# Patient Record
Sex: MALE | Race: WHITE | NOT HISPANIC OR LATINO | Employment: UNEMPLOYED | URBAN - METROPOLITAN AREA
[De-identification: names, ages, dates, MRNs, and addresses within clinical notes are randomized per-mention and may not be internally consistent; named-entity substitution may affect disease eponyms.]

---

## 2017-03-20 ENCOUNTER — LAB CONVERSION - ENCOUNTER (OUTPATIENT)
Dept: OTHER | Facility: OTHER | Age: 62
End: 2017-03-20

## 2017-03-20 LAB
A/G RATIO (HISTORICAL): 1.5 (CALC) (ref 1–2.5)
ALBUMIN SERPL BCP-MCNC: 4.3 G/DL (ref 3.6–5.1)
ALP SERPL-CCNC: 87 U/L (ref 40–115)
ALT SERPL W P-5'-P-CCNC: 11 U/L (ref 9–46)
AST SERPL W P-5'-P-CCNC: 11 U/L (ref 10–35)
BASOPHILS # BLD AUTO: 0.4 %
BASOPHILS # BLD AUTO: 32 CELLS/UL (ref 0–200)
BILIRUB SERPL-MCNC: 0.8 MG/DL (ref 0.2–1.2)
BUN SERPL-MCNC: 10 MG/DL (ref 7–25)
BUN/CREA RATIO (HISTORICAL): ABNORMAL (CALC) (ref 6–22)
CALCIUM SERPL-MCNC: 9.4 MG/DL (ref 8.6–10.3)
CHLORIDE SERPL-SCNC: 102 MMOL/L (ref 98–110)
CHOLEST SERPL-MCNC: 218 MG/DL (ref 125–200)
CHOLEST/HDLC SERPL: 5.2 (CALC)
CO2 SERPL-SCNC: 23 MMOL/L (ref 20–31)
CREAT SERPL-MCNC: 0.88 MG/DL (ref 0.7–1.33)
CREATININE, RANDOM URINE (HISTORICAL): 66 MG/DL (ref 20–370)
DEPRECATED RDW RBC AUTO: 14.6 % (ref 11–15)
EGFR AFRICAN AMERICAN (HISTORICAL): 109 ML/MIN/1.73M2
EGFR-AMERICAN CALC (HISTORICAL): 94 ML/MIN/1.73M2
EOSINOPHIL # BLD AUTO: 277 CELLS/UL (ref 15–500)
EOSINOPHIL # BLD AUTO: 3.5 %
GAMMA GLOBULIN (HISTORICAL): 2.8 G/DL (CALC) (ref 1.9–3.7)
GLUCOSE (HISTORICAL): 150 MG/DL (ref 65–99)
HBA1C MFR BLD HPLC: 7 % OF TOTAL HGB
HCT VFR BLD AUTO: 47.1 % (ref 38.5–50)
HDLC SERPL-MCNC: 42 MG/DL
HGB BLD-MCNC: 15.4 G/DL (ref 13.2–17.1)
LDL CHOLESTEROL (HISTORICAL): 152 MG/DL (CALC)
LYMPHOCYTES # BLD AUTO: 1414 CELLS/UL (ref 850–3900)
LYMPHOCYTES # BLD AUTO: 17.9 %
MAGNESIUM, UR (HISTORICAL): 3.2 MG/DL
MCH RBC QN AUTO: 29.7 PG (ref 27–33)
MCHC RBC AUTO-ENTMCNC: 32.8 G/DL (ref 32–36)
MCV RBC AUTO: 90.6 FL (ref 80–100)
MICROALBUMIN/CREATININE RATIO (HISTORICAL): 48 MCG/MG CREAT
MONOCYTES # BLD AUTO: 474 CELLS/UL (ref 200–950)
MONOCYTES (HISTORICAL): 6 %
NEUTROPHILS # BLD AUTO: 5704 CELLS/UL (ref 1500–7800)
NEUTROPHILS # BLD AUTO: 72.2 %
NON-HDL-CHOL (CHOL-HDL) (HISTORICAL): 176 MG/DL (CALC)
PLATELET # BLD AUTO: 222 THOUSAND/UL (ref 140–400)
PMV BLD AUTO: 8.9 FL (ref 7.5–12.5)
POTASSIUM SERPL-SCNC: 4 MMOL/L (ref 3.5–5.3)
RBC # BLD AUTO: 5.2 MILLION/UL (ref 4.2–5.8)
SODIUM SERPL-SCNC: 136 MMOL/L (ref 135–146)
TOTAL PROTEIN (HISTORICAL): 7.1 G/DL (ref 6.1–8.1)
TRIGL SERPL-MCNC: 118 MG/DL
TSH SERPL DL<=0.05 MIU/L-ACNC: 0.48 MIU/L (ref 0.4–4.5)
WBC # BLD AUTO: 7.9 THOUSAND/UL (ref 3.8–10.8)

## 2017-03-22 ENCOUNTER — ALLSCRIPTS OFFICE VISIT (OUTPATIENT)
Dept: OTHER | Facility: OTHER | Age: 62
End: 2017-03-22

## 2017-06-16 DIAGNOSIS — I10 ESSENTIAL (PRIMARY) HYPERTENSION: ICD-10-CM

## 2017-06-16 DIAGNOSIS — E78.5 HYPERLIPIDEMIA: ICD-10-CM

## 2017-06-16 DIAGNOSIS — Z12.5 ENCOUNTER FOR SCREENING FOR MALIGNANT NEOPLASM OF PROSTATE: ICD-10-CM

## 2017-06-16 DIAGNOSIS — E11.9 TYPE 2 DIABETES MELLITUS WITHOUT COMPLICATIONS (HCC): ICD-10-CM

## 2018-01-12 VITALS
BODY MASS INDEX: 49.38 KG/M2 | TEMPERATURE: 97.9 F | DIASTOLIC BLOOD PRESSURE: 74 MMHG | HEIGHT: 66 IN | HEART RATE: 84 BPM | SYSTOLIC BLOOD PRESSURE: 132 MMHG | WEIGHT: 307.25 LBS

## 2018-01-13 NOTE — PROGRESS NOTES
Assessment    1  Morbid or severe obesity due to excess calories (278 01) (E66 01)   2  Type 2 diabetes mellitus (250 00) (E11 9)   3  Essential hypertension (401 9) (I10)   4  Hyperlipidemia (272 4) (E78 5)    Plan  Essential hypertension, Hyperlipidemia, Type 2 diabetes mellitus    · (1) COMPREHENSIVE METABOLIC PANEL; Status:Active; Requested for:24May2016;    · (1) HEMOGLOBIN A1C; Status:Active; Requested for:24May2016;    · (1) LIPID PANEL, FASTING; Status:Active; Requested for:24May2016;   Type 2 diabetes mellitus    · MetFORMIN HCl  MG Oral Tablet Extended Release 24 Hour; TAKE  3   TABLETS BY MOUTH ONCE DAILY   · *VB - Eye Exam; Status:Active; Requested for:15Jan2016;    · *VB-Foot Exam; Status:Complete;   Done: [id number]    Discussion/Summary    Patient presents for followup of chronic medical conditions  He is motivated to start healthy diet, exercise, lose weight  His blood pressure is well controlled with present medication regimen  His LDL is elevated, he is motivated to adjust his diet and lose weight  We will hold off new medications today, he will start fatty 3 [place] acid supplements/flaxseed oil  We will increase dose of metformin to 1500 mg daily  Patient will start vitamin D3 4000 units once a day for a month and then will use 2000 units once a day from then on  He is unable to proceed with colonoscopy due to high insurance co-pays, pt denies abdominal pain, dysuria, melena or bright red blood per rectum  His PSA is normal  No recurrences of kidney stones  Chronic low back pain/stable  I strongly advised him to schedule annual diabetic eye exam   We will repeat blood work and followup in 4-5 month  Possible side effects of new medications were reviewed with the patient/guardian today  The treatment plan was reviewed with the patient/guardian   The patient/guardian understands and agrees with the treatment plan   The patient was counseled regarding diagnostic results, instructions for management, risk factor reductions, impressions  Chief Complaint  Pt is here for a 6 month f/u appt  Pt offers no complaints  Recent BW done  All meds/allergies reviewed with pt  Patient presents for followup of hypertension, obesity, impaired glucose tolerance  He has been under a lot of stress and was not following healthy diet  He gained 20 pounds  He is not monitoring blood sugars at home  He is using over the counter vitamin D3 but is not consistent with medication  Results of recent blood work discussed with patient  Hemoglobin A1c 6 4  LDL is elevated 140  Patient is compliant with Norvasc and Cozaar for hypertension  Patient is here today for follow up of chronic conditions described in HPI  Review of Systems    Constitutional: No fever or chills, feels well, no tiredness, no recent weight gain or weight loss  Eyes: No complaints of eye pain, no red eyes, no discharge from eyes, no itchy eyes  ENT: no complaints of earache, no hearing loss, no nosebleeds, no nasal discharge, no sore throat, no hoarseness  Cardiovascular: No complaints of slow heart rate, no fast heart rate, no chest pain, no palpitations, no leg claudication, no lower extremity  Respiratory: No complaints of shortness of breath, no wheezing, no cough, no SOB on exertion, no orthopnea or PND  Gastrointestinal: No complaints of abdominal pain, no constipation, no nausea or vomiting, no diarrhea or bloody stools  Genitourinary: No complaints of dysuria, no incontinence, no hesitancy, no nocturia, no genital lesion, no testicular pain  Musculoskeletal: No complaints of arthralgia, no myalgias, no joint swelling or stiffness, no limb pain or swelling  Integumentary: No complaints of skin rash or skin lesions, no itching, no skin wound, no dry skin  Neurological: No compliants of headache, no confusion, no convulsions, no numbness or tingling, no dizziness or fainting, no limb weakness, no difficulty walking  Psychiatric: Is not suicidal, no sleep disturbances, no anxiety or depression, no change in personality, no emotional problems  Endocrine: No complaints of proptosis, no hot flashes, no muscle weakness, no erectile dysfunction, no deepening of the voice, no feelings of weakness  Hematologic/Lymphatic: No complaints of swollen glands, no swollen glands in the neck, does not bleed easily, no easy bruising  Active Problems    1  Acute maxillary sinusitis, recurrence not specified (461 0) (J01 00)   2  Dysfunction of both Eustachian tubes (381 81) (H69 83)   3  Eczema (692 9) (L30 9)   4  Essential hypertension (401 9) (I10)   5  Impacted cerumen of both ears (380 4) (H61 23)   6  Impaired fasting glucose (790 21) (R73 01)   7  Morbid or severe obesity due to excess calories (278 01) (E66 01)   8  Nephrolithiasis (592 0) (N20 0)   9  Sebaceous cyst (706 2) (L72 3)   10  Type 2 diabetes mellitus (250 00) (E11 9)   11  Vitamin D deficiency (268 9) (E55 9)    Past Medical History    1  History of Measles ([place]) (055 9)   2  History of Mumps (072 9)   3  History of Reported Prostate Antigen Blood Test   4  History of Snoring Loudly    The active problems and past medical history were reviewed and updated today  Family History    1  Family history of Diabetes Mellitus Under Control   2  Family history of Ovarian Cancer (V16 41)   3  Family history of Reported Family History Of Heart Disease   4  Family history of Type 2 Diabetes Mellitus    5  Family history of Colon Cancer (V16 0)   6  Family history of Heart Disease (V17 49)   7  Family history of Nephrolithiasis    The family history was reviewed and updated today  Social History    · Alcohol Use (History)   · Never A Smoker  The social history was reviewed and updated today  Current Meds   1  AmLODIPine Besylate 10 MG Oral Tablet; take one tablet by mouth daily; Therapy: 11Rap2344 to (Evaluate:18Jun2016)  Requested for: 43TIG2371;  Last Rx: 15XIN1039 Ordered   2  Clobetasol Propionate 0 05 % External Cream; APPLY SPARINGLY TO AFFECTED   AREA(S) 3 TIMES A DAY; Therapy: 81RJJ5695 to (Evaluate:28Ivk2411)  Requested for: 38LIR5314; Last   ZD:40YNU4042 Ordered   3  Ecotrin Low Strength 81 MG Oral Tablet Delayed Release; TAKE 1 TABLET DAILY AS   DIRECTED; Therapy: 83IHJ3643 to (Evaluate:48Mod0778); Last Rx:24Whc0409 Ordered   4  Losartan Potassium 100 MG Oral Tablet; Take 1 tablet daily; Therapy: 19HQV5175 to (Evaluate:18Jun2016)  Requested for: 93WUW6396; Last   ZQ:16RFB2119 Ordered   5  MetFORMIN HCl  MG Oral Tablet Extended Release 24 Hour; TAKE2 TABLETS BY   MOUTH ONCE DAILY; Therapy: 82MHX8055 to (Evaluate:18Jun2016)  Requested for: 21UCP6903; Last   Rx:53Vbz8479 Ordered    The medication list was reviewed and updated today  Allergies    1  No Known Drug Allergies    Vitals  Vital Signs [Data Includes: Current Encounter]    Recorded: 05YDA4019 07:33AM   Temperature 97 6 F   Heart Rate 80   Systolic 414   Diastolic 90   Height 5 ft 6 in   Weight 310 lb 2 08 oz   BMI Calculated 50 06   BSA Calculated 2 42     Physical Exam    Constitutional   General appearance: No acute distress, well appearing and well nourished  132/84 LC  Eyes   Conjunctiva and lids: No swelling, erythema, or discharge  Pulmonary   Respiratory effort: No increased work of breathing or signs of respiratory distress  Auscultation of lungs: Clear to auscultation, equal breath sounds bilaterally, no wheezes, no rales, no rhonci  Cardiovascular   Auscultation of heart: Normal rate and rhythm, normal S1 and S2, without murmurs  Examination of extremities for edema and/or varicosities: Normal     Carotid pulses: Normal     Abdomen   Abdomen: Non-tender, no masses  No abdominal bruit  Musculoskeletal   Gait and station: Normal     Neurologic   Cranial nerves: Cranial nerves 2-12 intact      Psychiatric   Orientation to person, place and time: Normal  Mood and affect: Normal     Diabetic Foot Screen: Normal        Results/Data  Results   (1) CBC/PLT/DIFF 60YHR3932 09:14AM [facility]     Test Name Result Flag Reference   WHITE BLOOD CELL COUNT 5 4 Thousand/uL  3 8-10 8   RED BLOOD CELL COUNT 5 08 Million/uL  4 20-5 80   HEMOGLOBIN 14 9 g/dL  13 2-17 1   HEMATOCRIT 45 1 %  38 5-50 0   MCV 88 9 fL  80 0-100 0   MCH 29 3 pg  27 0-33 0   MCHC 33 0 g/dL  32 0-36 0   RDW 15 3 % H 11 0-15 0   PLATELET COUNT 541 Thousand/uL  140-400   MPV 9 2 fL  7 5-11 5   ABSOLUTE NEUTROPHILS 3667 cells/uL  0729-6708   ABSOLUTE LYMPHOCYTES 1183 cells/uL  850-3900   ABSOLUTE MONOCYTES 383 cells/uL  200-950   ABSOLUTE EOSINOPHILS 157 cells/uL     ABSOLUTE BASOPHILS 11 cells/uL  0-200   NEUTROPHILS 67 9 %     LYMPHOCYTES 21 9 %     MONOCYTES 7 1 %     EOSINOPHILS 2 9 %     BASOPHILS 0 2 %       (1) COMPREHENSIVE METABOLIC PANEL [id number] 23:31MJ [facility]     Test Name Result Flag Reference   GLUCOSE 125 mg/dL H 65-99   Fasting reference interval   UREA NITROGEN (BUN) 16 mg/dL  7-25   CREATININE 0 87 mg/dL  0 70-1 33   For patients >52years of age, the reference limit  for Creatinine is approximately 13% higher for people  identified as -American  eGFR NON-AFR   AMERICAN 95 mL/min/1 73m2  > OR = 60   eGFR AFRICAN AMERICAN 110 mL/min/1 73m2  > OR = 60   BUN/CREATININE RATIO   5-68   NOT APPLICABLE (calc)   SODIUM 137 mmol/L  135-146   POTASSIUM 4 3 mmol/L  3 5-5 3   CHLORIDE 102 mmol/L     CARBON DIOXIDE 23 mmol/L  19-30   CALCIUM 9 4 mg/dL  8 6-10 3   PROTEIN, TOTAL 6 7 g/dL  6 1-8 1   ALBUMIN 4 1 g/dL  3 6-5 1   GLOBULIN 2 6 g/dL (calc)  1 9-3 7   ALBUMIN/GLOBULIN RATIO 1 6 (calc)  1 0-2 5   BILIRUBIN, TOTAL 0 6 mg/dL  0 2-1 2   ALKALINE PHOSPHATASE 66 U/L     AST 15 U/L  10-35   ALT 17 U/L  9-46     (1) LIPID PANEL, FASTING 30LOQ4724 09:14AM Bonnie Nim     Test Name Result Flag Reference   CHOLESTEROL, TOTAL 204 mg/dL H 125-200 HDL CHOLESTEROL 44 mg/dL  > OR = 40   TRIGLICERIDES 97 mg/dL  <618   LDL-CHOLESTEROL 141 mg/dL (calc) H <130   Desirable range <100 mg/dL for patients with CHD or  diabetes and <70 mg/dL for diabetic patients with  known heart disease  CHOL/HDLC RATIO 4 6 (calc)  < OR = 5 0   NON HDL CHOLESTEROL 160 mg/dL (calc) H    Target for non-HDL cholesterol is 30 mg/dL higher than   LDL cholesterol target  (Q) MICROALBUMIN, RANDOM URINE (W/CREATININE) 16JYY6717 09:14AM Trent Holt     Test Name Result Flag Reference   CREATININE, RANDOM URINE 73 mg/dL     MICROALBUMIN 0 3 mg/dL     Reference Range  Not established   MICROALBUMIN/CREATININE$RATIO, RANDOM URINE 4 mcg/mg creat  <30   The ADA defines abnormalities in albumin  excretion as follows:     Category         Result (mcg/mg creatinine)     Normal                    <30  Microalbuminuria            Clinical albuminuria   > OR = 300     The ADA recommends that at least two of three  specimens collected within a 3-6 month period be  abnormal before considering a patient to be  within a diagnostic category  (Q) TSH, 3RD GENERATION 24KVX6165 09:14AM Trent Jonathon     Test Name Result Flag Reference   TSH 0 51 mIU/L  0 40-4 50     (1) PSA, DIAGNOSTIC (FOLLOW-UP) 96HVS5107 09:14AM Trent Jonathon     Test Name Result Flag Reference   PSA, TOTAL 0 6 ng/mL  < OR = 4 0   This test was performed using the Siemens  chemiluminescent method  Values obtained from  different assay methods cannot be used  interchangeably  PSA levels, regardless of  value, should not be interpreted as absolute  evidence of the presence or absence of disease       *(Q) VITAMIN D, 25-HYDROXY, LC/MS/MS 64XCN6461 09:14AM Trent Jonathon     Test Name Result Flag Reference   VITAMIN D, 25-OH, TOTAL 25 ng/mL L    Vitamin D Status         25-OH Vitamin D:     Deficiency:                    <20 ng/mL  Insufficiency:             20 - 29 ng/mL  Optimal: > or = 30 ng/mL     For 25-OH Vitamin D testing on patients on   D2-supplementation and patients for whom quantitation   of D2 and D3 fractions is required, the QuestAssureD(TM)  25-OH VIT D, (D2,D3), LC/MS/MS is recommended: order   code 32779 (patients >2yrs)  For more information on this test, go to:  http://Tappit/faq/KKS752  (This link is being provided for   informational/educational purposes only )     (Q) HEMOGLOBIN A1c 12Jan2016 09:14AM Aung Bullock   REPORT COMMENT:  FASTING:YES     Test Name Result Flag Reference   HEMOGLOBIN A1c 6 4 % of total Hgb H <5 7   According to ADA guidelines, hemoglobin A1c <7 0%  represents optimal control in non-pregnant diabetic  patients  Different metrics may apply to specific  patient populations  Standards of Medical Care in    Diabetes Care  2013;36:s11-s66     For the purpose of screening for the presence of  diabetes  <5 7%       Consistent with the absence of diabetes  5 7-6 4%    Consistent with increased risk for diabetes              (prediabetes)  >or=6 5%    Consistent with diabetes     This assay result is consistent with a higher risk  of diabetes  Currently, no consensus exists for use of hemoglobin  A1c for diagnosis of diabetes for children  Future Appointments    Date/Time Provider Specialty Site   07/13/2016 07:30 AM PAOLA Rodriguez  Family Medicine [address] [place] [address]     Signatures   Electronically signed by :  PAOLA Calabrese ; David 15 2016  6:20AM EST                       (Author)

## 2019-03-18 ENCOUNTER — TELEPHONE (OUTPATIENT)
Dept: FAMILY MEDICINE CLINIC | Facility: CLINIC | Age: 64
End: 2019-03-18

## 2019-03-19 RX ORDER — METFORMIN HYDROCHLORIDE 500 MG/1
TABLET, EXTENDED RELEASE ORAL
COMMUNITY
Start: 2013-10-01 | End: 2019-03-20 | Stop reason: SDUPTHER

## 2019-03-19 RX ORDER — ATORVASTATIN CALCIUM 10 MG/1
1 TABLET, FILM COATED ORAL DAILY
COMMUNITY
Start: 2017-03-22 | End: 2019-03-20 | Stop reason: ALTCHOICE

## 2019-03-19 RX ORDER — ACETAMINOPHEN 500 MG
500 TABLET ORAL EVERY 6 HOURS PRN
COMMUNITY
End: 2020-09-03 | Stop reason: ALTCHOICE

## 2019-03-19 RX ORDER — AMLODIPINE BESYLATE 10 MG/1
1 TABLET ORAL DAILY
COMMUNITY
Start: 2013-09-17 | End: 2019-03-20 | Stop reason: ALTCHOICE

## 2019-03-19 RX ORDER — ASPIRIN 81 MG/1
1 TABLET ORAL DAILY
COMMUNITY
Start: 2014-06-25 | End: 2019-08-08 | Stop reason: SDUPTHER

## 2019-03-19 NOTE — TELEPHONE ENCOUNTER
Please contact patient  I received a letter from Orthopedic surgery that he is scheduled for total knee replacement on April 11th  I have not seen him in the office in 2 years  I did notice that his blood pressure was quite high during recent office with Orthopedic surgery  He is currently scheduled to see me on March 25th for left total knee replacement clearance  We need to reschedule his preop at earlier date, please check with me regarding timing, I will try to accommodate patient

## 2019-03-20 ENCOUNTER — OFFICE VISIT (OUTPATIENT)
Dept: FAMILY MEDICINE CLINIC | Facility: CLINIC | Age: 64
End: 2019-03-20
Payer: COMMERCIAL

## 2019-03-20 VITALS
HEIGHT: 65 IN | DIASTOLIC BLOOD PRESSURE: 88 MMHG | HEART RATE: 96 BPM | BODY MASS INDEX: 39.29 KG/M2 | SYSTOLIC BLOOD PRESSURE: 140 MMHG | WEIGHT: 235.8 LBS | TEMPERATURE: 98.1 F | RESPIRATION RATE: 16 BRPM

## 2019-03-20 DIAGNOSIS — E11.69 DIABETES MELLITUS TYPE 2 IN OBESE (HCC): Primary | ICD-10-CM

## 2019-03-20 DIAGNOSIS — E66.01 SEVERE OBESITY (HCC): ICD-10-CM

## 2019-03-20 DIAGNOSIS — Z12.5 ENCOUNTER FOR PROSTATE CANCER SCREENING: ICD-10-CM

## 2019-03-20 DIAGNOSIS — M17.12 OSTEOARTHRITIS OF LEFT KNEE, UNSPECIFIED OSTEOARTHRITIS TYPE: ICD-10-CM

## 2019-03-20 DIAGNOSIS — B35.3 TINEA PEDIS OF BOTH FEET: ICD-10-CM

## 2019-03-20 DIAGNOSIS — I10 ESSENTIAL HYPERTENSION: ICD-10-CM

## 2019-03-20 DIAGNOSIS — Z01.818 PRE-OP EXAM: ICD-10-CM

## 2019-03-20 DIAGNOSIS — R63.4 WEIGHT LOSS, NON-INTENTIONAL: ICD-10-CM

## 2019-03-20 DIAGNOSIS — Z11.59 NEED FOR HEPATITIS C SCREENING TEST: ICD-10-CM

## 2019-03-20 DIAGNOSIS — E78.49 OTHER HYPERLIPIDEMIA: ICD-10-CM

## 2019-03-20 DIAGNOSIS — E66.9 DIABETES MELLITUS TYPE 2 IN OBESE (HCC): Primary | ICD-10-CM

## 2019-03-20 DIAGNOSIS — Z28.21 REFUSED INFLUENZA VACCINE: ICD-10-CM

## 2019-03-20 LAB
SL AMB  POCT GLUCOSE, UA: 2000
SL AMB LEUKOCYTE ESTERASE,UA: NORMAL
SL AMB POCT BILIRUBIN,UA: NORMAL
SL AMB POCT BLOOD,UA: NORMAL
SL AMB POCT CLARITY,UA: CLEAR
SL AMB POCT COLOR,UA: YELLOW
SL AMB POCT KETONES,UA: NORMAL
SL AMB POCT NITRITE,UA: NORMAL
SL AMB POCT PH,UA: 5
SL AMB POCT SPECIFIC GRAVITY,UA: 1
SL AMB POCT URINE PROTEIN: NORMAL
SL AMB POCT UROBILINOGEN: 0.2

## 2019-03-20 PROCEDURE — 4010F ACE/ARB THERAPY RXD/TAKEN: CPT | Performed by: FAMILY MEDICINE

## 2019-03-20 PROCEDURE — 99215 OFFICE O/P EST HI 40 MIN: CPT | Performed by: FAMILY MEDICINE

## 2019-03-20 PROCEDURE — 81003 URINALYSIS AUTO W/O SCOPE: CPT | Performed by: FAMILY MEDICINE

## 2019-03-20 PROCEDURE — 3725F SCREEN DEPRESSION PERFORMED: CPT | Performed by: FAMILY MEDICINE

## 2019-03-20 RX ORDER — KETOCONAZOLE 20 MG/G
CREAM TOPICAL
Qty: 60 G | Refills: 1 | Status: SHIPPED | OUTPATIENT
Start: 2019-03-20 | End: 2020-09-03 | Stop reason: ALTCHOICE

## 2019-03-20 RX ORDER — METFORMIN HYDROCHLORIDE 500 MG/1
TABLET, EXTENDED RELEASE ORAL
Qty: 270 TABLET | Refills: 1 | Status: SHIPPED | OUTPATIENT
Start: 2019-03-20 | End: 2019-04-04 | Stop reason: SDUPTHER

## 2019-03-20 RX ORDER — LISINOPRIL 10 MG/1
10 TABLET ORAL DAILY
Qty: 90 TABLET | Refills: 3 | Status: SHIPPED | OUTPATIENT
Start: 2019-03-20 | End: 2019-05-09 | Stop reason: SDUPTHER

## 2019-03-21 ENCOUNTER — TELEPHONE (OUTPATIENT)
Dept: FAMILY MEDICINE CLINIC | Facility: CLINIC | Age: 64
End: 2019-03-21

## 2019-03-21 DIAGNOSIS — E66.9 DIABETES MELLITUS TYPE 2 IN OBESE (HCC): Primary | ICD-10-CM

## 2019-03-21 DIAGNOSIS — E11.69 DIABETES MELLITUS TYPE 2 IN OBESE (HCC): Primary | ICD-10-CM

## 2019-03-22 NOTE — TELEPHONE ENCOUNTER
Patient is aware of MD instructions, orders placed and faxed  Follow-up appointment scheduled  Orders placed and faxed

## 2019-03-22 NOTE — TELEPHONE ENCOUNTER
Please contact patient  I reviewed his blood work results and EKG performed at [facility]  Please advise him to continue metformin and lisinopril that I have prescribed  Please fax prescription to his pharmacy for Accu strips, use t i d  Number 90 and 5 refills  Please advise patient to proceed with additional blood work next week  Please advise him to start monitoring blood sugars at home a 3 times a day  Please make follow-up with me in 2 weeks    Thank you

## 2019-03-23 PROBLEM — E66.01 SEVERE OBESITY (HCC): Status: ACTIVE | Noted: 2019-03-23

## 2019-03-23 PROBLEM — M17.12 OSTEOARTHRITIS OF LEFT KNEE: Status: ACTIVE | Noted: 2019-03-23

## 2019-03-29 ENCOUNTER — TELEPHONE (OUTPATIENT)
Dept: FAMILY MEDICINE CLINIC | Facility: CLINIC | Age: 64
End: 2019-03-29

## 2019-03-29 ENCOUNTER — APPOINTMENT (OUTPATIENT)
Dept: LAB | Facility: CLINIC | Age: 64
End: 2019-03-29
Payer: COMMERCIAL

## 2019-03-29 DIAGNOSIS — Z11.59 NEED FOR HEPATITIS C SCREENING TEST: ICD-10-CM

## 2019-03-29 DIAGNOSIS — E11.69 DIABETES MELLITUS TYPE 2 IN OBESE (HCC): ICD-10-CM

## 2019-03-29 DIAGNOSIS — I10 ESSENTIAL HYPERTENSION: ICD-10-CM

## 2019-03-29 DIAGNOSIS — E66.9 DIABETES MELLITUS TYPE 2 IN OBESE (HCC): ICD-10-CM

## 2019-03-29 DIAGNOSIS — Z12.5 ENCOUNTER FOR PROSTATE CANCER SCREENING: ICD-10-CM

## 2019-03-29 LAB
ALBUMIN SERPL BCP-MCNC: 3.1 G/DL (ref 3.5–5)
ALP SERPL-CCNC: 78 U/L (ref 46–116)
ALT SERPL W P-5'-P-CCNC: 10 U/L (ref 12–78)
ANION GAP SERPL CALCULATED.3IONS-SCNC: 9 MMOL/L (ref 4–13)
AST SERPL W P-5'-P-CCNC: 8 U/L (ref 5–45)
BILIRUB SERPL-MCNC: 0.54 MG/DL (ref 0.2–1)
BUN SERPL-MCNC: 15 MG/DL (ref 5–25)
CALCIUM SERPL-MCNC: 9.3 MG/DL (ref 8.3–10.1)
CHLORIDE SERPL-SCNC: 101 MMOL/L (ref 100–108)
CHOLEST SERPL-MCNC: 174 MG/DL (ref 50–200)
CO2 SERPL-SCNC: 24 MMOL/L (ref 21–32)
CREAT SERPL-MCNC: 0.96 MG/DL (ref 0.6–1.3)
GFR SERPL CREATININE-BSD FRML MDRD: 85 ML/MIN/1.73SQ M
GLUCOSE P FAST SERPL-MCNC: 327 MG/DL (ref 65–99)
HCV AB SER QL: NORMAL
HDLC SERPL-MCNC: 31 MG/DL (ref 40–60)
LDLC SERPL CALC-MCNC: 112 MG/DL (ref 0–100)
NONHDLC SERPL-MCNC: 143 MG/DL
POTASSIUM SERPL-SCNC: 3.7 MMOL/L (ref 3.5–5.3)
PROT SERPL-MCNC: 7.8 G/DL (ref 6.4–8.2)
PSA SERPL-MCNC: 0.3 NG/ML (ref 0–4)
SODIUM SERPL-SCNC: 134 MMOL/L (ref 136–145)
TRIGL SERPL-MCNC: 153 MG/DL
TSH SERPL DL<=0.05 MIU/L-ACNC: 0.71 UIU/ML (ref 0.36–3.74)

## 2019-03-29 PROCEDURE — 86803 HEPATITIS C AB TEST: CPT

## 2019-03-29 PROCEDURE — 36415 COLL VENOUS BLD VENIPUNCTURE: CPT

## 2019-03-29 PROCEDURE — G0103 PSA SCREENING: HCPCS

## 2019-03-29 PROCEDURE — 84443 ASSAY THYROID STIM HORMONE: CPT

## 2019-03-29 PROCEDURE — 80061 LIPID PANEL: CPT

## 2019-03-29 PROCEDURE — 80053 COMPREHEN METABOLIC PANEL: CPT

## 2019-03-29 NOTE — TELEPHONE ENCOUNTER
----- Message from Jamir Cortez MD sent at 3/29/2019  5:08 PM EDT -----  Please contact patient  I received his blood work  Fasting blood sugar is still quite high at 327  Please advise him to increase dose of metformin from 3 tablets once a day to 4 tablets once a day  Rest of the blood work looks normal/stable  Will discuss further at his follow-up appointment next week    Thank you

## 2019-04-02 RX ORDER — BLOOD SUGAR DIAGNOSTIC
STRIP MISCELLANEOUS
COMMUNITY
Start: 2019-03-22

## 2019-04-02 RX ORDER — LANCETS
EACH MISCELLANEOUS
COMMUNITY
Start: 2019-03-22

## 2019-04-04 ENCOUNTER — OFFICE VISIT (OUTPATIENT)
Dept: FAMILY MEDICINE CLINIC | Facility: CLINIC | Age: 64
End: 2019-04-04
Payer: COMMERCIAL

## 2019-04-04 VITALS
RESPIRATION RATE: 16 BRPM | TEMPERATURE: 97.8 F | SYSTOLIC BLOOD PRESSURE: 112 MMHG | DIASTOLIC BLOOD PRESSURE: 82 MMHG | HEIGHT: 65 IN | BODY MASS INDEX: 38.59 KG/M2 | HEART RATE: 84 BPM | WEIGHT: 231.6 LBS

## 2019-04-04 DIAGNOSIS — Z12.11 SCREENING FOR COLON CANCER: ICD-10-CM

## 2019-04-04 DIAGNOSIS — E11.69 DIABETES MELLITUS TYPE 2 IN OBESE (HCC): ICD-10-CM

## 2019-04-04 DIAGNOSIS — E66.9 DIABETES MELLITUS TYPE 2 IN OBESE (HCC): ICD-10-CM

## 2019-04-04 DIAGNOSIS — M17.12 OSTEOARTHRITIS OF LEFT KNEE, UNSPECIFIED OSTEOARTHRITIS TYPE: ICD-10-CM

## 2019-04-04 DIAGNOSIS — I10 ESSENTIAL HYPERTENSION: ICD-10-CM

## 2019-04-04 DIAGNOSIS — R63.4 WEIGHT LOSS, NON-INTENTIONAL: Primary | ICD-10-CM

## 2019-04-04 PROCEDURE — 3074F SYST BP LT 130 MM HG: CPT | Performed by: FAMILY MEDICINE

## 2019-04-04 PROCEDURE — 3079F DIAST BP 80-89 MM HG: CPT | Performed by: FAMILY MEDICINE

## 2019-04-04 PROCEDURE — 3008F BODY MASS INDEX DOCD: CPT | Performed by: FAMILY MEDICINE

## 2019-04-04 PROCEDURE — 99214 OFFICE O/P EST MOD 30 MIN: CPT | Performed by: FAMILY MEDICINE

## 2019-04-04 RX ORDER — METFORMIN HYDROCHLORIDE 500 MG/1
2000 TABLET, EXTENDED RELEASE ORAL DAILY
Qty: 360 TABLET | Refills: 1 | Status: SHIPPED | OUTPATIENT
Start: 2019-04-04 | End: 2019-08-08 | Stop reason: SDUPTHER

## 2019-04-04 RX ORDER — GLIPIZIDE 5 MG/1
5 TABLET, FILM COATED, EXTENDED RELEASE ORAL DAILY
Qty: 90 TABLET | Refills: 0 | Status: SHIPPED | OUTPATIENT
Start: 2019-04-04 | End: 2019-08-01 | Stop reason: SDUPTHER

## 2019-04-04 RX ORDER — MELOXICAM 15 MG/1
TABLET ORAL
Qty: 90 TABLET | Refills: 0 | Status: SHIPPED | OUTPATIENT
Start: 2019-04-04 | End: 2019-08-08

## 2019-04-10 ENCOUNTER — TELEPHONE (OUTPATIENT)
Dept: FAMILY MEDICINE CLINIC | Facility: CLINIC | Age: 64
End: 2019-04-10

## 2019-04-11 DIAGNOSIS — R63.4 WEIGHT LOSS, NON-INTENTIONAL: Primary | ICD-10-CM

## 2019-04-12 ENCOUNTER — TRANSCRIBE ORDERS (OUTPATIENT)
Dept: ADMINISTRATIVE | Facility: HOSPITAL | Age: 64
End: 2019-04-12

## 2019-04-12 DIAGNOSIS — R63.4 WEIGHT LOSS: ICD-10-CM

## 2019-04-12 DIAGNOSIS — I10 ESSENTIAL HYPERTENSION: Primary | ICD-10-CM

## 2019-04-12 DIAGNOSIS — E66.9 DIABETES MELLITUS TYPE 2 IN OBESE (HCC): ICD-10-CM

## 2019-04-12 DIAGNOSIS — E11.69 DIABETES MELLITUS TYPE 2 IN OBESE (HCC): ICD-10-CM

## 2019-04-15 ENCOUNTER — HOSPITAL ENCOUNTER (OUTPATIENT)
Dept: CT IMAGING | Facility: HOSPITAL | Age: 64
Discharge: HOME | End: 2019-04-15
Payer: COMMERCIAL

## 2019-04-15 DIAGNOSIS — R63.4 WEIGHT LOSS, NON-INTENTIONAL: ICD-10-CM

## 2019-04-15 PROCEDURE — 74177 CT ABD & PELVIS W/CONTRAST: CPT

## 2019-04-15 RX ADMIN — IOHEXOL 100 ML: 350 INJECTION, SOLUTION INTRAVENOUS at 17:41

## 2019-04-24 ENCOUNTER — TELEPHONE (OUTPATIENT)
Dept: FAMILY MEDICINE CLINIC | Facility: CLINIC | Age: 64
End: 2019-04-24

## 2019-04-24 DIAGNOSIS — R93.5 ABNORMAL CT SCAN, PELVIS: ICD-10-CM

## 2019-04-24 DIAGNOSIS — N32.89 BLADDER WALL THICKENING: Primary | ICD-10-CM

## 2019-04-27 ENCOUNTER — HOSPITAL ENCOUNTER (OUTPATIENT)
Dept: ULTRASOUND IMAGING | Facility: HOSPITAL | Age: 64
Discharge: HOME | End: 2019-04-27
Payer: COMMERCIAL

## 2019-04-27 DIAGNOSIS — N32.89 BLADDER WALL THICKENING: ICD-10-CM

## 2019-04-27 DIAGNOSIS — R93.5 ABNORMAL CT SCAN, PELVIS: ICD-10-CM

## 2019-04-27 PROCEDURE — 76770 US EXAM ABDO BACK WALL COMP: CPT

## 2019-05-02 DIAGNOSIS — N32.89 BLADDER WALL THICKENING: Primary | ICD-10-CM

## 2019-05-07 ENCOUNTER — CONSULT (OUTPATIENT)
Dept: UROLOGY | Facility: MEDICAL CENTER | Age: 64
End: 2019-05-07
Payer: COMMERCIAL

## 2019-05-07 VITALS
SYSTOLIC BLOOD PRESSURE: 120 MMHG | DIASTOLIC BLOOD PRESSURE: 80 MMHG | WEIGHT: 231 LBS | HEIGHT: 66 IN | HEART RATE: 97 BPM | BODY MASS INDEX: 37.12 KG/M2

## 2019-05-07 DIAGNOSIS — N32.89 BLADDER WALL THICKENING: Primary | ICD-10-CM

## 2019-05-07 DIAGNOSIS — R82.81 PYURIA: ICD-10-CM

## 2019-05-07 LAB
SL AMB  POCT GLUCOSE, UA: ABNORMAL
SL AMB LEUKOCYTE ESTERASE,UA: ABNORMAL
SL AMB POCT BILIRUBIN,UA: ABNORMAL
SL AMB POCT BLOOD,UA: ABNORMAL
SL AMB POCT CLARITY,UA: CLEAR
SL AMB POCT COLOR,UA: YELLOW
SL AMB POCT KETONES,UA: ABNORMAL
SL AMB POCT NITRITE,UA: POSITIVE
SL AMB POCT PH,UA: 5.5
SL AMB POCT SPECIFIC GRAVITY,UA: 1.02
SL AMB POCT URINE PROTEIN: ABNORMAL
SL AMB POCT UROBILINOGEN: 0.2

## 2019-05-07 PROCEDURE — 81003 URINALYSIS AUTO W/O SCOPE: CPT | Performed by: UROLOGY

## 2019-05-07 PROCEDURE — 99244 OFF/OP CNSLTJ NEW/EST MOD 40: CPT | Performed by: UROLOGY

## 2019-05-08 ENCOUNTER — APPOINTMENT (OUTPATIENT)
Dept: LAB | Facility: CLINIC | Age: 64
End: 2019-05-08
Payer: COMMERCIAL

## 2019-05-08 DIAGNOSIS — E66.9 DIABETES MELLITUS TYPE 2 IN OBESE (HCC): ICD-10-CM

## 2019-05-08 DIAGNOSIS — E11.69 DIABETES MELLITUS TYPE 2 IN OBESE (HCC): ICD-10-CM

## 2019-05-08 LAB
ALBUMIN SERPL BCP-MCNC: 3.5 G/DL (ref 3.5–5)
ALP SERPL-CCNC: 75 U/L (ref 46–116)
ALT SERPL W P-5'-P-CCNC: 11 U/L (ref 12–78)
ANION GAP SERPL CALCULATED.3IONS-SCNC: 9 MMOL/L (ref 4–13)
AST SERPL W P-5'-P-CCNC: 5 U/L (ref 5–45)
BILIRUB SERPL-MCNC: 0.54 MG/DL (ref 0.2–1)
BUN SERPL-MCNC: 15 MG/DL (ref 5–25)
CALCIUM SERPL-MCNC: 8.9 MG/DL (ref 8.3–10.1)
CHLORIDE SERPL-SCNC: 106 MMOL/L (ref 100–108)
CO2 SERPL-SCNC: 24 MMOL/L (ref 21–32)
CREAT SERPL-MCNC: 1.01 MG/DL (ref 0.6–1.3)
EST. AVERAGE GLUCOSE BLD GHB EST-MCNC: 200 MG/DL
GFR SERPL CREATININE-BSD FRML MDRD: 80 ML/MIN/1.73SQ M
GLUCOSE P FAST SERPL-MCNC: 135 MG/DL (ref 65–99)
HBA1C MFR BLD: 8.6 % (ref 4.2–6.3)
POTASSIUM SERPL-SCNC: 4 MMOL/L (ref 3.5–5.3)
PROT SERPL-MCNC: 7.7 G/DL (ref 6.4–8.2)
SODIUM SERPL-SCNC: 139 MMOL/L (ref 136–145)

## 2019-05-08 PROCEDURE — 80053 COMPREHEN METABOLIC PANEL: CPT

## 2019-05-08 PROCEDURE — 83036 HEMOGLOBIN GLYCOSYLATED A1C: CPT

## 2019-05-08 PROCEDURE — 87077 CULTURE AEROBIC IDENTIFY: CPT | Performed by: UROLOGY

## 2019-05-08 PROCEDURE — 87147 CULTURE TYPE IMMUNOLOGIC: CPT | Performed by: UROLOGY

## 2019-05-08 PROCEDURE — 87186 SC STD MICRODIL/AGAR DIL: CPT | Performed by: UROLOGY

## 2019-05-08 PROCEDURE — 36415 COLL VENOUS BLD VENIPUNCTURE: CPT

## 2019-05-08 PROCEDURE — 87086 URINE CULTURE/COLONY COUNT: CPT | Performed by: UROLOGY

## 2019-05-09 ENCOUNTER — OFFICE VISIT (OUTPATIENT)
Dept: FAMILY MEDICINE CLINIC | Facility: CLINIC | Age: 64
End: 2019-05-09
Payer: COMMERCIAL

## 2019-05-09 VITALS
WEIGHT: 240.4 LBS | HEART RATE: 77 BPM | DIASTOLIC BLOOD PRESSURE: 92 MMHG | HEIGHT: 66 IN | RESPIRATION RATE: 16 BRPM | SYSTOLIC BLOOD PRESSURE: 142 MMHG | OXYGEN SATURATION: 98 % | BODY MASS INDEX: 38.63 KG/M2 | TEMPERATURE: 97 F

## 2019-05-09 DIAGNOSIS — E66.01 SEVERE OBESITY (HCC): ICD-10-CM

## 2019-05-09 DIAGNOSIS — M17.12 OSTEOARTHRITIS OF LEFT KNEE, UNSPECIFIED OSTEOARTHRITIS TYPE: ICD-10-CM

## 2019-05-09 DIAGNOSIS — E11.69 DIABETES MELLITUS TYPE 2 IN OBESE (HCC): ICD-10-CM

## 2019-05-09 DIAGNOSIS — I10 ESSENTIAL HYPERTENSION: Primary | ICD-10-CM

## 2019-05-09 DIAGNOSIS — E66.9 DIABETES MELLITUS TYPE 2 IN OBESE (HCC): ICD-10-CM

## 2019-05-09 DIAGNOSIS — N32.89 BLADDER WALL THICKENING: ICD-10-CM

## 2019-05-09 PROCEDURE — 4010F ACE/ARB THERAPY RXD/TAKEN: CPT | Performed by: FAMILY MEDICINE

## 2019-05-09 PROCEDURE — 99214 OFFICE O/P EST MOD 30 MIN: CPT | Performed by: FAMILY MEDICINE

## 2019-05-09 PROCEDURE — 93000 ELECTROCARDIOGRAM COMPLETE: CPT | Performed by: FAMILY MEDICINE

## 2019-05-09 RX ORDER — LISINOPRIL 20 MG/1
20 TABLET ORAL DAILY
Qty: 30 TABLET | Refills: 3 | Status: SHIPPED | OUTPATIENT
Start: 2019-05-09 | End: 2019-08-08 | Stop reason: SDUPTHER

## 2019-05-10 ENCOUNTER — TELEPHONE (OUTPATIENT)
Dept: UROLOGY | Facility: MEDICAL CENTER | Age: 64
End: 2019-05-10

## 2019-05-10 DIAGNOSIS — B95.2 UTI (URINARY TRACT INFECTION) DUE TO ENTEROCOCCUS: Primary | ICD-10-CM

## 2019-05-10 DIAGNOSIS — N39.0 UTI (URINARY TRACT INFECTION) DUE TO ENTEROCOCCUS: Primary | ICD-10-CM

## 2019-05-10 LAB
BACTERIA UR CULT: ABNORMAL
BACTERIA UR CULT: ABNORMAL

## 2019-05-10 RX ORDER — CEPHALEXIN 500 MG/1
500 CAPSULE ORAL EVERY 6 HOURS SCHEDULED
Qty: 28 CAPSULE | Refills: 0 | Status: SHIPPED | OUTPATIENT
Start: 2019-05-10 | End: 2019-05-13 | Stop reason: SDUPTHER

## 2019-05-13 ENCOUNTER — TELEPHONE (OUTPATIENT)
Dept: FAMILY MEDICINE CLINIC | Facility: CLINIC | Age: 64
End: 2019-05-13

## 2019-05-13 ENCOUNTER — PROCEDURE VISIT (OUTPATIENT)
Dept: UROLOGY | Facility: MEDICAL CENTER | Age: 64
End: 2019-05-13
Payer: COMMERCIAL

## 2019-05-13 VITALS
WEIGHT: 231 LBS | HEIGHT: 66 IN | BODY MASS INDEX: 37.12 KG/M2 | SYSTOLIC BLOOD PRESSURE: 154 MMHG | DIASTOLIC BLOOD PRESSURE: 98 MMHG

## 2019-05-13 DIAGNOSIS — N39.0 UTI (URINARY TRACT INFECTION) DUE TO ENTEROCOCCUS: ICD-10-CM

## 2019-05-13 DIAGNOSIS — B95.2 UTI (URINARY TRACT INFECTION) DUE TO ENTEROCOCCUS: ICD-10-CM

## 2019-05-13 DIAGNOSIS — M17.12 OSTEOARTHRITIS OF LEFT KNEE, UNSPECIFIED OSTEOARTHRITIS TYPE: Primary | ICD-10-CM

## 2019-05-13 DIAGNOSIS — N30.00 ACUTE CYSTITIS WITHOUT HEMATURIA: ICD-10-CM

## 2019-05-13 DIAGNOSIS — N32.89 BLADDER WALL THICKENING: Primary | ICD-10-CM

## 2019-05-13 LAB
SL AMB  POCT GLUCOSE, UA: ABNORMAL
SL AMB LEUKOCYTE ESTERASE,UA: ABNORMAL
SL AMB POCT BILIRUBIN,UA: ABNORMAL
SL AMB POCT BLOOD,UA: ABNORMAL
SL AMB POCT CLARITY,UA: CLEAR
SL AMB POCT COLOR,UA: YELLOW
SL AMB POCT KETONES,UA: ABNORMAL
SL AMB POCT NITRITE,UA: ABNORMAL
SL AMB POCT PH,UA: 5.5
SL AMB POCT SPECIFIC GRAVITY,UA: 1.01
SL AMB POCT URINE PROTEIN: ABNORMAL
SL AMB POCT UROBILINOGEN: 0.2

## 2019-05-13 PROCEDURE — 81003 URINALYSIS AUTO W/O SCOPE: CPT | Performed by: UROLOGY

## 2019-05-13 PROCEDURE — 52000 CYSTOURETHROSCOPY: CPT | Performed by: UROLOGY

## 2019-05-13 PROCEDURE — 99214 OFFICE O/P EST MOD 30 MIN: CPT | Performed by: UROLOGY

## 2019-05-13 RX ORDER — CEPHALEXIN 500 MG/1
500 CAPSULE ORAL EVERY 12 HOURS SCHEDULED
Qty: 14 CAPSULE | Refills: 0 | Status: SHIPPED | OUTPATIENT
Start: 2019-05-13 | End: 2019-05-20

## 2019-05-21 ENCOUNTER — TELEPHONE (OUTPATIENT)
Dept: FAMILY MEDICINE CLINIC | Facility: CLINIC | Age: 64
End: 2019-05-21

## 2019-05-22 ENCOUNTER — CONSULT (OUTPATIENT)
Dept: OBGYN CLINIC | Facility: OTHER | Age: 64
End: 2019-05-22
Payer: COMMERCIAL

## 2019-05-22 ENCOUNTER — TELEPHONE (OUTPATIENT)
Dept: FAMILY MEDICINE CLINIC | Facility: CLINIC | Age: 64
End: 2019-05-22

## 2019-05-22 ENCOUNTER — APPOINTMENT (OUTPATIENT)
Dept: RADIOLOGY | Facility: OTHER | Age: 64
End: 2019-05-22
Payer: COMMERCIAL

## 2019-05-22 VITALS
SYSTOLIC BLOOD PRESSURE: 160 MMHG | BODY MASS INDEX: 39.31 KG/M2 | HEIGHT: 66 IN | WEIGHT: 244.6 LBS | DIASTOLIC BLOOD PRESSURE: 118 MMHG | HEART RATE: 106 BPM

## 2019-05-22 DIAGNOSIS — I10 ESSENTIAL HYPERTENSION: Primary | ICD-10-CM

## 2019-05-22 DIAGNOSIS — M17.12 OSTEOARTHRITIS OF LEFT KNEE, UNSPECIFIED OSTEOARTHRITIS TYPE: Primary | ICD-10-CM

## 2019-05-22 DIAGNOSIS — M25.562 LEFT KNEE PAIN, UNSPECIFIED CHRONICITY: ICD-10-CM

## 2019-05-22 DIAGNOSIS — Z01.89 ENCOUNTER FOR LOWER EXTREMITY COMPARISON IMAGING STUDY: ICD-10-CM

## 2019-05-22 PROCEDURE — 73564 X-RAY EXAM KNEE 4 OR MORE: CPT

## 2019-05-22 PROCEDURE — 99204 OFFICE O/P NEW MOD 45 MIN: CPT | Performed by: ORTHOPAEDIC SURGERY

## 2019-05-22 PROCEDURE — 73562 X-RAY EXAM OF KNEE 3: CPT

## 2019-05-22 RX ORDER — CEFAZOLIN SODIUM 2 G/50ML
2000 SOLUTION INTRAVENOUS ONCE
Status: CANCELLED | OUTPATIENT
Start: 2019-06-10 | End: 2019-05-22

## 2019-05-22 RX ORDER — AMLODIPINE BESYLATE 5 MG/1
5 TABLET ORAL DAILY
Qty: 30 TABLET | Refills: 2 | Status: SHIPPED | OUTPATIENT
Start: 2019-05-22 | End: 2019-08-08 | Stop reason: SDUPTHER

## 2019-05-22 RX ORDER — CHLORHEXIDINE GLUCONATE 0.12 MG/ML
15 RINSE ORAL ONCE
Status: CANCELLED | OUTPATIENT
Start: 2019-05-22 | End: 2019-05-22

## 2019-05-27 ENCOUNTER — ANESTHESIA EVENT (OUTPATIENT)
Dept: PERIOP | Facility: HOSPITAL | Age: 64
DRG: 302 | End: 2019-05-27
Payer: COMMERCIAL

## 2019-05-27 RX ORDER — SODIUM CHLORIDE 9 MG/ML
125 INJECTION, SOLUTION INTRAVENOUS CONTINUOUS
Status: CANCELLED | OUTPATIENT
Start: 2019-06-10

## 2019-05-28 ENCOUNTER — APPOINTMENT (OUTPATIENT)
Dept: PREADMISSION TESTING | Facility: HOSPITAL | Age: 64
End: 2019-05-28
Payer: COMMERCIAL

## 2019-05-28 ENCOUNTER — APPOINTMENT (OUTPATIENT)
Dept: LAB | Facility: HOSPITAL | Age: 64
End: 2019-05-28
Attending: ORTHOPAEDIC SURGERY
Payer: COMMERCIAL

## 2019-05-28 DIAGNOSIS — M17.12 PRIMARY OSTEOARTHRITIS OF LEFT KNEE: ICD-10-CM

## 2019-05-28 LAB
ABO GROUP BLD: NORMAL
ANION GAP SERPL CALCULATED.3IONS-SCNC: 9 MMOL/L (ref 4–13)
APTT PPP: 28 SECONDS (ref 26–38)
BASOPHILS # BLD AUTO: 0.01 THOUSANDS/ΜL (ref 0–0.1)
BASOPHILS NFR BLD AUTO: 0 % (ref 0–1)
BLD GP AB SCN SERPL QL: NEGATIVE
BUN SERPL-MCNC: 19 MG/DL (ref 5–25)
CALCIUM SERPL-MCNC: 9.9 MG/DL (ref 8.3–10.1)
CHLORIDE SERPL-SCNC: 102 MMOL/L (ref 100–108)
CO2 SERPL-SCNC: 27 MMOL/L (ref 21–32)
CREAT SERPL-MCNC: 0.87 MG/DL (ref 0.6–1.3)
CRP SERPL QL: 3.5 MG/L
EOSINOPHIL # BLD AUTO: 0.17 THOUSAND/ΜL (ref 0–0.61)
EOSINOPHIL NFR BLD AUTO: 2 % (ref 0–6)
ERYTHROCYTE [DISTWIDTH] IN BLOOD BY AUTOMATED COUNT: 14.8 % (ref 11.6–15.1)
FERRITIN SERPL-MCNC: 251 NG/ML (ref 8–388)
GFR SERPL CREATININE-BSD FRML MDRD: 92 ML/MIN/1.73SQ M
GLUCOSE P FAST SERPL-MCNC: 116 MG/DL (ref 65–99)
HCT VFR BLD AUTO: 45 % (ref 36.5–49.3)
HGB BLD-MCNC: 14.9 G/DL (ref 12–17)
IMM GRANULOCYTES # BLD AUTO: 0.04 THOUSAND/UL (ref 0–0.2)
IMM GRANULOCYTES NFR BLD AUTO: 1 % (ref 0–2)
INR PPP: 0.99 (ref 0.86–1.17)
IRON SATN MFR SERPL: 24 %
IRON SERPL-MCNC: 83 UG/DL (ref 65–175)
LYMPHOCYTES # BLD AUTO: 1.48 THOUSANDS/ΜL (ref 0.6–4.47)
LYMPHOCYTES NFR BLD AUTO: 18 % (ref 14–44)
MCH RBC QN AUTO: 30 PG (ref 26.8–34.3)
MCHC RBC AUTO-ENTMCNC: 33.1 G/DL (ref 31.4–37.4)
MCV RBC AUTO: 91 FL (ref 82–98)
MONOCYTES # BLD AUTO: 0.53 THOUSAND/ΜL (ref 0.17–1.22)
MONOCYTES NFR BLD AUTO: 7 % (ref 4–12)
NEUTROPHILS # BLD AUTO: 5.87 THOUSANDS/ΜL (ref 1.85–7.62)
NEUTS SEG NFR BLD AUTO: 72 % (ref 43–75)
NRBC BLD AUTO-RTO: 0 /100 WBCS
PLATELET # BLD AUTO: 279 THOUSANDS/UL (ref 149–390)
PMV BLD AUTO: 9.6 FL (ref 8.9–12.7)
POTASSIUM SERPL-SCNC: 4 MMOL/L (ref 3.5–5.3)
PROTHROMBIN TIME: 13.2 SECONDS (ref 11.8–14.2)
RBC # BLD AUTO: 4.96 MILLION/UL (ref 3.88–5.62)
RH BLD: POSITIVE
SODIUM SERPL-SCNC: 138 MMOL/L (ref 136–145)
SPECIMEN EXPIRATION DATE: NORMAL
TIBC SERPL-MCNC: 343 UG/DL (ref 250–450)
WBC # BLD AUTO: 8.1 THOUSAND/UL (ref 4.31–10.16)

## 2019-05-28 PROCEDURE — 80048 BASIC METABOLIC PNL TOTAL CA: CPT

## 2019-05-28 PROCEDURE — 83540 ASSAY OF IRON: CPT

## 2019-05-28 PROCEDURE — 82728 ASSAY OF FERRITIN: CPT

## 2019-05-28 PROCEDURE — 36415 COLL VENOUS BLD VENIPUNCTURE: CPT

## 2019-05-28 PROCEDURE — 85025 COMPLETE CBC W/AUTO DIFF WBC: CPT

## 2019-05-28 PROCEDURE — 86850 RBC ANTIBODY SCREEN: CPT

## 2019-05-28 PROCEDURE — 86900 BLOOD TYPING SEROLOGIC ABO: CPT

## 2019-05-28 PROCEDURE — 86901 BLOOD TYPING SEROLOGIC RH(D): CPT

## 2019-05-28 PROCEDURE — 83550 IRON BINDING TEST: CPT

## 2019-05-28 PROCEDURE — 85610 PROTHROMBIN TIME: CPT

## 2019-05-28 PROCEDURE — 86140 C-REACTIVE PROTEIN: CPT

## 2019-05-28 PROCEDURE — 85730 THROMBOPLASTIN TIME PARTIAL: CPT

## 2019-05-29 DIAGNOSIS — E11.69 DIABETES MELLITUS TYPE 2 IN OBESE (HCC): Primary | ICD-10-CM

## 2019-05-29 DIAGNOSIS — E66.9 DIABETES MELLITUS TYPE 2 IN OBESE (HCC): Primary | ICD-10-CM

## 2019-05-30 ENCOUNTER — TRANSCRIBE ORDERS (OUTPATIENT)
Dept: LAB | Facility: CLINIC | Age: 64
End: 2019-05-30

## 2019-05-30 ENCOUNTER — APPOINTMENT (OUTPATIENT)
Dept: LAB | Facility: CLINIC | Age: 64
End: 2019-05-30
Payer: COMMERCIAL

## 2019-05-30 ENCOUNTER — TELEPHONE (OUTPATIENT)
Dept: OBGYN CLINIC | Facility: HOSPITAL | Age: 64
End: 2019-05-30

## 2019-05-30 DIAGNOSIS — I10 ESSENTIAL HYPERTENSION: ICD-10-CM

## 2019-05-30 DIAGNOSIS — E66.9 DIABETES MELLITUS TYPE 2 IN OBESE (HCC): ICD-10-CM

## 2019-05-30 DIAGNOSIS — E11.69 DIABETES MELLITUS TYPE 2 IN OBESE (HCC): ICD-10-CM

## 2019-05-30 LAB
ALBUMIN SERPL BCP-MCNC: 3.8 G/DL (ref 3.5–5)
ALP SERPL-CCNC: 66 U/L (ref 46–116)
ALT SERPL W P-5'-P-CCNC: 11 U/L (ref 12–78)
ANION GAP SERPL CALCULATED.3IONS-SCNC: 8 MMOL/L (ref 4–13)
AST SERPL W P-5'-P-CCNC: 9 U/L (ref 5–45)
BILIRUB SERPL-MCNC: 0.84 MG/DL (ref 0.2–1)
BUN SERPL-MCNC: 21 MG/DL (ref 5–25)
CALCIUM SERPL-MCNC: 8.9 MG/DL (ref 8.3–10.1)
CHLORIDE SERPL-SCNC: 106 MMOL/L (ref 100–108)
CHOLEST SERPL-MCNC: 189 MG/DL (ref 50–200)
CO2 SERPL-SCNC: 25 MMOL/L (ref 21–32)
CREAT SERPL-MCNC: 0.86 MG/DL (ref 0.6–1.3)
EST. AVERAGE GLUCOSE BLD GHB EST-MCNC: 163 MG/DL
GFR SERPL CREATININE-BSD FRML MDRD: 93 ML/MIN/1.73SQ M
GLUCOSE P FAST SERPL-MCNC: 122 MG/DL (ref 65–99)
HBA1C MFR BLD: 7.3 % (ref 4.2–6.3)
HDLC SERPL-MCNC: 43 MG/DL (ref 40–60)
LDLC SERPL CALC-MCNC: 127 MG/DL (ref 0–100)
NONHDLC SERPL-MCNC: 146 MG/DL
POTASSIUM SERPL-SCNC: 4 MMOL/L (ref 3.5–5.3)
PROT SERPL-MCNC: 7.4 G/DL (ref 6.4–8.2)
SODIUM SERPL-SCNC: 139 MMOL/L (ref 136–145)
TRIGL SERPL-MCNC: 96 MG/DL

## 2019-05-30 PROCEDURE — 36415 COLL VENOUS BLD VENIPUNCTURE: CPT

## 2019-05-30 PROCEDURE — 80053 COMPREHEN METABOLIC PANEL: CPT

## 2019-05-30 PROCEDURE — 83036 HEMOGLOBIN GLYCOSYLATED A1C: CPT

## 2019-05-30 PROCEDURE — 80061 LIPID PANEL: CPT

## 2019-06-04 ENCOUNTER — OFFICE VISIT (OUTPATIENT)
Dept: FAMILY MEDICINE CLINIC | Facility: CLINIC | Age: 64
End: 2019-06-04
Payer: COMMERCIAL

## 2019-06-04 VITALS
HEIGHT: 66 IN | SYSTOLIC BLOOD PRESSURE: 122 MMHG | HEART RATE: 88 BPM | BODY MASS INDEX: 39.47 KG/M2 | TEMPERATURE: 97.4 F | RESPIRATION RATE: 16 BRPM | OXYGEN SATURATION: 98 % | WEIGHT: 245.6 LBS | DIASTOLIC BLOOD PRESSURE: 78 MMHG

## 2019-06-04 DIAGNOSIS — Z01.818 PRE-OP EXAM: Primary | ICD-10-CM

## 2019-06-04 DIAGNOSIS — M17.12 OSTEOARTHRITIS OF LEFT KNEE, UNSPECIFIED OSTEOARTHRITIS TYPE: ICD-10-CM

## 2019-06-04 DIAGNOSIS — I10 ESSENTIAL HYPERTENSION: ICD-10-CM

## 2019-06-04 DIAGNOSIS — E66.9 DIABETES MELLITUS TYPE 2 IN OBESE (HCC): ICD-10-CM

## 2019-06-04 DIAGNOSIS — E11.69 DIABETES MELLITUS TYPE 2 IN OBESE (HCC): ICD-10-CM

## 2019-06-04 PROCEDURE — 99243 OFF/OP CNSLTJ NEW/EST LOW 30: CPT | Performed by: FAMILY MEDICINE

## 2019-06-10 ENCOUNTER — ANESTHESIA (OUTPATIENT)
Dept: PERIOP | Facility: HOSPITAL | Age: 64
DRG: 302 | End: 2019-06-10
Payer: COMMERCIAL

## 2019-06-10 ENCOUNTER — HOSPITAL ENCOUNTER (INPATIENT)
Facility: HOSPITAL | Age: 64
LOS: 1 days | Discharge: HOME HEALTH | DRG: 302 | End: 2019-06-11
Attending: ORTHOPAEDIC SURGERY | Admitting: ORTHOPAEDIC SURGERY
Payer: COMMERCIAL

## 2019-06-10 DIAGNOSIS — M17.12 PRIMARY OSTEOARTHRITIS OF LEFT KNEE: Primary | ICD-10-CM

## 2019-06-10 LAB
GLUCOSE SERPL-MCNC: 115 MG/DL (ref 65–140)
GLUCOSE SERPL-MCNC: 143 MG/DL (ref 65–140)
GLUCOSE SERPL-MCNC: 147 MG/DL (ref 65–140)
GLUCOSE SERPL-MCNC: 95 MG/DL (ref 65–140)

## 2019-06-10 PROCEDURE — C1776 JOINT DEVICE (IMPLANTABLE): HCPCS | Performed by: ORTHOPAEDIC SURGERY

## 2019-06-10 PROCEDURE — 97163 PT EVAL HIGH COMPLEX 45 MIN: CPT

## 2019-06-10 PROCEDURE — G8979 MOBILITY GOAL STATUS: HCPCS

## 2019-06-10 PROCEDURE — 27447 TOTAL KNEE ARTHROPLASTY: CPT | Performed by: ORTHOPAEDIC SURGERY

## 2019-06-10 PROCEDURE — C1713 ANCHOR/SCREW BN/BN,TIS/BN: HCPCS | Performed by: ORTHOPAEDIC SURGERY

## 2019-06-10 PROCEDURE — 82948 REAGENT STRIP/BLOOD GLUCOSE: CPT

## 2019-06-10 PROCEDURE — 0SRD0J9 REPLACEMENT OF LEFT KNEE JOINT WITH SYNTHETIC SUBSTITUTE, CEMENTED, OPEN APPROACH: ICD-10-PCS | Performed by: ORTHOPAEDIC SURGERY

## 2019-06-10 PROCEDURE — 99254 IP/OBS CNSLTJ NEW/EST MOD 60: CPT | Performed by: PHYSICIAN ASSISTANT

## 2019-06-10 PROCEDURE — 27447 TOTAL KNEE ARTHROPLASTY: CPT | Performed by: PHYSICIAN ASSISTANT

## 2019-06-10 PROCEDURE — G8978 MOBILITY CURRENT STATUS: HCPCS

## 2019-06-10 DEVICE — ATTUNE KNEE SYSTEM TIBIAL INSERT FIXED BEARING POSTERIOR STABILIZED 5 5MM AOX
Type: IMPLANTABLE DEVICE | Site: KNEE | Status: FUNCTIONAL
Brand: ATTUNE

## 2019-06-10 DEVICE — ATTUNE PATELLA MEDIALIZED DOME 38MM CEMENTED AOX
Type: IMPLANTABLE DEVICE | Site: KNEE | Status: FUNCTIONAL
Brand: ATTUNE

## 2019-06-10 DEVICE — SMARTSET GMV HIGH PERFORMANCE GENTAMICIN MEDIUM VISCOSITY BONE CEMENT 40G
Type: IMPLANTABLE DEVICE | Site: KNEE | Status: FUNCTIONAL
Brand: SMARTSET

## 2019-06-10 DEVICE — ATTUNE KNEE SYSTEM FEMORAL POSTERIOR STABILIZED SIZE 5 LEFT CEMENTED
Type: IMPLANTABLE DEVICE | Site: KNEE | Status: FUNCTIONAL
Brand: ATTUNE

## 2019-06-10 DEVICE — ATTUNE KNEE SYSTEM TIBIAL BASE FIXED BEARING SIZE 5 CEMENTED
Type: IMPLANTABLE DEVICE | Site: KNEE | Status: FUNCTIONAL
Brand: ATTUNE

## 2019-06-10 RX ORDER — SODIUM CHLORIDE 9 MG/ML
125 INJECTION, SOLUTION INTRAVENOUS CONTINUOUS
Status: DISCONTINUED | OUTPATIENT
Start: 2019-06-10 | End: 2019-06-11 | Stop reason: HOSPADM

## 2019-06-10 RX ORDER — FENTANYL CITRATE 50 UG/ML
INJECTION, SOLUTION INTRAMUSCULAR; INTRAVENOUS
Status: DISCONTINUED | OUTPATIENT
Start: 2019-06-10 | End: 2019-06-10

## 2019-06-10 RX ORDER — CEFAZOLIN SODIUM 2 G/50ML
2000 SOLUTION INTRAVENOUS EVERY 8 HOURS
Status: COMPLETED | OUTPATIENT
Start: 2019-06-10 | End: 2019-06-11

## 2019-06-10 RX ORDER — SIMETHICONE 80 MG
80 TABLET,CHEWABLE ORAL 4 TIMES DAILY PRN
Status: DISCONTINUED | OUTPATIENT
Start: 2019-06-10 | End: 2019-06-11 | Stop reason: HOSPADM

## 2019-06-10 RX ORDER — OXYCODONE HYDROCHLORIDE 5 MG/1
5 TABLET ORAL EVERY 4 HOURS PRN
Status: DISCONTINUED | OUTPATIENT
Start: 2019-06-10 | End: 2019-06-11 | Stop reason: HOSPADM

## 2019-06-10 RX ORDER — CELECOXIB 200 MG/1
200 CAPSULE ORAL DAILY
Status: DISCONTINUED | OUTPATIENT
Start: 2019-06-12 | End: 2019-06-11 | Stop reason: HOSPADM

## 2019-06-10 RX ORDER — KETOROLAC TROMETHAMINE 30 MG/ML
30 INJECTION, SOLUTION INTRAMUSCULAR; INTRAVENOUS EVERY 6 HOURS PRN
Status: DISCONTINUED | OUTPATIENT
Start: 2019-06-10 | End: 2019-06-11 | Stop reason: HOSPADM

## 2019-06-10 RX ORDER — ROPIVACAINE HYDROCHLORIDE 5 MG/ML
INJECTION, SOLUTION EPIDURAL; INFILTRATION; PERINEURAL
Status: COMPLETED | OUTPATIENT
Start: 2019-06-10 | End: 2019-06-10

## 2019-06-10 RX ORDER — PANTOPRAZOLE SODIUM 40 MG/1
40 TABLET, DELAYED RELEASE ORAL
Status: DISCONTINUED | OUTPATIENT
Start: 2019-06-10 | End: 2019-06-11 | Stop reason: HOSPADM

## 2019-06-10 RX ORDER — OXYCODONE HYDROCHLORIDE 10 MG/1
10 TABLET ORAL EVERY 4 HOURS PRN
Status: DISCONTINUED | OUTPATIENT
Start: 2019-06-10 | End: 2019-06-11 | Stop reason: HOSPADM

## 2019-06-10 RX ORDER — HYDRALAZINE HYDROCHLORIDE 20 MG/ML
5 INJECTION INTRAMUSCULAR; INTRAVENOUS EVERY 4 HOURS PRN
Status: DISCONTINUED | OUTPATIENT
Start: 2019-06-10 | End: 2019-06-11 | Stop reason: HOSPADM

## 2019-06-10 RX ORDER — TRAMADOL HYDROCHLORIDE 50 MG/1
50 TABLET ORAL EVERY 6 HOURS SCHEDULED
Status: DISCONTINUED | OUTPATIENT
Start: 2019-06-10 | End: 2019-06-11 | Stop reason: HOSPADM

## 2019-06-10 RX ORDER — GABAPENTIN 300 MG/1
300 CAPSULE ORAL DAILY
Status: DISCONTINUED | OUTPATIENT
Start: 2019-06-10 | End: 2019-06-11 | Stop reason: HOSPADM

## 2019-06-10 RX ORDER — ACETAMINOPHEN 325 MG/1
650 TABLET ORAL EVERY 4 HOURS PRN
Status: DISCONTINUED | OUTPATIENT
Start: 2019-06-10 | End: 2019-06-11 | Stop reason: HOSPADM

## 2019-06-10 RX ORDER — TRANEXAMIC ACID 100 MG/ML
INJECTION, SOLUTION INTRAVENOUS AS NEEDED
Status: DISCONTINUED | OUTPATIENT
Start: 2019-06-10 | End: 2019-06-10 | Stop reason: SURG

## 2019-06-10 RX ORDER — PROPOFOL 10 MG/ML
INJECTION, EMULSION INTRAVENOUS CONTINUOUS PRN
Status: DISCONTINUED | OUTPATIENT
Start: 2019-06-10 | End: 2019-06-10 | Stop reason: SURG

## 2019-06-10 RX ORDER — IBUPROFEN 600 MG/1
TABLET ORAL EVERY 6 HOURS PRN
COMMUNITY
End: 2019-06-11 | Stop reason: HOSPADM

## 2019-06-10 RX ORDER — MAGNESIUM HYDROXIDE 1200 MG/15ML
LIQUID ORAL AS NEEDED
Status: DISCONTINUED | OUTPATIENT
Start: 2019-06-10 | End: 2019-06-10 | Stop reason: HOSPADM

## 2019-06-10 RX ORDER — MIDAZOLAM HYDROCHLORIDE 1 MG/ML
INJECTION INTRAMUSCULAR; INTRAVENOUS
Status: COMPLETED | OUTPATIENT
Start: 2019-06-10 | End: 2019-06-10

## 2019-06-10 RX ORDER — CHLORHEXIDINE GLUCONATE 0.12 MG/ML
15 RINSE ORAL ONCE
Status: COMPLETED | OUTPATIENT
Start: 2019-06-10 | End: 2019-06-10

## 2019-06-10 RX ORDER — FENTANYL CITRATE/PF 50 MCG/ML
25 SYRINGE (ML) INJECTION
Status: DISCONTINUED | OUTPATIENT
Start: 2019-06-10 | End: 2019-06-10 | Stop reason: HOSPADM

## 2019-06-10 RX ORDER — CALCIUM CARBONATE 200(500)MG
1000 TABLET,CHEWABLE ORAL DAILY PRN
Status: DISCONTINUED | OUTPATIENT
Start: 2019-06-10 | End: 2019-06-11 | Stop reason: HOSPADM

## 2019-06-10 RX ORDER — ONDANSETRON 2 MG/ML
4 INJECTION INTRAMUSCULAR; INTRAVENOUS ONCE AS NEEDED
Status: DISCONTINUED | OUTPATIENT
Start: 2019-06-10 | End: 2019-06-10 | Stop reason: HOSPADM

## 2019-06-10 RX ORDER — SENNOSIDES 8.6 MG
1 TABLET ORAL DAILY
Status: DISCONTINUED | OUTPATIENT
Start: 2019-06-10 | End: 2019-06-11 | Stop reason: HOSPADM

## 2019-06-10 RX ORDER — ROPIVACAINE HYDROCHLORIDE 5 MG/ML
INJECTION, SOLUTION EPIDURAL; INFILTRATION; PERINEURAL
Status: DISCONTINUED | OUTPATIENT
Start: 2019-06-10 | End: 2019-06-10

## 2019-06-10 RX ORDER — ACETAMINOPHEN 325 MG/1
650 TABLET ORAL EVERY 6 HOURS SCHEDULED
Status: DISCONTINUED | OUTPATIENT
Start: 2019-06-10 | End: 2019-06-11 | Stop reason: HOSPADM

## 2019-06-10 RX ORDER — SODIUM CHLORIDE, SODIUM LACTATE, POTASSIUM CHLORIDE, CALCIUM CHLORIDE 600; 310; 30; 20 MG/100ML; MG/100ML; MG/100ML; MG/100ML
75 INJECTION, SOLUTION INTRAVENOUS CONTINUOUS
Status: DISCONTINUED | OUTPATIENT
Start: 2019-06-10 | End: 2019-06-11 | Stop reason: HOSPADM

## 2019-06-10 RX ORDER — FENTANYL CITRATE 50 UG/ML
INJECTION, SOLUTION INTRAMUSCULAR; INTRAVENOUS
Status: COMPLETED | OUTPATIENT
Start: 2019-06-10 | End: 2019-06-10

## 2019-06-10 RX ORDER — CEFAZOLIN SODIUM 2 G/50ML
2000 SOLUTION INTRAVENOUS ONCE
Status: COMPLETED | OUTPATIENT
Start: 2019-06-10 | End: 2019-06-10

## 2019-06-10 RX ORDER — AMLODIPINE BESYLATE 5 MG/1
5 TABLET ORAL DAILY
Status: DISCONTINUED | OUTPATIENT
Start: 2019-06-11 | End: 2019-06-11 | Stop reason: HOSPADM

## 2019-06-10 RX ORDER — MIDAZOLAM HYDROCHLORIDE 1 MG/ML
INJECTION INTRAMUSCULAR; INTRAVENOUS
Status: DISCONTINUED | OUTPATIENT
Start: 2019-06-10 | End: 2019-06-10

## 2019-06-10 RX ORDER — DOCUSATE SODIUM 100 MG/1
100 CAPSULE, LIQUID FILLED ORAL 2 TIMES DAILY
Status: DISCONTINUED | OUTPATIENT
Start: 2019-06-10 | End: 2019-06-11 | Stop reason: HOSPADM

## 2019-06-10 RX ADMIN — MIDAZOLAM 2 MG: 1 INJECTION INTRAMUSCULAR; INTRAVENOUS at 10:56

## 2019-06-10 RX ADMIN — ACETAMINOPHEN 650 MG: 325 TABLET ORAL at 17:59

## 2019-06-10 RX ADMIN — PANTOPRAZOLE SODIUM 40 MG: 40 TABLET, DELAYED RELEASE ORAL at 15:46

## 2019-06-10 RX ADMIN — LIDOCAINE HYDROCHLORIDE 40 MG: 20 INJECTION, SOLUTION INTRAVENOUS at 10:56

## 2019-06-10 RX ADMIN — CHLORHEXIDINE GLUCONATE 15 ML: 1.2 RINSE ORAL at 08:16

## 2019-06-10 RX ADMIN — MIDAZOLAM 2 MG: 1 INJECTION INTRAMUSCULAR; INTRAVENOUS at 09:54

## 2019-06-10 RX ADMIN — PROPOFOL 80 MCG/KG/MIN: 10 INJECTION, EMULSION INTRAVENOUS at 10:56

## 2019-06-10 RX ADMIN — CEFAZOLIN SODIUM 2000 MG: 2 SOLUTION INTRAVENOUS at 17:58

## 2019-06-10 RX ADMIN — GABAPENTIN 300 MG: 300 CAPSULE ORAL at 15:46

## 2019-06-10 RX ADMIN — SODIUM CHLORIDE, SODIUM LACTATE, POTASSIUM CHLORIDE, AND CALCIUM CHLORIDE 75 ML/HR: .6; .31; .03; .02 INJECTION, SOLUTION INTRAVENOUS at 14:28

## 2019-06-10 RX ADMIN — SENNOSIDES 8.6 MG: 8.6 TABLET, FILM COATED ORAL at 15:46

## 2019-06-10 RX ADMIN — SODIUM CHLORIDE: 0.9 INJECTION, SOLUTION INTRAVENOUS at 11:08

## 2019-06-10 RX ADMIN — DOCUSATE SODIUM 100 MG: 100 CAPSULE, LIQUID FILLED ORAL at 17:58

## 2019-06-10 RX ADMIN — SODIUM CHLORIDE 125 ML/HR: 0.9 INJECTION, SOLUTION INTRAVENOUS at 08:14

## 2019-06-10 RX ADMIN — FENTANYL CITRATE 100 MCG: 50 INJECTION, SOLUTION INTRAMUSCULAR; INTRAVENOUS at 09:54

## 2019-06-10 RX ADMIN — CEFAZOLIN SODIUM 2000 MG: 2 SOLUTION INTRAVENOUS at 10:56

## 2019-06-10 RX ADMIN — TRANEXAMIC ACID 1000 MG: 1 INJECTION, SOLUTION INTRAVENOUS at 11:20

## 2019-06-10 RX ADMIN — TRAMADOL HYDROCHLORIDE 50 MG: 50 TABLET, COATED ORAL at 23:52

## 2019-06-10 RX ADMIN — ROPIVACAINE HYDROCHLORIDE 30 ML: 5 INJECTION, SOLUTION EPIDURAL; INFILTRATION; PERINEURAL at 09:54

## 2019-06-10 RX ADMIN — ACETAMINOPHEN 650 MG: 325 TABLET ORAL at 23:53

## 2019-06-11 VITALS
TEMPERATURE: 97 F | DIASTOLIC BLOOD PRESSURE: 89 MMHG | OXYGEN SATURATION: 97 % | BODY MASS INDEX: 39.18 KG/M2 | HEIGHT: 66 IN | WEIGHT: 243.8 LBS | RESPIRATION RATE: 18 BRPM | SYSTOLIC BLOOD PRESSURE: 161 MMHG | HEART RATE: 78 BPM

## 2019-06-11 PROBLEM — E66.812 CLASS 2 OBESITY IN ADULT: Status: ACTIVE | Noted: 2019-03-23

## 2019-06-11 PROBLEM — E66.9 CLASS 2 OBESITY IN ADULT: Status: ACTIVE | Noted: 2019-03-23

## 2019-06-11 LAB
ANION GAP SERPL CALCULATED.3IONS-SCNC: 10 MMOL/L (ref 4–13)
BUN SERPL-MCNC: 17 MG/DL (ref 5–25)
CALCIUM SERPL-MCNC: 8.8 MG/DL (ref 8.3–10.1)
CHLORIDE SERPL-SCNC: 106 MMOL/L (ref 100–108)
CO2 SERPL-SCNC: 24 MMOL/L (ref 21–32)
CREAT SERPL-MCNC: 0.93 MG/DL (ref 0.6–1.3)
GFR SERPL CREATININE-BSD FRML MDRD: 88 ML/MIN/1.73SQ M
GLUCOSE SERPL-MCNC: 108 MG/DL (ref 65–140)
GLUCOSE SERPL-MCNC: 134 MG/DL (ref 65–140)
GLUCOSE SERPL-MCNC: 150 MG/DL (ref 65–140)
HCT VFR BLD AUTO: 38 % (ref 36.5–49.3)
HGB BLD-MCNC: 12.4 G/DL (ref 12–17)
POTASSIUM SERPL-SCNC: 4.2 MMOL/L (ref 3.5–5.3)
SODIUM SERPL-SCNC: 140 MMOL/L (ref 136–145)

## 2019-06-11 PROCEDURE — 99232 SBSQ HOSP IP/OBS MODERATE 35: CPT | Performed by: PHYSICIAN ASSISTANT

## 2019-06-11 PROCEDURE — 82948 REAGENT STRIP/BLOOD GLUCOSE: CPT

## 2019-06-11 PROCEDURE — NC001 PR NO CHARGE: Performed by: PHYSICIAN ASSISTANT

## 2019-06-11 PROCEDURE — 99024 POSTOP FOLLOW-UP VISIT: CPT | Performed by: PHYSICIAN ASSISTANT

## 2019-06-11 PROCEDURE — 97110 THERAPEUTIC EXERCISES: CPT

## 2019-06-11 PROCEDURE — 97116 GAIT TRAINING THERAPY: CPT

## 2019-06-11 PROCEDURE — 97530 THERAPEUTIC ACTIVITIES: CPT

## 2019-06-11 PROCEDURE — 80048 BASIC METABOLIC PNL TOTAL CA: CPT | Performed by: PHYSICIAN ASSISTANT

## 2019-06-11 PROCEDURE — 85018 HEMOGLOBIN: CPT | Performed by: PHYSICIAN ASSISTANT

## 2019-06-11 PROCEDURE — 85014 HEMATOCRIT: CPT | Performed by: PHYSICIAN ASSISTANT

## 2019-06-11 RX ORDER — OXYCODONE HYDROCHLORIDE 5 MG/1
5 TABLET ORAL EVERY 4 HOURS PRN
Qty: 60 TABLET | Refills: 0 | Status: SHIPPED | OUTPATIENT
Start: 2019-06-11 | End: 2019-06-21

## 2019-06-11 RX ADMIN — TRAMADOL HYDROCHLORIDE 50 MG: 50 TABLET, COATED ORAL at 14:01

## 2019-06-11 RX ADMIN — CEFAZOLIN SODIUM 2000 MG: 2 SOLUTION INTRAVENOUS at 03:00

## 2019-06-11 RX ADMIN — OXYCODONE HYDROCHLORIDE 10 MG: 10 TABLET ORAL at 06:31

## 2019-06-11 RX ADMIN — ACETAMINOPHEN 650 MG: 325 TABLET ORAL at 13:24

## 2019-06-11 RX ADMIN — ENOXAPARIN SODIUM 40 MG: 40 INJECTION SUBCUTANEOUS at 08:17

## 2019-06-11 RX ADMIN — SENNOSIDES 8.6 MG: 8.6 TABLET, FILM COATED ORAL at 08:17

## 2019-06-11 RX ADMIN — ACETAMINOPHEN 650 MG: 325 TABLET ORAL at 06:31

## 2019-06-11 RX ADMIN — CEFAZOLIN SODIUM 2000 MG: 2 SOLUTION INTRAVENOUS at 09:38

## 2019-06-11 RX ADMIN — AMLODIPINE BESYLATE 5 MG: 5 TABLET ORAL at 08:17

## 2019-06-11 RX ADMIN — SODIUM CHLORIDE, SODIUM LACTATE, POTASSIUM CHLORIDE, AND CALCIUM CHLORIDE 75 ML/HR: .6; .31; .03; .02 INJECTION, SOLUTION INTRAVENOUS at 02:57

## 2019-06-11 RX ADMIN — OXYCODONE HYDROCHLORIDE 5 MG: 5 TABLET ORAL at 13:25

## 2019-06-11 RX ADMIN — TRAMADOL HYDROCHLORIDE 50 MG: 50 TABLET, COATED ORAL at 07:40

## 2019-06-11 RX ADMIN — DOCUSATE SODIUM 100 MG: 100 CAPSULE, LIQUID FILLED ORAL at 08:17

## 2019-06-11 RX ADMIN — GABAPENTIN 300 MG: 300 CAPSULE ORAL at 08:17

## 2019-06-11 RX ADMIN — PANTOPRAZOLE SODIUM 40 MG: 40 TABLET, DELAYED RELEASE ORAL at 06:31

## 2019-06-12 ENCOUNTER — TRANSITIONAL CARE MANAGEMENT (OUTPATIENT)
Dept: FAMILY MEDICINE CLINIC | Facility: CLINIC | Age: 64
End: 2019-06-12

## 2019-06-12 ENCOUNTER — TELEPHONE (OUTPATIENT)
Dept: OBGYN CLINIC | Facility: HOSPITAL | Age: 64
End: 2019-06-12

## 2019-06-12 DIAGNOSIS — M17.12 OSTEOARTHRITIS OF LEFT KNEE, UNSPECIFIED OSTEOARTHRITIS TYPE: Primary | ICD-10-CM

## 2019-06-12 RX ORDER — ONDANSETRON 4 MG/1
4 TABLET, ORALLY DISINTEGRATING ORAL EVERY 6 HOURS PRN
Qty: 30 TABLET | Refills: 0 | Status: SHIPPED | OUTPATIENT
Start: 2019-06-12 | End: 2019-08-05

## 2019-06-14 ENCOUNTER — TELEPHONE (OUTPATIENT)
Dept: FAMILY MEDICINE CLINIC | Facility: CLINIC | Age: 64
End: 2019-06-14

## 2019-06-25 ENCOUNTER — APPOINTMENT (OUTPATIENT)
Dept: RADIOLOGY | Facility: CLINIC | Age: 64
End: 2019-06-25
Payer: COMMERCIAL

## 2019-06-25 ENCOUNTER — OFFICE VISIT (OUTPATIENT)
Dept: OBGYN CLINIC | Facility: MEDICAL CENTER | Age: 64
End: 2019-06-25

## 2019-06-25 VITALS
DIASTOLIC BLOOD PRESSURE: 86 MMHG | SYSTOLIC BLOOD PRESSURE: 131 MMHG | WEIGHT: 243.2 LBS | HEIGHT: 66 IN | BODY MASS INDEX: 39.08 KG/M2 | HEART RATE: 103 BPM

## 2019-06-25 DIAGNOSIS — M17.12 OSTEOARTHRITIS OF LEFT KNEE, UNSPECIFIED OSTEOARTHRITIS TYPE: ICD-10-CM

## 2019-06-25 DIAGNOSIS — M17.12 PRIMARY OSTEOARTHRITIS OF LEFT KNEE: Primary | ICD-10-CM

## 2019-06-25 PROCEDURE — 73560 X-RAY EXAM OF KNEE 1 OR 2: CPT

## 2019-06-25 PROCEDURE — 1111F DSCHRG MED/CURRENT MED MERGE: CPT | Performed by: ORTHOPAEDIC SURGERY

## 2019-06-25 PROCEDURE — 99024 POSTOP FOLLOW-UP VISIT: CPT | Performed by: ORTHOPAEDIC SURGERY

## 2019-07-02 ENCOUNTER — TELEPHONE (OUTPATIENT)
Dept: OBGYN CLINIC | Facility: HOSPITAL | Age: 64
End: 2019-07-02

## 2019-07-02 NOTE — TELEPHONE ENCOUNTER
Slick Bryson is calling to let us know that the patient is finishing home pt today and will transition to outpatient pt on Friday

## 2019-07-05 ENCOUNTER — EVALUATION (OUTPATIENT)
Dept: PHYSICAL THERAPY | Facility: MEDICAL CENTER | Age: 64
End: 2019-07-05
Payer: COMMERCIAL

## 2019-07-05 DIAGNOSIS — M17.12 PRIMARY OSTEOARTHRITIS OF LEFT KNEE: Primary | ICD-10-CM

## 2019-07-05 PROCEDURE — 97161 PT EVAL LOW COMPLEX 20 MIN: CPT | Performed by: PHYSICAL THERAPIST

## 2019-07-05 NOTE — PROGRESS NOTES
PT Evaluation     Today's date: 2019  Patient name: Lele Meyer  : 1955  MRN: [id number]  Referring provider: Zofia Blake MD  Dx:   Encounter Diagnosis     ICD-10-CM    1  Primary osteoarthritis of left knee M17 12 Ambulatory referral to Physical Therapy                  Assessment  Assessment details: Lele Meyer is a 60 y o  male who came to outpatient PT on 19  Patient presents with L knee decreased ROM, instability, and weakness post L TKA done on 6/10/19  The patient's greatest concerns are his feeling of unsteadiness and weakness  No further referral appears necessary at this time based upon examination results  Lele Meyer has the impairments listed below resulting in functional impairment and are having a negative impact on his quality of life  Patient is appropriate and would benefit from skilled PT intervention to have an optimal return to function and achieve patient specific goals  All of the patient's questions were answered to their satisfaction and the patient agreed to the plan of care  Patient's greatest impairments are:  1 ) decreased L knee ROM - to be addressed with joint mobilizations, self and therapist stretching and soft tissue mobilizations  2 ) impaired balance - to be addressed with neuromuscular reeducation, balance training  3 ) LE weakness - to be addressed with neuromuscular reeducation, progressive resistance exercise    Primary movement impairment diagnosis of L knee hypomobility and weakness resulting in pathoanatomical symptoms of imbalance  The encounter diagnosis was Primary osteoarthritis of left knee  and limiting his ability to exercise or recreation and walking for extended periods of time  Etiologic factors include poor lower extremity strength and poor balance      Impairments: abnormal coordination, abnormal gait, abnormal muscle firing, abnormal or restricted ROM, activity intolerance, impaired balance, impaired physical strength and lacks appropriate home exercise program    Symptom irritability: lowUnderstanding of Dx/Px/POC: good   Prognosis: good    Goals  Patient will successfully transition to HEP  Patient will be able to manage symptoms independently  Patient will regain full ROM of L knee  Patient will regain full strength of L knee  Patient will report no limitation in ADLs, walking, and patient specific goals  Plan  Plan details: Patient will receive skilled PT 2x a week for 12 weeks  Patient would benefit from: skilled physical therapy  Planned modality interventions: cryotherapy  Planned therapy interventions: activity modification, balance, balance/weight bearing training, coordination, flexibility, functional ROM exercises, graded exercise, home exercise program, therapeutic exercise, therapeutic activities, stretching, strengthening, neuromuscular re-education, motor coordination training, manual therapy and joint mobilization        Subjective Evaluation    History of Present Illness  Mechanism of injury: Emiliano Kenny is a 60 y o  male who came to outpatient PT on 7/5/19  Patient presents with L knee decreased ROM, instability, and weakness post L TKA done on 6/10/19  The patient reports he is happy with his progress that he made with home PT  The patients greatest concerns are the instability and weakness he is feeling in his L knee  Denied any Red Flags  Patient finds aggravating factors are:  1 ) walking for longer periods of time     Patients finds most relieving factors are:  1 ) rest    No further referral appears necessary at this time based upon examination results  Patient's goals are to feel steady and balanced enough in order to get on his boat and to gain motion back in his knee  Objective     Observations   Left Knee   Positive for effusion and incision  Negative for drainage       Active Range of Motion   Left Knee   Flexion: 115 degrees   Extension: -10 degrees Right Knee   Normal active range of motion  Flexion: WFL  Prone flexion: WFL  Extension: WFL    Passive Range of Motion   Left Hip   Flexion: [facility] [place]  External rotation (prone): [facility]  Internal rotation (prone): WFL    Right Hip   Flexion: [facility]  External rotation (prone): [facility]  Internal rotation (prone): WFL  Left Knee   Flexion: 120 degrees   Extension: -8 degrees     Mobility   Patellar Mobility:   Left Knee   WFL: medial, lateral, superior and inferior       Right Knee   WFL: medial, lateral, superior and inferior    Strength/Myotome Testing     Left Hip   Planes of Motion   Flexion: 3+  Abduction: 3    Right Hip   Planes of Motion   Flexion: 3+    Left Knee   Flexion: 3+  Extension: 4    Right Knee   Flexion: 4  Extension: 4      Flowsheet Rows      Most Recent Value   PT/OT G-Codes   Current Score  60   Projected Score  69             Precautions: none      Manual  7/5            STM ant distal thigh AF            Post knee mob AF                                                       Exercise Diary                                                                                                                                                                                                                                                                                      Modalities

## 2019-07-08 ENCOUNTER — OFFICE VISIT (OUTPATIENT)
Dept: PHYSICAL THERAPY | Facility: MEDICAL CENTER | Age: 64
End: 2019-07-08
Payer: COMMERCIAL

## 2019-07-08 DIAGNOSIS — M17.12 PRIMARY OSTEOARTHRITIS OF LEFT KNEE: Primary | ICD-10-CM

## 2019-07-08 PROCEDURE — 97110 THERAPEUTIC EXERCISES: CPT | Performed by: PHYSICAL THERAPIST

## 2019-07-08 PROCEDURE — 97140 MANUAL THERAPY 1/> REGIONS: CPT | Performed by: PHYSICAL THERAPIST

## 2019-07-08 NOTE — PROGRESS NOTES
Daily Note     Today's date: 2019  Patient name: Rachel Chopra  : 1955  MRN: [id number]  Referring provider: Colleen Irby MD  Dx:   Encounter Diagnosis     ICD-10-CM    1  Primary osteoarthritis of left knee M17 12                   Subjective: Karlos Wilson reports that he felt good after 1st session and felt like his knee was moving better       Objective: See treatment diary below    Assessment: Tolerated treatment well  Patient exhibited good technique with therapeutic exercises and improved knee extension post session  Began unstable stepping to facilitate return to stepping on boat with good tolerance  Progress as able      Plan: Continue per plan of care        Precautions: none      Manual             STM ant distal thigh AF AF           Post knee mob to promote extension AF AF                                                      Exercise Diary              Bike  10'           Hamstring stretch  30 sec  X3           Prone quad stretch  30 sec  X3           Prone quad set  5 sec  X20           Prone extension hang  5 min           Stepping on foam/dynadisc   30           Sit to stand  10#  3X10                                                                                                                                                                                        Modalities

## 2019-07-11 ENCOUNTER — OFFICE VISIT (OUTPATIENT)
Dept: PHYSICAL THERAPY | Facility: MEDICAL CENTER | Age: 64
End: 2019-07-11
Payer: COMMERCIAL

## 2019-07-11 DIAGNOSIS — M17.12 PRIMARY OSTEOARTHRITIS OF LEFT KNEE: Primary | ICD-10-CM

## 2019-07-11 PROCEDURE — 97140 MANUAL THERAPY 1/> REGIONS: CPT | Performed by: PHYSICAL THERAPIST

## 2019-07-11 PROCEDURE — 97110 THERAPEUTIC EXERCISES: CPT | Performed by: PHYSICAL THERAPIST

## 2019-07-11 NOTE — PROGRESS NOTES
Daily Note     Today's date: 2019  Patient name: Von Pedersen  : 1955  MRN: [id number]  Referring provider: Shirin Rivera MD  Dx:   Encounter Diagnosis     ICD-10-CM    1  Primary osteoarthritis of left knee M17 12                   Subjective: Patient reports he is doing well since his last visit  He was able to kneel on his knees while painting and the only discomfort he had with that was his incision  Objective: See treatment diary below      Assessment: Patient is continuing to be adherent to his HEP and working on his knee ext ROM  He is doing well with progressing his dynamic balance, but is unsteady at times  Will continue to progress challenge as able  Tolerated treatment well  Patient would benefit from continued PT for optimal return to function  Plan: Continue per plan of care        Precautions: none      Manual            STM ant distal thigh AF AF KD          Post knee mob to promote extension AF AF KD                                                     Exercise Diary              Bike  10' 10'          Hamstring stretch  30 sec  X3 30 sec x3          Prone quad stretch  30 sec  X3 30sec x3          Prone quad set  5 sec  X20           Prone extension hang  5 min           Stepping on foam/dynadisc   30           Sit to stand  10#  3X10           Mini squats w/ TB at knees   3x12 GR          Step up onto dynadisc to step and down to dynadisc    2 risers 30x                                                                                                                                                             Modalities

## 2019-07-15 ENCOUNTER — OFFICE VISIT (OUTPATIENT)
Dept: PHYSICAL THERAPY | Facility: MEDICAL CENTER | Age: 64
End: 2019-07-15
Payer: COMMERCIAL

## 2019-07-15 DIAGNOSIS — M17.12 PRIMARY OSTEOARTHRITIS OF LEFT KNEE: Primary | ICD-10-CM

## 2019-07-15 PROCEDURE — 97140 MANUAL THERAPY 1/> REGIONS: CPT | Performed by: PHYSICAL THERAPIST

## 2019-07-15 PROCEDURE — 97110 THERAPEUTIC EXERCISES: CPT | Performed by: PHYSICAL THERAPIST

## 2019-07-17 ENCOUNTER — OFFICE VISIT (OUTPATIENT)
Dept: PHYSICAL THERAPY | Facility: MEDICAL CENTER | Age: 64
End: 2019-07-17
Payer: COMMERCIAL

## 2019-07-17 DIAGNOSIS — Z12.11 SCREENING FOR COLON CANCER: Primary | ICD-10-CM

## 2019-07-17 DIAGNOSIS — M17.12 PRIMARY OSTEOARTHRITIS OF LEFT KNEE: Primary | ICD-10-CM

## 2019-07-17 PROCEDURE — 97110 THERAPEUTIC EXERCISES: CPT | Performed by: PHYSICAL MEDICINE & REHABILITATION

## 2019-07-17 PROCEDURE — 97140 MANUAL THERAPY 1/> REGIONS: CPT | Performed by: PHYSICAL MEDICINE & REHABILITATION

## 2019-07-17 PROCEDURE — 97112 NEUROMUSCULAR REEDUCATION: CPT | Performed by: PHYSICAL MEDICINE & REHABILITATION

## 2019-07-17 NOTE — PROGRESS NOTES
Daily Note     Today's date: 2019  Patient name: Johanna Gant  : 1955  MRN: [id number]  Referring provider: Yared Bills MD  Dx:   Encounter Diagnosis     ICD-10-CM    1  Primary osteoarthritis of left knee M17 12                   Subjective: Adolfo reported "I am feeling okay today "      Objective: See treatment diary below      Assessment: Tolerated treatment well  Patient would benefit from continued PT  Chayo Ng was able to add leg press to his therapy program which shows progress towards his goals  He denied an increase in pain during the session  Plan: Continue per plan of care        Precautions: none      Manual  7/5 7/8 7/11 7/15 2019        STM ant distal thigh AF AF KD  JR        Post knee mob to promote extension AF AF KD  JR                                                   Exercise Diary    7/11 7/15 2019        Bike  10' 10' 10'         Hamstring stretch  30 sec  X3 30 sec x3 30 sec x3 30 sec x3        Prone quad stretch  30 sec  X3 30sec x3 30 sec x3 30 sec x3        Prone quad set  5 sec  X20   NP        Prone extension hang  5 min           Stepping on foam/dynadisc   30           Sit to stand  10#  3X10           Mini squats w/ TB at knees   3x12 GR 3x12 GR LLE back 3x12 GR LLE back        Step up onto dynadisc to step and down to dynadisc    2 risers 30x 2 dynadisc 10x 2 discs 10x        Lateral Step Down             Standing TKE     18# 30x         Lunges onto Dynadisc    20x 20x        Leg Press     90# 2x10                                                                                                       Modalities

## 2019-07-22 ENCOUNTER — OFFICE VISIT (OUTPATIENT)
Dept: PHYSICAL THERAPY | Facility: MEDICAL CENTER | Age: 64
End: 2019-07-22
Payer: COMMERCIAL

## 2019-07-22 DIAGNOSIS — M17.12 PRIMARY OSTEOARTHRITIS OF LEFT KNEE: Primary | ICD-10-CM

## 2019-07-22 PROCEDURE — 97110 THERAPEUTIC EXERCISES: CPT | Performed by: PHYSICAL THERAPIST

## 2019-07-22 NOTE — PROGRESS NOTES
Daily Note     Today's date: 2019  Patient name: Sonia Castaneda  : 1955  MRN: [id number]  Referring provider: Naveen Caban MD  Dx:   Encounter Diagnosis     ICD-10-CM    1  Primary osteoarthritis of left knee M17 12                   Subjective: Patient reports his knee is doing well  He did painting this weekend and now it feels a little sore  Objective: See treatment diary below      Assessment: Patient continues to be able to tolerate progressive strengthening with no pain  Continuing to gain knee flexion and knee extension ROM  Tolerated treatment well  Patient would benefit from continued PT      Plan: Continue per plan of care        Precautions: none      Manual  7/5 7/8 7/11 7/15 2019 7/22       STM ant distal thigh AF AF KD  JR KD       Post knee mob to promote extension AF AF KD  JR KD                                                  Exercise Diary    7/11 7/15 2019 7/22       Bike  10' 10' 10'  10'       Hamstring stretch  30 sec  X3 30 sec x3 30 sec x3 30 sec x3 30 sec x3       Prone quad stretch  30 sec  X3 30sec x3 30 sec x3 30 sec x3 30 sec x3       Prone quad set  5 sec  X20   NP        Prone extension hang  5 min           Stepping on foam/dynadisc   30           Sit to stand  10#  3X10           Mini squats w/ TB at knees   3x12 GR 3x12 GR LLE back 3x12 GR LLE back 3x12 GR LLE back       Step up onto dynadisc to step and down to dynadisc    2 risers 30x 2 dynadisc 10x 2 discs 10x        Lateral Step Down             Standing TKE     18# 30x         Lunges onto Dynadisc    20x 20x 20x       Leg Press     90# 2x10 95# 3x                                                                                                      Modalities

## 2019-07-23 ENCOUNTER — TELEPHONE (OUTPATIENT)
Dept: FAMILY MEDICINE CLINIC | Facility: CLINIC | Age: 64
End: 2019-07-23

## 2019-07-23 NOTE — TELEPHONE ENCOUNTER
Cassandra heredia/ [facility] on [address]  Called asking if patient should be on a statin therapy  Can you please send a RX or call them to let them know that he shouldn't be taking anything at [phone]

## 2019-07-25 ENCOUNTER — OFFICE VISIT (OUTPATIENT)
Dept: PHYSICAL THERAPY | Facility: MEDICAL CENTER | Age: 64
End: 2019-07-25
Payer: COMMERCIAL

## 2019-07-25 DIAGNOSIS — M17.12 PRIMARY OSTEOARTHRITIS OF LEFT KNEE: Primary | ICD-10-CM

## 2019-07-25 PROCEDURE — 97110 THERAPEUTIC EXERCISES: CPT | Performed by: PHYSICAL MEDICINE & REHABILITATION

## 2019-07-25 PROCEDURE — 97112 NEUROMUSCULAR REEDUCATION: CPT | Performed by: PHYSICAL MEDICINE & REHABILITATION

## 2019-07-25 NOTE — PROGRESS NOTES
Daily Note     Today's date: 2019  Patient name: Alonzo Jensen  : 1955  MRN: [id number]  Referring provider: Apurva Ayala MD  Dx:   Encounter Diagnosis     ICD-10-CM    1  Primary osteoarthritis of left knee M17 12                   Subjective: Adolfo reported "I walked 1 5 miles yesterday, it felt pretty good but it made it tired today "    Objective: See treatment diary below      Assessment: Tolerated treatment well  Patient would benefit from continued PT  [address] was able to increase his resistance for leg press and showed an increase in independence with his exercises which shows progress towards his goals  He denied an increase in pain throughout the session  Plan: Continue per plan of care        Precautions: none      Manual  7/5 7/8 7/11 7/15 2019 7/22 2019      STM ant distal thigh AF AF YAMEL MONTESINOS JR      Post knee mob to promote extension AF AF YAMEL MONTESINOS JR                                                 Exercise Diary    7/11 7/15 2019 7/22 2019      Bike  10' 10' 10'  10'       Hamstring stretch  30 sec  X3 30 sec x3 30 sec x3 30 sec x3 30 sec x3 3x30"      Prone quad stretch  30 sec  X3 30sec x3 30 sec x3 30 sec x3 30 sec x3 3x30"      Prone quad set  5 sec  X20   NP        Prone extension hang  5 min           Stepping on foam/dynadisc   30           Sit to stand  10#  3X10           Mini squats w/ TB at knees   3x12 GR 3x12 GR LLE back 3x12 GR LLE back 3x12 GR LLE back 3x20 GR LLE back      Step up onto dynadisc to step and down to dynadisc    2 risers 30x 2 dynadisc 10x 2 discs 10x        Lateral Step Down             Standing TKE     18# 30x         Lunges onto Dynadisc    20x 20x 20x 20x      Leg Press     90# 2x10 95# 3x 100# 3x15                                                                                                     Modalities

## 2019-07-29 ENCOUNTER — TELEPHONE (OUTPATIENT)
Dept: OTHER | Facility: OTHER | Age: 64
End: 2019-07-29

## 2019-07-30 NOTE — TELEPHONE ENCOUNTER
Pharmacy suggesting the [facility] prescription  Please call the pharmacy with any question

## 2019-08-01 DIAGNOSIS — E11.69 DIABETES MELLITUS TYPE 2 IN OBESE (HCC): ICD-10-CM

## 2019-08-01 DIAGNOSIS — E66.9 DIABETES MELLITUS TYPE 2 IN OBESE (HCC): ICD-10-CM

## 2019-08-01 RX ORDER — GLIPIZIDE 5 MG/1
TABLET, FILM COATED, EXTENDED RELEASE ORAL
Qty: 90 TABLET | Refills: 0 | Status: SHIPPED | OUTPATIENT
Start: 2019-08-01 | End: 2019-08-08 | Stop reason: SDUPTHER

## 2019-08-06 ENCOUNTER — OFFICE VISIT (OUTPATIENT)
Dept: OBGYN CLINIC | Facility: MEDICAL CENTER | Age: 64
End: 2019-08-06

## 2019-08-06 VITALS
BODY MASS INDEX: 39.73 KG/M2 | SYSTOLIC BLOOD PRESSURE: 131 MMHG | WEIGHT: 247.2 LBS | HEART RATE: 94 BPM | HEIGHT: 66 IN | DIASTOLIC BLOOD PRESSURE: 88 MMHG

## 2019-08-06 DIAGNOSIS — Z96.652 STATUS POST LEFT KNEE REPLACEMENT: Primary | ICD-10-CM

## 2019-08-06 PROCEDURE — 99024 POSTOP FOLLOW-UP VISIT: CPT | Performed by: ORTHOPAEDIC SURGERY

## 2019-08-06 NOTE — PROGRESS NOTES
Orthopaedic Surgery - Office Note  Sonia Castaneda (86 y o  male)   : 1955   MRN: [id number]  Encounter Date: 2019    Chief Complaint   Patient presents with    Left Knee - Follow-up       Assessment / Plan  2 months s/p Left TKA, DOS: 6/10/19    · Continue to work on knee extension  · Ice and analgesics as needed   Return in about 4 months (around 2019) for Post-op with new xray  History of Present Illness  Sonia Castaneda is a 60 y o  male who presents for 2 months follow-up of Left TKA  He stated that he is doing great over all  He completed PT last week  He stated that the incision is a little tender but other than that he isn't experiencing any pain  He mowed the grass the other day without pain  He has been ambulating with no assistance and using stairs without difficulty and pain  He denies numbness and tingling  Review of Systems  Const: negative for chills and fevers  Eyes: negative for irritation and redness  Resp: negative for stridor and wheezing  CV: negative for chest pain and chest pressure/discomfort  MSK:negative for arthralgias and myalgias    Physical Exam  /88   Pulse 94   Ht 5' 6" (1 676 m)   Wt 112 kg (247 lb 3 2 oz)   BMI 39 90 kg/m²   Cons: Appears well  No apparent distress  Psych: Alert  Oriented x3  Mood and affect normal   Eyes: PERRLA, EOMI  Resp: Normal effort  No audible wheezing or stridor  CV: Palpable pulse  No discernable arrhythmia  No LE edema  Lymph:  No palpable cervical, axillary, or inguinal lymphadenopathy  Skin: Warm  No palpable masses  No visible lesions  Neuro: Normal muscle tone  Normal and symmetric DTR's  Left Knee Exam  Alignment:  Normal knee alignment  Inspection:  Well healed anterior incision  Palpation:  minimal tenderness at insicion site  ROM:  Knee Extension 10  Knee Flexion 120  Strength:  5/5 quadriceps and hamstrings  Stability:  No objective knee instability   Stable Varus / Valgus stress, Lachman, and Posterior drawer  Tests:  No pertinent positive or negative tests  Patella:  Patella tracks centrally without crepitus  Neurovascular:  Sensation intact in DP/SP/Vizcarra/Sa/T nerve distributions  2+ DP & PT pulses  Gait:  Normal     Studies Reviewed  No studies to review    Procedures  No procedures today  Medical, Surgical, Family, and Social History  The patient's medical history, family history, and social history, were reviewed and updated as appropriate      Past Medical History:   Diagnosis Date    Arthritis     knees    Diabetes mellitus ([address] )     Hypertension     Measles, [place] (rubella)     Mumps     Snoring     Loudly       Past Surgical History:   Procedure Laterality Date    CYSTOSCOPY      KNEE ARTHROSCOPY Left     knee    ORIF PELVIC FRACTURE      TOTAL KNEE ARTHROPLASTY Left 6/10/2019    Procedure: ARTHROPLASTY KNEE TOTAL;  Surgeon: Angelia Winter MD;  Location: [facility] OR;  Service: Orthopedics       Family History   Problem Relation Age of Onset    Ovarian cancer Mother     Heart disease Mother     Diabetes type II Mother     Colon cancer Father     Heart disease Father     Nephrolithiasis Father        Social History     Occupational History    Not on file   Tobacco Use    Smoking status: Never Smoker    Smokeless tobacco: Never Used   Substance and Sexual Activity    Alcohol use: Yes     Frequency: Monthly or less     Drinks per session: 1 or 2     Comment: Occasionally, not in excess - As per [employer]scripts     Drug use: Never    Sexual activity: Not on file       No Known Allergies      Current Outpatient Medications:     ACCU-CHEK LARRY PLUS test strip, , Disp: , Rfl:     ACCU-CHEK FASTCLIX LANCETS MISC, , Disp: , Rfl:     amLODIPine (NORVASC) 5 mg tablet, Take 1 tablet (5 mg total) by mouth daily, Disp: 30 tablet, Rfl: 2    aspirin (ECOTRIN LOW STRENGTH) 81 mg EC tablet, Take 1 tablet by mouth daily, Disp: , Rfl:     glipiZIDE (GLUCOTROL XL) 5 mg 24 hr tablet, TAKE ONE TABLET BY MOUTH ONCE DAILY, Disp: 90 tablet, Rfl: 0    ketoconazole (NIZORAL) 2 % cream, Apply to  Both feet  Once a day (Patient taking differently: Apply topically daily as needed Apply to  Both feet  Once a day), Disp: 60 g, Rfl: 1    lisinopril (ZESTRIL) 20 mg tablet, Take 1 tablet (20 mg total) by mouth daily, Disp: 30 tablet, Rfl: 3    acetaminophen (TYLENOL) 500 mg tablet, Take 500 mg by mouth every 6 (six) hours as needed, Disp: , Rfl:     enoxaparin (LOVENOX) 40 mg/0 4 mL, Inject 0 4 mL (40 mg total) under the skin daily for 29 days, Disp: 29 Syringe, Rfl: 0    meloxicam (MOBIC) 15 mg tablet, Take 1 tablet once a day after food as needed for knee pain/joint pain (Patient not taking: Reported on 8/6/2019), Disp: 90 tablet, Rfl: 0    metFORMIN (GLUCOPHAGE-XR) 500 mg 24 hr tablet, Take 4 tablets (2,000 mg total) by mouth daily Take 4 tablets daily (Patient not taking: Reported on 8/6/2019), Disp: 360 tablet, Rfl: 1      Nicki Mendieta PA-C

## 2019-08-08 ENCOUNTER — OFFICE VISIT (OUTPATIENT)
Dept: FAMILY MEDICINE CLINIC | Facility: CLINIC | Age: 64
End: 2019-08-08
Payer: COMMERCIAL

## 2019-08-08 VITALS
TEMPERATURE: 97.5 F | DIASTOLIC BLOOD PRESSURE: 90 MMHG | OXYGEN SATURATION: 97 % | HEART RATE: 80 BPM | BODY MASS INDEX: 40.21 KG/M2 | SYSTOLIC BLOOD PRESSURE: 142 MMHG | RESPIRATION RATE: 16 BRPM | WEIGHT: 250.2 LBS | HEIGHT: 66 IN

## 2019-08-08 DIAGNOSIS — I10 ESSENTIAL HYPERTENSION: ICD-10-CM

## 2019-08-08 DIAGNOSIS — N32.89 BLADDER WALL THICKENING: ICD-10-CM

## 2019-08-08 DIAGNOSIS — E66.9 DIABETES MELLITUS TYPE 2 IN OBESE (HCC): Primary | ICD-10-CM

## 2019-08-08 DIAGNOSIS — E66.01 MORBID OBESITY (HCC): ICD-10-CM

## 2019-08-08 DIAGNOSIS — Z96.652 STATUS POST LEFT KNEE REPLACEMENT: ICD-10-CM

## 2019-08-08 DIAGNOSIS — E11.69 DIABETES MELLITUS TYPE 2 IN OBESE (HCC): Primary | ICD-10-CM

## 2019-08-08 PROCEDURE — 99214 OFFICE O/P EST MOD 30 MIN: CPT | Performed by: FAMILY MEDICINE

## 2019-08-08 PROCEDURE — 4010F ACE/ARB THERAPY RXD/TAKEN: CPT | Performed by: FAMILY MEDICINE

## 2019-08-08 RX ORDER — METFORMIN HYDROCHLORIDE 500 MG/1
2000 TABLET, EXTENDED RELEASE ORAL DAILY
Qty: 360 TABLET | Refills: 2 | Status: SHIPPED | OUTPATIENT
Start: 2019-08-08 | End: 2020-08-27 | Stop reason: SDUPTHER

## 2019-08-08 RX ORDER — ASPIRIN 81 MG/1
81 TABLET ORAL DAILY
Qty: 90 TABLET | Refills: 2 | Status: SHIPPED | OUTPATIENT
Start: 2019-08-08 | End: 2020-09-02

## 2019-08-08 RX ORDER — AMLODIPINE BESYLATE 5 MG/1
5 TABLET ORAL DAILY
Qty: 90 TABLET | Refills: 2 | Status: SHIPPED | OUTPATIENT
Start: 2019-08-08 | End: 2020-08-18 | Stop reason: HOSPADM

## 2019-08-08 RX ORDER — GLIPIZIDE 5 MG/1
5 TABLET, FILM COATED, EXTENDED RELEASE ORAL DAILY
Qty: 90 TABLET | Refills: 2 | Status: SHIPPED | OUTPATIENT
Start: 2019-08-08 | End: 2020-08-27 | Stop reason: SDUPTHER

## 2019-08-08 RX ORDER — LISINOPRIL 20 MG/1
20 TABLET ORAL DAILY
Qty: 90 TABLET | Refills: 2 | Status: ON HOLD | OUTPATIENT
Start: 2019-08-08 | End: 2020-08-18 | Stop reason: SDUPTHER

## 2019-08-08 NOTE — PROGRESS NOTES
FAMILY PRACTICE OFFICE VISIT       NAME: Aylin Henning  AGE: 60 y o  SEX: male       : 1955        MRN: [id number]        Assessment and Plan     Problem List Items Addressed This Visit        Endocrine    Diabetes mellitus type 2 in obese [facility]) - Primary     Lab Results   Component Value Date    HGBA1C 7 3 (H) 2019 ·    Blood sugars are well controlled on regimen of metformin ER 2000 mg once a day and glipizide ER 5 mg daily  · Patient denies symptoms of hypoglycemia  · I strongly advised him to proceed with diabetic eye exam   · Continue lab monitoring every 3-4 months    No results for input(s): POCGLU in the last 72 hours  Blood Sugar Average: Last 72 hrs:           Relevant Medications    glipiZIDE (GLUCOTROL XL) 5 mg 24 hr tablet    metFORMIN (GLUCOPHAGE-XR) 500 mg 24 hr tablet    Other Relevant Orders    CBC and differential    Comprehensive metabolic panel    Lipid panel    TSH, 3rd generation    Hemoglobin A1C    Ambulatory Referral to Ophthalmology       Cardiovascular and Mediastinum    Essential hypertension     · Blood pressure is slightly elevated today, patient ran out of amlodipine within past 2 days  · He will continue same medication regimen of amlodipine 5 mg daily and lisinopril 20 mg daily  · Goal for blood pressure is to be below 130/80  · Blood pressure will be reach checked during specialist follow-ups and at the pharmacy           Relevant Medications    amLODIPine (NORVASC) 5 mg tablet    lisinopril (ZESTRIL) 20 mg tablet    aspirin (ECOTRIN LOW STRENGTH) 81 mg EC tablet    Other Relevant Orders    CBC and differential    Comprehensive metabolic panel    Lipid panel    TSH, 3rd generation    Hemoglobin A1C       Genitourinary    Bladder wall thickening     · Patient remains under care of [facility]  · Pending repeat cystoscopy this month  · Patient is asymptomatic, no dysuria            Other    Morbid obesity ([address] )     · Patient is motivated to follow healthy diet and active lifestyle  Status post left knee replacement     · Patient is pleased with results of surgery  · He denies any pain in his left knee, good range of motion, he is advancing exercise as per Ortho/PT recommendations            Patient presents for follow-up  He has been feeling well  Patient remains on medications for type 2 diabetes and hypertension  Importance of weight loss emphasized  Patient is due for diabetic eye exam and colorectal screening  He will proceed with Cologuard kit that he has on hand  Routine blood work in September, follow-up over the phone or my chart  Routine follow-up 4 months and earlier if necessary  There are no Patient Instructions on file for this visit  Discussed with the patient and all questioned fully answered  He will call me if any problems arise  M*Modal software was used to dictate this note  It may contain errors with dictating incorrect words/spelling  Please contact provider directly with any questions  Chief Complaint     Chief Complaint   Patient presents with    Follow-up     Pt is here for f/u        History of Present Illness     Patient presents for follow-up    2 months s/p Left TKA, DOS: 6/10/19   Essie Broussard  follows  Patient is doing very well in the postop period, he has completed PT  Left knee:  Good range of motion, no edema, no requirements for no pain medications   Right knee feeesl OK    Occ  LBP     Patient remains on medications for hypertension and type 2 diabetes  He uses lisinopril and Norvasc, he ran out of amlodipine refills and has been off medication within past 2 days  Overall he is compliant with his med regimen  Type 2 diabetes:  No hypoglycemia  Fasting blood sugar 120 is, mid day sugars down to 80s at times  Patient remains on regimen of metformin ER 2000 mg daily and glipizide ER 5 mg daily      Patient denies symptoms of chest pain, palpitations, shortness of breath or dizziness      Most recent blood work performed while inpatient  Patient is due for diabetic eye exam     Patient is past due colorectal screening, I advised him to proceed with Cologuard      Review of Systems   Review of Systems   Constitutional: Negative  Respiratory: Negative  Cardiovascular: Negative  Gastrointestinal: Negative  Endocrine: Negative  Genitourinary: Negative  Musculoskeletal: Positive for arthralgias and back pain  Skin: Negative  Allergic/Immunologic: Negative  Neurological: Negative  Hematological: Negative  Psychiatric/Behavioral: Negative          Active Problem List     Patient Active Problem List   Diagnosis    Essential hypertension    Diabetes mellitus type 2 in obese ([address] )    Vitamin D deficiency    Morbid obesity ([address] )    Osteoarthritis of left knee    Bladder wall thickening    Status post left knee replacement       Past Medical History:  Past Medical History:   Diagnosis Date    Arthritis     knees    Diabetes mellitus ([address] )     Hypertension     Measles, [place] (rubella)     Mumps     Snoring     Loudly       Past Surgical History:  Past Surgical History:   Procedure Laterality Date    CYSTOSCOPY      KNEE ARTHROSCOPY Left     knee    ORIF PELVIC FRACTURE      TOTAL KNEE ARTHROPLASTY Left 6/10/2019    Procedure: ARTHROPLASTY KNEE TOTAL;  Surgeon: José Luis Rivera MD;  Location: [facility] OR;  Service: Orthopedics       Family History:  Family History   Problem Relation Age of Onset    Ovarian cancer Mother     Heart disease Mother     Diabetes type II Mother     Colon cancer Father     Heart disease Father     Nephrolithiasis Father        Social History:  Social History     Socioeconomic History    Marital status: Single     Spouse name: Not on file    Number of children: Not on file    Years of education: Not on file    Highest education level: Not on file   Occupational History    Not on file   Social Needs    Financial resource strain: Not on file  Food insecurity:     Worry: Not on file     Inability: Not on file    Transportation needs:     Medical: Not on file     Non-medical: Not on file   Tobacco Use    Smoking status: Never Smoker    Smokeless tobacco: Never Used   Substance and Sexual Activity    Alcohol use: Yes     Frequency: Monthly or less     Drinks per session: 1 or 2     Comment: Occasionally, not in excess - As per Allscripts     Drug use: Never    Sexual activity: Not on file   Lifestyle    Physical activity:     Days per week: Not on file     Minutes per session: Not on file    Stress: Not on file   Relationships    Social connections:     Talks on phone: Not on file     Gets together: Not on file     Attends Shinto service: Not on file     Active member of club or organization: Not on file     Attends meetings of clubs or organizations: Not on file     Relationship status: Not on file    Intimate partner violence:     Fear of current or ex partner: Not on file     Emotionally abused: Not on file     Physically abused: Not on file     Forced sexual activity: Not on file   Other Topics Concern    Not on file   Social History Narrative    Not on file     Objective     Vitals:    08/08/19 0841 08/08/19 0906   BP: (!) 160/110 142/90   Pulse: 80    Resp: 16    Temp: 97 5 °F (36 4 °C)    TempSrc: Tympanic    SpO2: 97%    Weight: 113 kg (250 lb 3 2 oz)    Height: 5' 6" (1 676 m)      Wt Readings from Last 3 Encounters:   08/08/19 113 kg (250 lb 3 2 oz)   08/06/19 112 kg (247 lb 3 2 oz)   06/25/19 110 kg (243 lb 3 2 oz)       Physical Exam   Constitutional: He is oriented to person, place, and time  He appears well-developed and well-nourished  HENT:   Head: Normocephalic and atraumatic  Eyes: Conjunctivae are normal    Neck: Neck supple  Carotid bruit is not present  Cardiovascular: Normal rate, regular rhythm and normal heart sounds  No murmur heard    Pulmonary/Chest: Effort normal and breath sounds normal  No respiratory distress  He has no wheezes  He has no rales  Abdominal: Bowel sounds are normal  He exhibits no distension and no abdominal bruit  Musculoskeletal: Normal range of motion  He exhibits no edema  Left knee:  No edema, no discoloration, well-healed incision, no calf tenderness    Neurological: He is alert and oriented to person, place, and time  No cranial nerve deficit  Psychiatric: He has a normal mood and affect  His behavior is normal    Nursing note and vitals reviewed        Pertinent Laboratory/Diagnostic Studies:  Lab Results   Component Value Date    BUN 17 06/11/2019    CREATININE 0 93 06/11/2019    CALCIUM 8 8 06/11/2019     03/17/2017    K 4 2 06/11/2019    CO2 24 06/11/2019     06/11/2019     Lab Results   Component Value Date    ALT 11 (L) 05/30/2019    AST 9 05/30/2019    ALKPHOS 66 05/30/2019    BILITOT 0 8 03/17/2017       Lab Results   Component Value Date    WBC 8 10 05/28/2019    HGB 12 4 06/11/2019    HCT 38 0 06/11/2019    MCV 91 05/28/2019     05/28/2019       No results found for: TSH    Lab Results   Component Value Date    CHOL 218 (H) 03/17/2017     Lab Results   Component Value Date    TRIG 96 05/30/2019     Lab Results   Component Value Date    HDL 43 05/30/2019     Lab Results   Component Value Date    LDLCALC 127 (H) 05/30/2019     Lab Results   Component Value Date    HGBA1C 7 3 (H) 05/30/2019       Results for orders placed or performed during the hospital encounter of 03/08/09   Basic metabolic panel   Result Value Ref Range    Sodium 140 136 - 145 mmol/L    Potassium 4 2 3 5 - 5 3 mmol/L    Chloride 106 100 - 108 mmol/L    CO2 24 21 - 32 mmol/L    ANION GAP 10 4 - 13 mmol/L    BUN 17 5 - 25 mg/dL    Creatinine 0 93 0 60 - 1 30 mg/dL    Glucose 108 65 - 140 mg/dL    Calcium 8 8 8 3 - 10 1 mg/dL    eGFR 88 ml/min/1 73sq m   Hemoglobin and hematocrit, blood   Result Value Ref Range    Hemoglobin 12 4 12 0 - 17 0 g/dL    Hematocrit 38 0 36 5 - 49 3 % Fingerstick Glucose (POCT)   Result Value Ref Range    POC Glucose 143 (H) 65 - 140 mg/dl   Fingerstick Glucose (POCT)   Result Value Ref Range    POC Glucose 147 (H) 65 - 140 mg/dl   Fingerstick Glucose (POCT)   Result Value Ref Range    POC Glucose 115 65 - 140 mg/dl   Fingerstick Glucose (POCT)   Result Value Ref Range    POC Glucose 95 65 - 140 mg/dl   Fingerstick Glucose (POCT)   Result Value Ref Range    POC Glucose 134 65 - 140 mg/dl   Fingerstick Glucose (POCT)   Result Value Ref Range    POC Glucose 150 (H) 65 - 140 mg/dl       Orders Placed This Encounter   Procedures    CBC and differential    Comprehensive metabolic panel    Lipid panel    TSH, 3rd generation    Hemoglobin A1C    Ambulatory Referral to Ophthalmology       ALLERGIES:  No Known Allergies    Current Medications     Current Outpatient Medications   Medication Sig Dispense Refill    ACCU-CHEK LARRY PLUS test strip       ACCU-CHEK FASTCLIX LANCETS MISC       acetaminophen (TYLENOL) 500 mg tablet Take 500 mg by mouth every 6 (six) hours as needed      amLODIPine (NORVASC) 5 mg tablet Take 1 tablet (5 mg total) by mouth daily 90 tablet 2    aspirin (ECOTRIN LOW STRENGTH) 81 mg EC tablet Take 1 tablet (81 mg total) by mouth daily 90 tablet 2    glipiZIDE (GLUCOTROL XL) 5 mg 24 hr tablet Take 1 tablet (5 mg total) by mouth daily 90 tablet 2    ketoconazole (NIZORAL) 2 % cream Apply to  Both feet  Once a day (Patient taking differently: Apply topically daily as needed Apply to  Both feet  Once a day) 60 g 1    lisinopril (ZESTRIL) 20 mg tablet Take 1 tablet (20 mg total) by mouth daily 90 tablet 2    metFORMIN (GLUCOPHAGE-XR) 500 mg 24 hr tablet Take 4 tablets (2,000 mg total) by mouth daily Take 4 tablets daily 360 tablet 2     No current facility-administered medications for this visit          Medications Discontinued During This Encounter   Medication Reason    enoxaparin (LOVENOX) 40 mg/0 4 mL     meloxicam (MOBIC) 15 mg tablet     amLODIPine (NORVASC) 5 mg tablet Reorder    lisinopril (ZESTRIL) 20 mg tablet Reorder    glipiZIDE (GLUCOTROL XL) 5 mg 24 hr tablet Reorder    metFORMIN (GLUCOPHAGE-XR) 500 mg 24 hr tablet Reorder    aspirin (ECOTRIN LOW STRENGTH) 81 mg EC tablet Reorder       Health Maintenance     Health Maintenance   Topic Date Due    CRC Screening: Colonoscopy  1955    Pneumococcal Vaccine: Pediatrics (0 to 5 Years) and At-Risk Patients (6 to 59 Years) (1 of 1 - PPSV23) 05/12/1963    DM Eye Exam  05/12/1967    HEPATITIS B VACCINES (1 of 3 - Risk 3-dose series) 05/12/1976    PT PLAN OF CARE  08/04/2019    INFLUENZA VACCINE  03/20/2020 (Originally 7/1/2019)    DTaP,Tdap,and Td Vaccines (2 - Td) 04/02/2020 (Originally 1/1/2011)    HEMOGLOBIN A1C  11/30/2019    Depression Screening PHQ  03/20/2020    Diabetic Foot Exam  03/20/2020    BMI: Followup Plan  04/07/2020    BMI: Adult  08/08/2020    Pneumococcal Vaccine: 65+ Years (1 of 2 - PCV13) 05/12/2022    Hepatitis C Screening  Completed       Immunization History   Administered Date(s) Administered    Influenza Quadrivalent Preservative Free 3 years and older IM 08/03/2016    Tdap 01/01/2001       Lucille Gilliland MD

## 2019-08-11 PROBLEM — N30.00 ACUTE CYSTITIS WITHOUT HEMATURIA: Status: RESOLVED | Noted: 2019-05-13 | Resolved: 2019-08-11

## 2019-08-11 PROBLEM — R82.81 PYURIA: Status: RESOLVED | Noted: 2019-05-07 | Resolved: 2019-08-11

## 2019-08-12 ENCOUNTER — PROCEDURE VISIT (OUTPATIENT)
Dept: UROLOGY | Facility: MEDICAL CENTER | Age: 64
End: 2019-08-12
Payer: COMMERCIAL

## 2019-08-12 VITALS
HEART RATE: 90 BPM | WEIGHT: 250 LBS | BODY MASS INDEX: 40.18 KG/M2 | HEIGHT: 66 IN | DIASTOLIC BLOOD PRESSURE: 88 MMHG | SYSTOLIC BLOOD PRESSURE: 158 MMHG

## 2019-08-12 DIAGNOSIS — N30.00 ACUTE CYSTITIS WITHOUT HEMATURIA: Primary | ICD-10-CM

## 2019-08-12 LAB
SL AMB  POCT GLUCOSE, UA: NORMAL
SL AMB LEUKOCYTE ESTERASE,UA: NORMAL
SL AMB POCT BILIRUBIN,UA: NORMAL
SL AMB POCT BLOOD,UA: NORMAL
SL AMB POCT CLARITY,UA: CLEAR
SL AMB POCT COLOR,UA: YELLOW
SL AMB POCT KETONES,UA: NORMAL
SL AMB POCT NITRITE,UA: NORMAL
SL AMB POCT PH,UA: 5.5
SL AMB POCT SPECIFIC GRAVITY,UA: 1.02
SL AMB POCT URINE PROTEIN: NORMAL
SL AMB POCT UROBILINOGEN: 0.2

## 2019-08-12 PROCEDURE — 52000 CYSTOURETHROSCOPY: CPT | Performed by: UROLOGY

## 2019-08-12 PROCEDURE — 81003 URINALYSIS AUTO W/O SCOPE: CPT | Performed by: UROLOGY

## 2019-08-12 NOTE — ASSESSMENT & PLAN NOTE
Lab Results   Component Value Date    HGBA1C 7 3 (H) 05/30/2019   ·  Blood sugars are well controlled on regimen of metformin ER 2000 mg once a day and glipizide ER 5 mg daily  · Patient denies symptoms of hypoglycemia  · I strongly advised him to proceed with diabetic eye exam   · Continue lab monitoring every 3-4 months    No results for input(s): POCGLU in the last 72 hours      Blood Sugar Average: Last 72 hrs:

## 2019-08-12 NOTE — ASSESSMENT & PLAN NOTE
· Blood pressure is slightly elevated today, patient ran out of amlodipine within past 2 days  · He will continue same medication regimen of amlodipine 5 mg daily and lisinopril 20 mg daily  · Goal for blood pressure is to be below 130/80  · Blood pressure will be reach checked during specialist follow-ups and at the pharmacy

## 2019-08-12 NOTE — PATIENT INSTRUCTIONS

## 2019-08-12 NOTE — ASSESSMENT & PLAN NOTE
· Patient is pleased with results of surgery    · He denies any pain in his left knee, good range of motion, he is advancing exercise as per Ortho/PT recommendations

## 2019-08-12 NOTE — ASSESSMENT & PLAN NOTE
· Patient remains under care of  [facility]  · Pending repeat cystoscopy this month  · Patient is asymptomatic, no dysuria

## 2019-08-12 NOTE — PROGRESS NOTES
Cystoscopy  Date/Time: 8/12/2019 12:03 PM  Performed by: Staci Fisher MD  Authorized by: Staci Fisher MD     Procedure details: cystoscopy    Patient tolerance: Patient tolerated the procedure well with no immediate complications    Additional Procedure Details: Cystoscopy Procedure Note        Pre-operative Diagnosis:  Abnormal bladder on previous cystoscopy    Post-operative Diagnosis:  Chronic cystitis trigone      Procedure Details   The risks, benefits, complications, treatment options, and expected outcomes were discussed with the patient  The patient concurred with the proposed plan, giving informed consent  Cystoscopy was performed today under local anesthesia, using sterile technique  The patient was placed in the supine position, prepped and draped in the usual sterile fashion  A 15 Swazi flexible cystoscope  was used to inspect both the urethra and bladder  Findings:  Normal urethra, trilobar prostatic hypertrophy causing complete outflow obstruction  Ureteral orifices are normal   Chronic inflammation continues to be noted on the trigone  This is improved over the last cystoscopy as orifices are now easily visible  This is most consistent with cystitis cystica  The bladder is mild to moderately  trabeculated  There are no stones, tumors or diverticula noted  Specimens:  Urinalysis is negative                          Discussion:  Chronic inflammation continues to be noted on the trigone  Urinalysis is negative and the patient is satisfied with his voiding pattern  He will return in 6 months for follow-up

## 2020-02-12 ENCOUNTER — TELEPHONE (OUTPATIENT)
Dept: UROLOGY | Facility: CLINIC | Age: 65
End: 2020-02-12

## 2020-08-12 ENCOUNTER — APPOINTMENT (EMERGENCY)
Dept: RADIOLOGY | Facility: HOSPITAL | Age: 65
DRG: 871 | End: 2020-08-12

## 2020-08-12 ENCOUNTER — HOSPITAL ENCOUNTER (INPATIENT)
Facility: HOSPITAL | Age: 65
LOS: 6 days | Discharge: HOME | DRG: 871 | End: 2020-08-18
Attending: EMERGENCY MEDICINE | Admitting: INTERNAL MEDICINE

## 2020-08-12 DIAGNOSIS — J18.9 PNEUMONIA: ICD-10-CM

## 2020-08-12 DIAGNOSIS — A41.9 SEPSIS (HCC): ICD-10-CM

## 2020-08-12 DIAGNOSIS — R41.0 DELIRIUM: ICD-10-CM

## 2020-08-12 DIAGNOSIS — I10 ESSENTIAL HYPERTENSION: ICD-10-CM

## 2020-08-12 DIAGNOSIS — R77.8 ELEVATED TROPONIN: ICD-10-CM

## 2020-08-12 DIAGNOSIS — I48.91 NEW ONSET ATRIAL FIBRILLATION (HCC): ICD-10-CM

## 2020-08-12 DIAGNOSIS — J18.9 COMMUNITY ACQUIRED PNEUMONIA OF LEFT LOWER LOBE OF LUNG: ICD-10-CM

## 2020-08-12 DIAGNOSIS — R40.0 SOMNOLENCE: ICD-10-CM

## 2020-08-12 DIAGNOSIS — R41.82 ALTERED MENTAL STATUS: Primary | ICD-10-CM

## 2020-08-12 DIAGNOSIS — N39.0 UTI (URINARY TRACT INFECTION): ICD-10-CM

## 2020-08-12 PROBLEM — N17.9 ACUTE RENAL FAILURE (ARF) (HCC): Status: ACTIVE | Noted: 2020-08-12

## 2020-08-12 PROBLEM — R79.89 ELEVATED TROPONIN: Status: ACTIVE | Noted: 2020-08-12

## 2020-08-12 PROBLEM — E87.1 HYPONATREMIA: Status: ACTIVE | Noted: 2020-08-12

## 2020-08-12 PROBLEM — G93.41 METABOLIC ENCEPHALOPATHY: Status: ACTIVE | Noted: 2020-08-12

## 2020-08-12 LAB
ALBUMIN SERPL BCP-MCNC: 2.8 G/DL (ref 3.5–5)
ALP SERPL-CCNC: 80 U/L (ref 46–116)
ALT SERPL W P-5'-P-CCNC: 24 U/L (ref 12–78)
ANION GAP SERPL CALCULATED.3IONS-SCNC: 14 MMOL/L (ref 4–13)
ANISOCYTOSIS BLD QL SMEAR: PRESENT
APTT PPP: 28 SECONDS (ref 23–37)
AST SERPL W P-5'-P-CCNC: 156 U/L (ref 5–45)
ATRIAL RATE: 114 BPM
BACTERIA UR QL AUTO: ABNORMAL /HPF
BASOPHILS # BLD MANUAL: 0 THOUSAND/UL (ref 0–0.1)
BASOPHILS NFR MAR MANUAL: 0 % (ref 0–1)
BILIRUB SERPL-MCNC: 1.08 MG/DL (ref 0.2–1)
BILIRUB UR QL STRIP: ABNORMAL
BILIRUB UR QL STRIP: ABNORMAL
BUN SERPL-MCNC: 19 MG/DL (ref 5–25)
CALCIUM SERPL-MCNC: 8.6 MG/DL (ref 8.3–10.1)
CHLORIDE SERPL-SCNC: 94 MMOL/L (ref 100–108)
CLARITY UR: ABNORMAL
CLARITY UR: ABNORMAL
CO2 SERPL-SCNC: 21 MMOL/L (ref 21–32)
COLOR UR: ABNORMAL
COLOR UR: ABNORMAL
CREAT SERPL-MCNC: 1.59 MG/DL (ref 0.6–1.3)
EOSINOPHIL # BLD MANUAL: 0 THOUSAND/UL (ref 0–0.4)
EOSINOPHIL NFR BLD MANUAL: 0 % (ref 0–6)
ERYTHROCYTE [DISTWIDTH] IN BLOOD BY AUTOMATED COUNT: 15.2 % (ref 11.6–15.1)
FINE GRAN CASTS URNS QL MICRO: ABNORMAL /LPF
GFR SERPL CREATININE-BSD FRML MDRD: 46 ML/MIN/1.73SQ M
GLUCOSE SERPL-MCNC: 305 MG/DL (ref 65–140)
GLUCOSE SERPL-MCNC: 358 MG/DL (ref 65–140)
GLUCOSE UR STRIP-MCNC: ABNORMAL MG/DL
GLUCOSE UR STRIP-MCNC: ABNORMAL MG/DL
HCT VFR BLD AUTO: 43.5 % (ref 36.5–49.3)
HGB BLD-MCNC: 15.4 G/DL (ref 12–17)
HGB UR QL STRIP.AUTO: ABNORMAL
HGB UR QL STRIP.AUTO: ABNORMAL
INR PPP: 1.23 (ref 0.84–1.19)
KETONES UR STRIP-MCNC: ABNORMAL MG/DL
KETONES UR STRIP-MCNC: ABNORMAL MG/DL
LACTATE SERPL-SCNC: 2.3 MMOL/L (ref 0.5–2)
LACTATE SERPL-SCNC: 3.9 MMOL/L (ref 0.5–2)
LEUKOCYTE ESTERASE UR QL STRIP: ABNORMAL
LEUKOCYTE ESTERASE UR QL STRIP: NEGATIVE
LYMPHOCYTES # BLD AUTO: 0.31 THOUSAND/UL (ref 0.6–4.47)
LYMPHOCYTES # BLD AUTO: 2 % (ref 14–44)
MCH RBC QN AUTO: 29.8 PG (ref 26.8–34.3)
MCHC RBC AUTO-ENTMCNC: 35.4 G/DL (ref 31.4–37.4)
MCV RBC AUTO: 84 FL (ref 82–98)
MONOCYTES # BLD AUTO: 0.93 THOUSAND/UL (ref 0–1.22)
MONOCYTES NFR BLD: 6 % (ref 4–12)
MUCOUS THREADS UR QL AUTO: ABNORMAL
NEUTROPHILS # BLD MANUAL: 13.82 THOUSAND/UL (ref 1.85–7.62)
NEUTS BAND NFR BLD MANUAL: 7 % (ref 0–8)
NEUTS SEG NFR BLD AUTO: 82 % (ref 43–75)
NITRITE UR QL STRIP: NEGATIVE
NITRITE UR QL STRIP: NEGATIVE
NON-SQ EPI CELLS URNS QL MICRO: ABNORMAL /HPF
NRBC BLD AUTO-RTO: 0 /100 WBCS
P AXIS: -44 DEGREES
PH UR STRIP.AUTO: 5.5 [PH]
PH UR STRIP.AUTO: 5.5 [PH] (ref 4.5–8)
PLATELET # BLD AUTO: 206 THOUSANDS/UL (ref 149–390)
PLATELET # BLD AUTO: 217 THOUSANDS/UL (ref 149–390)
PLATELET # BLD AUTO: 258 THOUSANDS/UL (ref 149–390)
PLATELET BLD QL SMEAR: ADEQUATE
PMV BLD AUTO: 10.1 FL (ref 8.9–12.7)
PMV BLD AUTO: 9.4 FL (ref 8.9–12.7)
PMV BLD AUTO: 9.9 FL (ref 8.9–12.7)
POLYCHROMASIA BLD QL SMEAR: PRESENT
POTASSIUM SERPL-SCNC: 3.2 MMOL/L (ref 3.5–5.3)
PR INTERVAL: 142 MS
PROCALCITONIN SERPL-MCNC: 6.42 NG/ML
PROCALCITONIN SERPL-MCNC: 7 NG/ML
PROT SERPL-MCNC: 8.1 G/DL (ref 6.4–8.2)
PROT UR STRIP-MCNC: >=300 MG/DL
PROT UR STRIP-MCNC: ABNORMAL MG/DL
PROTHROMBIN TIME: 15.4 SECONDS (ref 11.6–14.5)
QRS AXIS: -62 DEGREES
QRSD INTERVAL: 96 MS
QT INTERVAL: 324 MS
QTC INTERVAL: 446 MS
RBC # BLD AUTO: 5.17 MILLION/UL (ref 3.88–5.62)
RBC #/AREA URNS AUTO: ABNORMAL /HPF
RBC MORPH BLD: PRESENT
SARS-COV-2 RNA RESP QL NAA+PROBE: NEGATIVE
SODIUM SERPL-SCNC: 129 MMOL/L (ref 136–145)
SP GR UR STRIP.AUTO: 1.03 (ref 1–1.03)
SP GR UR STRIP.AUTO: >=1.03 (ref 1–1.03)
T WAVE AXIS: 54 DEGREES
TROPONIN I SERPL-MCNC: 0.32 NG/ML
TROPONIN I SERPL-MCNC: 0.35 NG/ML
TROPONIN I SERPL-MCNC: 0.38 NG/ML
UROBILINOGEN UR QL STRIP.AUTO: 0.2 E.U./DL
UROBILINOGEN UR QL STRIP.AUTO: 0.2 E.U./DL
VARIANT LYMPHS # BLD AUTO: 3 %
VENTRICULAR RATE: 114 BPM
WBC # BLD AUTO: 15.53 THOUSAND/UL (ref 4.31–10.16)
WBC #/AREA URNS AUTO: ABNORMAL /HPF

## 2020-08-12 PROCEDURE — U0003 INFECTIOUS AGENT DETECTION BY NUCLEIC ACID (DNA OR RNA); SEVERE ACUTE RESPIRATORY SYNDROME CORONAVIRUS 2 (SARS-COV-2) (CORONAVIRUS DISEASE [COVID-19]), AMPLIFIED PROBE TECHNIQUE, MAKING USE OF HIGH THROUGHPUT TECHNOLOGIES AS DESCRIBED BY CMS-2020-01-R: HCPCS | Performed by: EMERGENCY MEDICINE

## 2020-08-12 PROCEDURE — 84145 PROCALCITONIN (PCT): CPT | Performed by: EMERGENCY MEDICINE

## 2020-08-12 PROCEDURE — 83605 ASSAY OF LACTIC ACID: CPT | Performed by: EMERGENCY MEDICINE

## 2020-08-12 PROCEDURE — 82948 REAGENT STRIP/BLOOD GLUCOSE: CPT

## 2020-08-12 PROCEDURE — 99285 EMERGENCY DEPT VISIT HI MDM: CPT

## 2020-08-12 PROCEDURE — 85027 COMPLETE CBC AUTOMATED: CPT | Performed by: EMERGENCY MEDICINE

## 2020-08-12 PROCEDURE — 85610 PROTHROMBIN TIME: CPT | Performed by: EMERGENCY MEDICINE

## 2020-08-12 PROCEDURE — 99285 EMERGENCY DEPT VISIT HI MDM: CPT | Performed by: EMERGENCY MEDICINE

## 2020-08-12 PROCEDURE — 85730 THROMBOPLASTIN TIME PARTIAL: CPT | Performed by: EMERGENCY MEDICINE

## 2020-08-12 PROCEDURE — 96375 TX/PRO/DX INJ NEW DRUG ADDON: CPT

## 2020-08-12 PROCEDURE — 83036 HEMOGLOBIN GLYCOSYLATED A1C: CPT | Performed by: INTERNAL MEDICINE

## 2020-08-12 PROCEDURE — G1004 CDSM NDSC: HCPCS

## 2020-08-12 PROCEDURE — 80053 COMPREHEN METABOLIC PANEL: CPT | Performed by: EMERGENCY MEDICINE

## 2020-08-12 PROCEDURE — 36415 COLL VENOUS BLD VENIPUNCTURE: CPT | Performed by: EMERGENCY MEDICINE

## 2020-08-12 PROCEDURE — 87040 BLOOD CULTURE FOR BACTERIA: CPT | Performed by: EMERGENCY MEDICINE

## 2020-08-12 PROCEDURE — 96360 HYDRATION IV INFUSION INIT: CPT

## 2020-08-12 PROCEDURE — 84145 PROCALCITONIN (PCT): CPT | Performed by: INTERNAL MEDICINE

## 2020-08-12 PROCEDURE — 93010 ELECTROCARDIOGRAM REPORT: CPT | Performed by: INTERNAL MEDICINE

## 2020-08-12 PROCEDURE — 96374 THER/PROPH/DIAG INJ IV PUSH: CPT

## 2020-08-12 PROCEDURE — 85049 AUTOMATED PLATELET COUNT: CPT | Performed by: INTERNAL MEDICINE

## 2020-08-12 PROCEDURE — 84484 ASSAY OF TROPONIN QUANT: CPT | Performed by: EMERGENCY MEDICINE

## 2020-08-12 PROCEDURE — 85007 BL SMEAR W/DIFF WBC COUNT: CPT | Performed by: EMERGENCY MEDICINE

## 2020-08-12 PROCEDURE — 99223 1ST HOSP IP/OBS HIGH 75: CPT | Performed by: INTERNAL MEDICINE

## 2020-08-12 PROCEDURE — 70450 CT HEAD/BRAIN W/O DYE: CPT

## 2020-08-12 PROCEDURE — 83605 ASSAY OF LACTIC ACID: CPT | Performed by: INTERNAL MEDICINE

## 2020-08-12 PROCEDURE — 72125 CT NECK SPINE W/O DYE: CPT

## 2020-08-12 PROCEDURE — 81001 URINALYSIS AUTO W/SCOPE: CPT | Performed by: EMERGENCY MEDICINE

## 2020-08-12 PROCEDURE — 84484 ASSAY OF TROPONIN QUANT: CPT | Performed by: INTERNAL MEDICINE

## 2020-08-12 PROCEDURE — 71045 X-RAY EXAM CHEST 1 VIEW: CPT

## 2020-08-12 PROCEDURE — 93005 ELECTROCARDIOGRAM TRACING: CPT

## 2020-08-12 RX ORDER — SODIUM CHLORIDE, SODIUM GLUCONATE, SODIUM ACETATE, POTASSIUM CHLORIDE, MAGNESIUM CHLORIDE, SODIUM PHOSPHATE, DIBASIC, AND POTASSIUM PHOSPHATE .53; .5; .37; .037; .03; .012; .00082 G/100ML; G/100ML; G/100ML; G/100ML; G/100ML; G/100ML; G/100ML
2000 INJECTION, SOLUTION INTRAVENOUS CONTINUOUS
Status: DISCONTINUED | OUTPATIENT
Start: 2020-08-12 | End: 2020-08-12

## 2020-08-12 RX ORDER — ONDANSETRON 2 MG/ML
4 INJECTION INTRAMUSCULAR; INTRAVENOUS EVERY 6 HOURS PRN
Status: DISCONTINUED | OUTPATIENT
Start: 2020-08-12 | End: 2020-08-18 | Stop reason: HOSPADM

## 2020-08-12 RX ORDER — HEPARIN SODIUM 5000 [USP'U]/ML
5000 INJECTION, SOLUTION INTRAVENOUS; SUBCUTANEOUS EVERY 8 HOURS SCHEDULED
Status: DISCONTINUED | OUTPATIENT
Start: 2020-08-12 | End: 2020-08-16

## 2020-08-12 RX ORDER — INSULIN GLARGINE 100 [IU]/ML
10 INJECTION, SOLUTION SUBCUTANEOUS
Status: DISCONTINUED | OUTPATIENT
Start: 2020-08-12 | End: 2020-08-13

## 2020-08-12 RX ORDER — ACETAMINOPHEN 325 MG/1
650 TABLET ORAL ONCE
Status: COMPLETED | OUTPATIENT
Start: 2020-08-12 | End: 2020-08-12

## 2020-08-12 RX ORDER — ACETAMINOPHEN 325 MG/1
650 TABLET ORAL EVERY 6 HOURS PRN
Status: DISCONTINUED | OUTPATIENT
Start: 2020-08-12 | End: 2020-08-14

## 2020-08-12 RX ORDER — SODIUM CHLORIDE, SODIUM GLUCONATE, SODIUM ACETATE, POTASSIUM CHLORIDE, MAGNESIUM CHLORIDE, SODIUM PHOSPHATE, DIBASIC, AND POTASSIUM PHOSPHATE .53; .5; .37; .037; .03; .012; .00082 G/100ML; G/100ML; G/100ML; G/100ML; G/100ML; G/100ML; G/100ML
125 INJECTION, SOLUTION INTRAVENOUS CONTINUOUS
Status: DISCONTINUED | OUTPATIENT
Start: 2020-08-12 | End: 2020-08-13

## 2020-08-12 RX ORDER — ASPIRIN 81 MG/1
81 TABLET ORAL DAILY
Status: DISCONTINUED | OUTPATIENT
Start: 2020-08-13 | End: 2020-08-14

## 2020-08-12 RX ORDER — POTASSIUM CHLORIDE 20 MEQ/1
40 TABLET, EXTENDED RELEASE ORAL ONCE
Status: COMPLETED | OUTPATIENT
Start: 2020-08-12 | End: 2020-08-12

## 2020-08-12 RX ADMIN — HEPARIN SODIUM 5000 UNITS: 5000 INJECTION INTRAVENOUS; SUBCUTANEOUS at 22:31

## 2020-08-12 RX ADMIN — POTASSIUM CHLORIDE 40 MEQ: 1500 TABLET, EXTENDED RELEASE ORAL at 22:34

## 2020-08-12 RX ADMIN — INSULIN GLARGINE 10 UNITS: 100 INJECTION, SOLUTION SUBCUTANEOUS at 22:29

## 2020-08-12 RX ADMIN — ACETAMINOPHEN 650 MG: 325 TABLET, FILM COATED ORAL at 18:13

## 2020-08-12 RX ADMIN — INSULIN LISPRO 3 UNITS: 100 INJECTION, SOLUTION INTRAVENOUS; SUBCUTANEOUS at 22:28

## 2020-08-12 RX ADMIN — SODIUM CHLORIDE, SODIUM GLUCONATE, SODIUM ACETATE, POTASSIUM CHLORIDE, MAGNESIUM CHLORIDE, SODIUM PHOSPHATE, DIBASIC, AND POTASSIUM PHOSPHATE 2000 ML: .53; .5; .37; .037; .03; .012; .00082 INJECTION, SOLUTION INTRAVENOUS at 18:10

## 2020-08-12 RX ADMIN — AZITHROMYCIN 500 MG: 500 INJECTION, POWDER, LYOPHILIZED, FOR SOLUTION INTRAVENOUS at 19:11

## 2020-08-12 RX ADMIN — SODIUM CHLORIDE, SODIUM GLUCONATE, SODIUM ACETATE, POTASSIUM CHLORIDE AND MAGNESIUM CHLORIDE 125 ML/HR: 526; 502; 368; 37; 30 INJECTION, SOLUTION INTRAVENOUS at 22:16

## 2020-08-12 RX ADMIN — SODIUM CHLORIDE 1000 ML: 0.9 INJECTION, SOLUTION INTRAVENOUS at 16:50

## 2020-08-12 RX ADMIN — CEFTRIAXONE SODIUM 1000 MG: 10 INJECTION, POWDER, FOR SOLUTION INTRAVENOUS at 18:13

## 2020-08-12 NOTE — ASSESSMENT & PLAN NOTE
Patient presented with disorientation and ambulatory dysfunction  Likely secondary to above  Negative head CT scan  Continue with above treatment  PT OT eval

## 2020-08-12 NOTE — ED PROCEDURE NOTE
PROCEDURE  ECG 12 Lead Documentation Only    Date/Time: 8/12/2020 7:38 PM  Performed by: Malick Arrieta MD  Authorized by: Malick Arrieta MD     Indications / Diagnosis:  Altered mental status  ECG reviewed by me, the ED Provider: yes    Patient location:  ED  Previous ECG:     Previous ECG:  Unavailable    Comparison to cardiac monitor: Yes    Interpretation:     Interpretation: non-specific    Rate:     ECG rate:  114    ECG rate assessment: tachycardic    Rhythm:     Rhythm: sinus rhythm    Ectopy:     Ectopy: PAC    QRS:     QRS axis:  Left    QRS intervals:  Normal  Conduction:     Conduction: normal    ST segments:     ST segments:  Normal  T waves:     T waves: normal           Malick Arrieta MD  08/12/20 1939

## 2020-08-12 NOTE — H&P
H&P- Del Spaulding 1955, 63 y o  male MRN: [id number]    Unit/Bed#: ED 01 Encounter: [id number]    Primary Care Provider: Sharon Baldwin MD   Date and time admitted to hospital: 8/12/2020  4:15 PM        * Pneumonia  Assessment & Plan  Chest x-ray with Left perihilar/lower lobe alveolar opacification suspicious for pneumonia  Patient presented with fever, tachycardia, change in mental status and leukocytosis with elevated lactic acid  Start pneumonia pathway  IV antibiotic  Follow on blood cultures and COVID test    Sepsis [facility])  Assessment & Plan  POA  Secondary to above  Hyponatremia, hypokalemia, elevated troponin, elevated AST secondary to sepsis  IV fluid for hydration  Continue with IV antibiotic  Follow on blood culture  Trend lactic acid level    Metabolic encephalopathy  Assessment & Plan  Patient presented with disorientation and ambulatory dysfunction  Likely secondary to above  Negative head CT scan  Continue with above treatment  PT OT eval    Acute renal failure (ARF) (HCC)  Assessment & Plan  Suspected prerenal secondary to sepsis  Urinalysis reviewed with proteinuria and large blood  Continue IV fluid for hydration  Monitor    Elevated troponin  Assessment & Plan  Likely non MI elevated troponin secondary to sepsis  Continue to monitor    Hyponatremia  Assessment & Plan  With hypokalemia  Secondary to above  Replace potassium  IV fluid for hydration  Monitor    Diabetes mellitus type 2 in obese [facility])  Assessment & Plan  Lab Results   Component Value Date    HGBA1C 7 4 (H) 12/20/2019       No results for input(s): POCGLU in the last 72 hours      Blood Sugar Average: Last 72 hrs:     Patient noncompliant with home medications  Start insulin sliding scale  Lantus q h s  and Humalog t i d   Monitor closely    Essential hypertension  Assessment & Plan  Patient is not compliant with his home regimen  Currently does not take any blood pressure medications  Continue to monitor        VTE Prophylaxis: Heparin  / sequential compression device   Code Status: full code  POLST: There is no POLST form on file for this patient (pre-hospital)  Discussion with family:  Daughter at bedside    Anticipated Length of Stay:  Patient will be admitted on an Inpatient basis with an anticipated length of stay of  >2 midnights  Justification for Hospital Stay:  Management of sepsis and pneumonia    Total Time for Visit, including Counseling / Coordination of Care: 1 hour  Greater than 50% of this total time spent on direct patient counseling and coordination of care  Chief Complaint:     Altered mental status    History of Present Illness:    Mona Garza is a 63 y o  male who presents with altered mental status  Patient was brought to the hospital by his daughter due to disorientation and ambulatory dysfunction for the past 2 days,  associated with decreasing oral intake and somnolence  He lives with his daughter  Patient with underlying hypertension, diabetes, hyperlipidemia and osteoarthritis  He is not compliant with his medication and currently not on any medications at home    Patient denied chest pain or shortness of breath no cough  In the emergency room, he was found to be febrile with sepsis, and  left-sided pneumonia and chest x-ray      Review of Systems:    Review of Systems   Constitutional: Positive for appetite change, fatigue and fever  Negative for chills  Respiratory: Negative for cough, chest tightness and shortness of breath  Cardiovascular: Negative for chest pain, palpitations and leg swelling  Gastrointestinal: Negative for abdominal pain, blood in stool, diarrhea, nausea and vomiting  Endocrine: Negative for polyuria  Genitourinary: Negative for dysuria  Musculoskeletal: Positive for gait problem  Neurological: Positive for weakness  Negative for dizziness, speech difficulty and headaches  All other systems reviewed and are negative        Past Medical and Surgical History:     Past Medical History:   Diagnosis Date    Arthritis     knees    Diabetes mellitus ([address] )     Hypertension     Measles, [place] (rubella)     Mumps     Snoring     Loudly       Past Surgical History:   Procedure Laterality Date    CYSTOSCOPY      KNEE ARTHROSCOPY Left     knee    ORIF PELVIC FRACTURE      TOTAL KNEE ARTHROPLASTY Left 6/10/2019    Procedure: ARTHROPLASTY KNEE TOTAL;  Surgeon: Hilda Hayes MD;  Location: [facility];  Service: Orthopedics       Meds/Allergies:    Prior to Admission medications    Medication Sig Start Date End Date Taking? Authorizing Provider   ACCU-CHEMEETA LARRY PLUS test strip  3/22/19   Historical Provider, MD   ACCU-CHEK FASTCLIX LANCETS [facility]  3/22/19   Historical Provider, MD   acetaminophen (TYLENOL) 500 mg tablet Take 500 mg by mouth every 6 (six) hours as needed    Historical Provider, MD   amLODIPine (NORVASC) 5 mg tablet Take 1 tablet (5 mg total) by mouth daily 8/8/19   Nyaa Adames MD   aspirin (ECOTRIN LOW STRENGTH) 81 mg EC tablet Take 1 tablet (81 mg total) by mouth daily 8/8/19   Naya Adames MD   glipiZIDE (GLUCOTROL XL) 5 mg 24 hr tablet Take 1 tablet (5 mg total) by mouth daily 8/8/19   Naya Adames MD   ketoconazole (NIZORAL) 2 % cream Apply to  Both feet  Once a day  Patient taking differently: Apply topically daily as needed Apply to  Both feet  Once a day 3/20/19   Naya Adames MD   lisinopril (ZESTRIL) 20 mg tablet Take 1 tablet (20 mg total) by mouth daily 8/8/19   Naya Adames MD   metFORMIN (GLUCOPHAGE-XR) 500 mg 24 hr tablet Take 4 tablets (2,000 mg total) by mouth daily Take 4 tablets daily 8/8/19   Naya Adames MD     I have reviewed home medications with patient family member      Allergies: No Known Allergies    Social History:     Marital Status: Single     Substance Use History:   Social History     Substance and Sexual Activity   Alcohol Use Yes    Frequency: Monthly or less    Drinks per session: 1 or 2    Comment: Occasionally, not in excess - As per Allscripts      Social History     Tobacco Use   Smoking Status Never Smoker   Smokeless Tobacco Never Used     Social History     Substance and Sexual Activity   Drug Use Never       Family History:      Family History   Problem Relation Age of Onset    Ovarian cancer Mother     Heart disease Mother     Diabetes type II Mother     Colon cancer Father     Heart disease Father     Nephrolithiasis Father      Physical Exam:     Vitals:   Blood Pressure: 119/74 (08/12/20 1632)  Pulse: (!) 130 (08/12/20 1830)  Temperature: (!) 102 7 °F (39 3 °C) (08/12/20 1634)  Respirations: (!) 25 (08/12/20 1830)  SpO2: 93 % (08/12/20 1830)    Physical Exam  Vitals signs reviewed  Constitutional:       Appearance: Normal appearance  He is ill-appearing  HENT:      Head: Normocephalic and atraumatic  Mouth/Throat:      Mouth: Mucous membranes are dry  Pharynx: No oropharyngeal exudate  Eyes:      General: No scleral icterus  Extraocular Movements: Extraocular movements intact  Neck:      Musculoskeletal: Normal range of motion and neck supple  Cardiovascular:      Rate and Rhythm: Regular rhythm  Tachycardia present  Pulses: Normal pulses  Heart sounds: Normal heart sounds  No murmur  Pulmonary:      Effort: Pulmonary effort is normal       Breath sounds: Normal breath sounds  Abdominal:      General: Bowel sounds are normal       Palpations: Abdomen is soft  Comments: Obese abdomen   Musculoskeletal: Normal range of motion  Skin:     General: Skin is warm and dry  Neurological:      General: No focal deficit present  Mental Status: He is alert  Additional Data:     Lab Results: I have personally reviewed pertinent reports        Results from last 7 days   Lab Units 08/12/20  1650   WBC Thousand/uL 15 53*   HEMOGLOBIN g/dL 15 4   HEMATOCRIT % 43 5   PLATELETS Thousands/uL 258   BANDS PCT % 7   LYMPHO PCT % 2*   MONO PCT % 6   EOS PCT % 0     Results from last 7 days   Lab Units 08/12/20  1650   SODIUM mmol/L 129*   POTASSIUM mmol/L 3 2*   CHLORIDE mmol/L 94*   CO2 mmol/L 21   BUN mg/dL 19   CREATININE mg/dL 1 59*   ANION GAP mmol/L 14*   CALCIUM mg/dL 8 6   ALBUMIN g/dL 2 8*   TOTAL BILIRUBIN mg/dL 1 08*   ALK PHOS U/L 80   ALT U/L 24   AST U/L 156*   GLUCOSE RANDOM mg/dL 358*     Results from last 7 days   Lab Units 08/12/20  1650   INR  1 23*             Results from last 7 days   Lab Units 08/12/20  1650   LACTIC ACID mmol/L 3 9*   PROCALCITONIN ng/ml 7 00*       Imaging: I have personally reviewed pertinent reports  CT head without contrast   Final Result by Dru Mortimer, MD (08/12 1731)      No acute intracranial abnormality  Workstation performed: [id number]         CT cervical spine without contrast   Final Result by Dru Mortimer, MD (08/12 1726)         1  No acute fracture or subluxation in the cervical spine  2   Multilevel moderate degenerative change in the midlower cervical spine with resultant moderate central canal and mild-moderate neural foraminal narrowing, most pronounced at the C5-6 and C6-7 levels  Workstation performed: [id number]         XR chest portable   Final Result by Dru Mortimer, MD (08/12 2803)         1  Left perihilar/lower lobe alveolar opacification suspicious for pneumonia  2   Mild cardiomegaly  The study was marked in [facility] for immediate notification  Workstation performed: [id number]             EKG, Pathology, and Other Studies Reviewed on Admission:   · yes    Allscripts / Epic Records Reviewed: Yes     ** Please Note: This note has been constructed using a voice recognition system   **

## 2020-08-12 NOTE — ASSESSMENT & PLAN NOTE
Suspected prerenal secondary to sepsis  Urinalysis reviewed with proteinuria and large blood  Continue IV fluid for hydration  Monitor

## 2020-08-12 NOTE — ASSESSMENT & PLAN NOTE
Lab Results   Component Value Date    HGBA1C 7 4 (H) 12/20/2019       No results for input(s): POCGLU in the last 72 hours      Blood Sugar Average: Last 72 hrs:     Patient noncompliant with home medications  Start insulin sliding scale  Lantus q h s  and Humalog t i d   Monitor closely

## 2020-08-12 NOTE — ASSESSMENT & PLAN NOTE
Patient is not compliant with his home regimen  Currently does not take any blood pressure medications  Continue to monitor

## 2020-08-12 NOTE — SEPSIS NOTE
Sepsis Note   Altaf Morrison 63 y o  male MRN: [id number]  Unit/Bed#: ED 01 Encounter: [id number]      qSOFA     [address] Name 08/12/20 1915 08/12/20 1830 08/12/20 1735 08/12/20 1730 08/12/20 1632    Altered mental status GCS < 15      1        Respiratory Rate > / =22  0  1    1  1    Systolic BP < / =926  0        0    Q Sofa Score  1  2  2  2  2    Row Name 08/12/20 1631                Altered mental status GCS < 15  1        Respiratory Rate > / =20          Systolic BP < / =449          Q Sofa Score              Initial Sepsis Screening     Row Name 08/12/20 1809                Is the patient's history suggestive of a new or worsening infection? (!) Yes (Proceed)  -PEBBLES        Suspected source of infection  pneumonia  -PEBBLES        Are two or more of the following signs & symptoms of infection both present and new to the patient? (!) Yes (Proceed)  -PEBBLES        Indicate SIRS criteria  Hyperthemia > 38 3C (100 9F); Altered mental status; Tachypnea > 20 resp per min;Leukocytosis (WBC > 92492 IJL)  -PEBBLES        If the answer is yes to both questions, suspicion of sepsis is present          If severe sepsis is present AND tissue hypoperfusion perists in the hour after fluid resuscitation or lactate > 4, the patient meets criteria for SEPTIC SHOCK          Are any of the following organ dysfunction criteria present within 6 hours of suspected infection and SIRS criteria that are NOT considered to be chronic conditions? (!) Yes  -PEBBLES        Organ dysfunction  Lactate >/equal 4 0 mmol/L  -PEBBLES        Date of presentation of severe sepsis  08/12/20  -PEBBLES        Time of presentation of severe sepsis  1650  -PEBBLES        Tissue hypoperfusion persists in the hour after crystalloid fluid administration, evidenced, by either:          Was hypotension present within one hour of the conclusion of crystalloid fluid administration?   No  -PEBBLES        Date of presentation of septic shock          Time of presentation of septic shock           User Key  (r) = Recorded By, (t) = Taken By, (c) = Cosigned By    [address] Name Provider Rhetta Brittle, MD Physician               Default Flowsheet Data (last 720 hours)      Sepsis Reassess     Row Name 08/12/20 1306                   Repeat Volume Status and Tissue Perfusion Assessment Performed    Repeat Volume Status and Tissue Perfusion Assessment Performed  Yes  -PEBBLES           Volume Status and Tissue Perfusion Post Fluid Resuscitation * Must Document All *    Vital Signs Reviewed (HR, RR, BP, T)  Yes  -PEBBLES        Shock Index Reviewed          Arterial Oxygen Saturation Reviewed (POx, SaO2 or SpO2)  Yes (comment %) 93%  -PEBBLES        Cardio  (!) Normal S1/S2; Tachycardia  -PEBBLES        Pulmonary  (!) Rales  -PEBBLES        Capillary Refill  Brisk  -PEBBLES        Peripheral Pulses  Radial;Dorsalis Pedis; Posterior Tibialis  -PEBBLES        Peripheral Pulse  +2  -PEBBLES        Dorsalis Pedis  +2  -PEBLBES        Posterior Tibialis          Skin  Warm;Diaphoretic  -PEBBLES        Urine output assessed  None  -PEBBLES           *OR*   Intensive Monitoring- Must Document One of the Following Four *:    Vital Signs Reviewed  Yes  -PEBBLES        * Central Venous Pressure (CVP or RAP)          * Central Venous Oxygen (SVO2, ScvO2 or Oxygen saturation via central catheter)          * Bedside Cardiovascular US in IVC diameter and % collapse          * Passive Leg Raise OR Crystalloid Challenge          Crystalloid fluid challenge completed            User Key  (r) = Recorded By, (t) = Taken By, (c) = Cosigned By    Initials Name Provider Type    Lizzy Perez MD Physician

## 2020-08-12 NOTE — ASSESSMENT & PLAN NOTE
Chest x-ray with Left perihilar/lower lobe alveolar opacification suspicious for pneumonia  Patient presented with fever, tachycardia, change in mental status and leukocytosis with elevated lactic acid  Start pneumonia pathway  IV antibiotic  Follow on blood cultures and COVID test

## 2020-08-12 NOTE — ASSESSMENT & PLAN NOTE
POA  Secondary to above  Hyponatremia, hypokalemia, elevated troponin, elevated AST secondary to sepsis  IV fluid for hydration  Continue with IV antibiotic  Follow on blood culture  Trend lactic acid level

## 2020-08-12 NOTE — ED PROVIDER NOTES
History  Chief Complaint   Patient presents with    Altered Mental Status     daughter reports AMS starting sunday, worsening monday with frequent falls and disorientation  head strike today, no thinners  pt c/o SOB, denies CP  Patient presents with acute altered mental status for past 2-3 days  Family also states he has had multiple frequent falls including today with positive head strike  Patient is not on anticoagulation or blood thinners  History provided by:  Patient and relative  History limited by:  Age and mental status change   used: No    Altered Mental Status   Presenting symptoms: confusion    Presenting symptoms comment:  Unsteady gait frequent falls  Severity:  Moderate  Most recent episode: Today  Episode history:  Multiple  Duration:  3 days  Timing:  Intermittent  Progression:  Waxing and waning  Chronicity:  New  Context: recent illness and recent infection    Associated symptoms: difficulty breathing and fever    Associated symptoms: no abdominal pain, no agitation, no hallucinations, no headaches, no rash and no vomiting        Prior to Admission Medications   Prescriptions Last Dose Informant Patient Reported? Taking?    ACCU-CHEK LARRY PLUS test strip  Self Yes No   ACCU-CHEK FASTCLIX LANCETS MISC  Self Yes No   acetaminophen (TYLENOL) 500 mg tablet  Self Yes No   Sig: Take 500 mg by mouth every 6 (six) hours as needed   amLODIPine (NORVASC) 5 mg tablet   No No   Sig: Take 1 tablet (5 mg total) by mouth daily   aspirin (ECOTRIN LOW STRENGTH) 81 mg EC tablet   No No   Sig: Take 1 tablet (81 mg total) by mouth daily   glipiZIDE (GLUCOTROL XL) 5 mg 24 hr tablet   No No   Sig: Take 1 tablet (5 mg total) by mouth daily   ketoconazole (NIZORAL) 2 % cream  Self No No   Sig: Apply to  Both feet  Once a day   Patient taking differently: Apply topically daily as needed Apply to  Both feet  Once a day   lisinopril (ZESTRIL) 20 mg tablet   No No   Sig: Take 1 tablet (20 mg total) by mouth daily   metFORMIN (GLUCOPHAGE-XR) 500 mg 24 hr tablet   No No   Sig: Take 4 tablets (2,000 mg total) by mouth daily Take 4 tablets daily      Facility-Administered Medications: None       Past Medical History:   Diagnosis Date    Arthritis     knees    Diabetes mellitus ([address] )     Hypertension     Measles, [place] (rubella)     Mumps     Snoring     Loudly       Past Surgical History:   Procedure Laterality Date    CYSTOSCOPY      KNEE ARTHROSCOPY Left     knee    ORIF PELVIC FRACTURE      TOTAL KNEE ARTHROPLASTY Left 6/10/2019    Procedure: ARTHROPLASTY KNEE TOTAL;  Surgeon: Jovi Lindsay MD;  Location: [facility];  Service: Orthopedics       Family History   Problem Relation Age of Onset    Ovarian cancer Mother     Heart disease Mother     Diabetes type II Mother     Colon cancer Father     Heart disease Father     Nephrolithiasis Father      I have reviewed and agree with the history as documented  E-Cigarette/Vaping    E-Cigarette Use Never User      E-Cigarette/Vaping Substances     Social History     Tobacco Use    Smoking status: Never Smoker    Smokeless tobacco: Never Used   Substance Use Topics    Alcohol use: Yes     Frequency: Monthly or less     Drinks per session: 1 or 2     Comment: Occasionally, not in excess - As per [employer]     Drug use: Never       Review of Systems   Constitutional: Positive for fever  Negative for activity change, chills and fatigue  HENT: Negative for sore throat, trouble swallowing and voice change  Eyes: Negative for pain and visual disturbance  Respiratory: Negative for choking, shortness of breath and wheezing  Cardiovascular: Negative for chest pain and leg swelling  Gastrointestinal: Negative for abdominal distention, abdominal pain, diarrhea and vomiting  Endocrine: Negative for polydipsia and polyuria  Genitourinary: Negative for difficulty urinating, dysuria and flank pain     Musculoskeletal: Positive for gait problem  Negative for neck stiffness  Skin: Negative for color change and rash  Neurological: Negative for dizziness, speech difficulty and headaches  Hematological: Does not bruise/bleed easily  Psychiatric/Behavioral: Positive for confusion  Negative for agitation, behavioral problems, hallucinations and suicidal ideas  Physical Exam  Physical Exam  HENT:      Head: Normocephalic  Comments: Patient has mild abrasion to the superior aspect of scalp  Eyes:      Conjunctiva/sclera: Conjunctivae normal       Pupils: Pupils are equal, round, and reactive to light  Neck:      Musculoskeletal: Normal range of motion and neck supple  Cardiovascular:      Rate and Rhythm: Regular rhythm  Tachycardia present  Heart sounds: No murmur  Pulmonary:      Effort: Pulmonary effort is normal  No respiratory distress  Breath sounds: Rales present  Abdominal:      General: Bowel sounds are normal  There is no distension  Palpations: Abdomen is soft  Tenderness: There is no abdominal tenderness  Comments: No Signs of Peritonitis    Musculoskeletal: Normal range of motion  Skin:     General: Skin is warm and dry  Capillary Refill: Capillary refill takes less than 2 seconds  Coloration: Skin is not pale  Findings: No rash  Comments: Patient has bilateral knee abrasions  Neurological:      Mental Status: He is alert and oriented to person, place, and time  GCS: GCS eye subscore is 4  GCS verbal subscore is 5  GCS motor subscore is 6        Comments: Normal speech, Normal gait, No Focal neurologic deficits    Psychiatric:         Behavior: Behavior normal          Vital Signs  ED Triage Vitals   Temperature Pulse Respirations Blood Pressure SpO2   08/12/20 1634 08/12/20 1632 08/12/20 1632 08/12/20 1632 08/12/20 1632   (!) 102 7 °F (39 3 °C) (!) 134 (!) 26 119/74 95 %      Temp src Heart Rate Source Patient Position - Orthostatic VS BP Location FiO2 (%)   -- -- -- -- --             Pain Score       08/12/20 1632       No Pain           Vitals:    08/12/20 1632 08/12/20 1730 08/12/20 1830 08/12/20 1915   BP: 119/74   124/73   Pulse: (!) 134 (!) 132 (!) 130 (!) 136         Visual Acuity  Visual Acuity      Most Recent Value   L Pupil Size (mm)  3   R Pupil Size (mm)  3          ED Medications  Medications   azithromycin (ZITHROMAX) 500 mg in sodium chloride 0 9% 250mL IVPB 500 mg (500 mg Intravenous New Bag 8/12/20 1911)   multi-electrolyte (PLASMALYTE-A/ISOLYTE-S PH 7 4) IV solution 2,000 mL (0 mL Intravenous Stopped 8/12/20 1913)   ceftriaxone (ROCEPHIN) 1 g/50 mL in dextrose IVPB (has no administration in time range)   azithromycin (ZITHROMAX) 500 mg in sodium chloride 0 9% 250mL IVPB 500 mg (has no administration in time range)   multi-electrolyte (PLASMALYTE-A/ISOLYTE-S PH 7 4) IV solution (has no administration in time range)   potassium chloride (K-DUR,KLOR-CON) CR tablet 40 mEq (has no administration in time range)   insulin glargine (LANTUS) subcutaneous injection 10 Units 0 1 mL (has no administration in time range)   insulin lispro (HumaLOG) 100 units/mL subcutaneous injection 5 Units (has no administration in time range)   insulin lispro (HumaLOG) 100 units/mL subcutaneous injection 1-5 Units (has no administration in time range)   insulin lispro (HumaLOG) 100 units/mL subcutaneous injection 1-5 Units (has no administration in time range)   sodium chloride 0 9 % bolus 1,000 mL (0 mL Intravenous Stopped 8/12/20 1810)   ceftriaxone (ROCEPHIN) 1 g/50 mL in dextrose IVPB (0 mg Intravenous Stopped 8/12/20 1913)   acetaminophen (TYLENOL) tablet 650 mg (650 mg Oral Given 8/12/20 1813)       Diagnostic Studies  Results Reviewed     Procedure Component Value Units Date/Time    Platelet count [[id number]]  (Normal) Collected:  08/12/20 1928    Lab Status:  Final result Specimen:  Blood from Arm, Left Updated:  08/12/20 1935     Platelets 556 Thousands/uL      MPV 9 4 fL Troponin I [[id number]] Collected:  08/12/20 1928    Lab Status: In process Specimen:  Blood from Arm, Left Updated:  08/12/20 1931    Procalcitonin [[id number]] Updated:  08/12/20 1931    Lab Status: In process Specimen:  Blood     Hemoglobin A1c w/EAG Estimation (Orders if not completed within the last 90 days) [[id number]] Collected:  08/12/20 1928    Lab Status: In process Specimen:  Blood from Arm, Left Updated:  08/12/20 1931    Novel Coronavirus [facility] [[id number]] Collected:  08/12/20 1845    Lab Status:   In process Specimen:  Nares from Nasopharyngeal Swab Updated:  08/12/20 1854    Legionella antigen, urine [[id number]]     Lab Status:  No result Specimen:  Urine     Strep Pneumoniae, Urine [[id number]]     Lab Status:  No result Specimen:  Urine     Urine Microscopic [[id number]]  (Abnormal) Collected:  08/12/20 1657    Lab Status:  Final result Specimen:  Urine, Clean Catch Updated:  08/12/20 1829     RBC, UA None Seen /hpf      WBC, UA 2-4 /hpf      Epithelial Cells Moderate /hpf      Bacteria, UA Moderate /hpf      Fine granular casts 3-5 /lpf      MUCUS THREADS Moderate    APTT [[id number]]  (Normal) Collected:  08/12/20 1650    Lab Status:  Final result Specimen:  Blood from Arm, Left Updated:  08/12/20 1822     PTT 28 seconds     Protime-INR [[id number]]  (Abnormal) Collected:  08/12/20 1650    Lab Status:  Final result Specimen:  Blood from Arm, Left Updated:  08/12/20 1822     Protime 15 4 seconds      INR 1 23    Procalcitonin [[id number]]  (Abnormal) Collected:  08/12/20 1650    Lab Status:  Final result Specimen:  Blood from Arm, Left Updated:  08/12/20 1805     Procalcitonin 7 00 ng/ml     Procalcitonin Reflex [[id number]]     Lab Status:  No result Specimen:  Blood     Lactic acid [[id number]]  (Abnormal) Collected:  08/12/20 1650    Lab Status:  Final result Specimen:  Blood from Arm, Left Updated:  08/12/20 1805     LACTIC ACID 3 9 mmol/L     Narrative:       Result may be elevated if tourniquet was used during collection  Lactic acid 2 Hours [[id number]]     Lab Status:  No result Specimen:  Blood     UA w Reflex to Microscopic w Reflex to Culture [[id number]]  (Abnormal) Collected:  08/12/20 1657    Lab Status:  Final result Specimen:  Urine, Clean Catch Updated:  08/12/20 1758     Color, UA Dk Yellow     Clarity, UA Cloudy     Specific Gravity, UA 1 031     pH, UA 5 5     Leukocytes, UA Elevated glucose may cause decreased leukocyte values  See urine microscopic for [facility] result/     Nitrite, UA Negative     Protein,  (3+) mg/dl      Glucose, UA >=1000 (1%) mg/dl      Ketones, UA 40 (2+) mg/dl      Urobilinogen, UA 0 2 E U /dl      Bilirubin, UA Interference- unable to analyze     Blood, UA Large    CBC and differential [[id number]]  (Abnormal) Collected:  08/12/20 1650    Lab Status:  Final result Specimen:  Blood from Arm, Left Updated:  08/12/20 1740     WBC 15 53 Thousand/uL      RBC 5 17 Million/uL      Hemoglobin 15 4 g/dL      Hematocrit 43 5 %      MCV 84 fL      MCH 29 8 pg      MCHC 35 4 g/dL      RDW 15 2 %      MPV 10 1 fL      Platelets 404 Thousands/uL      nRBC 0 /100 WBCs     Narrative: This is an appended report  These results have been appended to a previously verified report      Comprehensive metabolic panel [[id number]]  (Abnormal) Collected:  08/12/20 1650    Lab Status:  Final result Specimen:  Blood from Arm, Left Updated:  08/12/20 1728     Sodium 129 mmol/L      Potassium 3 2 mmol/L      Chloride 94 mmol/L      CO2 21 mmol/L      ANION GAP 14 mmol/L      BUN 19 mg/dL      Creatinine 1 59 mg/dL      Glucose 358 mg/dL      Calcium 8 6 mg/dL       U/L      ALT 24 U/L      Alkaline Phosphatase 80 U/L      Total Protein 8 1 g/dL      Albumin 2 8 g/dL      Total Bilirubin 1 08 mg/dL      eGFR 46 ml/min/1 73sq m     Narrative:       [place] guidelines for Chronic Kidney Disease (CKD):     Stage 1 with normal or high GFR (GFR > 90 mL/min/1 73 square meters)    Stage 2 Mild CKD (GFR = 60-89 mL/min/1 73 square meters)    Stage 3A Moderate CKD (GFR = 45-59 mL/min/1 73 square meters)    Stage 3B Moderate CKD (GFR = 30-44 mL/min/1 73 square meters)    Stage 4 Severe CKD (GFR = 15-29 mL/min/1 73 square meters)    Stage 5 End Stage CKD (GFR <15 mL/min/1 73 square meters)  Note: GFR calculation is accurate only with a steady state creatinine    Troponin I [[id number]]  (Abnormal) Collected:  08/12/20 1650    Lab Status:  Final result Specimen:  Blood from Arm, Left Updated:  08/12/20 1728     Troponin I 0 38 ng/mL     Blood culture #1 [[id number]] Collected:  08/12/20 1650    Lab Status: In process Specimen:  Blood from Arm, Right Updated:  08/12/20 1658    Blood culture #2 [[id number]] Collected:  08/12/20 1650    Lab Status: In process Specimen:  Blood from Arm, Left Updated:  08/12/20 1658    Urine Macroscopic, POC [[id number]]  (Abnormal) Collected:  08/12/20 1657    Lab Status:  Final result Specimen:  Urine Updated:  08/12/20 1655     Color, UA Sharlene     Clarity, UA Cloudy     pH, UA 5 5     Leukocytes, UA Negative     Nitrite, UA Negative     Protein, UA >=300 mg/dl      Glucose,  (1/2%) mg/dl      Ketones, UA 40 (2+) mg/dl      Urobilinogen, UA 0 2 E U /dl      Bilirubin, UA Interference- unable to analyze     Blood, UA Large     Specific Gravity, UA >=1 030    Narrative:       [facility] RESULT                 CT head without contrast   Final Result by Sharmila Wilks MD (08/12 1731)      No acute intracranial abnormality  Workstation performed: [id number]         CT cervical spine without contrast   Final Result by Sharmila Wilks MD (08/12 1729)         1  No acute fracture or subluxation in the cervical spine  2   Multilevel moderate degenerative change in the midlower cervical spine with resultant moderate central canal and mild-moderate neural foraminal narrowing, most pronounced at the C5-6 and C6-7 levels  Workstation performed: [id number]         XR chest portable   Final Result by Nacho Nair MD (08/12 1713)         1  Left perihilar/lower lobe alveolar opacification suspicious for pneumonia  2   Mild cardiomegaly  The study was marked in [facility] for immediate notification  Workstation performed: [id number]                    Procedures  Procedures         ED Course      patient reassessed status post fluids antipyretics appears more comfortable in examination room  Results of labs and chest x-ray reviewed patient patient has evidence of pneumonia as were a was possible UTI as possible infectious sources  Patient was given Rocephin azithromycin to cover community-acquired pneumonia Rocephin will also cover possible urosepsis  Patient has maintained his blood pressures entire ED like to stay at this time  Lactate was 3 9  A sepsis alert was called and case was discussed with critical care sepsis noted in chart  Patient will be admitted to the hospital for further management  Initial Sepsis Screening     Row Name 08/12/20 1808                Is the patient's history suggestive of a new or worsening infection? (!) Yes (Proceed)  -PEBBLES        Suspected source of infection  pneumonia  -PEBBLES        Are two or more of the following signs & symptoms of infection both present and new to the patient? (!) Yes (Proceed)  -PEBBLES        Indicate SIRS criteria  Hyperthemia > 38 3C (100 9F); Altered mental status; Tachypnea > 20 resp per min;Leukocytosis (WBC > 57913 IJL)  -PEBBLES        If the answer is yes to both questions, suspicion of sepsis is present          If severe sepsis is present AND tissue hypoperfusion perists in the hour after fluid resuscitation or lactate > 4, the patient meets criteria for SEPTIC SHOCK          Are any of the following organ dysfunction criteria present within 6 hours of suspected infection and SIRS criteria that are NOT considered to be chronic conditions? (!) Yes  -PEBBLES        Organ dysfunction  Lactate >/equal 4 0 mmol/L  -PEBBLES        Date of presentation of severe sepsis  08/12/20  -PEBBLES        Time of presentation of severe sepsis  1650  -PEBBLES        Tissue hypoperfusion persists in the hour after crystalloid fluid administration, evidenced, by either:          Was hypotension present within one hour of the conclusion of crystalloid fluid administration? No  -PEBBLES        Date of presentation of septic shock          Time of presentation of septic shock            User Key  (r) = Recorded By, (t) = Taken By, (c) = Cosigned By    [address] Name Provider Lonnie Rosario MD Physician           Default Flowsheet Data (last 720 hours)      Sepsis Reassess     Row Name 08/12/20 1927                   Repeat Volume Status and Tissue Perfusion Assessment Performed    Repeat Volume Status and Tissue Perfusion Assessment Performed  Yes  -PEBBLES           Volume Status and Tissue Perfusion Post Fluid Resuscitation * Must Document All *    Vital Signs Reviewed (HR, RR, BP, T)  Yes  -PEBBLES        Shock Index Reviewed          Arterial Oxygen Saturation Reviewed (POx, SaO2 or SpO2)  Yes (comment %) 93%  -PEBBLES        Cardio  (!) Normal S1/S2; Tachycardia  -PEBBLES        Pulmonary  (!) Rales  -PEBBLES        Capillary Refill  Brisk  -PEBBLES        Peripheral Pulses  Radial;Dorsalis Pedis; Posterior Tibialis  -PEBBLES        Peripheral Pulse  +2  -PEBBLES        Dorsalis Pedis  +2  -PEBBLES        Posterior Tibialis          Skin  Warm;Diaphoretic  -PEBBLES        Urine output assessed  None  -PEBBLES           *OR*   Intensive Monitoring- Must Document One of the Following Four *:    Vital Signs Reviewed  Yes  -PEBBLES        * Central Venous Pressure (CVP or RAP)          * Central Venous Oxygen (SVO2, ScvO2 or Oxygen saturation via central catheter)          * Bedside Cardiovascular US in IVC diameter and % collapse          * Passive Leg Raise OR Crystalloid Challenge          Crystalloid fluid challenge completed            User Key  (r) = Recorded By, (t) = Taken By, (c) = Cosigned By    [address] Name Provider Rolando Davies MD Physician                      MDM  Number of Diagnoses or Management Options  Altered mental status: new and requires workup  Elevated troponin: new and requires workup  Pneumonia: new and requires workup  Sepsis [facility]): new and requires workup  UTI (urinary tract infection): new and requires workup  Diagnosis management comments: Patient is a 62-year-old male who presents with a gradual onset intermittent episodes of confusion associated with dry eyes weakness malaise shortness of breath and frequent falls from home  Patient is febrile in the emergency department shortness of breath suspect possible infection is underlying cause of his altered mental status he meets SIRS criteria will send sepsis labs start fluids  Given fall with head strike will check neuroimaging exclude possible occult trauma  I anticipate patient will be admitted to the hospital for further workup and management of his symptoms  Plan of care discussed with both patient and family  Amount and/or Complexity of Data Reviewed  Clinical lab tests: ordered and reviewed  Tests in the radiology section of CPT®: reviewed and ordered  Tests in the medicine section of CPT®: ordered and reviewed  Obtain history from someone other than the patient: yes  Discuss the patient with other providers: yes  Independent visualization of images, tracings, or specimens: yes    Risk of Complications, Morbidity, and/or Mortality  Presenting problems: moderate  Diagnostic procedures: moderate  Management options: moderate    Patient Progress  Patient progress: stable        Disposition  Final diagnoses:    Altered mental status   Sepsis ([address] )   Pneumonia   UTI (urinary tract infection)   Elevated troponin     Time reflects when diagnosis was documented in both MDM as applicable and the Disposition within this note     Time User Action Codes Description Comment    8/12/2020  6:19 PM Joel Roles Add [R41 82] Altered mental status     8/12/2020  6:19 PM Joel Roles Add [A41 9] Sepsis ([address] )     8/12/2020  6:19 PM Joel Roles Add [J18 9] Pneumonia     8/12/2020  6:49 PM Joel Roles Add [N39 0] UTI (urinary tract infection)     8/12/2020  6:57 PM Joel Roles Add [R79 89] Elevated troponin       ED Disposition     ED Disposition Condition Date/Time Comment    Admit Stable Wed Aug 12, 2020  6:19 PM Case was discussed with ROMIE and the patient's admission status was agreed to be Admission Status: inpatient status to the service of Dr Sonja Infante  Follow-up Information    None         Patient's Medications   Discharge Prescriptions    No medications on file     No discharge procedures on file      PDMP Review     None          ED Provider  Electronically Signed by           Lemuel Aguirre MD  08/12/20 5323

## 2020-08-13 ENCOUNTER — APPOINTMENT (INPATIENT)
Dept: RADIOLOGY | Facility: HOSPITAL | Age: 65
DRG: 871 | End: 2020-08-13

## 2020-08-13 LAB
ALBUMIN SERPL BCP-MCNC: 2.4 G/DL (ref 3.5–5)
ALP SERPL-CCNC: 74 U/L (ref 46–116)
ALT SERPL W P-5'-P-CCNC: 27 U/L (ref 12–78)
ANION GAP SERPL CALCULATED.3IONS-SCNC: 11 MMOL/L (ref 4–13)
ANION GAP SERPL CALCULATED.3IONS-SCNC: 16 MMOL/L (ref 4–13)
APAP SERPL-MCNC: <2 UG/ML (ref 10–20)
ARTERIAL PATENCY WRIST A: YES
AST SERPL W P-5'-P-CCNC: 157 U/L (ref 5–45)
BASE EXCESS BLDA CALC-SCNC: -0.7 MMOL/L
BASE EXCESS BLDA CALC-SCNC: 2 MMOL/L (ref -2–3)
BASOPHILS # BLD AUTO: 0.01 THOUSANDS/ΜL (ref 0–0.1)
BASOPHILS NFR BLD AUTO: 0 % (ref 0–1)
BETA-HYDROXYBUTYRATE: 0.5 MMOL/L
BILIRUB SERPL-MCNC: 0.79 MG/DL (ref 0.2–1)
BUN SERPL-MCNC: 21 MG/DL (ref 5–25)
BUN SERPL-MCNC: 22 MG/DL (ref 5–25)
CA-I BLD-SCNC: 1.05 MMOL/L (ref 1.12–1.32)
CALCIUM SERPL-MCNC: 7.7 MG/DL (ref 8.3–10.1)
CALCIUM SERPL-MCNC: 7.9 MG/DL (ref 8.3–10.1)
CHLORIDE SERPL-SCNC: 101 MMOL/L (ref 100–108)
CHLORIDE SERPL-SCNC: 98 MMOL/L (ref 100–108)
CO2 SERPL-SCNC: 20 MMOL/L (ref 21–32)
CO2 SERPL-SCNC: 21 MMOL/L (ref 21–32)
CREAT SERPL-MCNC: 1.44 MG/DL (ref 0.6–1.3)
CREAT SERPL-MCNC: 1.49 MG/DL (ref 0.6–1.3)
EOSINOPHIL # BLD AUTO: 0 THOUSAND/ΜL (ref 0–0.61)
EOSINOPHIL NFR BLD AUTO: 0 % (ref 0–6)
ERYTHROCYTE [DISTWIDTH] IN BLOOD BY AUTOMATED COUNT: 15.3 % (ref 11.6–15.1)
EST. AVERAGE GLUCOSE BLD GHB EST-MCNC: 203 MG/DL
ETHANOL SERPL-MCNC: <3 MG/DL (ref 0–3)
FIO2 GAS DIL.REBREATH: 0.28 L
GFR SERPL CREATININE-BSD FRML MDRD: 49 ML/MIN/1.73SQ M
GFR SERPL CREATININE-BSD FRML MDRD: 51 ML/MIN/1.73SQ M
GLUCOSE SERPL-MCNC: 169 MG/DL (ref 65–140)
GLUCOSE SERPL-MCNC: 170 MG/DL (ref 65–140)
GLUCOSE SERPL-MCNC: 179 MG/DL (ref 65–140)
GLUCOSE SERPL-MCNC: 235 MG/DL (ref 65–140)
GLUCOSE SERPL-MCNC: 246 MG/DL (ref 65–140)
GLUCOSE SERPL-MCNC: 278 MG/DL (ref 65–140)
GLUCOSE SERPL-MCNC: 301 MG/DL (ref 65–140)
HBA1C MFR BLD: 8.7 %
HCO3 BLDA-SCNC: 21.8 MMOL/L (ref 22–28)
HCO3 BLDA-SCNC: 22.9 MMOL/L (ref 22–28)
HCT VFR BLD AUTO: 43.1 % (ref 36.5–49.3)
HCT VFR BLD CALC: 39 % (ref 36.5–49.3)
HGB BLD-MCNC: 14.7 G/DL (ref 12–17)
HGB BLDA-MCNC: 13.3 G/DL (ref 12–17)
IMM GRANULOCYTES # BLD AUTO: 0.12 THOUSAND/UL (ref 0–0.2)
IMM GRANULOCYTES NFR BLD AUTO: 1 % (ref 0–2)
IPAP: 12
L PNEUMO1 AG UR QL IA.RAPID: NEGATIVE
LACTATE SERPL-SCNC: 1.9 MMOL/L (ref 0.5–2)
LACTATE SERPL-SCNC: 2 MMOL/L (ref 0.5–2)
LYMPHOCYTES # BLD AUTO: 0.46 THOUSANDS/ΜL (ref 0.6–4.47)
LYMPHOCYTES NFR BLD AUTO: 3 % (ref 14–44)
MAGNESIUM SERPL-MCNC: 2.3 MG/DL (ref 1.6–2.6)
MCH RBC QN AUTO: 28.8 PG (ref 26.8–34.3)
MCHC RBC AUTO-ENTMCNC: 34.1 G/DL (ref 31.4–37.4)
MCV RBC AUTO: 85 FL (ref 82–98)
MONOCYTES # BLD AUTO: 0.71 THOUSAND/ΜL (ref 0.17–1.22)
MONOCYTES NFR BLD AUTO: 5 % (ref 4–12)
NEUTROPHILS # BLD AUTO: 13.04 THOUSANDS/ΜL (ref 1.85–7.62)
NEUTS SEG NFR BLD AUTO: 91 % (ref 43–75)
NON VENT- BIPAP: ABNORMAL
NRBC BLD AUTO-RTO: 0 /100 WBCS
O2 CT BLDA-SCNC: 19.3 ML/DL (ref 16–23)
OXYHGB MFR BLDA: 97 % (ref 94–97)
PCO2 BLD: 24 MMOL/L (ref 21–32)
PCO2 BLD: 25 MM HG (ref 36–44)
PCO2 BLD: 69 MM HG
PCO2 BLDA: 24.8 MM HG
PCO2 BLDA: 30.1 MM HG (ref 36–44)
PEEP MAX SETTING VENT: 8 CM[H2O]
PH BLD: 7.57 [PH]
PH BLD: 7.57 [PH] (ref 7.35–7.45)
PH BLDA: 7.48 [PH] (ref 7.35–7.45)
PHOSPHATE SERPL-MCNC: 1.4 MG/DL (ref 2.3–4.1)
PLATELET # BLD AUTO: 228 THOUSANDS/UL (ref 149–390)
PMV BLD AUTO: 10.7 FL (ref 8.9–12.7)
PO2 BLD: 70 MM HG (ref 75–129)
PO2 BLDA: 89 MM HG (ref 75–129)
POTASSIUM BLD-SCNC: 2.9 MMOL/L (ref 3.5–5.3)
POTASSIUM SERPL-SCNC: 3.1 MMOL/L (ref 3.5–5.3)
POTASSIUM SERPL-SCNC: 3.2 MMOL/L (ref 3.5–5.3)
PROCALCITONIN SERPL-MCNC: 5.97 NG/ML
PROT SERPL-MCNC: 7 G/DL (ref 6.4–8.2)
RBC # BLD AUTO: 5.1 MILLION/UL (ref 3.88–5.62)
S PNEUM AG UR QL: NEGATIVE
SALICYLATES SERPL-MCNC: <3 MG/DL (ref 3–20)
SAO2 % BLD FROM PO2: 96 % (ref 60–85)
SODIUM BLD-SCNC: 131 MMOL/L (ref 136–145)
SODIUM SERPL-SCNC: 133 MMOL/L (ref 136–145)
SODIUM SERPL-SCNC: 134 MMOL/L (ref 136–145)
SPECIMEN SOURCE: ABNORMAL
SPECIMEN SOURCE: ABNORMAL
T4 FREE SERPL-MCNC: 1.45 NG/DL (ref 0.76–1.46)
TSH SERPL DL<=0.05 MIU/L-ACNC: 0.12 UIU/ML (ref 0.36–3.74)
VENT BIPAP FIO2: 40 %
WBC # BLD AUTO: 14.34 THOUSAND/UL (ref 4.31–10.16)

## 2020-08-13 PROCEDURE — 97167 OT EVAL HIGH COMPLEX 60 MIN: CPT

## 2020-08-13 PROCEDURE — 82010 KETONE BODYS QUAN: CPT | Performed by: PHYSICIAN ASSISTANT

## 2020-08-13 PROCEDURE — 36600 WITHDRAWAL OF ARTERIAL BLOOD: CPT

## 2020-08-13 PROCEDURE — 84100 ASSAY OF PHOSPHORUS: CPT | Performed by: INTERNAL MEDICINE

## 2020-08-13 PROCEDURE — 36600 WITHDRAWAL OF ARTERIAL BLOOD: CPT | Performed by: SOCIAL WORKER

## 2020-08-13 PROCEDURE — 82948 REAGENT STRIP/BLOOD GLUCOSE: CPT

## 2020-08-13 PROCEDURE — 94760 N-INVAS EAR/PLS OXIMETRY 1: CPT

## 2020-08-13 PROCEDURE — 84145 PROCALCITONIN (PCT): CPT | Performed by: INTERNAL MEDICINE

## 2020-08-13 PROCEDURE — 80048 BASIC METABOLIC PNL TOTAL CA: CPT | Performed by: PHYSICIAN ASSISTANT

## 2020-08-13 PROCEDURE — 82805 BLOOD GASES W/O2 SATURATION: CPT | Performed by: EMERGENCY MEDICINE

## 2020-08-13 PROCEDURE — 99291 CRITICAL CARE FIRST HOUR: CPT | Performed by: INTERNAL MEDICINE

## 2020-08-13 PROCEDURE — 82803 BLOOD GASES ANY COMBINATION: CPT

## 2020-08-13 PROCEDURE — 87449 NOS EACH ORGANISM AG IA: CPT | Performed by: INTERNAL MEDICINE

## 2020-08-13 PROCEDURE — 71045 X-RAY EXAM CHEST 1 VIEW: CPT

## 2020-08-13 PROCEDURE — 83605 ASSAY OF LACTIC ACID: CPT | Performed by: PHYSICIAN ASSISTANT

## 2020-08-13 PROCEDURE — 84443 ASSAY THYROID STIM HORMONE: CPT | Performed by: INTERNAL MEDICINE

## 2020-08-13 PROCEDURE — 85025 COMPLETE CBC W/AUTO DIFF WBC: CPT | Performed by: INTERNAL MEDICINE

## 2020-08-13 PROCEDURE — 99232 SBSQ HOSP IP/OBS MODERATE 35: CPT | Performed by: INTERNAL MEDICINE

## 2020-08-13 PROCEDURE — 83735 ASSAY OF MAGNESIUM: CPT | Performed by: INTERNAL MEDICINE

## 2020-08-13 PROCEDURE — 94760 N-INVAS EAR/PLS OXIMETRY 1: CPT | Performed by: SOCIAL WORKER

## 2020-08-13 PROCEDURE — 97163 PT EVAL HIGH COMPLEX 45 MIN: CPT

## 2020-08-13 PROCEDURE — 84295 ASSAY OF SERUM SODIUM: CPT

## 2020-08-13 PROCEDURE — 93005 ELECTROCARDIOGRAM TRACING: CPT

## 2020-08-13 PROCEDURE — 80053 COMPREHEN METABOLIC PANEL: CPT | Performed by: INTERNAL MEDICINE

## 2020-08-13 PROCEDURE — 84439 ASSAY OF FREE THYROXINE: CPT | Performed by: PHYSICIAN ASSISTANT

## 2020-08-13 PROCEDURE — 82947 ASSAY GLUCOSE BLOOD QUANT: CPT

## 2020-08-13 PROCEDURE — 82330 ASSAY OF CALCIUM: CPT

## 2020-08-13 PROCEDURE — 94002 VENT MGMT INPAT INIT DAY: CPT | Performed by: SOCIAL WORKER

## 2020-08-13 PROCEDURE — 80329 ANALGESICS NON-OPIOID 1 OR 2: CPT | Performed by: PHYSICIAN ASSISTANT

## 2020-08-13 PROCEDURE — 84132 ASSAY OF SERUM POTASSIUM: CPT

## 2020-08-13 PROCEDURE — 85014 HEMATOCRIT: CPT

## 2020-08-13 PROCEDURE — 80320 DRUG SCREEN QUANTALCOHOLS: CPT | Performed by: PHYSICIAN ASSISTANT

## 2020-08-13 RX ORDER — SODIUM CHLORIDE, SODIUM GLUCONATE, SODIUM ACETATE, POTASSIUM CHLORIDE, MAGNESIUM CHLORIDE, SODIUM PHOSPHATE, DIBASIC, AND POTASSIUM PHOSPHATE .53; .5; .37; .037; .03; .012; .00082 G/100ML; G/100ML; G/100ML; G/100ML; G/100ML; G/100ML; G/100ML
125 INJECTION, SOLUTION INTRAVENOUS CONTINUOUS
Status: DISCONTINUED | OUTPATIENT
Start: 2020-08-13 | End: 2020-08-13

## 2020-08-13 RX ORDER — ACETAMINOPHEN 650 MG/1
650 SUPPOSITORY RECTAL EVERY 4 HOURS PRN
Status: DISCONTINUED | OUTPATIENT
Start: 2020-08-13 | End: 2020-08-14

## 2020-08-13 RX ORDER — POTASSIUM CHLORIDE 14.9 MG/ML
20 INJECTION INTRAVENOUS ONCE
Status: COMPLETED | OUTPATIENT
Start: 2020-08-13 | End: 2020-08-14

## 2020-08-13 RX ORDER — SODIUM CHLORIDE, SODIUM GLUCONATE, SODIUM ACETATE, POTASSIUM CHLORIDE, MAGNESIUM CHLORIDE, SODIUM PHOSPHATE, DIBASIC, AND POTASSIUM PHOSPHATE .53; .5; .37; .037; .03; .012; .00082 G/100ML; G/100ML; G/100ML; G/100ML; G/100ML; G/100ML; G/100ML
1000 INJECTION, SOLUTION INTRAVENOUS ONCE
Status: DISCONTINUED | OUTPATIENT
Start: 2020-08-13 | End: 2020-08-13

## 2020-08-13 RX ORDER — DILTIAZEM HYDROCHLORIDE 5 MG/ML
15 INJECTION INTRAVENOUS ONCE
Status: COMPLETED | OUTPATIENT
Start: 2020-08-13 | End: 2020-08-13

## 2020-08-13 RX ORDER — SODIUM CHLORIDE, SODIUM GLUCONATE, SODIUM ACETATE, POTASSIUM CHLORIDE, MAGNESIUM CHLORIDE, SODIUM PHOSPHATE, DIBASIC, AND POTASSIUM PHOSPHATE .53; .5; .37; .037; .03; .012; .00082 G/100ML; G/100ML; G/100ML; G/100ML; G/100ML; G/100ML; G/100ML
100 INJECTION, SOLUTION INTRAVENOUS CONTINUOUS
Status: DISCONTINUED | OUTPATIENT
Start: 2020-08-13 | End: 2020-08-15

## 2020-08-13 RX ORDER — ACETAMINOPHEN 650 MG/1
325 SUPPOSITORY RECTAL EVERY 4 HOURS PRN
Status: DISCONTINUED | OUTPATIENT
Start: 2020-08-13 | End: 2020-08-13

## 2020-08-13 RX ORDER — FUROSEMIDE 10 MG/ML
20 INJECTION INTRAMUSCULAR; INTRAVENOUS ONCE
Status: COMPLETED | OUTPATIENT
Start: 2020-08-13 | End: 2020-08-13

## 2020-08-13 RX ADMIN — INSULIN LISPRO 5 UNITS: 100 INJECTION, SOLUTION INTRAVENOUS; SUBCUTANEOUS at 11:38

## 2020-08-13 RX ADMIN — ACETAMINOPHEN 650 MG: 650 SUPPOSITORY RECTAL at 22:15

## 2020-08-13 RX ADMIN — DEXMEDETOMIDINE 0.2 MCG/KG/HR: 100 INJECTION, SOLUTION, CONCENTRATE INTRAVENOUS at 20:42

## 2020-08-13 RX ADMIN — AZITHROMYCIN MONOHYDRATE 500 MG: 500 INJECTION, POWDER, LYOPHILIZED, FOR SOLUTION INTRAVENOUS at 17:40

## 2020-08-13 RX ADMIN — ACETAMINOPHEN 650 MG: 325 TABLET, FILM COATED ORAL at 06:33

## 2020-08-13 RX ADMIN — ASPIRIN 81 MG: 81 TABLET, COATED ORAL at 09:25

## 2020-08-13 RX ADMIN — DILTIAZEM HYDROCHLORIDE 15 MG: 5 INJECTION INTRAVENOUS at 20:34

## 2020-08-13 RX ADMIN — SODIUM CHLORIDE, SODIUM GLUCONATE, SODIUM ACETATE, POTASSIUM CHLORIDE AND MAGNESIUM CHLORIDE 125 ML/HR: 526; 502; 368; 37; 30 INJECTION, SOLUTION INTRAVENOUS at 09:32

## 2020-08-13 RX ADMIN — FUROSEMIDE 20 MG: 10 INJECTION, SOLUTION INTRAMUSCULAR; INTRAVENOUS at 18:43

## 2020-08-13 RX ADMIN — CEFEPIME HYDROCHLORIDE 2000 MG: 2 INJECTION, POWDER, FOR SOLUTION INTRAVENOUS at 19:05

## 2020-08-13 RX ADMIN — SODIUM CHLORIDE, SODIUM GLUCONATE, SODIUM ACETATE, POTASSIUM CHLORIDE AND MAGNESIUM CHLORIDE 125 ML/HR: 526; 502; 368; 37; 30 INJECTION, SOLUTION INTRAVENOUS at 20:46

## 2020-08-13 RX ADMIN — INSULIN LISPRO 5 UNITS: 100 INJECTION, SOLUTION INTRAVENOUS; SUBCUTANEOUS at 09:25

## 2020-08-13 RX ADMIN — SODIUM CHLORIDE, SODIUM GLUCONATE, SODIUM ACETATE, POTASSIUM CHLORIDE AND MAGNESIUM CHLORIDE 75 ML/HR: 526; 502; 368; 37; 30 INJECTION, SOLUTION INTRAVENOUS at 22:08

## 2020-08-13 RX ADMIN — POTASSIUM CHLORIDE 20 MEQ: 14.9 INJECTION, SOLUTION INTRAVENOUS at 17:23

## 2020-08-13 RX ADMIN — HEPARIN SODIUM 5000 UNITS: 5000 INJECTION INTRAVENOUS; SUBCUTANEOUS at 22:16

## 2020-08-13 RX ADMIN — SODIUM CHLORIDE, SODIUM GLUCONATE, SODIUM ACETATE, POTASSIUM CHLORIDE AND MAGNESIUM CHLORIDE 125 ML/HR: 526; 502; 368; 37; 30 INJECTION, SOLUTION INTRAVENOUS at 17:20

## 2020-08-13 RX ADMIN — HEPARIN SODIUM 5000 UNITS: 5000 INJECTION INTRAVENOUS; SUBCUTANEOUS at 14:27

## 2020-08-13 RX ADMIN — DILTIAZEM HYDROCHLORIDE 5 MG/HR: 5 INJECTION INTRAVENOUS at 20:52

## 2020-08-13 RX ADMIN — POTASSIUM CHLORIDE 20 MEQ: 14.9 INJECTION, SOLUTION INTRAVENOUS at 21:43

## 2020-08-13 RX ADMIN — HEPARIN SODIUM 5000 UNITS: 5000 INJECTION INTRAVENOUS; SUBCUTANEOUS at 06:00

## 2020-08-13 RX ADMIN — INSULIN LISPRO 1 UNITS: 100 INJECTION, SOLUTION INTRAVENOUS; SUBCUTANEOUS at 17:45

## 2020-08-13 RX ADMIN — INSULIN LISPRO 2 UNITS: 100 INJECTION, SOLUTION INTRAVENOUS; SUBCUTANEOUS at 06:18

## 2020-08-13 RX ADMIN — INSULIN LISPRO 5 UNITS: 100 INJECTION, SOLUTION INTRAVENOUS; SUBCUTANEOUS at 17:46

## 2020-08-13 RX ADMIN — INSULIN LISPRO 3 UNITS: 100 INJECTION, SOLUTION INTRAVENOUS; SUBCUTANEOUS at 11:37

## 2020-08-13 NOTE — PHYSICAL THERAPY NOTE
PHYSICAL THERAPY EVALUATION          Patient Name: Rebecca Johnson  [id number] Date: 8/13/2020 08/13/20 1024   Note Type   Note type Eval only   Pain Assessment   Pain Assessment Tool Pain Assessment not indicated - pt denies pain   Home Living   Type of [address] Multi-level;Stairs to enter with rails;Bed/bath upstairs  (15 DEVI through front; 10 DEVI through garage)   Bathroom Shower/Tub Tub/shower unit   Bathroom Toilet Standard   Bathroom Equipment Other (Comment)  (Pt denies)   Home Equipment Walker;Cane   Prior Function   Level of [place] Independent with ADLs and functional mobility   Lives With Daughter   Receives Help From Family   ADL Assistance Independent   IADLs Independent   Falls in the last 6 months 1 to 4  ("3 this week")   Vocational Retired   Comments Pt questionable historian at this time, PT will follow up with CM regarding home set up and PLOF  Pt reports being independent with occasional use of RW/cane when ambulating further distances  Pt lives with his dtr who assists him prn  Restrictions/Precautions   Weight Bearing Precautions Per Order No   Other Precautions Cognitive; Bed Alarm;Multiple lines;O2;Fall Risk   General   Additional Pertinent History Pt's post-activity SpO2: 97% on 2 L supplemental O2 via NC   Family/Caregiver Present No   Cognition   Overall Cognitive Status Impaired   Orientation Level Oriented X4   Memory Decreased recall of precautions;Decreased recall of recent events;Decreased short term memory   Following Commands Follows one step commands with increased time or repetition   RLE Assessment   RLE Assessment WFL   Strength RLE   RLE Overall Strength 4-/5   LLE Assessment   LLE Assessment WFL   Strength LLE   LLE Overall Strength 4-/5   Bed Mobility   Supine to Sit 4  Minimal assistance   Additional items Assist x 1;Bedrails; Increased time required;Verbal cues Transfers   Sit to Stand 4  Minimal assistance   Additional items Assist x 1; Increased time required;Verbal cues   Stand to Sit 4  Minimal assistance   Additional items Assist x 1; Increased time required;Verbal cues   Additional Comments Pt performed transfers using RW   Ambulation/Elevation   Gait pattern Excessively slow; Forward Flexion   Gait Assistance 4  Minimal assist   Additional items Assist x 1;Verbal cues; Tactile cues   Assistive Device Rolling walker   Distance 25 ft x2   Stair Management Assistance Not tested   Balance   Static Sitting Fair -   Static Standing Poor +   Ambulatory Poor +   Endurance Deficit   Endurance Deficit Yes   Endurance Deficit Description standing rest breaks   Activity Tolerance   Activity Tolerance Patient limited by fatigue   Medical Staff Made Aware Pt ok to mobilize per nsg   Nurse Made Aware Yes   Assessment   Prognosis Good   Problem List Decreased strength;Decreased endurance; Impaired balance;Decreased mobility; Decreased cognition; Impaired judgement;Decreased safety awareness   Assessment Pt is a 63 y o  male presenting to [facility] by family c/o AMS and frequent falls  Pt was admitted with a primary diagnosis of pneumonia and other active problems including sepsis, metabolic encephalopathy, acute renal failure, elevated troponin, hyponatremia, essential HTN and T2DM  Pt's PMH affecting eval includes arthritis, measles, mumps and personal factors including steps to negotiate at home and frequent falls  Pt seen for high complexity PT eval due to ongoing medical management of admitting diagnosis, increased reliance on supplemental O2, decreased functional ability and activity tolerance compared to baseline, aspiration precautions and fall risk  Upon eval, pt was resting in bed  Pt required Min Ax1 with bed mobility, transfers and ambulation using RW  During ambulation, pt required standing rest breaks and demonstrated increased reliance on RW for support   Pt denies any dizziness or lightheadedness in standing and post-activity SpO2 was 97% on 2 L supplemental O2 via NC  Based on PT eval, pt is currently limited by decreased BLE strength, decreased endurance, impaired balance, decreased mobility, impaired cognition and fall risk  PT will continue to follow pt for remainder of hospital stay to address these impairments  At conclusion of session, pt was assisted back into bed with all needs within reach  PT currently recommending rehab at D/C  Barriers to Discharge Decreased caregiver support; Inaccessible home environment   Goals   Patient Goals To go home   STG Expiration Date 08/23/20   Short Term Goal #1 In 10 days, pt will  Jamie Merquan Estrella 1) perform all aspects of bed mobility independently in order to reduce caregiver burden  2) perform transfers with supervision in order to increase functional independence  3) ambulate 150 ft with supervision and least restrictive AD in order to increase endurance  4) negotiate full flight of steps with supervision in order to access different levels of home  5) increase BLE strength by 1/2 grade in order to perform transfers with greater ease  6) improve ambulatory balance by 1 grade in order to reduce fall risk  Plan   Treatment/Interventions Functional transfer training;LE strengthening/ROM; Elevations; Therapeutic exercise; Endurance training;Patient/family training;Equipment eval/education; Bed mobility;Gait training;Spoke to nursing;OT   PT Frequency Other (Comment)  (3-5x/wk)   Recommendation   PT Discharge Recommendation Post-Acute Rehabilitation Services   Equipment Recommended Walker  (RW)   PT - OK to Discharge Yes  (Rehab when medically cleared)   Modified Navarro Scale   Modified Navarro Scale 4   Barthel Index   Feeding 10   Bathing 0   Grooming Score 0   Dressing Score 5   Bladder Score 10   Bowels Score 0   Toilet Use Score 5   Transfers (Bed/Chair) Score 10   Mobility (Level Surface) Score 0   Stairs Score 0   Barthel Index Score [address], [place]

## 2020-08-13 NOTE — PLAN OF CARE
Problem: PHYSICAL THERAPY ADULT  Goal: Performs mobility at highest level of function for planned discharge setting  See evaluation for individualized goals  Description: Treatment/Interventions: Functional transfer training, LE strengthening/ROM, Elevations, Therapeutic exercise, Endurance training, Patient/family training, Equipment eval/education, Bed mobility, Gait training, Spoke to nursing, OT  Equipment Recommended: Walker(RW)       See flowsheet documentation for full assessment, interventions and recommendations  Note: Prognosis: Good  Problem List: Decreased strength, Decreased endurance, Impaired balance, Decreased mobility, Decreased cognition, Impaired judgement, Decreased safety awareness  Assessment: Pt is a 63 y o  male presenting to [facility] by family c/o AMS and frequent falls  Pt was admitted with a primary diagnosis of pneumonia and other active problems including sepsis, metabolic encephalopathy, acute renal failure, elevated troponin, hyponatremia, essential HTN and T2DM  Pt's PMH affecting eval includes arthritis, measles, mumps and personal factors including steps to negotiate at home and frequent falls  Pt seen for high complexity PT eval due to ongoing medical management of admitting diagnosis, increased reliance on supplemental O2, decreased functional ability and activity tolerance compared to baseline, aspiration precautions and fall risk  Upon eval, pt was resting in bed  Pt required Min Ax1 with bed mobility, transfers and ambulation using RW  During ambulation, pt required standing rest breaks and demonstrated increased reliance on RW for support  Pt denies any dizziness or lightheadedness in standing and post-activity SpO2 was 97% on 2 L supplemental O2 via NC  Based on PT eval, pt is currently limited by decreased BLE strength, decreased endurance, impaired balance, decreased mobility, impaired cognition and fall risk   PT will continue to follow pt for remainder of hospital stay to address these impairments  At conclusion of session, pt was assisted back into bed with all needs within reach  PT currently recommending rehab at D/C  Barriers to Discharge: Decreased caregiver support, Inaccessible home environment     PT Discharge Recommendation: [id number]     PT - OK to Discharge: Yes(Rehab when medically cleared)    See flowsheet documentation for full assessment

## 2020-08-13 NOTE — QUICK NOTE
QUICK NOTE - Deterioration Index  Luigi Villagomez 63 y o  male MRN: [id number]  Unit/Bed#: -01 Encounter: [id number]    Date Paged: 20  Time Paged: 1618  Room #:   Arrival Time: 1619  Deterioration index score at time of page: 63 5  %  Spoke with Dr Weaver President from primary team  Need to escalate level of care: yes     PROBLEMS resulting in high DI score:   20% Respiratory rate is 26   17% Supplemental oxygen is Nasal cannula   17% Age is 63   14% Neurological exam is Lethargic   10% [place] coma scale is 14   6% Cardiac rhythm is Other (Comment)   6% WBC count is 14 34 Thousand/uL   3% Systolic is 647   3% Sodium is 134 mmol/L   2% Pulse is 95   2% Potassium is 3 2 mmol/L   <1% Hematocrit is 43 1 %   <1% Pulse oximetry is 96 %   <1% Temperature is 98 6 °F (37 °C)     PLAN:     Patient confused, knows his name but does not know location or place  He follows basic commands, but does not understand why he is in the hospital     Metabolic acidosis on AM labs with elevated respiratory rate  Recheck STAT BMP, lactic, and a beta hydroxybutyrate  Check bedside iSTAT    Concern for DKA in setting of sepsis vs worsening sepsis    Likely will need upgrade       Vitals:   Vitals:    20 0627 20 0700 20 1300 20 1547   BP: 158/87 (!) 180/109  145/87   BP Location: Left arm Left arm  Left arm   Pulse: 67  95    Resp: (!) 24  (!) 26    Temp: (!) 103 1 °F (39 5 °C) 99 6 °F (37 6 °C) 98 6 °F (37 °C)    TempSrc: Oral Oral Oral    SpO2: 96% 93% 94%    Weight:           Respiratory:  SpO2: SpO2: 94 %, SpO2 Activity: SpO2 Activity: At Rest, SpO2 Device: O2 Device: Nasal cannula  Nasal Cannula O2 Flow Rate (L/min): 2 L/min    Temperature: Temp (24hrs), Av 8 °F (38 2 °C), Min:98 6 °F (37 °C), Max:103 1 °F (39 5 °C)  Current: Temperature: 98 6 °F (37 °C)    Labs:   Results from last 7 days   Lab Units 08/13/20  0506 20  2156 20  1928 20  1650   WBC Thousand/uL 14 34*  --   -- 15 53*   HEMOGLOBIN g/dL 14 7  --   --  15 4   HEMATOCRIT % 43 1  --   --  43 5   PLATELETS Thousands/uL 228 217 206 258   NEUTROS PCT % 91*  --   --   --    MONOS PCT % 5  --   --   --    MONO PCT %  --   --   --  6     Results from last 7 days   Lab Units 08/13/20  0506 08/12/20  1650   SODIUM mmol/L 134* 129*   POTASSIUM mmol/L 3 2* 3 2*   CHLORIDE mmol/L 98* 94*   CO2 mmol/L 20* 21   BUN mg/dL 21 19   CREATININE mg/dL 1 44* 1 59*   CALCIUM mg/dL 7 7* 8 6   ALK PHOS U/L 74 80   ALT U/L 27 24   AST U/L 157* 156*     Results from last 7 days   Lab Units 08/13/20  0506   MAGNESIUM mg/dL 2 3     Results from last 7 days   Lab Units 08/13/20  0119 08/12/20  2156 08/12/20  1650   LACTIC ACID mmol/L 1 9 2 3* 3 9*     Results from last 7 days   Lab Units 08/12/20  2156 08/12/20  1928 08/12/20  1650   TROPONIN I ng/mL 0 32* 0 35* 0 38*     Results from last 7 days   Lab Units 08/13/20  0610 08/12/20  2129 08/12/20  1650   PROCALCITONIN ng/ml 5 97* 6 42* 7 00*       Code Status: Level 1 - Full Code

## 2020-08-13 NOTE — OCCUPATIONAL THERAPY NOTE
Occupational Therapy Evaluation     Patient Name: Shaheen Haddad  XSXNS'[id number] Date: 8/13/2020  Problem List  Principal Problem:    Pneumonia  Active Problems:    Essential hypertension    Diabetes mellitus type 2 in obese ([address] )    Sepsis ([address] )    Hyponatremia    Acute renal failure (ARF) (HCC)    Metabolic encephalopathy    Elevated troponin    Past Medical History  Past Medical History:   Diagnosis Date    Arthritis     knees    Diabetes mellitus ([address] )     Hypertension     Measles, [place] (rubella)     Mumps     Snoring     Loudly     Past Surgical History  Past Surgical History:   Procedure Laterality Date    CYSTOSCOPY      KNEE ARTHROSCOPY Left     knee    ORIF PELVIC FRACTURE      TOTAL KNEE ARTHROPLASTY Left 6/10/2019    Procedure: ARTHROPLASTY KNEE TOTAL;  Surgeon: Ethan Natarajan MD;  Location: [facility];  Service: Orthopedics         08/13/20 1025   Note Type   Note type Eval only   Restrictions/Precautions   Weight Bearing Precautions Per Order No   Other Precautions Cognitive;Multiple lines; Fall Risk   Pain Assessment   Pain Assessment Tool 0-10   Pain Score No Pain   Home Living   Type of [address] [place] [address] Multi-level;Stairs to enter with rails;Bed/bath upstairs   Bathroom Shower/Tub Tub/shower unit   Bathroom Toilet Standard   Bathroom Equipment   (denies)   Home Equipment Walker;Cane   Additional Comments Pt is a questionable historian, but reports living in a 3 story home with 10-15 DEVI with bed/bath on third floor      Prior Function   Level of [place] Independent with ADLs and functional mobility   Lives With Daughter   ADL Assistance Independent   IADLs Independent   Falls in the last 6 months 1 to 4  (Pt reports 3 within the past few days  )   Vocational Retired   Lifestyle   Autonomy Pt is a questionable historian but reports being I with ADLS, IADLS and mobility without device PTA  (+)    Reciprocal Relationships Pt reports that he lives with his daughter, unclear how much support she is able to provide  Service to Others Retired   Intrinsic Gratification Reports that he "can not afford any hobbies" now that he is retired    ADL   Where Assessed Edge of bed   [address] 7  Independent   Grooming Assistance 5  [address] [facility] 4  Minimal Assistance   LB [address] 3  Moderate Assistance   [address] 4  [address] 3  Moderate Assistance   [address]  2  Maximal Assistance   Bed Mobility   Supine to Sit 4  Minimal assistance   Additional items Assist x 1; Increased time required;Verbal cues;LE management   Sit to Supine 4  Minimal assistance   Additional items Assist x 1; Increased time required   Additional Comments After OT session pt in bed with all needs within reach  Transfers   Sit to Stand 4  Minimal assistance   Additional items Assist x 1; Increased time required;Verbal cues   Stand to Sit 4  Minimal assistance   Additional items Assist x 1; Increased time required;Verbal cues   Functional Mobility   Functional Mobility 4  Minimal assistance   Additional Comments Pt demonstrated short household mobility with RW and VC for correct technique  Additional items Rolling walker   Balance   Static Sitting Fair -   Dynamic Sitting Fair -   Static Standing Poor +   Dynamic Standing Poor +   Ambulatory Poor +   Activity Tolerance   Activity Tolerance Patient limited by fatigue   Medical Staff Made Aware PT Joselin Nunez   Nurse Made Aware RN confirmed okay to see pt   RUE Assessment   RUE Assessment WFL   LUE Assessment   LUE Assessment WFL   Cognition   Overall Cognitive Status Impaired   Arousal/Participation Alert; Cooperative   Attention Attends with cues to redirect   Orientation Level Oriented X4   Memory Decreased recall of precautions   Following Commands Follows one step commands with increased time or repetition   Comments Pt is a questionable historian but is overall pleasant and cooperative  Assessment   Limitation Decreased ADL status; Decreased endurance;Decreased cognition;Decreased self-care trans;Decreased high-level ADLs   Prognosis Good   Assessment Pt is a 63 y o  male admitted [facility] on 2020 w/ pneumonia  Comorbidities include a h/o  has a past medical history of Arthritis, Diabetes mellitus ([address] ), Hypertension, Measles, [place] (rubella), Mumps, and Snoring  Pt with active OT orders and up and OOB as tolerated orders  Pt is a questionable historian but reports living in a 1 story home with 10-15 DEVI  Pt reports living with his daughter, unclear what level of support she is able to provide  Pt was I w/  ADLS and IADLS, (+) drove, & required no use of DME PTA  Currently pt is min A for bed mobility, functional mobility and functional transfers, min A for UB ADLS and mod A for LB ADLS  Pt is limited at this time 2*: endurance, activity tolerance, functional mobility, forward functional reach, functional standing tolerance, decreased I w/ ADLS/IADLS and decreased insight into deficits  The following Occupational Performance Areas to address include: grooming, bathing/shower, toilet hygiene, dressing, functional mobility and clothing management  Based on the aforementioned OT evaluation, functional performance deficits, and assessments, pt has been identified as a high complexity evaluation  From OT standpoint, anticipate d/c STR  Pt to continue to benefit from acute immediate OT services to address the following goals 3-5x/week to  w/in 7-10 days: Ivonne Cassette Goals   Patient Goals To return home    LTG Time Frame 7-10   Long Term Goal #1 See goals below   Plan   Treatment Interventions ADL retraining;Functional transfer training; Endurance training;UE strengthening/ROM; Cognitive reorientation;Patient/family training;Equipment evaluation/education; Compensatory technique education;Continued evaluation; Energy conservation; Activityengagement   Goal Expiration Date 20   OT Frequency 3-5x/wk   Recommendation   OT Discharge Recommendation Post-Acute Rehabilitation Services   OT - OK to Discharge Yes  (When medically appropriate)   Modified Bartow Scale   Modified Bartow Scale 4     GOALS    1) Pt will increase activity tolerance to G for 30 min txment sessions    2) Pt will complete UB/LB dressing/self care w/ mod I using adaptive device and DME as needed    3) Pt will complete bathing w/ Mod I w/ use of AE and DME as needed    4) Pt will complete toileting w/ mod I w/ G hygiene/thoroughness using DME as needed    5) Pt will improve functional transfers to Mod I on/off all surfaces using DME as needed w/ G balance/safety     6) Pt will improve functional mobility during ADL/IADL/leisure tasks to Mod I using DME as needed w/ G balance/safety     7) Pt will participate in simulated IADL management task with DME as needed to increase independence to  w/ G safety and endurance    8) Pt will demonstrate G carryover of pt/caregiver education and training as appropriate  9) Pt will demonstrate 100% carryover of energy conservation techniques t/o functional I/ADL/leisure tasks w/o cues s/p skilled education    10) Pt will independently identify and utilize 2-3 coping strategies to increase positive affect and promote overall well-being      11) Pt will engage in ongoing cognitive assessment w/ G participation to assist w/ safe d/c planning/recommendations (as needed)     BENITO Epstein, OTR/L

## 2020-08-13 NOTE — PROGRESS NOTES
Progress Note - Paris Maddox 1955, 63 y o  male MRN: [id number]    Unit/Bed#: -01 Encounter: [id number]    Primary Care Provider: Christiane Blanco MD   Date and time admitted to hospital: 8/12/2020  4:15 PM        * Pneumonia  Assessment & Plan  Chest x-ray with Left perihilar/lower lobe alveolar opacification suspicious for pneumonia  Patient presented with fever, tachycardia, change in mental status and leukocytosis with elevated lactic acid  Start pneumonia pathway  IV antibiotic  Follow on blood cultures and COVID test    Elevated troponin  Assessment & Plan  Likely non MI elevated troponin secondary to sepsis  Continue to monitor    Metabolic encephalopathy  Assessment & Plan  Patient presented with disorientation and ambulatory dysfunction  Likely secondary to above  Negative head CT scan  Continue with above treatment  PT OT eval    Acute renal failure (ARF) (HCC)  Assessment & Plan  Suspected prerenal secondary to sepsis  Urinalysis reviewed with proteinuria and large blood  Continue IV fluid for hydration  Monitor    Hyponatremia  Assessment & Plan  With hypokalemia  Secondary to above  Replace potassium  IV fluid for hydration  Monitor    Sepsis ([address] )  Assessment & Plan  POA  Secondary to above  Hyponatremia, hypokalemia, elevated troponin, elevated AST secondary to sepsis  IV fluid for hydration  Continue with IV antibiotic  Follow on blood culture  Deterioration index discussed with    Diabetes mellitus type 2 in obese [facility])  Assessment & Plan  Lab Results   Component Value Date    HGBA1C 8 7 (H) 08/12/2020       Recent Labs     08/12/20  1632 08/12/20  2147 08/13/20  0615 08/13/20  1058   POCGLU 301* 305* 246* 278*       Blood Sugar Average: Last 72 hrs:  (P) 282 5   Patient noncompliant with home medications  Start insulin sliding scale  Lantus q h s  and Humalog t i d   Monitor closely    Essential hypertension  Assessment & Plan  Patient is not compliant with his home regimen  Currently does not take any blood pressure medications  Continue to monitor    Disposition:  Discussed with critical care team   They continue to follow  Possible transfer to ICU versus level 1 step-down  Critical care team will follow through regarding transfer to ICU if needed based on work of breathing  VTE Pharmacologic Prophylaxis:   Pharmacologic: Heparin  Mechanical VTE Prophylaxis in Place: No    Patient Centered Rounds: I have performed bedside rounds with nursing staff today  Time Spent for Care: 15 minutes  More than 50% of total time spent on counseling and coordination of care as described above  Current Length of Stay: 1 day(s)    Current Patient Status: Inpatient       Code Status: Level 1 - Full Code      Subjective:   No acute distress    Objective:     Vitals:   Temp (24hrs), Av 9 °F (37 7 °C), Min:98 3 °F (36 8 °C), Max:103 1 °F (39 5 °C)    Temp:  [98 3 °F (36 8 °C)-103 1 °F (39 5 °C)] 98 3 °F (36 8 °C)  HR:  [] 104  Resp:  [20-26] 26  BP: (117-180)/() 145/87  SpO2:  [93 %-96 %] 95 %  Body mass index is 42 66 kg/m²  Input and Output Summary (last 24 hours): Intake/Output Summary (Last 24 hours) at 2020 1759  Last data filed at 2020 1548  Gross per 24 hour   Intake 2770 ml   Output 614 ml   Net 2156 ml       Physical Exam:     Physical Exam  Constitutional:       Appearance: Normal appearance  HENT:      Head: Normocephalic and atraumatic  Eyes:      Extraocular Movements: Extraocular movements intact  Pupils: Pupils are equal, round, and reactive to light  Neck:      Musculoskeletal: Normal range of motion and neck supple  Cardiovascular:      Rate and Rhythm: Normal rate and regular rhythm  Heart sounds: No murmur  Pulmonary:      Effort: Pulmonary effort is normal       Breath sounds: Normal breath sounds  Abdominal:      General: Abdomen is flat  Palpations: Abdomen is soft     Musculoskeletal:         General: No swelling  Skin:     General: Skin is warm and dry  Neurological:      General: No focal deficit present  Mental Status: He is alert and oriented to person, place, and time  Psychiatric:         Mood and Affect: Mood normal          Behavior: Behavior normal          Additional Data:     Labs:    Results from last 7 days   Lab Units 08/13/20  1657 08/13/20  0506   WBC Thousand/uL  --  14 34*   HEMOGLOBIN g/dL  --  14 7   I STAT HEMOGLOBIN g/dl 13 3  --    HEMATOCRIT %  --  43 1   HEMATOCRIT, ISTAT % 39  --    PLATELETS Thousands/uL  --  228   NEUTROS PCT %  --  91*   LYMPHS PCT %  --  3*   MONOS PCT %  --  5   EOS PCT %  --  0     Results from last 7 days   Lab Units 08/13/20  1715 08/13/20  1657 08/13/20  0506   POTASSIUM mmol/L 3 1*  --  3 2*   CHLORIDE mmol/L 101  --  98*   CO2 mmol/L 21  --  20*   CO2, I-STAT mmol/L  --  24  --    BUN mg/dL 22  --  21   CREATININE mg/dL 1 49*  --  1 44*   CALCIUM mg/dL 7 9*  --  7 7*   ALK PHOS U/L  --   --  74   ALT U/L  --   --  27   AST U/L  --   --  157*   GLUCOSE, ISTAT mg/dl  --  170*  --      Results from last 7 days   Lab Units 08/12/20  1650   INR  1 23*       * I Have Reviewed All Lab Data Listed Above  * Additional Pertinent Lab Tests Reviewed: All Labs Within Last 24 Hours Reviewed      Recent Cultures (last 7 days):     Results from last 7 days   Lab Units 08/13/20  0418 08/12/20  1650   BLOOD CULTURE   --  Received in Microbiology Lab  Culture in Progress  Received in Microbiology Lab  Culture in Progress     LEGIONELLA URINARY ANTIGEN  Negative  --        Last 24 Hours Medication List:   Current Facility-Administered Medications   Medication Dose Route Frequency Provider Last Rate    acetaminophen  650 mg Oral Q6H PRN [place] Silk, DO      aspirin  81 mg Oral Daily Henry Silk, DO      azithromycin  500 mg Intravenous Q24H Henry Silk,  mg (08/13/20 1740)    cefTRIAXone  1,000 mg Intravenous Q24H Henry Silk, DO      heparin (porcine) 5,000 Units Subcutaneous [facility] Heidy Thurman, DO      insulin glargine  10 Units Subcutaneous HS Heidy Thurman, DO      insulin lispro  1-5 Units Subcutaneous TID [facility] Heidy Thurman, DO      insulin lispro  1-5 Units Subcutaneous HS Heidy Thurman, DO      insulin lispro  5 Units Subcutaneous TID With Meals Heidy Thurman, DO      multi-electrolyte  125 mL/hr Intravenous Continuous Heidy Thurman,  mL/hr (08/13/20 1720)    ondansetron  4 mg Intravenous Q6H PRN Heidy Thurman, DO      potassium chloride  20 mEq Intravenous Once Sandra Victoria PA-C 20 mEq (08/13/20 1723)    Followed by   Kinsey Massey potassium chloride  20 mEq Intravenous Once Sandra Julien, PA-C      potassium phosphate  30 mmol Intravenous Once Sandra Julien PA-C          Today, Patient Was Seen By: Sheri Matute DO    ** Please Note: Dictation voice to text software may have been used in the creation of this document   **

## 2020-08-13 NOTE — SOCIAL WORK
CM received call from Josiane Ibarra with denials informing that pt does not have insurance  Pt is listed as MA Pending  CM called and spoke with , Vasu Lopez who informed that a referral to [facility] has already been initiated  MARIA ESTHER informed that pt is confused and his daughter, Carlos Davis is his contact  Sho informed that pt has had MA in the past but that it wasn't renewed  CM department to follow

## 2020-08-13 NOTE — PLAN OF CARE
Problem: OCCUPATIONAL THERAPY ADULT  Goal: Performs self-care activities at highest level of function for planned discharge setting  See evaluation for individualized goals  Description: Treatment Interventions: ADL retraining, Functional transfer training, Endurance training, UE strengthening/ROM, Cognitive reorientation, Patient/family training, Equipment evaluation/education, Compensatory technique education, Continued evaluation, Energy conservation, Activityengagement          See flowsheet documentation for full assessment, interventions and recommendations  Note: Limitation: Decreased ADL status, Decreased endurance, Decreased cognition, Decreased self-care trans, Decreased high-level ADLs  Prognosis: Good  Assessment: Pt is a 63 y o  male admitted to [facility] on 8/12/2020 w/ pneumonia  Comorbidities include a h/o  has a past medical history of Arthritis, Diabetes mellitus ([facility] [address] ), Hypertension, Measles, [place] (rubella), Mumps, and Snoring  Pt with active OT orders and up and OOB as tolerated orders  Pt is a questionable historian but reports living in a 1 story home with 10-15 DEVI  Pt reports living with his daughter, unclear what level of support she is able to provide  Pt was I w/  ADLS and IADLS, (+) drove, & required no use of DME PTA  Currently pt is min A for bed mobility, functional mobility and functional transfers, min A for UB ADLS and mod A for LB ADLS  Pt is limited at this time 2*: endurance, activity tolerance, functional mobility, forward functional reach, functional standing tolerance, decreased I w/ ADLS/IADLS and decreased insight into deficits  The following Occupational Performance Areas to address include: grooming, bathing/shower, toilet hygiene, dressing, functional mobility and clothing management  Based on the aforementioned OT evaluation, functional performance deficits, and assessments, pt has been identified as a high complexity evaluation  From OT standpoint, anticipate d/c STR  Pt to continue to benefit from acute immediate OT services to address the following goals 3-5x/week to  w/in 7-10 days:        OT Discharge Recommendation: Post-Acute Rehabilitation Services  OT - OK to Discharge: Yes(When medically appropriate)

## 2020-08-13 NOTE — UTILIZATION REVIEW
Initial Clinical Review    Admission: Date/Time/Statement:   Admission Orders (From admission, onward)     Ordered        08/12/20 1824  Inpatient Admission (expected length of stay for this patient Order details is greater than two midnights)  Once                   Orders Placed This Encounter   Procedures    Inpatient Admission (expected length of stay for this patient Order details is greater than two midnights)     Standing Status:   Standing     Number of Occurrences:   1     Order Specific Question:   Admitting Physician     Answer:   Marizol Bussing [[id number]]     Order Specific Question:   Level of Care     Answer:   Level 2 Stepdown / HOT [14]     Order Specific Question:   Estimated length of stay     Answer:   More than 2 Midnights     Order Specific Question:   Certification     Answer:   I certify that inpatient services are medically necessary for this patient for a duration of greater than two midnights  See H&P and MD Progress Notes for additional information about the patient's course of treatment  ED Arrival Information     Expected Arrival Acuity Means of Arrival Escorted By Service Admission Type    - 8/12/2020 15:57 Emergent Walk-In Family Member Hospitalist Emergency    Arrival Complaint    weakness,fall        Chief Complaint   Patient presents with    Altered Mental Status     daughter reports AMS starting sunday, worsening monday with frequent falls and disorientation  head strike today, no thinners  pt c/o SOB, denies CP  Assessment/Plan: 63year old male, presented to the ED @ [facility] from home via walk in  Admitted as Inpatient due to Pneumonia  Patient was brought to the hospital by his daughter due to disorientation and ambulatory dysfunction for the past 2 days,  associated with decreasing oral intake and somnolence  + for appetite change fatigue and fever, weakness and gait problem    In the ED, he was found to be febrile with sepsis, and  left-sided pneumonia and chest x-ray   IV Abx F/U on blood and COVID test   IV flds  Hyponatremia, hypokalemia, elevated troponin, elevated AST secondary to sepsis  Consult PT/OT        VTE Prophylaxis: Heparin  / sequential compression device     ED Triage Vitals   Temperature Pulse Respirations Blood Pressure SpO2   08/12/20 1634 08/12/20 1632 08/12/20 1632 08/12/20 1632 08/12/20 1632   (!) 102 7 °F (39 3 °C) (!) 134 (!) 26 119/74 95 %      Temp Source Heart Rate Source Patient Position - Orthostatic VS BP Location FiO2 (%)   08/12/20 2130 08/13/20 0627 08/12/20 2130 08/12/20 2130 --   Oral Monitor Lying Right arm       Pain Score       08/12/20 1632       No Pain          Wt Readings from Last 1 Encounters:   08/12/20 120 kg (264 lb 4 8 oz)     Additional Vital Signs:   Date/Time   Temp   Pulse   Resp   BP   MAP (mmHg)   SpO2   Calculated FIO2 (%) - Nasal Cannula   Nasal Cannula O2 Flow Rate (L/min)   O2 Device   Patient Position - Orthostatic VS    08/13/20 1548                     28   2 L/min   Nasal cannula       08/13/20 1547            145/87   116               Lying    08/13/20 1300   98 6 °F (37 °C)   95   26Abnormal           94 %         None (Room air)       08/13/20 0900                     28   2 L/min   Nasal cannula       08/13/20 0700   99 6 °F (37 6 °C)         180/109Abnormal        93 %            Lying    08/13/20 0627   103 1 °F (39 5 °C)Abnormal     67   24Abnormal     158/87   119   96 %            Lying    08/12/20 2130   100 °F (37 8 °C)   112Abnormal        153/86   109   94 %         None (Room air)   Lying    08/12/20 2000      136Abnormal        117/76   105                   08/12/20 1915      136Abnormal     20   124/73   103   93 %                08/12/20 1830      130Abnormal     25Abnormal           93 %                08/12/20 1730      132Abnormal     30Abnormal           95 %                  Date and Time  Eye Opening  Best Verbal Response Best Motor Response  Forrest Coma Scale Score    20 1300  4  4  6  14    20 0900  4  5  6  15    20 0300  4  4  6  14    20 2130  4  4  6  14    20 1735  4  4  6  14    20 1631  4  4  6  14      2020 @ 1729  CT head:  No acute intracranial abnormality  2020 @ 1724  CT C spine:  1   No acute fracture or subluxation in the cervical spine  2   Multilevel moderate degenerative change in the midlower cervical spine with resultant moderate central canal and mild-moderate neural foraminal narrowing, most pronounced at the C5-6 and C6-7 levels  2020 @ 1712   Chest X:  1   Left perihilar/lower lobe alveolar opacification suspicious for pneumonia  2   Mild cardiomegaly       2020 @ 1629  EC, Sinus tachycardia, Premature atrial complexes    Pertinent Labs/Diagnostic Test Results:   Results from last 7 days   Lab Units 20  1845   SARS-COV-2  Negative     Results from last 7 days   Lab Units 20  0506 20  2156 20  1928 20  1650   WBC Thousand/uL 14 34*  --   --  15 53*   HEMOGLOBIN g/dL 14 7  --   --  15 4   HEMATOCRIT % 43 1  --   --  43 5   PLATELETS Thousands/uL 228 217 206 258   NEUTROS ABS Thousands/µL 13 04*  --   --   --    BANDS PCT %  --   --   --  7     Results from last 7 days   Lab Units 20  0506 20  1650   SODIUM mmol/L 134* 129*   POTASSIUM mmol/L 3 2* 3 2*   CHLORIDE mmol/L 98* 94*   CO2 mmol/L 20* 21   ANION GAP mmol/L 16* 14*   BUN mg/dL 21 19   CREATININE mg/dL 1 44* 1 59*   EGFR ml/min/1 73sq m 51 46   CALCIUM mg/dL 7 7* 8 6   MAGNESIUM mg/dL 2 3  --    PHOSPHORUS mg/dL 1 4*  --      Results from last 7 days   Lab Units 20  0506 20  1650   AST U/L 157* 156*   ALT U/L 27 24   ALK PHOS U/L 74 80   TOTAL PROTEIN g/dL 7 0 8 1   ALBUMIN g/dL 2 4* 2 8*   TOTAL BILIRUBIN mg/dL 0 79 1 08*     Results from last 7 days   Lab Units 20  1058 20  0615 20  2147 20  1632 POC GLUCOSE mg/dl 278* 246* 305* 301*     Results from last 7 days   Lab Units 08/13/20  0506 08/12/20  1650   GLUCOSE RANDOM mg/dL 235* 358*     Results from last 7 days   Lab Units 08/12/20  1928   HEMOGLOBIN A1C % 8 7*   EAG mg/dl 203     Results from last 7 days   Lab Units 08/12/20  2156 08/12/20  1928 08/12/20  1650   TROPONIN I ng/mL 0 32* 0 35* 0 38*     Results from last 7 days   Lab Units 08/12/20  1650   PROTIME seconds 15 4*   INR  1 23*   PTT seconds 28     Results from last 7 days   Lab Units 08/13/20  0506   TSH 3RD GENERATON uIU/mL 0 118*     Results from last 7 days   Lab Units 08/13/20  0610 08/12/20  2129 08/12/20  1650   PROCALCITONIN ng/ml 5 97* 6 42* 7 00*     Results from last 7 days   Lab Units 08/13/20  0119 08/12/20  2156 08/12/20  1650   LACTIC ACID mmol/L 1 9 2 3* 3 9*     Results from last 7 days   Lab Units 08/12/20  1657   CLARITY UA  Cloudy  Cloudy   COLOR UA  Dk Yellow  Sharlene   SPEC GRAV UA  1 031*  >=1 030   PH UA  5 5  5 5   GLUCOSE UA mg/dl >=1000 (1%)*  500 (1/2%)*   KETONES UA mg/dl 40 (2+)*  40 (2+)*   BLOOD UA  Large*  Large*   PROTEIN UA mg/dl 300 (3+)*  >=300*   NITRITE UA  Negative  Negative   BILIRUBIN UA  Interference- unable to analyze*  Interference- unable to analyze*   UROBILINOGEN UA E U /dl 0 2  0 2   LEUKOCYTES UA  Elevated glucose may cause decreased leukocyte values  See urine microscopic for [facility] result/*  Negative   WBC UA /hpf 2-4*   RBC UA /hpf None Seen   BACTERIA UA /hpf Moderate*   EPITHELIAL CELLS WET PREP /hpf Moderate*   MUCUS THREADS  Moderate*     Results from last 7 days   Lab Units 08/13/20  0418   STREP PNEUMONIAE ANTIGEN, URINE  Negative   LEGIONELLA URINARY ANTIGEN  Negative     Results from last 7 days   Lab Units 08/12/20  1650   BLOOD CULTURE  Received in Microbiology Lab  Culture in Progress  Received in Microbiology Lab  Culture in Progress       ED Treatment:   Medication Administration from 08/12/2020 1557 to 08/12/2020 2139 Date/Time Order Dose Route Action     08/12/2020 1650 sodium chloride 0 9 % bolus 1,000 mL 1,000 mL Intravenous New Bag     08/12/2020 1813 ceftriaxone (ROCEPHIN) 1 g/50 mL in dextrose IVPB 1,000 mg Intravenous New Bag     08/12/2020 1911 azithromycin (ZITHROMAX) 500 mg in sodium chloride 0 9% 250mL IVPB 500 mg 500 mg Intravenous New Bag     08/12/2020 1813 acetaminophen (TYLENOL) tablet 650 mg 650 mg Oral Given     08/12/2020 1810 multi-electrolyte (PLASMALYTE-A/ISOLYTE-S PH 7 4) IV solution 2,000 mL 2,000 mL Intravenous New Bag        Past Medical History:   Diagnosis Date    Arthritis     knees    Diabetes mellitus ([facility] [address] )     Hypertension     Measles, [place] (rubella)     Mumps     Snoring     Loudly     Present on Admission:   Essential hypertension   Diabetes mellitus type 2 in [facility])    Admitting Diagnosis: UTI (urinary tract infection) [N39 0]  Altered mental status [R41 82]  Pneumonia [J18 9]  Elevated troponin [R79 89]  Sepsis ([facility] [address] ) [A41 9]  Age/Sex: 63 y o  male  Admission Orders:  Scheduled Medications:  aspirin, 81 mg, Oral, Daily  azithromycin, 500 mg, Intravenous, Q24H  cefTRIAXone, 1,000 mg, Intravenous, Q24H  heparin (porcine), 5,000 Units, Subcutaneous, Q8H HECTOR  insulin glargine, 10 Units, Subcutaneous, HS  insulin lispro, 1-5 Units, Subcutaneous, TID AC  insulin lispro, 1-5 Units, Subcutaneous, HS  insulin lispro, 5 Units, Subcutaneous, TID With Meals    Continuous IV Infusions:  multi-electrolyte, 125 mL/hr, Intravenous, Continuous    PRN Meds:  acetaminophen, 650 mg, Oral, Q6H PRN  ondansetron, 4 mg, Intravenous, Q6H PRN    Augie SCDs      Network Utilization Review Department  [email or web address]  ATTENTION: Please call with any questions or concerns to [phone] and carefully listen to the prompts so that you are directed to the right person   All voicemails are confidential   Trista Winter all requests for admission clinical reviews, approved or denied determinations and any other requests to dedicated fax number below belonging to the campus where the patient is receiving treatment   List of dedicated fax numbers for the Facilities:  [address] [place] [address] DENIALS (Administrative/Medical Necessity) [phone]   [address]  (Maternity/NICU/Pediatrics) [phone]   Trinidad Flatten [phone]   Sebastian Harris [phone]   [facility] [phone]   Paolo Mtz [phone]   [address] [place] [address] [phone]   [facility]  [phone]   [address] [facility] [address] [facility]  [address] [facility] [address] [facility] [phone]

## 2020-08-13 NOTE — PROGRESS NOTES
ICU Accept Note - [facility] 63 y o  male MRN: [id number]  Unit/Bed#: -01 Encounter: [id number]      -------------------------------------------------------------------------------------------------------------  Chief Complaint: SOB    History of Present Illness   HX and PE limited by: mentation  Zara Abel is a 63 y o  male who presents with acute onset AMS and shortness of breath  Per H&P patient noted to have ambulatory dysfunction with confusion x 2 days per daughter with associated decreased oral intake  Upon arrival there was concern for LLL PNA and bacteruria he was given ABX and admitted to the hospital   A DI alert was called and critical care evaluated the patient  He was found to have a AGMA on his AM labs with associated tachypnea, RR in the 30's, he was confused and unable to answer most basic questions mostly saying "pshh, I don't know"       PMHx: HTN, HLD, DM2, noncompliant    History obtained from chart review and unobtainable from patient due to mental status   -------------------------------------------------------------------------------------------------------------  Assessment and Plan:    Neuro:    Diagnosis: Acute encephalopathy   o Plan:   - Unclear etiology likely toxic metabolic in setting of AGMA  - 8/12 CT head- neg acute changes    Diagnosis: PAD   o Plan:   - Delirium precautions  - PRN tylenol      CV:    Diagnosis: Hx of HTN  o Plan:   - Continue to hold home Norvasc in favor of PRN medications for SBP >180  - Hold lisinopril in setting of LAURENCE    Pulm:   Diagnosis: Acute hypoxic respiratory failure  o Plan:   - Place patient on BiPAP for WOB  - STAT CXR to evaluate, possible pulmonary edema vs worsening pneumonia   Diagnosis: Acute LLL PNA  o Plan:   - Broaded ABX to cefepime  - Wean FiO2 as able for SpO2 > 92%      GI:   o NPO      :    Diagnosis: Acute renal failure  o Plan:   - Unclear baseline likely secondary to prerenal in setting of sepsis  - Avoid hypotension and nephrotoxic medications   Diagnosis: AGMA  o Plan:   - Unclear etiology, check lactic acid, beta hydroxybutyrate, recheck BMP, COMA panel     Diagnosis: Hyponatremia  o Plan:   - Improving since admission with IVF, possible due to hypovolemic hyponatremia       F/E/N:    Plan: NPO, replete lytes as needed      Heme/Onc:   SQH/scds for ppx    Endo:    Diagnosis: DM2  o Plan:   - Continue Lantus and SSI at this moment, but if AGMA is due to DM will need to start insulin gtt    Diagnosis: Low TSH  o Plan:   - Obtain free T4      ID:    Diagnosis: Sepsis unclear etiology  o Plan:   - Probable LLL pna vs urosepsis   - Broaded to cefepime   - Follow up BCx and sputum cx   - Legionella and strep pneumo neg      MSK/Skin:   o B/l lower knee wounds, consult wound care       Disposition: Transfer to Stepdown Level 1   Code Status: Level 1 - Full Code  --------------------------------------------------------------------------------------------------------------  Review of Systems    Review of systems was unable to be performed secondary to encephalopathy    Physical Exam  Constitutional:       General: He is in acute distress  Appearance: He is ill-appearing  HENT:      Head: Normocephalic and atraumatic  Cardiovascular:      Rate and Rhythm: Regular rhythm  Tachycardia present  Heart sounds: No murmur  No friction rub  No gallop  Pulmonary:      Effort: Respiratory distress present  Comments: Diminished b/l breath dsounds  Tachypnec   Chest:      Chest wall: No tenderness  Abdominal:      Tenderness: There is no abdominal tenderness  Comments: obese   Musculoskeletal:      Comments: Trace b/l lower extremity edema    Skin:     General: Skin is warm and dry     Neurological:      Comments: Alert, oriented to self  Follows basic commands, but easily gets distracted and tries to get out of bed  nonfocal --------------------------------------------------------------------------------------------------------------  Vitals:   Vitals:    08/13/20 0700 08/13/20 1300 08/13/20 1547 08/13/20 1548   BP: (!) 180/109  145/87    BP Location: Left arm  Left arm    Pulse:  95  104   Resp:  (!) 26     Temp: 99 6 °F (37 6 °C) 98 6 °F (37 °C)  98 3 °F (36 8 °C)   TempSrc: Oral Oral  Oral   SpO2: 93% 94% 95% 95%   Weight:         Temp  Min: 98 3 °F (36 8 °C)  Max: 103 1 °F (39 5 °C)  IBW: -88 kg     Body mass index is 42 66 kg/m²    N/A    Laboratory and Diagnostics:  Results from last 7 days   Lab Units 08/13/20  1657 08/13/20  0506 08/12/20  2156 08/12/20  1928 08/12/20  1650   WBC Thousand/uL  --  14 34*  --   --  15 53*   HEMOGLOBIN g/dL  --  14 7  --   --  15 4   I STAT HEMOGLOBIN g/dl 13 3  --   --   --   --    HEMATOCRIT %  --  43 1  --   --  43 5   HEMATOCRIT, ISTAT % 39  --   --   --   --    PLATELETS Thousands/uL  --  228 217 206 258   NEUTROS PCT %  --  91*  --   --   --    BANDS PCT %  --   --   --   --  7   MONOS PCT %  --  5  --   --   --    MONO PCT %  --   --   --   --  6     Results from last 7 days   Lab Units 08/13/20  1715 08/13/20  1657 08/13/20  0506 08/12/20  1650   SODIUM mmol/L 133*  --  134* 129*   POTASSIUM mmol/L 3 1*  --  3 2* 3 2*   CHLORIDE mmol/L 101  --  98* 94*   CO2 mmol/L 21  --  20* 21   CO2, I-STAT mmol/L  --  24  --   --    ANION GAP mmol/L 11  --  16* 14*   BUN mg/dL 22  --  21 19   CREATININE mg/dL 1 49*  --  1 44* 1 59*   CALCIUM mg/dL 7 9*  --  7 7* 8 6   GLUCOSE RANDOM mg/dL 169*  --  235* 358*   ALT U/L  --   --  27 24   AST U/L  --   --  157* 156*   ALK PHOS U/L  --   --  74 80   ALBUMIN g/dL  --   --  2 4* 2 8*   TOTAL BILIRUBIN mg/dL  --   --  0 79 1 08*     Results from last 7 days   Lab Units 08/13/20  0506   MAGNESIUM mg/dL 2 3   PHOSPHORUS mg/dL 1 4*      Results from last 7 days   Lab Units 08/12/20  1650   INR  1 23*   PTT seconds 28      Results from last 7 days   Lab Units 08/12/20  2156 08/12/20  1928 08/12/20  1650   TROPONIN I ng/mL 0 32* 0 35* 0 38*     Results from last 7 days   Lab Units 08/13/20  1656 08/13/20  0119 08/12/20  2156 08/12/20  1650   LACTIC ACID mmol/L 2 0 1 9 2 3* 3 9*     ABG:    VBG:    Results from last 7 days   Lab Units 08/13/20  0610 08/12/20  2129 08/12/20  1650   PROCALCITONIN ng/ml 5 97* 6 42* 7 00*       Micro:  Results from last 7 days   Lab Units 08/13/20  0418 08/12/20  1650   BLOOD CULTURE   --  Received in Microbiology Lab  Culture in Progress  Received in Microbiology Lab  Culture in Progress     LEGIONELLA URINARY ANTIGEN  Negative  --    STREP PNEUMONIAE ANTIGEN, URINE  Negative  --        EKG: no new  Imaging: pending new    Historical Information   Past Medical History:   Diagnosis Date    Arthritis     knees    Diabetes mellitus ([address] )     Hypertension     Measles, [place] (rubella)     Mumps     Snoring     Loudly     Past Surgical History:   Procedure Laterality Date    CYSTOSCOPY      KNEE ARTHROSCOPY Left     knee    ORIF PELVIC FRACTURE      TOTAL KNEE ARTHROPLASTY Left 6/10/2019    Procedure: ARTHROPLASTY KNEE TOTAL;  Surgeon: Misha Simental MD;  Location: [facility] OR;  Service: Orthopedics     Social History   Social History     Substance and Sexual Activity   Alcohol Use Yes    Frequency: Monthly or less    Drinks per session: 1 or 2    Comment: Occasionally, not in excess - As per [employer]      Social History     Substance and Sexual Activity   Drug Use Never     Social History     Tobacco Use   Smoking Status Never Smoker   Smokeless Tobacco Never Used     Exercise History: n/a  Family History:   Family History   Problem Relation Age of Onset    Ovarian cancer Mother     Heart disease Mother     Diabetes type II Mother     Colon cancer Father     Heart disease Father     Nephrolithiasis Father      I have reviewed this patient's family history and commented on sigificant items within the HPI      Medications:  Current Facility-Administered Medications   Medication Dose Route Frequency    acetaminophen (TYLENOL) tablet 650 mg  650 mg Oral Q6H PRN    aspirin (ECOTRIN LOW STRENGTH) EC tablet 81 mg  81 mg Oral Daily    azithromycin (ZITHROMAX) 500 mg in sodium chloride 0 9% 250mL IVPB 500 mg  500 mg Intravenous Q24H    ceftriaxone (ROCEPHIN) 1 g/50 mL in dextrose IVPB  1,000 mg Intravenous Q24H    heparin (porcine) subcutaneous injection 5,000 Units  5,000 Units Subcutaneous Q8H [address]    insulin glargine (LANTUS) subcutaneous injection 10 Units 0 1 mL  10 Units Subcutaneous HS    insulin lispro (HumaLOG) 100 units/mL subcutaneous injection 1-5 Units  1-5 Units Subcutaneous TID AC    insulin lispro (HumaLOG) 100 units/mL subcutaneous injection 1-5 Units  1-5 Units Subcutaneous HS    insulin lispro (HumaLOG) 100 units/mL subcutaneous injection 5 Units  5 Units Subcutaneous TID With Meals    multi-electrolyte (PLASMALYTE-A/ISOLYTE-S PH 7 4) IV solution  125 mL/hr Intravenous Continuous    ondansetron (ZOFRAN) injection 4 mg  4 mg Intravenous Q6H PRN    potassium chloride 20 mEq IVPB (premix)  20 mEq Intravenous Once    Followed by    potassium chloride 20 mEq IVPB (premix)  20 mEq Intravenous Once    potassium phosphates 30 mmol in sodium chloride 0 9 % 250 mL infusion  30 mmol Intravenous Once     Home medications:  Prior to Admission Medications   Prescriptions Last Dose Informant Patient Reported? Taking?    ACCU-CHEK LARRY PLUS test strip  Self Yes No   ACCU-CHEK FASTCLIX LANCETS MISC  Self Yes No   acetaminophen (TYLENOL) 500 mg tablet  Self Yes No   Sig: Take 500 mg by mouth every 6 (six) hours as needed   amLODIPine (NORVASC) 5 mg tablet   No No   Sig: Take 1 tablet (5 mg total) by mouth daily   aspirin (ECOTRIN LOW STRENGTH) 81 mg EC tablet   No No   Sig: Take 1 tablet (81 mg total) by mouth daily   glipiZIDE (GLUCOTROL XL) 5 mg 24 hr tablet   No No   Sig: Take 1 tablet (5 mg total) by mouth daily   ketoconazole (NIZORAL) 2 % cream  Self No No   Sig: Apply to  Both feet  Once a day   Patient taking differently: Apply topically daily as needed Apply to  Both feet  Once a day   lisinopril (ZESTRIL) 20 mg tablet   No No   Sig: Take 1 tablet (20 mg total) by mouth daily   metFORMIN (GLUCOPHAGE-XR) 500 mg 24 hr tablet   No No   Sig: Take 4 tablets (2,000 mg total) by mouth daily Take 4 tablets daily      Facility-Administered Medications: None     Allergies:  No Known Allergies  ------------------------------------------------------------------------------------------------------------  Advance Directive and Living Will:      Power of :    POLST:    ------------------------------------------------------------------------------------------------------------  Anticipated Length of Stay is > 2 midnights    Care Time Delivered:   No Critical Care time spent       Jessy Lau PA-C        Portions of the record may have been created with voice recognition software  Occasional wrong word or "sound a like" substitutions may have occurred due to the inherent limitations of voice recognition software    Read the chart carefully and recognize, using context, where substitutions have occurred

## 2020-08-13 NOTE — ASSESSMENT & PLAN NOTE
POA  Secondary to above  Hyponatremia, hypokalemia, elevated troponin, elevated AST secondary to sepsis  IV fluid for hydration  Continue with IV antibiotic  Follow on blood culture  Deterioration index discussed with

## 2020-08-13 NOTE — RESPIRATORY THERAPY NOTE
RT Protocol Note  Ronak Solares 63 y o  male MRN: [id number]  Unit/Bed#: -01 Encounter: [id number]    Assessment    Principal Problem:    Pneumonia  Active Problems:    Essential hypertension    Diabetes mellitus type 2 in obese ([facility] [address] )    [facility]    Hyponatremia    Acute renal failure (ARF) (HCC)    Metabolic encephalopathy    Elevated troponin      Home Pulmonary Medications:  none  Home Devices/Therapy: Other (Comment)    Past Medical History:   Diagnosis Date    Arthritis     knees    Diabetes mellitus ([facility] )     Hypertension     Measles, [place] (rubella)     Mumps     Snoring     Loudly     Social History     Socioeconomic History    Marital status: Single     Spouse name: None    Number of children: None    Years of education: None    Highest education level: None   Occupational History    None   Social Needs    Financial resource strain: None    Food insecurity     Worry: None     Inability: None    Transportation needs     Medical: None     Non-medical: None   Tobacco Use    Smoking status: Never Smoker    Smokeless tobacco: Never Used   Substance and Sexual Activity    Alcohol use: Yes     Frequency: Monthly or less     Drinks per session: 1 or 2     Comment: Occasionally, not in excess - As per [employer]scripts     Drug use: Never    Sexual activity: None   Lifestyle    Physical activity     Days per week: None     Minutes per session: None    Stress: None   Relationships    Social connections     Talks on phone: None     Gets together: None     Attends [employer] service: None     Active member of club or organization: None     Attends meetings of clubs or organizations: None     Relationship status: None    Intimate partner violence     Fear of current or ex partner: None     Emotionally abused: None     Physically abused: None     Forced sexual activity: None   Other Topics Concern    None   Social History Narrative    None       Subjective         Objective    Physical Exam: Assessment Type: Assess only  General Appearance: Lethargic, Awake  Respiratory Pattern: Dyspnea at rest  Chest Assessment: Chest expansion symmetrical  Bilateral Breath Sounds: Clear, Diminished    Vitals:  Blood pressure 145/87, pulse 104, temperature 98 3 °F (36 8 °C), temperature source Oral, resp  rate (!) 26, weight 120 kg (264 lb 4 8 oz), SpO2 95 %  Imaging and other studies: I have personally reviewed pertinent reports  Plan    Respiratory Plan: Vent/NIV/HFNC  Airway Clearance Plan: Incentive Spirometer     Resp Comments: (P) Pt placed on Cpap after IStat result for increased wob  Pt admitted with sepsis  CXR shown PNA  Pt has no pulm hx and takes no pulm meds at home  Will cont to follow per resp protocol

## 2020-08-13 NOTE — ASSESSMENT & PLAN NOTE
Lab Results   Component Value Date    HGBA1C 8 7 (H) 08/12/2020       Recent Labs     08/12/20  1632 08/12/20  2147 08/13/20  0615 08/13/20  1058   POCGLU 301* 305* 246* 278*       Blood Sugar Average: Last 72 hrs:  (P) 282 5   Patient noncompliant with home medications  Start insulin sliding scale  Lantus q h s  and Humalog t i d   Monitor closely

## 2020-08-14 ENCOUNTER — APPOINTMENT (INPATIENT)
Dept: RADIOLOGY | Facility: HOSPITAL | Age: 65
DRG: 871 | End: 2020-08-14

## 2020-08-14 ENCOUNTER — APPOINTMENT (INPATIENT)
Dept: NON INVASIVE DIAGNOSTICS | Facility: HOSPITAL | Age: 65
DRG: 871 | End: 2020-08-14

## 2020-08-14 PROBLEM — M62.82 RHABDOMYOLYSIS: Status: ACTIVE | Noted: 2020-08-14

## 2020-08-14 PROBLEM — J96.01 ACUTE RESPIRATORY FAILURE WITH HYPOXIA (HCC): Status: ACTIVE | Noted: 2020-08-14

## 2020-08-14 LAB
ANION GAP SERPL CALCULATED.3IONS-SCNC: 16 MMOL/L (ref 4–13)
ATRIAL RATE: 159 BPM
BASE EXCESS BLDA CALC-SCNC: -6.9 MMOL/L
BODY TEMPERATURE: 100.9 DEGREES FEHRENHEIT
BUN SERPL-MCNC: 26 MG/DL (ref 5–25)
CA-I BLD-SCNC: 1.03 MMOL/L (ref 1.12–1.32)
CALCIUM SERPL-MCNC: 7.6 MG/DL (ref 8.3–10.1)
CHLORIDE SERPL-SCNC: 101 MMOL/L (ref 100–108)
CK MB SERPL-MCNC: 3.2 NG/ML (ref 0–5)
CK MB SERPL-MCNC: 4.7 NG/ML (ref 0–5)
CK MB SERPL-MCNC: <1 % (ref 0–2.5)
CK MB SERPL-MCNC: <1 % (ref 0–2.5)
CK SERPL-CCNC: 3753 U/L (ref 39–308)
CK SERPL-CCNC: 5452 U/L (ref 39–308)
CO2 SERPL-SCNC: 18 MMOL/L (ref 21–32)
CREAT SERPL-MCNC: 1.54 MG/DL (ref 0.6–1.3)
ERYTHROCYTE [DISTWIDTH] IN BLOOD BY AUTOMATED COUNT: 15.7 % (ref 11.6–15.1)
GFR SERPL CREATININE-BSD FRML MDRD: 47 ML/MIN/1.73SQ M
GLUCOSE SERPL-MCNC: 142 MG/DL (ref 65–140)
GLUCOSE SERPL-MCNC: 148 MG/DL (ref 65–140)
GLUCOSE SERPL-MCNC: 168 MG/DL (ref 65–140)
GLUCOSE SERPL-MCNC: 204 MG/DL (ref 65–140)
GLUCOSE SERPL-MCNC: 224 MG/DL (ref 65–140)
HCO3 BLDA-SCNC: 16.6 MMOL/L (ref 22–28)
HCT VFR BLD AUTO: 37.9 % (ref 36.5–49.3)
HGB BLD-MCNC: 12.7 G/DL (ref 12–17)
HOROWITZ INDEX BLDA+IHG-RTO: 100 MM[HG]
LACTATE SERPL-SCNC: 1.5 MMOL/L (ref 0.5–2)
MAGNESIUM SERPL-MCNC: 2.3 MG/DL (ref 1.6–2.6)
MCH RBC QN AUTO: 28.9 PG (ref 26.8–34.3)
MCHC RBC AUTO-ENTMCNC: 33.5 G/DL (ref 31.4–37.4)
MCV RBC AUTO: 86 FL (ref 82–98)
O2 CT BLDA-SCNC: 19.4 ML/DL (ref 16–23)
OXYHGB MFR BLDA: 98.8 % (ref 94–97)
PCO2 BLDA: 28.3 MM HG (ref 36–44)
PCO2 TEMP ADJ BLDA: 30 MM HG (ref 36–44)
PEEP RESPIRATORY: 10 CM[H2O]
PH BLD: 7.37 [PH] (ref 7.35–7.45)
PH BLDA: 7.39 [PH] (ref 7.35–7.45)
PHOSPHATE SERPL-MCNC: 3.3 MG/DL (ref 2.3–4.1)
PLATELET # BLD AUTO: 245 THOUSANDS/UL (ref 149–390)
PMV BLD AUTO: 10.7 FL (ref 8.9–12.7)
PO2 BLD: 249.9 MM HG (ref 75–129)
PO2 BLDA: 243.6 MM HG (ref 75–129)
POTASSIUM SERPL-SCNC: 3.6 MMOL/L (ref 3.5–5.3)
PROCALCITONIN SERPL-MCNC: 6.95 NG/ML
QRS AXIS: -74 DEGREES
QRSD INTERVAL: 100 MS
QT INTERVAL: 271 MS
QTC INTERVAL: 441 MS
RBC # BLD AUTO: 4.4 MILLION/UL (ref 3.88–5.62)
SODIUM SERPL-SCNC: 135 MMOL/L (ref 136–145)
SPECIMEN SOURCE: ABNORMAL
T WAVE AXIS: 77 DEGREES
VENT AC: 26
VENT- AC: AC
VENTRICULAR RATE: 159 BPM
VT SETTING VENT: 400 ML
WBC # BLD AUTO: 18.04 THOUSAND/UL (ref 4.31–10.16)

## 2020-08-14 PROCEDURE — 99291 CRITICAL CARE FIRST HOUR: CPT | Performed by: INTERNAL MEDICINE

## 2020-08-14 PROCEDURE — 03HC33Z INSERTION OF INFUSION DEVICE INTO LEFT RADIAL ARTERY, PERCUTANEOUS APPROACH: ICD-10-PCS | Performed by: INTERNAL MEDICINE

## 2020-08-14 PROCEDURE — 5A1945Z RESPIRATORY VENTILATION, 24-96 CONSECUTIVE HOURS: ICD-10-PCS | Performed by: INTERNAL MEDICINE

## 2020-08-14 PROCEDURE — 94760 N-INVAS EAR/PLS OXIMETRY 1: CPT | Performed by: SOCIAL WORKER

## 2020-08-14 PROCEDURE — 93325 DOPPLER ECHO COLOR FLOW MAPG: CPT | Performed by: INTERNAL MEDICINE

## 2020-08-14 PROCEDURE — 83735 ASSAY OF MAGNESIUM: CPT | Performed by: EMERGENCY MEDICINE

## 2020-08-14 PROCEDURE — 82948 REAGENT STRIP/BLOOD GLUCOSE: CPT

## 2020-08-14 PROCEDURE — 87205 SMEAR GRAM STAIN: CPT | Performed by: INTERNAL MEDICINE

## 2020-08-14 PROCEDURE — 71045 X-RAY EXAM CHEST 1 VIEW: CPT

## 2020-08-14 PROCEDURE — 84145 PROCALCITONIN (PCT): CPT | Performed by: PHYSICIAN ASSISTANT

## 2020-08-14 PROCEDURE — 93010 ELECTROCARDIOGRAM REPORT: CPT | Performed by: INTERNAL MEDICINE

## 2020-08-14 PROCEDURE — 02HV33Z INSERTION OF INFUSION DEVICE INTO SUPERIOR VENA CAVA, PERCUTANEOUS APPROACH: ICD-10-PCS | Performed by: INTERNAL MEDICINE

## 2020-08-14 PROCEDURE — 82330 ASSAY OF CALCIUM: CPT | Performed by: EMERGENCY MEDICINE

## 2020-08-14 PROCEDURE — 93321 DOPPLER ECHO F-UP/LMTD STD: CPT | Performed by: INTERNAL MEDICINE

## 2020-08-14 PROCEDURE — 94760 N-INVAS EAR/PLS OXIMETRY 1: CPT

## 2020-08-14 PROCEDURE — 84100 ASSAY OF PHOSPHORUS: CPT | Performed by: EMERGENCY MEDICINE

## 2020-08-14 PROCEDURE — 82553 CREATINE MB FRACTION: CPT | Performed by: PHYSICIAN ASSISTANT

## 2020-08-14 PROCEDURE — 83605 ASSAY OF LACTIC ACID: CPT | Performed by: PHYSICIAN ASSISTANT

## 2020-08-14 PROCEDURE — 31500 INSERT EMERGENCY AIRWAY: CPT | Performed by: INTERNAL MEDICINE

## 2020-08-14 PROCEDURE — 94002 VENT MGMT INPAT INIT DAY: CPT

## 2020-08-14 PROCEDURE — 36556 INSERT NON-TUNNEL CV CATH: CPT | Performed by: INTERNAL MEDICINE

## 2020-08-14 PROCEDURE — 82805 BLOOD GASES W/O2 SATURATION: CPT | Performed by: EMERGENCY MEDICINE

## 2020-08-14 PROCEDURE — 93308 TTE F-UP OR LMTD: CPT | Performed by: INTERNAL MEDICINE

## 2020-08-14 PROCEDURE — 0BH18EZ INSERTION OF ENDOTRACHEAL AIRWAY INTO TRACHEA, VIA NATURAL OR ARTIFICIAL OPENING ENDOSCOPIC: ICD-10-PCS | Performed by: INTERNAL MEDICINE

## 2020-08-14 PROCEDURE — 36620 INSERTION CATHETER ARTERY: CPT | Performed by: INTERNAL MEDICINE

## 2020-08-14 PROCEDURE — 99292 CRITICAL CARE ADDL 30 MIN: CPT | Performed by: INTERNAL MEDICINE

## 2020-08-14 PROCEDURE — 87070 CULTURE OTHR SPECIMN AEROBIC: CPT | Performed by: INTERNAL MEDICINE

## 2020-08-14 PROCEDURE — 31500 INSERT EMERGENCY AIRWAY: CPT

## 2020-08-14 PROCEDURE — 85027 COMPLETE CBC AUTOMATED: CPT | Performed by: EMERGENCY MEDICINE

## 2020-08-14 PROCEDURE — 93306 TTE W/DOPPLER COMPLETE: CPT

## 2020-08-14 PROCEDURE — 80048 BASIC METABOLIC PNL TOTAL CA: CPT | Performed by: EMERGENCY MEDICINE

## 2020-08-14 PROCEDURE — 82550 ASSAY OF CK (CPK): CPT | Performed by: PHYSICIAN ASSISTANT

## 2020-08-14 RX ORDER — MIDAZOLAM HYDROCHLORIDE 2 MG/2ML
INJECTION, SOLUTION INTRAMUSCULAR; INTRAVENOUS
Status: COMPLETED
Start: 2020-08-14 | End: 2020-08-14

## 2020-08-14 RX ORDER — CALCIUM GLUCONATE 20 MG/ML
2 INJECTION, SOLUTION INTRAVENOUS ONCE
Status: COMPLETED | OUTPATIENT
Start: 2020-08-14 | End: 2020-08-14

## 2020-08-14 RX ORDER — ASPIRIN 81 MG/1
81 TABLET, CHEWABLE ORAL DAILY
Status: DISCONTINUED | OUTPATIENT
Start: 2020-08-14 | End: 2020-08-14

## 2020-08-14 RX ORDER — SODIUM CHLORIDE, SODIUM GLUCONATE, SODIUM ACETATE, POTASSIUM CHLORIDE, MAGNESIUM CHLORIDE, SODIUM PHOSPHATE, DIBASIC, AND POTASSIUM PHOSPHATE .53; .5; .37; .037; .03; .012; .00082 G/100ML; G/100ML; G/100ML; G/100ML; G/100ML; G/100ML; G/100ML
1000 INJECTION, SOLUTION INTRAVENOUS ONCE
Status: COMPLETED | OUTPATIENT
Start: 2020-08-14 | End: 2020-08-14

## 2020-08-14 RX ORDER — ASPIRIN 81 MG/1
81 TABLET, CHEWABLE ORAL DAILY
Status: DISCONTINUED | OUTPATIENT
Start: 2020-08-15 | End: 2020-08-18 | Stop reason: HOSPADM

## 2020-08-14 RX ORDER — NOREPINEPHRINE BITARTRATE 1 MG/ML
INJECTION, SOLUTION INTRAVENOUS
Status: DISPENSED
Start: 2020-08-14 | End: 2020-08-14

## 2020-08-14 RX ORDER — FENTANYL CITRATE 50 UG/ML
100 INJECTION, SOLUTION INTRAMUSCULAR; INTRAVENOUS ONCE
Status: COMPLETED | OUTPATIENT
Start: 2020-08-14 | End: 2020-08-14

## 2020-08-14 RX ORDER — ACETAMINOPHEN 160 MG/5ML
975 SUSPENSION, ORAL (FINAL DOSE FORM) ORAL EVERY 6 HOURS PRN
Status: DISCONTINUED | OUTPATIENT
Start: 2020-08-14 | End: 2020-08-15

## 2020-08-14 RX ORDER — POTASSIUM CHLORIDE 20MEQ/15ML
40 LIQUID (ML) ORAL ONCE
Status: COMPLETED | OUTPATIENT
Start: 2020-08-14 | End: 2020-08-14

## 2020-08-14 RX ORDER — LIDOCAINE HYDROCHLORIDE 10 MG/ML
5 INJECTION, SOLUTION EPIDURAL; INFILTRATION; INTRACAUDAL; PERINEURAL ONCE
Status: COMPLETED | OUTPATIENT
Start: 2020-08-14 | End: 2020-08-14

## 2020-08-14 RX ORDER — CHLORHEXIDINE GLUCONATE 0.12 MG/ML
15 RINSE ORAL EVERY 12 HOURS SCHEDULED
Status: DISCONTINUED | OUTPATIENT
Start: 2020-08-14 | End: 2020-08-16

## 2020-08-14 RX ORDER — PROPOFOL 10 MG/ML
INJECTION, EMULSION INTRAVENOUS
Status: COMPLETED
Start: 2020-08-14 | End: 2020-08-14

## 2020-08-14 RX ORDER — CALCIUM GLUCONATE 20 MG/ML
1 INJECTION, SOLUTION INTRAVENOUS ONCE
Status: COMPLETED | OUTPATIENT
Start: 2020-08-14 | End: 2020-08-14

## 2020-08-14 RX ORDER — FAMOTIDINE 40 MG/5ML
20 POWDER, FOR SUSPENSION ORAL 2 TIMES DAILY
Status: DISCONTINUED | OUTPATIENT
Start: 2020-08-14 | End: 2020-08-15

## 2020-08-14 RX ORDER — FENTANYL CITRATE 50 UG/ML
INJECTION, SOLUTION INTRAMUSCULAR; INTRAVENOUS
Status: COMPLETED
Start: 2020-08-14 | End: 2020-08-14

## 2020-08-14 RX ORDER — FENTANYL CITRATE 50 UG/ML
50 INJECTION, SOLUTION INTRAMUSCULAR; INTRAVENOUS ONCE
Status: COMPLETED | OUTPATIENT
Start: 2020-08-14 | End: 2020-08-14

## 2020-08-14 RX ORDER — SODIUM CHLORIDE, SODIUM GLUCONATE, SODIUM ACETATE, POTASSIUM CHLORIDE, MAGNESIUM CHLORIDE, SODIUM PHOSPHATE, DIBASIC, AND POTASSIUM PHOSPHATE .53; .5; .37; .037; .03; .012; .00082 G/100ML; G/100ML; G/100ML; G/100ML; G/100ML; G/100ML; G/100ML
500 INJECTION, SOLUTION INTRAVENOUS ONCE
Status: COMPLETED | OUTPATIENT
Start: 2020-08-14 | End: 2020-08-14

## 2020-08-14 RX ORDER — FENTANYL CITRATE-0.9 % NACL/PF 10 MCG/ML
50 PLASTIC BAG, INJECTION (ML) INTRAVENOUS CONTINUOUS
Status: DISCONTINUED | OUTPATIENT
Start: 2020-08-14 | End: 2020-08-15

## 2020-08-14 RX ORDER — PROPOFOL 10 MG/ML
5-50 INJECTION, EMULSION INTRAVENOUS
Status: DISCONTINUED | OUTPATIENT
Start: 2020-08-14 | End: 2020-08-15

## 2020-08-14 RX ADMIN — CEFEPIME HYDROCHLORIDE 2000 MG: 2 INJECTION, POWDER, FOR SOLUTION INTRAVENOUS at 06:55

## 2020-08-14 RX ADMIN — PROPOFOL 15 MCG/KG/MIN: 10 INJECTION, EMULSION INTRAVENOUS at 17:41

## 2020-08-14 RX ADMIN — FENTANYL CITRATE 50 MCG: 50 INJECTION INTRAMUSCULAR; INTRAVENOUS at 03:59

## 2020-08-14 RX ADMIN — SODIUM CHLORIDE, SODIUM GLUCONATE, SODIUM ACETATE, POTASSIUM CHLORIDE AND MAGNESIUM CHLORIDE 500 ML: 526; 502; 368; 37; 30 INJECTION, SOLUTION INTRAVENOUS at 09:30

## 2020-08-14 RX ADMIN — ACETAMINOPHEN 975 MG: 650 SUSPENSION ORAL at 20:56

## 2020-08-14 RX ADMIN — MIDAZOLAM 4 MG: 1 INJECTION INTRAMUSCULAR; INTRAVENOUS at 03:17

## 2020-08-14 RX ADMIN — CHLORHEXIDINE GLUCONATE 0.12% ORAL RINSE 15 ML: 1.2 LIQUID ORAL at 21:10

## 2020-08-14 RX ADMIN — SODIUM CHLORIDE, SODIUM GLUCONATE, SODIUM ACETATE, POTASSIUM CHLORIDE AND MAGNESIUM CHLORIDE 150 ML/HR: 526; 502; 368; 37; 30 INJECTION, SOLUTION INTRAVENOUS at 17:06

## 2020-08-14 RX ADMIN — PROPOFOL 20 MCG/KG/MIN: 10 INJECTION, EMULSION INTRAVENOUS at 10:14

## 2020-08-14 RX ADMIN — SODIUM CHLORIDE, SODIUM GLUCONATE, SODIUM ACETATE, POTASSIUM CHLORIDE AND MAGNESIUM CHLORIDE 150 ML/HR: 526; 502; 368; 37; 30 INJECTION, SOLUTION INTRAVENOUS at 10:13

## 2020-08-14 RX ADMIN — FENTANYL CITRATE 50 MCG: 50 INJECTION, SOLUTION INTRAMUSCULAR; INTRAVENOUS at 03:59

## 2020-08-14 RX ADMIN — FENTANYL CITRATE 100 MCG: 50 INJECTION, SOLUTION INTRAMUSCULAR; INTRAVENOUS at 03:30

## 2020-08-14 RX ADMIN — PROPOFOL 15 MCG/KG/MIN: 10 INJECTION, EMULSION INTRAVENOUS at 23:58

## 2020-08-14 RX ADMIN — PROPOFOL 20 MCG/KG/MIN: 10 INJECTION, EMULSION INTRAVENOUS at 05:38

## 2020-08-14 RX ADMIN — FAMOTIDINE 20 MG: 40 POWDER, FOR SUSPENSION ORAL at 18:03

## 2020-08-14 RX ADMIN — CEFEPIME HYDROCHLORIDE 2000 MG: 2 INJECTION, POWDER, FOR SOLUTION INTRAVENOUS at 17:50

## 2020-08-14 RX ADMIN — INSULIN LISPRO 10 UNITS: 100 INJECTION, SOLUTION INTRAVENOUS; SUBCUTANEOUS at 08:16

## 2020-08-14 RX ADMIN — NOREPINEPHRINE BITARTRATE 11 MCG/MIN: 1 INJECTION INTRAVENOUS at 06:38

## 2020-08-14 RX ADMIN — FAMOTIDINE 20 MG: 40 POWDER, FOR SUSPENSION ORAL at 12:38

## 2020-08-14 RX ADMIN — SODIUM CHLORIDE, SODIUM GLUCONATE, SODIUM ACETATE, POTASSIUM CHLORIDE AND MAGNESIUM CHLORIDE 500 ML: 526; 502; 368; 37; 30 INJECTION, SOLUTION INTRAVENOUS at 06:53

## 2020-08-14 RX ADMIN — POTASSIUM PHOSPHATE, MONOBASIC POTASSIUM PHOSPHATE, DIBASIC 30 MMOL: 224; 236 INJECTION, SOLUTION, CONCENTRATE INTRAVENOUS at 00:29

## 2020-08-14 RX ADMIN — LIDOCAINE HYDROCHLORIDE 5 ML: 10 INJECTION, SOLUTION EPIDURAL; INFILTRATION; INTRACAUDAL at 04:00

## 2020-08-14 RX ADMIN — ASPIRIN 81 MG: 81 TABLET, COATED ORAL at 09:26

## 2020-08-14 RX ADMIN — HEPARIN SODIUM 5000 UNITS: 5000 INJECTION INTRAVENOUS; SUBCUTANEOUS at 21:10

## 2020-08-14 RX ADMIN — CALCIUM GLUCONATE 2 G: 20 INJECTION, SOLUTION INTRAVENOUS at 07:44

## 2020-08-14 RX ADMIN — Medication 50 MCG/HR: at 15:33

## 2020-08-14 RX ADMIN — NOREPINEPHRINE BITARTRATE 8 MCG/MIN: 1 INJECTION INTRAVENOUS at 12:43

## 2020-08-14 RX ADMIN — CHLORHEXIDINE GLUCONATE 0.12% ORAL RINSE 15 ML: 1.2 LIQUID ORAL at 12:32

## 2020-08-14 RX ADMIN — Medication 50 MCG/HR: at 03:43

## 2020-08-14 RX ADMIN — HEPARIN SODIUM 5000 UNITS: 5000 INJECTION INTRAVENOUS; SUBCUTANEOUS at 15:05

## 2020-08-14 RX ADMIN — CALCIUM GLUCONATE 1 G: 20 INJECTION, SOLUTION INTRAVENOUS at 08:25

## 2020-08-14 RX ADMIN — ACETAMINOPHEN 650 MG: 650 SUPPOSITORY RECTAL at 02:13

## 2020-08-14 RX ADMIN — FENTANYL CITRATE 100 MCG: 50 INJECTION INTRAMUSCULAR; INTRAVENOUS at 03:30

## 2020-08-14 RX ADMIN — NOREPINEPHRINE BITARTRATE 5 MCG/MIN: 1 INJECTION INTRAVENOUS at 02:55

## 2020-08-14 RX ADMIN — DEXMEDETOMIDINE 0.6 MCG/KG/HR: 100 INJECTION, SOLUTION, CONCENTRATE INTRAVENOUS at 03:55

## 2020-08-14 RX ADMIN — POTASSIUM CHLORIDE 40 MEQ: 20 SOLUTION ORAL at 07:46

## 2020-08-14 RX ADMIN — ACETAMINOPHEN 650 MG: 325 TABLET, FILM COATED ORAL at 09:44

## 2020-08-14 RX ADMIN — INSULIN LISPRO 5 UNITS: 100 INJECTION, SOLUTION INTRAVENOUS; SUBCUTANEOUS at 12:32

## 2020-08-14 RX ADMIN — SODIUM CHLORIDE, SODIUM GLUCONATE, SODIUM ACETATE, POTASSIUM CHLORIDE AND MAGNESIUM CHLORIDE 1000 ML: 526; 502; 368; 37; 30 INJECTION, SOLUTION INTRAVENOUS at 03:00

## 2020-08-14 RX ADMIN — ACETAMINOPHEN 650 MG: 650 SUPPOSITORY RECTAL at 14:47

## 2020-08-14 RX ADMIN — HEPARIN SODIUM 5000 UNITS: 5000 INJECTION INTRAVENOUS; SUBCUTANEOUS at 06:05

## 2020-08-14 NOTE — WOUND OSTOMY CARE
Consult Note - Wound   Pramodst Found 63 y o  male MRN: [id number]  [facility]: [id number]      History and Present Illness: Patient is seen for wound care consult   The patient is a 63year old male that had a fall as per the RN and the family   The patient has had altered mental status   Patient with pneumonia , sepsis  , metabolic encephalopathy , acute renal failure , elevated troponin , hyponatremia, DM and HTN   Patient has his eyes closed during the assessment not responding   He was transferred to the MICU on CPAP with worsening respiratory distress and is intubated   Assessment Findings:   1  Bilateral heels dry and intact   2  Right knee - dry partial thickness abrasions   3  Left knee dry partial thickness abrasions   4  Confirmed with the ANH Figueroa sacral / buttocks is dry and intact     Discussed the plan of care with the ANH Figueroa         Skin care plans:  1-Hydraguard to bilateral sacrum, buttock and heels BID and PRN  2-Elevate heels to offload pressure  3-Ehob cushion in chair when out of bed  4-Moisturize skin daily with skin nourishing cream   5-Turn/reposition q2h or when medically stable for pressure re-distribution on skin  6  Right and left knee abrasions - apply 3 M no sting cavilon daily           Vitals: Blood pressure 140/85, pulse 66, temperature (!) 101 5 °F (38 6 °C), resp  rate (!) 25, weight 120 kg (264 lb 4 8 oz), SpO2 97 %  ,Body mass index is 42 66 kg/m²  Wound 08/14/20 Knee Anterior;Right (Active)   Wound Image   08/14/20 1122   Wound Description Dry; Intact; Brown 08/14/20 1330   Jaclyn-wound Assessment Clean;Dry; Intact 08/14/20 1330   Wound Length (cm) 7 cm 08/14/20 1122   Wound Width (cm) 8 cm 08/14/20 1122   Wound Surface Area (cm^2) 56 cm^2 08/14/20 1122   Drainage Amount None 08/14/20 1330   Non-staged Wound Description Partial thickness 08/14/20 1330   Treatments Other (Comment) 08/14/20 1330   Dressing Other (Comment) 08/14/20 1330   Patient Tolerance Tolerated well 08/14/20 1330       Wound 08/14/20 Knee Anterior; Left (Active)   Wound Image   08/14/20 1123   Wound Description Clean;Dry;Brown 08/14/20 1330   Jaclyn-wound Assessment Clean;Dry; Intact 08/14/20 1330   Wound Length (cm) 7 cm 08/14/20 1123   Wound Width (cm) 5 cm 08/14/20 1123   Wound Surface Area (cm^2) 35 cm^2 08/14/20 1123   Drainage Amount None 08/14/20 1330   Non-staged Wound Description Partial thickness 08/14/20 1330   Treatments Other (Comment) 08/14/20 1330   Dressing Other (Comment) 08/14/20 1330   Patient Tolerance Tolerated well 08/14/20 1330       Wound care will follow weekly call with questions or concerns     Melisa Mullen RN BSN [id number]

## 2020-08-14 NOTE — PROGRESS NOTES
Called urgently to see patient with worsening mental status as well as respiratory distress  I had evaluated patient multiple times tonight for respiratory failure, high grade fevers, encephalopathy, new onset AF   He was transferred to MICU on CPAP due to worsening respiratory distress with concern for LLL PNA as well as pulmonary vascular congestion  He received one dose lasix  On arrival in MICU- he is clearly delirious, febrile up to 104, tachypneic and increased work of breathing  We gave him one dose cardizem bolus and started on cardizem gtt and this seem to help his HR somewhat  We started him on low dose precedex gtt due to his worsening encephalopathy and he seemed to be somewhat calmer and will intermittently FC  He received multiple doses of antipyretics as well as surface coolants to bring his temperature down  I felt his primary driving force was a LLL PNA   We continued his antibiotics- cefepime/vancomycin and azithromycin  I updated his family- daughter and aunt multiple times and intimated them that if he got worse- he will need to be intubated  Around 3am - I was called to see him urgently- He was now obtunded and will only moan/localize to deep painful stimuli  He was also hypotensive needing institution of norepi gtt as well as a fluid bolus  Cardizem gtt was d/syeda  We proceeded with endotracheal intubation and mechanical ventilation  There were some purulent secretions noted in the oropharynx  There was transient hypoxemia noted during intubation  Afterwards we inserted a CVC and an arterial catheter for better hemodynamic monitoring - see separate procedure notes  POCUS with very poor echo views  He will need formal echo    We started fentanyl/profol for sedation/analgesia  I did update family again with events    Cc time- 55mins exclusive teaching/procedures

## 2020-08-14 NOTE — PROGRESS NOTES
Daily Progress Note - Critical Care   Celia Smith 63 y o  male MRN: [id number]  Unit/Bed#: MICU 04 Encounter: [id number]        ----------------------------------------------------------------------------------------  HPI/24hr events: Transferred to the ICU yesterday after DI  Overnight worsening respiratory status necessitating intubation  Post intubation he developed hypotension necessitating CVC placement for pressors  This AM he is on levo 11  Previously closed aMryam Ardon is now returned  ---------------------------------------------------------------------------------------  SUBJECTIVE  Intubated/sedated    Review of Systems  Review of systems was unable to be performed secondary to intubated/sedated  ---------------------------------------------------------------------------------------  Assessment and Plan:    Neuro:    Diagnosis: Acute encephlopathy  o Plan:   - Toxic metabolic in setting of AGMA  - Delirium precautions  § 8/12 CT head- neg acute changes   · Diagnosis: PAD             ? Plan:   § Continue propofol/fentanyl for goal RASS of 0  § PRN tylenol        CV:   · Diagnosis: Undifferentiated shock  ? Plan:   § Likely septic in setting of LLL PNA vs urosepsis  § Consider bedside ECHO  § Levo 11, give bolus 500cc IVF and wean as able for MAP >65     Pulm:  · Diagnosis: Acute hypoxic respiratory failure  ? Plan:   § AC/VC 20/400/60%/10  § Wean FiO2 as able for SpO2 >92%  · Diagnosis: Acute LLL PNA  ? Plan:   § Continue ABX as below  § Wean FiO2 as able for SpO2 > 92%        GI:   No active issues        :   · Diagnosis: Acute renal failure  ? Plan:   § Unclear baseline likely secondary to prerenal in setting of sepsis  § Avoid hypotension and nephrotoxic medications  § IVF  · Diagnosis: AGMA  ? Plan:   § Unclear etiology  § No DKA, unimpressive uremia, COMA panel neg for salicylates/ETOH   § Recheck lactic and check CK given renal failure   § IVF  · Diagnosis: Hyponatremia  ?  Plan:   § Improving since admission with IVF, possible due to hypovolemic hyponatremia         F/E/N:   · Plan: Start TF, replete lytes as needed        Heme/Onc:   SQH/scds for ppx     Endo:   · Diagnosis: DM2  ? Plan:   § Lantus on hold, increase SSI to Alg 6, may need insulin gtt   · Diagnosis: Low TSH  ? Plan:   § Normal free T4        ID:   · Diagnosis: Sepsis unclear etiology  ? Plan:   § Probable LLL pna vs urosepsis   § Continue cefepime   § Follow up BCx and sputum cx   § Legionella and strep pneumo neg  § Consider d/c azithromycin         MSK/Skin:   ? B/l lower knee wounds, consult wound care   Disposition: Continue Critical Care   Code Status: Level 1 - Full Code  ---------------------------------------------------------------------------------------  ICU CORE MEASURES    Prophylaxis   VTE Pharmacologic Prophylaxis: Heparin  VTE Mechanical Prophylaxis: sequential compression device  Stress Ulcer Prophylaxis: Prophylaxis Not Indicated     ABCDE Protocol (if indicated)  Plan to perform spontaneous awakening trial today? Yes  Plan to perform spontaneous breathing trial today? Yes  Obvious barriers to extubation? Yes  CAM-ICU: Positive    Invasive Devices Review  Invasive Devices     Central Venous Catheter Line            CVC Central Lines 08/14/20 Triple 16cm less than 1 day          Peripheral Intravenous Line            Peripheral IV 08/12/20 Left Antecubital 2 days    Peripheral IV 08/12/20 Right Forearm 1 day    Peripheral IV 08/14/20 Right Wrist less than 1 day          Arterial Line            Arterial Line 08/14/20 Radial less than 1 day          Drain            Urethral Catheter Temperature probe less than 1 day          Airway            ETT  Hi-Lo 8 mm less than 1 day              Can any invasive devices be discontinued today?  No  ---------------------------------------------------------------------------------------  OBJECTIVE    Vitals   Vitals:    08/14/20 0400 08/14/20 0405 08/14/20 0500 08/14/20 0600   BP: 96/64 (!) 84/64 101/58    BP Location:       Pulse: 82 80 70 70   Resp: 22 (!) 27 (!) 29 (!) 27   Temp: 100 4 °F (38 °C) (!) 100 8 °F (38 2 °C) (!) 101 5 °F (38 6 °C) 99 °F (37 2 °C)   TempSrc: Probe      SpO2: 98% 98% 99% 100%   Weight:         Temp (24hrs), Av 7 °F (38 7 °C), Min:98 3 °F (36 8 °C), Max:104 °F (40 °C)  Current: Temperature: 99 °F (37 2 °C)      Respiratory:  SpO2: SpO2: 100 %  Nasal Cannula O2 Flow Rate (L/min): 2 L/min    Invasive/non-invasive ventilation settings   Respiratory    Lab Data (Last 4 hours)       0520            pH, Arterial       7 387           pCO2, Arterial       28 3           pO2, Arterial       243 6           HCO3, Arterial       16 6           Base Excess, Arterial       -6 9                O2/Vent Data        0326   Most Recent         Vent Mode AC/VC  AC/VC      Resp Rate (BPM) (BPM) 26  26      Vt (mL) (mL) 400  400      FIO2 (%) (%) 100  100      PEEP (cmH2O) (cmH2O) 10  10      MV 13 5  13 5                  Physical Exam  Constitutional:       General: He is not in acute distress  Appearance: He is not ill-appearing  HENT:      Head: Normocephalic  Eyes:      Comments: Pupils 2mm b/l reactive to light    Cardiovascular:      Rate and Rhythm: Normal rate  Rhythm irregular  Heart sounds: No murmur  No friction rub  No gallop  Pulmonary:      Effort: No respiratory distress  Breath sounds: No wheezing or rales  Comments: Tachypnea   Abdominal:      Palpations: Abdomen is soft  Tenderness: There is no abdominal tenderness  Comments: obese   Musculoskeletal:         General: Swelling present  Skin:     General: Skin is warm and dry     Neurological:      Comments: Intubated/sedated, no sedation break at this time          Laboratory and Diagnostics:  Results from last 7 days   Lab Units 20  0519 20  1657 20  0506 20  2156 20  1928 20  1650   WBC Thousand/uL 18 04*  --  14 34*  -- --  15 53*   HEMOGLOBIN g/dL 12 7  --  14 7  --   --  15 4   I STAT HEMOGLOBIN g/dl  --  13 3  --   --   --   --    HEMATOCRIT % 37 9  --  43 1  --   --  43 5   HEMATOCRIT, ISTAT %  --  39  --   --   --   --    PLATELETS Thousands/uL 245  --  228 217 206 258   NEUTROS PCT %  --   --  91*  --   --   --    BANDS PCT %  --   --   --   --   --  7   MONOS PCT %  --   --  5  --   --   --    MONO PCT %  --   --   --   --   --  6     Results from last 7 days   Lab Units 08/14/20  0519 08/13/20  1715 08/13/20  1657 08/13/20  0506 08/12/20  1650   SODIUM mmol/L 135* 133*  --  134* 129*   POTASSIUM mmol/L 3 6 3 1*  --  3 2* 3 2*   CHLORIDE mmol/L 101 101  --  98* 94*   CO2 mmol/L 18* 21  --  20* 21   CO2, I-STAT mmol/L  --   --  24  --   --    ANION GAP mmol/L 16* 11  --  16* 14*   BUN mg/dL 26* 22  --  21 19   CREATININE mg/dL 1 54* 1 49*  --  1 44* 1 59*   CALCIUM mg/dL 7 6* 7 9*  --  7 7* 8 6   GLUCOSE RANDOM mg/dL 224* 169*  --  235* 358*   ALT U/L  --   --   --  27 24   AST U/L  --   --   --  157* 156*   ALK PHOS U/L  --   --   --  74 80   ALBUMIN g/dL  --   --   --  2 4* 2 8*   TOTAL BILIRUBIN mg/dL  --   --   --  0 79 1 08*     Results from last 7 days   Lab Units 08/14/20  0519 08/13/20  0506   MAGNESIUM mg/dL 2 3 2 3   PHOSPHORUS mg/dL 3 3 1 4*      Results from last 7 days   Lab Units 08/12/20  1650   INR  1 23*   PTT seconds 28      Results from last 7 days   Lab Units 08/12/20  2156 08/12/20  1928 08/12/20  1650   TROPONIN I ng/mL 0 32* 0 35* 0 38*     Results from last 7 days   Lab Units 08/13/20  1656 08/13/20  0119 08/12/20  2156 08/12/20  1650   LACTIC ACID mmol/L 2 0 1 9 2 3* 3 9*     ABG:  Results from last 7 days   Lab Units 08/14/20  0520   PH ART  7 387   PCO2 ART mm Hg 28 3*   PO2 ART mm Hg 243 6*   HCO3 ART mmol/L 16 6*   BASE EXC ART mmol/L -6 9   ABG SOURCE  Line, Arterial     VBG:  Results from last 7 days   Lab Units 08/14/20  0520   ABG SOURCE  Line, Arterial     Results from last 7 days   Lab Units 08/13/20  0610 08/12/20  2129 08/12/20  1650   PROCALCITONIN ng/ml 5 97* 6 42* 7 00*       Micro  Results from last 7 days   Lab Units 08/13/20  0418 08/12/20  1650   BLOOD CULTURE   --  No Growth at 24 hrs  No Growth at 24 hrs  LEGIONELLA URINARY ANTIGEN  Negative  --    STREP PNEUMONIAE ANTIGEN, URINE  Negative  --        EKG: No new  Imaging:Extremely rotated, ETT appears to be at chikis and CVC deep, recheck CR with patient repositioned I have personally reviewed pertinent films in PACS    Intake and Output  I/O       08/12 0701 - 08/13 0700 08/13 0701 - 08/14 0700    P  O  120 600    I V  (mL/kg) 1000 (8 3) 1657 2 (13 8)    IV Piggyback 1050 355    Total Intake(mL/kg) 2170 (18 1) 2612 1 (21 8)    Urine (mL/kg/hr) 300 1320 (0 5)    Stool 0 0    Total Output 300 1320    Net +1870 +1292 1          Unmeasured Urine Occurrence  2 x    Unmeasured Stool Occurrence 1 x 3 x        UOP: 0 5cc/kg/hr     Height and Weights      IBW: -88 kg  Body mass index is 42 66 kg/m²  Weight (last 2 days)     Date/Time   Weight    08/12/20 2130   120 (264 3)                Nutrition       Diet Orders   (From admission, onward)             Start     Ordered    08/13/20 2011  Diet NPO  Diet effective now     Question Answer Comment   Diet Type NPO    RD to adjust diet per protocol?  Yes        08/13/20 2010                  Active Medications  Scheduled Meds:  Current Facility-Administered Medications   Medication Dose Route Frequency Provider Last Rate    acetaminophen  650 mg Rectal Q4H PRN Devan Jolanta, DO      acetaminophen  650 mg Oral Q6H PRN Devan Jolanta, DO      aspirin  81 mg Oral Daily Diogenes A Paster, DO      [START ON 8/15/2020] azithromycin  500 mg Intravenous Q24H Whitney Brooks MD      calcium gluconate  2 g Intravenous Once Safia Velez PA-C      Followed by   [facility] calcium gluconate  1 g Intravenous Once Safia Velez PA-C      cefepime  2,000 mg Intravenous Q12H Diogenes A Paster, DO      fentaNYL  50 mcg/hr Intravenous Continuous Whitney Brooks MD 50 mcg/hr (08/14/20 0634)    heparin (porcine)  5,000 Units Subcutaneous Q8H [facility] Diogenes A Paster, DO      insulin lispro  1-6 Units Subcutaneous Q6H [facility] Diogenes A Paster, DO      multi-electrolyte  500 mL Intravenous Once Clifford Landaverde PA-C      multi-electrolyte  75 mL/hr Intravenous Continuous Diogenes A Paster, DO 75 mL/hr (08/13/20 2208)    norepinephrine           norepinephrine  1-30 mcg/min Intravenous Titrated Whitney Brooks MD 11 mcg/min (08/14/20 4663)    ondansetron  4 mg Intravenous Q6H PRN Diogenes A Paster, DO      potassium chloride  40 mEq Oral Once Clifford Landaverde PA-C      propofol  5-50 mcg/kg/min Intravenous Titrated Jack De Leon MD 15 mcg/kg/min (08/14/20 0500)     Continuous Infusions:  fentaNYL, 50 mcg/hr, Last Rate: 50 mcg/hr (08/14/20 0634)  multi-electrolyte, 75 mL/hr, Last Rate: 75 mL/hr (08/13/20 2208)  norepinephrine, 1-30 mcg/min, Last Rate: 11 mcg/min (08/14/20 6479)  propofol, 5-50 mcg/kg/min, Last Rate: 15 mcg/kg/min (08/14/20 0557)      PRN Meds:   acetaminophen, 650 mg, Q4H PRN  acetaminophen, 650 mg, Q6H PRN  ondansetron, 4 mg, Q6H PRN        Allergies   No Known Allergies  ---------------------------------------------------------------------------------------  Advance Directive and Living Will:      Power of :    POLST:    ---------------------------------------------------------------------------------------  Care Time Delivered:   Upon my evaluation, this patient had a high probability of imminent or life-threatening deterioration due to undifferentiated shock, AGMA, which required my direct attention, intervention, and personal management  I have personally provided 24 minutes (0600 to 0705) of critical care time, exclusive of procedures, teaching, family meetings, and any prior time recorded by providers other than myself       Clifford Landaverde PA-C      Portions of the record may have been created with voice recognition software  Occasional wrong word or "sound a like" substitutions may have occurred due to the inherent limitations of voice recognition software    Read the chart carefully and recognize, using context, where substitutions have occurred

## 2020-08-14 NOTE — RESPIRATORY THERAPY NOTE
RT Ventilator Management Note  Noam Form 63 y o  male MRN: [id number]  Unit/Bed#: [facility] 04 Encounter: [id number]      Daily Screen     No data found in the last 10 encounters  Physical Exam:   Assessment Type: Assess only  General Appearance: Sedated  Respiratory Pattern: Assisted  Chest Assessment: Chest expansion symmetrical  Bilateral Breath Sounds: Diminished, Coarse      Resp Comments: Pt became very obtunded  Pt was intubated by resident with the assistance of Attending  Moise to see with DL  Buren Clack was used  Sats were not stable through the procedure  Dropping to 40's  Bagged on 100% with and oral airway  Intubated on second attempt  Sats increased back to 95 after intubation  Placed on vent

## 2020-08-14 NOTE — DISCHARGE INSTR - OTHER ORDERS
Skin care plans:  1-Hydraguard to bilateral sacrum, buttock and heels BID and PRN  2-Elevate heels to offload pressure  3-Ehob cushion in chair when out of bed  4-Moisturize skin daily with skin nourishing cream   5-Turn/reposition q2h or when medically stable for pressure re-distribution on skin     6  Right and left knee abrasions - apply 3 M no sting cavilon daily

## 2020-08-14 NOTE — PROCEDURES
Central Line Insertion    Date/Time: 8/14/2020 4:58 AM  Performed by: Genesis Kohler DO  Authorized by: Genesis Kohler DO     Patient location:  ICU  Other Assisting Provider: Yes (comment)    Consent:     Consent obtained:  Verbal    Consent given by:  Spouse    Risks discussed:  Arterial puncture, bleeding, infection, incorrect placement, nerve damage and pneumothorax    Alternatives discussed:  No treatment, delayed treatment and alternative treatment  Universal protocol:     Procedure explained and questions answered to patient or proxy's satisfaction: yes      Relevant documents present and verified: yes      Test results available and properly labeled: yes      Patient identity confirmed:  Arm band and hospital-assigned identification number  Pre-procedure details:     Hand hygiene: Hand hygiene performed prior to insertion      Sterile barrier technique: All elements of maximal sterile technique followed      Skin preparation:  ChloraPrep    Skin preparation agent: Skin preparation agent completely dried prior to procedure    Indications:     Central line indications: medications requiring central line and hemodynamic monitoring    Sedation:     Sedation type: Anxiolysis  Anesthesia (see MAR for exact dosages):      Anesthesia method:  Local infiltration    Local anesthetic:  Lidocaine 1% w/o epi  Procedure details:     Location:  Right internal jugular    Vessel type: vein      Laterality:  Right    Approach: percutaneous technique used      Patient position:  Trendelenburg    Catheter type:  Triple lumen 16cm    Catheter size:  7 Fr    Landmarks identified: yes      Ultrasound guidance: yes      Sterile ultrasound techniques: Sterile gel and sterile probe covers were used      Number of attempts:  1    Successful placement: yes    Post-procedure details:     Post-procedure:  Dressing applied and line sutured    Assessment:  Blood return through all ports, free fluid flow, no pneumothorax on x-ray and placement verified by x-ray    Post-procedure complications: none      Patient tolerance of procedure: Tolerated well, no immediate complications    Observer: Yes      Observer name:  Dr Mary Santos and Dr Montoya Second  Arterial Line Insertion    Date/Time: 8/14/2020 5:00 AM  Performed by: Kevin Tatum DO  Authorized by: Kevin Tatum DO     Patient location:  ICU  Other Assisting Provider: Yes (comment)    Consent:     Consent obtained:  Verbal    Consent given by:  Spouse    Risks discussed:  Bleeding, ischemia, infection, pain and repeat procedure  Universal protocol:     Procedure explained and questions answered to patient or proxy's satisfaction: yes      Relevant documents present and verified: yes      Patient identity confirmed:  Arm band and hospital-assigned identification number  Indications:     Indications: hemodynamic monitoring, multiple ABGs and continuous blood pressure monitoring    Pre-procedure details:     Skin preparation:  Chlorhexidine    Preparation: Patient was prepped and draped in sterile fashion    Sedation:     Sedation type: Anxiolysis  Anesthesia (see MAR for exact dosages): Anesthesia method:  Local infiltration    Local anesthetic:  Lidocaine 1% w/o epi  Procedure details:     Location / Tip of Catheter:  Radial    Laterality:  Left    Dimas's test performed: yes      Needle gauge:  18 G    Placement technique:  Percutaneous    Number of attempts:  2    Successful placement: yes      Transducer: waveform confirmed    Post-procedure details:     Post-procedure:  Sterile dressing applied and sutured    Patient tolerance of procedure:   Tolerated well, no immediate complications  Intubation    Date/Time: 8/14/2020 5:01 AM  Performed by: Kevin Tatum DO  Authorized by: Kevin Tatum DO     Patient location:  Other (comment)  Other Assisting Provider: Yes (comment)    Consent:     Consent obtained:  Verbal    Consent given by:  Spouse    Risks discussed:  Brain injury, aspiration, bleeding, death, dental trauma, hypoxia, pneumothorax and laryngeal injury    Alternatives discussed:  No treatment, delayed treatment and alternative treatment  Universal protocol:     Procedure explained and questions answered to patient or proxy's satisfaction: yes      Relevant documents present and verified: yes      Test results available and properly labeled: yes      Radiology Images displayed and confirmed  If images not available, report reviewed: yes      Patient identity confirmed:  Arm band and hospital-assigned identification number  Pre-procedure details:     Patient status:  Altered mental status    Mallampati score:  3    Pretreatment medications:  Fentanyl    Paralytics:  Succinylcholine  Indications:     Indications for intubation: respiratory distress, respiratory failure and hypoxemia    Procedure details:     Preoxygenation:  Bag valve mask    CPR in progress: no      Intubation method:  Oral    Oral intubation technique:  Glidescope    Laryngoscope blade: Mac 3    Tube size (mm):  8 0    Tube type:  Cuffed    Number of attempts:  2    Ventilation between attempts: yes      Cricoid pressure: no      Tube visualized through cords: yes    Placement assessment:     ETT to lip:  24    Tube secured with:  ETT nunez    Breath sounds:  Equal    Placement verification: chest rise, condensation, CXR verification, direct visualization, equal breath sounds, ETCO2 detector and tube exhalation      CXR findings:  ETT in proper place  Post-procedure details:     Patient tolerance of procedure:   Tolerated well, no immediate complications

## 2020-08-14 NOTE — RESPIRATORY THERAPY NOTE
RT Ventilator Management Note  Ann-Marie Domínguez 63 y o  male MRN: [id number]  Unit/Bed#: [facility] 04 Encounter: [id number]      Daily Screen       8/14/2020  0824             Patient safety screen outcome[de-identified]  Failed    Not Ready for Weaning due to[de-identified]  Underline problem not resolved;PEEP > 8cmH2O            Physical Exam:   Assessment Type: (P) Assess only  General Appearance: (P) Sedated  Respiratory Pattern: (P) Assisted  Chest Assessment: (P) Chest expansion symmetrical  Bilateral Breath Sounds: (P) Clear, Diminished      Resp Comments: (P) Pt sedate  BS clear  02 decreased to 50 % withsat 99%  Rate changed on vent  Will place order for new settings  SBT not indicated due to increased peep

## 2020-08-15 LAB
ANION GAP SERPL CALCULATED.3IONS-SCNC: 9 MMOL/L (ref 4–13)
ATRIAL RATE: 71 BPM
BUN SERPL-MCNC: 24 MG/DL (ref 5–25)
CALCIUM SERPL-MCNC: 7.3 MG/DL (ref 8.3–10.1)
CHLORIDE SERPL-SCNC: 106 MMOL/L (ref 100–108)
CK MB SERPL-MCNC: 1.5 NG/ML (ref 0–5)
CK MB SERPL-MCNC: <1 % (ref 0–2.5)
CK SERPL-CCNC: 1692 U/L (ref 39–308)
CO2 SERPL-SCNC: 21 MMOL/L (ref 21–32)
CREAT SERPL-MCNC: 1.32 MG/DL (ref 0.6–1.3)
ERYTHROCYTE [DISTWIDTH] IN BLOOD BY AUTOMATED COUNT: 16.2 % (ref 11.6–15.1)
GFR SERPL CREATININE-BSD FRML MDRD: 57 ML/MIN/1.73SQ M
GLUCOSE SERPL-MCNC: 119 MG/DL (ref 65–140)
GLUCOSE SERPL-MCNC: 131 MG/DL (ref 65–140)
GLUCOSE SERPL-MCNC: 209 MG/DL (ref 65–140)
GLUCOSE SERPL-MCNC: 234 MG/DL (ref 65–140)
GLUCOSE SERPL-MCNC: 269 MG/DL (ref 65–140)
GLUCOSE SERPL-MCNC: 273 MG/DL (ref 65–140)
HCT VFR BLD AUTO: 34.7 % (ref 36.5–49.3)
HGB BLD-MCNC: 11.8 G/DL (ref 12–17)
MAGNESIUM SERPL-MCNC: 2.4 MG/DL (ref 1.6–2.6)
MCH RBC QN AUTO: 28.8 PG (ref 26.8–34.3)
MCHC RBC AUTO-ENTMCNC: 34 G/DL (ref 31.4–37.4)
MCV RBC AUTO: 85 FL (ref 82–98)
PHOSPHATE SERPL-MCNC: 1.6 MG/DL (ref 2.3–4.1)
PLATELET # BLD AUTO: 170 THOUSANDS/UL (ref 149–390)
PMV BLD AUTO: 10.1 FL (ref 8.9–12.7)
POTASSIUM SERPL-SCNC: 3.5 MMOL/L (ref 3.5–5.3)
PROCALCITONIN SERPL-MCNC: 5.74 NG/ML
QRS AXIS: -35 DEGREES
QRSD INTERVAL: 96 MS
QT INTERVAL: 388 MS
QTC INTERVAL: 422 MS
RBC # BLD AUTO: 4.1 MILLION/UL (ref 3.88–5.62)
SODIUM SERPL-SCNC: 136 MMOL/L (ref 136–145)
T WAVE AXIS: 23 DEGREES
VENTRICULAR RATE: 71 BPM
WBC # BLD AUTO: 10.75 THOUSAND/UL (ref 4.31–10.16)

## 2020-08-15 PROCEDURE — 94760 N-INVAS EAR/PLS OXIMETRY 1: CPT

## 2020-08-15 PROCEDURE — 80048 BASIC METABOLIC PNL TOTAL CA: CPT | Performed by: PHYSICIAN ASSISTANT

## 2020-08-15 PROCEDURE — 99291 CRITICAL CARE FIRST HOUR: CPT | Performed by: INTERNAL MEDICINE

## 2020-08-15 PROCEDURE — 85027 COMPLETE CBC AUTOMATED: CPT | Performed by: PHYSICIAN ASSISTANT

## 2020-08-15 PROCEDURE — 82553 CREATINE MB FRACTION: CPT | Performed by: NURSE PRACTITIONER

## 2020-08-15 PROCEDURE — 84100 ASSAY OF PHOSPHORUS: CPT | Performed by: NURSE PRACTITIONER

## 2020-08-15 PROCEDURE — 93005 ELECTROCARDIOGRAM TRACING: CPT

## 2020-08-15 PROCEDURE — 84145 PROCALCITONIN (PCT): CPT | Performed by: PHYSICIAN ASSISTANT

## 2020-08-15 PROCEDURE — 82948 REAGENT STRIP/BLOOD GLUCOSE: CPT

## 2020-08-15 PROCEDURE — 82550 ASSAY OF CK (CPK): CPT | Performed by: NURSE PRACTITIONER

## 2020-08-15 PROCEDURE — 83735 ASSAY OF MAGNESIUM: CPT | Performed by: NURSE PRACTITIONER

## 2020-08-15 PROCEDURE — 93010 ELECTROCARDIOGRAM REPORT: CPT | Performed by: INTERNAL MEDICINE

## 2020-08-15 PROCEDURE — 94003 VENT MGMT INPAT SUBQ DAY: CPT

## 2020-08-15 RX ORDER — ACETAMINOPHEN 160 MG/5ML
975 SUSPENSION, ORAL (FINAL DOSE FORM) ORAL EVERY 8 HOURS
Status: DISCONTINUED | OUTPATIENT
Start: 2020-08-15 | End: 2020-08-18 | Stop reason: HOSPADM

## 2020-08-15 RX ORDER — POTASSIUM CHLORIDE 20 MEQ/1
40 TABLET, EXTENDED RELEASE ORAL ONCE
Status: COMPLETED | OUTPATIENT
Start: 2020-08-15 | End: 2020-08-15

## 2020-08-15 RX ORDER — LABETALOL 20 MG/4 ML (5 MG/ML) INTRAVENOUS SYRINGE
10 EVERY 6 HOURS PRN
Status: DISCONTINUED | OUTPATIENT
Start: 2020-08-15 | End: 2020-08-16

## 2020-08-15 RX ORDER — LANOLIN ALCOHOL/MO/W.PET/CERES
6 CREAM (GRAM) TOPICAL
Status: DISCONTINUED | OUTPATIENT
Start: 2020-08-15 | End: 2020-08-18 | Stop reason: HOSPADM

## 2020-08-15 RX ORDER — NOREPINEPHRINE BITARTRATE 1 MG/ML
INJECTION, SOLUTION INTRAVENOUS
Status: DISPENSED
Start: 2020-08-15 | End: 2020-08-16

## 2020-08-15 RX ORDER — AMOXICILLIN 250 MG
1 CAPSULE ORAL 2 TIMES DAILY
Status: DISCONTINUED | OUTPATIENT
Start: 2020-08-15 | End: 2020-08-18 | Stop reason: HOSPADM

## 2020-08-15 RX ORDER — FUROSEMIDE 10 MG/ML
40 INJECTION INTRAMUSCULAR; INTRAVENOUS ONCE
Status: DISCONTINUED | OUTPATIENT
Start: 2020-08-15 | End: 2020-08-15

## 2020-08-15 RX ORDER — FENTANYL CITRATE 50 UG/ML
50 INJECTION, SOLUTION INTRAMUSCULAR; INTRAVENOUS
Status: DISCONTINUED | OUTPATIENT
Start: 2020-08-15 | End: 2020-08-15

## 2020-08-15 RX ORDER — FUROSEMIDE 10 MG/ML
40 INJECTION INTRAMUSCULAR; INTRAVENOUS ONCE
Status: COMPLETED | OUTPATIENT
Start: 2020-08-15 | End: 2020-08-15

## 2020-08-15 RX ORDER — METRONIDAZOLE 500 MG/1
500 TABLET ORAL EVERY 8 HOURS SCHEDULED
Status: DISCONTINUED | OUTPATIENT
Start: 2020-08-15 | End: 2020-08-18 | Stop reason: HOSPADM

## 2020-08-15 RX ORDER — POTASSIUM CHLORIDE 20MEQ/15ML
20 LIQUID (ML) ORAL ONCE
Status: COMPLETED | OUTPATIENT
Start: 2020-08-15 | End: 2020-08-15

## 2020-08-15 RX ADMIN — INSULIN LISPRO 10 UNITS: 100 INJECTION, SOLUTION INTRAVENOUS; SUBCUTANEOUS at 11:43

## 2020-08-15 RX ADMIN — HEPARIN SODIUM 5000 UNITS: 5000 INJECTION INTRAVENOUS; SUBCUTANEOUS at 05:23

## 2020-08-15 RX ADMIN — ACETAMINOPHEN 975 MG: 650 SUSPENSION ORAL at 20:23

## 2020-08-15 RX ADMIN — FUROSEMIDE 40 MG: 10 INJECTION, SOLUTION INTRAMUSCULAR; INTRAVENOUS at 16:06

## 2020-08-15 RX ADMIN — CHLORHEXIDINE GLUCONATE 0.12% ORAL RINSE 15 ML: 1.2 LIQUID ORAL at 08:05

## 2020-08-15 RX ADMIN — INSULIN LISPRO 15 UNITS: 100 INJECTION, SOLUTION INTRAVENOUS; SUBCUTANEOUS at 07:27

## 2020-08-15 RX ADMIN — DEXMEDETOMIDINE 0.2 MCG/KG/HR: 100 INJECTION, SOLUTION, CONCENTRATE INTRAVENOUS at 07:41

## 2020-08-15 RX ADMIN — FAMOTIDINE 20 MG: 40 POWDER, FOR SUSPENSION ORAL at 08:05

## 2020-08-15 RX ADMIN — HEPARIN SODIUM 5000 UNITS: 5000 INJECTION INTRAVENOUS; SUBCUTANEOUS at 21:39

## 2020-08-15 RX ADMIN — ACETAMINOPHEN 975 MG: 650 SUSPENSION ORAL at 11:43

## 2020-08-15 RX ADMIN — ASPIRIN 81 MG 81 MG: 81 TABLET ORAL at 08:05

## 2020-08-15 RX ADMIN — METRONIDAZOLE 500 MG: 500 TABLET ORAL at 21:39

## 2020-08-15 RX ADMIN — POTASSIUM CHLORIDE 40 MEQ: 1500 TABLET, EXTENDED RELEASE ORAL at 14:28

## 2020-08-15 RX ADMIN — POTASSIUM PHOSPHATE, MONOBASIC POTASSIUM PHOSPHATE, DIBASIC 30 MMOL: 224; 236 INJECTION, SOLUTION, CONCENTRATE INTRAVENOUS at 07:40

## 2020-08-15 RX ADMIN — HEPARIN SODIUM 5000 UNITS: 5000 INJECTION INTRAVENOUS; SUBCUTANEOUS at 14:28

## 2020-08-15 RX ADMIN — ACETAMINOPHEN 975 MG: 650 SUSPENSION ORAL at 04:08

## 2020-08-15 RX ADMIN — CEFTRIAXONE SODIUM 1000 MG: 10 INJECTION, POWDER, FOR SOLUTION INTRAVENOUS at 13:25

## 2020-08-15 RX ADMIN — INSULIN LISPRO 10 UNITS: 100 INJECTION, SOLUTION INTRAVENOUS; SUBCUTANEOUS at 01:06

## 2020-08-15 RX ADMIN — CEFEPIME HYDROCHLORIDE 2000 MG: 2 INJECTION, POWDER, FOR SOLUTION INTRAVENOUS at 05:32

## 2020-08-15 RX ADMIN — LABETALOL 20 MG/4 ML (5 MG/ML) INTRAVENOUS SYRINGE 10 MG: at 21:40

## 2020-08-15 RX ADMIN — CHLORHEXIDINE GLUCONATE 0.12% ORAL RINSE 15 ML: 1.2 LIQUID ORAL at 20:23

## 2020-08-15 RX ADMIN — POTASSIUM CHLORIDE 20 MEQ: 1.5 SOLUTION ORAL at 08:30

## 2020-08-15 RX ADMIN — DOCUSATE SODIUM AND SENNOSIDES 1 TABLET: 8.6; 5 TABLET ORAL at 08:05

## 2020-08-15 RX ADMIN — METRONIDAZOLE 500 MG: 500 TABLET ORAL at 14:28

## 2020-08-15 NOTE — PROGRESS NOTES
Daily Progress Note - Critical Care   Kaci Crowley 63 y o  male MRN: [id number]  Unit/Bed#: MICU 04 Encounter: [id number]        ----------------------------------------------------------------------------------------  HPI/24hr events:   · Levophed able to be weaned down and left stable overnight 3 mcg/min  · No acute issues overnight  · Patient stable on current ventilator setting    ---------------------------------------------------------------------------------------  SUBJECTIVE  Patient unable to offer verbal complaints  Patient able to deny pain, shortness of breath, nausea    Review of Systems   Unable to perform ROS: Intubated     Review of systems was unable to be performed secondary to Intubation/sedation  ---------------------------------------------------------------------------------------  Assessment and Plan    Neuro:   · Acute toxic/metabolic encephalopathy  ? Likely multifactorial from fever, sepsis, hypoxia, shock   § Appears improved this AM with increased alertness   ? Routine  neuro checks     ? Sleep/wake cycle regulation as able   § Melatonin 6mg qHS  ? CAM-ICU BID  · Sedation/analgesia   ? Continuous infusions:   § Current medications: , Fentanyl 50 mcg/hr and Propofol 15 mcg/kg/min  § Attempt to transition to Precedex    § Titrate to goal RASS 0 to -1  ?  PRN medications  § Add fentanyl 50 mcg q1h PRN  § Tylenol 975mg q6h PRN  § Change to Tylenol 975mg scheduled q8h given persistent fever       CV:    Septic shock  o Likely secondary to LLL pneumonia  o Continue levophed   - Will hopefully be able to come off vasopressors once off propofol   o Maintain arterial line and central lines today   o Maintain MAP >65  o Hold home antihypertensive medications:   - Amlodipine 5 mg daily  - Lisinopril 20 mg daily   None MI troponin elevation  o Secondary to sepsis and acute hypoxic respiratory failure  o Troponin peaked at 0 38  - No longer trending  o 8/14/20 Echo:  EF 60%, no RWMA, mild MR, mild AI  o Continue to monitor telemetry for EKG changes  o Aspirin 81 mg daily    Pulm:   Acute hypoxic respiratory failure  o Multifactorial secondary to pneumonia as well as component of volume overload from volume resuscitation  o Mechanical ventilation day # 2  o Perform SBT today to evaluate for readiness extubate  - Patient remains persistently tachypneic, may not tolerate extubation today  o Maintain SpO2 >90%  o Consider repeat chest x-ray today  o Suction as needed and closely monitor secretions  Maintain HOB >30 degrees  Q4h oral care with chlorhexidine BID  o Monitor peak and plateau airway pressures  Maintain Ppeak < 45cm H2O, Pplat < 30cm H2O      GI:    No acute issues  o Stress ulcer prophylaxis:  Famotidine 20mg BID  o Bowel regimen: Start senna/colace BID    :    LAURENCE   o Likely secondary to hypoperfusion from hypotension/prerenal ATN  o Baseline creatinine:  1 01-0 86  o Admission creatinine:  1 59  o Current creatinine:  1 32  o Maintain Elizondo catheter for today  o Strict q2h I/O monitoring  o Continue to follow renal function tests   Elevated CK  o Likely rhabdomyolysis secondary to high fevers  o CK trending down  - 5969 - 3513 - PENDING     F/E/N:    High anion gap metabolic acidosis   o Resolved   Maintenance fluids:  Isolyte 150 mL/hr  Decrease to 100 mL/hr   Diuresis plan:  None  Consider Lasix later today if blood pressure remains stable   Hypophosphatemia   o Replete  o Goal > 3 0   Nutrition:  Jevity 1 2 at 40 mL/hr    Follow-up with Nutrition regarding tube feeding recommendations    Heme/Onc:    No acute issues  o Baseline hemoglobin:  12 4-14 9  o Admission hemoglobin:  15 4  - Likely hemoconcentrated  o Current hemoglobin:  PENDING   o No sign of bleeding  o Transfuse for hemoglobin <7 0  o Baseline platelets:  151 - 266  o Admission platelets:  185  o Current platelets:  PENDING   o Transfuse for plts <15,000 or < 50,000 in the presence of bleeding    VTE prophylaxis: Sub-q heparin TID, SCD's to BLE    Endo/Rheum:    Insulin dependent type 2 diabetes   o Last hemoglobin A1c 8 7 % on 8/12/20  o Persistent episodes of blood glucose > 200 on sliding scale algorithm 6  o  Consider transitioning to insulin drip  o Adjust insulin regimen as needed to maintain goal -180      ID:    Sepsis secondary to left lower lobe pneumonia vs UTI  o Antibiotics de-escalated yesterday to cefepime  o Antibiotic day # 3  o 8/12/20 BCX x2:  No growth at 48 hours  o 8/12/20 COVID:  Negative  o 8/13/20 strep pneumo/Legionella antigen:  Negative  o 8/14/20 sputum culture:  1+ polys, no bacteria  - Continue to follow final results  o 8/12/20 urinalysis:  Negative nitrites, moderate bacteria, unable to evaluate leukocytes  - Unfortunately, no urine culture  o Procalcitonin: 7 00 - 6 42 - 5 97 - 6 95 - 5 74  o Patient persistently febrile  o Continue to monitor fever and WBC curve     MSK/Skin:    No acute issues  o PT/OT when appropriate  o Reposition q2h, eliminate pressure points while in bed  o Close skin surveillance     Disposition: Continue Critical Care   Code Status: Level 1 - Full Code  ---------------------------------------------------------------------------------------  Invasive Devices Review  Invasive Devices     Central Venous Catheter Line            CVC Central Lines 08/14/20 Triple 16cm 1 day          Peripheral Intravenous Line            Peripheral IV 08/14/20 Right Wrist 1 day          Arterial Line            Arterial Line 08/14/20 Radial 1 day          Drain            NG/OG/Enteral Tube Orogastric Left mouth 1 day    Urethral Catheter Temperature probe 1 day          Airway            ETT  Hi-Lo 8 mm 1 day              Can any invasive devices be discontinued today?  No  ---------------------------------------------------------------------------------------  OBJECTIVE    Vitals   Vitals:    08/15/20 0200 08/15/20 0300 08/15/20 0343 08/15/20 0400   BP:       BP Location: Pulse: 86 88  92   Resp:       Temp: (!) 101 8 °F (38 8 °C) (!) 102 2 °F (39 °C)  (!) 102 6 °F (39 2 °C)   TempSrc:       SpO2: 96% 95% 95% 96%   Weight:         Temp (24hrs), Av 9 °F (38 3 °C), Min:97 5 °F (36 4 °C), Max:104 °F (40 °C)  Current: Temperature: (!) 102 6 °F (39 2 °C)      Invasive/non-invasive ventilation settings   Respiratory    Lab Data (Last 4 hours)    None         O2/Vent Data (Last 4 hours)      08/15 0343           Vent Mode AC/VC+       Resp Rate (BPM) (BPM) 20       VT (mL) (mL) 400       Insp Time (S) (S) 0 6       FIO2 (%) (%) 40       PEEP (cmH2O) (cmH2O) 5       Rise Time (%) (%) 50       MV (Obs) 11 1                   Physical Exam  Constitutional:       General: He is not in acute distress  Appearance: He is obese  Interventions: He is sedated, intubated and restrained  HENT:      Head: Normocephalic and atraumatic  Eyes:      Conjunctiva/sclera: Conjunctivae normal       Pupils: Pupils are equal, round, and reactive to light  Neck:      Musculoskeletal: Neck supple  Cardiovascular:      Rate and Rhythm: Normal rate and regular rhythm  Pulses:           Radial pulses are 2+ on the right side and 2+ on the left side  Dorsalis pedis pulses are 1+ on the right side and 1+ on the left side  Heart sounds: Normal heart sounds  Pulmonary:      Effort: Tachypnea present  He is intubated  Breath sounds: Examination of the right-lower field reveals decreased breath sounds  Examination of the left-lower field reveals decreased breath sounds  Decreased breath sounds present  No wheezing or rhonchi  Comments: Strong cough, small amount tan secretions  Abdominal:      General: Abdomen is protuberant  Bowel sounds are decreased  Palpations: Abdomen is soft  Tenderness: There is no abdominal tenderness  Genitourinary:     Comments: Elizondo catheter:  Clear, yellow urine  Musculoskeletal:      Right lower le+ Edema present  Left lower le+ Edema present  Skin:     General: Skin is warm  Capillary Refill: Capillary refill takes less than 2 seconds  Neurological:      Mental Status: He is alert and easily aroused  GCS: GCS eye subscore is 4  GCS verbal subscore is 1  GCS motor subscore is 6  Motor: Motor function is intact             Laboratory and Diagnostics:  Results from last 7 days   Lab Units 20  0519 20  1657 20  0506 20  2156 20  1928 20  1650   WBC Thousand/uL 18 04*  --  14 34*  --   --  15 53*   HEMOGLOBIN g/dL 12 7  --  14 7  --   --  15 4   I STAT HEMOGLOBIN g/dl  --  13 3  --   --   --   --    HEMATOCRIT % 37 9  --  43 1  --   --  43 5   HEMATOCRIT, ISTAT %  --  39  --   --   --   --    PLATELETS Thousands/uL 245  --  228 217 206 258   NEUTROS PCT %  --   --  91*  --   --   --    BANDS PCT %  --   --   --   --   --  7   MONOS PCT %  --   --  5  --   --   --    MONO PCT %  --   --   --   --   --  6     Results from last 7 days   Lab Units 08/15/20  0448 20  0519 20  1715 20  1657 20  0506 20  1650   SODIUM mmol/L 136 135* 133*  --  134* 129*   POTASSIUM mmol/L 3 5 3 6 3 1*  --  3 2* 3 2*   CHLORIDE mmol/L 106 101 101  --  98* 94*   CO2 mmol/L 21 18* 21  --  20* 21   CO2, I-STAT mmol/L  --   --   --  24  --   --    ANION GAP mmol/L 9 16* 11  --  16* 14*   BUN mg/dL 24 26* 22  --  21 19   CREATININE mg/dL 1 32* 1 54* 1 49*  --  1 44* 1 59*   CALCIUM mg/dL 7 3* 7 6* 7 9*  --  7 7* 8 6   GLUCOSE RANDOM mg/dL 273* 224* 169*  --  235* 358*   ALT U/L  --   --   --   --  27 24   AST U/L  --   --   --   --  157* 156*   ALK PHOS U/L  --   --   --   --  74 80   ALBUMIN g/dL  --   --   --   --  2 4* 2 8*   TOTAL BILIRUBIN mg/dL  --   --   --   --  0 79 1 08*     Results from last 7 days   Lab Units 08/15/20  0448 20  0519 20  0506   MAGNESIUM mg/dL 2 4 2 3 2 3   PHOSPHORUS mg/dL 1 6* 3 3 1 4*      Results from last 7 days   Lab Units 08/12/20  1650   INR  1 23*   PTT seconds 28      Results from last 7 days   Lab Units 08/12/20  2156 08/12/20  1928 08/12/20  1650   TROPONIN I ng/mL 0 32* 0 35* 0 38*     Results from last 7 days   Lab Units 08/14/20  0651 08/13/20  1656 08/13/20  0119 08/12/20  2156 08/12/20  1650   LACTIC ACID mmol/L 1 5 2 0 1 9 2 3* 3 9*     ABG:  Results from last 7 days   Lab Units 08/14/20  0520   PH ART  7 387   PCO2 ART mm Hg 28 3*   PO2 ART mm Hg 243 6*   HCO3 ART mmol/L 16 6*   BASE EXC ART mmol/L -6 9   ABG SOURCE  Line, Arterial       Results from last 7 days   Lab Units 08/15/20  0448 08/14/20  0830 08/13/20  0610 08/12/20  2129 08/12/20  1650   PROCALCITONIN ng/ml 5 74* 6 95* 5 97* 6 42* 7 00*       Micro  Results from last 7 days   Lab Units 08/14/20  0340 08/13/20  0418 08/12/20  1650   BLOOD CULTURE   --   --  No Growth at 48 hrs  No Growth at 48 hrs  GRAM STAIN RESULT  1+ Polys  No bacteria seen  --   --    LEGIONELLA URINARY ANTIGEN   --  Negative  --    STREP PNEUMONIAE ANTIGEN, URINE   --  Negative  --        EKG:  Normal sinus rhythm  Imaging:  No new imaging from today   I have personally reviewed pertinent reports  and I have personally reviewed pertinent films in PACS    Intake and Output  I/O       08/13 0701 - 08/14 0700 08/14 0701 - 08/15 0700 08/15 0701 - 08/16 0700    P  O  600      I V  (mL/kg) 1989 3 (16 6) 5041 3 (42)     NG/GT  180     IV Piggyback 355 250     Feedings  166     Total Intake(mL/kg) 2944 2 (24 5) 5637 3 (47)     Urine (mL/kg/hr) 1320 (0 5) 1520 (0 5)     Emesis/NG output  150     Stool 0      Total Output 1320 1670     Net +1624 2 +3967 3            Unmeasured Urine Occurrence 2 x      Unmeasured Stool Occurrence 4 x            UOP: 63 ml/hr     Height and Weights      IBW: -88 kg  Body mass index is 42 66 kg/m²    Weight (last 2 days)     None            Nutrition       Diet Orders   (From admission, onward)             Start     Ordered    08/14/20 4890  Diet Enteral/Parenteral; Tube Feeding No Oral Diet; Jevity 1 2 Dillon; Continuous; 40  Diet effective now     Question Answer Comment   Diet Type Enteral/Parenteral    Enteral/Parenteral Tube Feeding No Oral Diet    Tube Feeding Formula: Jevity 1 2 Dillon    Bolus/Cyclic/Continuous Continuous    Tube Feeding Goal Rate (mL/hr): 40    RD to adjust diet per protocol? Yes        08/14/20 1540              Tube feeds currently running at 30 ml/hr with a goal of 40 ml/hr         Active Medications  Scheduled Meds:  Current Facility-Administered Medications   Medication Dose Route Frequency Provider Last Rate    acetaminophen  975 mg Oral Q6H PRN Teresa Oddnarinder PA-C      aspirin  81 mg Oral Daily [place], DO      cefepime  2,000 mg Intravenous Q12H Diogenes A Paster, DO 2,000 mg (08/15/20 0532)    chlorhexidine  15 mL Swish & Spit Q12H [address] Teresa Mark PA-C      famotidine  20 mg Oral BID Brysam DO      fentaNYL  50 mcg/hr Intravenous Continuous Whitney Brooks MD 50 mcg/hr (08/14/20 1533)    heparin (porcine)  5,000 Units Subcutaneous Q8H [address] Diogenes A Paster, DO      insulin lispro  5-25 Units Subcutaneous Q6H [address] Teresa Mark PA-C      multi-electrolyte  150 mL/hr Intravenous Continuous Teresa Mark PA-C 150 mL/hr (08/14/20 1706)    norepinephrine  1-30 mcg/min Intravenous Titrated Whitney Brooks MD 3 mcg/min (08/15/20 0015)    ondansetron  4 mg Intravenous Q6H PRN Diogenes A Paster, DO      propofol  5-50 mcg/kg/min Intravenous Titrated Yousif Beebe MD 15 mcg/kg/min (08/14/20 2358)     Continuous Infusions:  fentaNYL, 50 mcg/hr, Last Rate: 50 mcg/hr (08/14/20 1533)  multi-electrolyte, 150 mL/hr, Last Rate: 150 mL/hr (08/14/20 1706)  norepinephrine, 1-30 mcg/min, Last Rate: 3 mcg/min (08/15/20 0015)  propofol, 5-50 mcg/kg/min, Last Rate: 15 mcg/kg/min (08/14/20 2358)      PRN Meds:   acetaminophen, 975 mg, Q6H PRN  ondansetron, 4 mg, Q6H PRN        Allergies   No Known Allergies ---------------------------------------------------------------------------------------  Care Time Delivered:   Upon my evaluation, this patient had a high probability of imminent or life-threatening deterioration due to Septic shock, LAURENCE, which required my direct attention, intervention, and personal management  I have personally provided 30 minutes (4364 to 32 70 61) of critical care time, exclusive of procedures, teaching, family meetings, and any prior time recorded by providers other than myself  SALONI Mackey        Portions of the record may have been created with voice recognition software  Occasional wrong word or "sound a like" substitutions may have occurred due to the inherent limitations of voice recognition software    Read the chart carefully and recognize, using context, where substitutions have occurred

## 2020-08-15 NOTE — RESPIRATORY THERAPY NOTE
RT Ventilator Management Note  Tish Silva 63 y o  male MRN: [id number]  Unit/Bed#: [facility] 04 Encounter: [id number]      Daily Screen       8/14/2020  0824             Patient safety screen outcome[de-identified]  Failed    Not Ready for Weaning due to[de-identified]  Underline problem not resolved;PEEP > 8cmH2O            Physical Exam:   Assessment Type: Assess only  General Appearance: Sedated  Respiratory Pattern: Assisted  Chest Assessment: Chest expansion symmetrical  Bilateral Breath Sounds: Clear, Diminished  Suction: ET Tube  O2 Device: vent      Resp Comments: (P) Patient received and continues on AC VC  without change  Will continue to monitor and wean  as tolerated

## 2020-08-15 NOTE — PLAN OF CARE
Problem: Nutrition/Hydration-ADULT  Goal: Nutrient/Hydration intake appropriate for improving, restoring or maintaining nutritional needs  Description: Monitor and assess patient's nutrition/hydration status for malnutrition  Collaborate with interdisciplinary team and initiate plan and interventions as ordered  Monitor patient's weight and dietary intake as ordered or per policy  Utilize nutrition screening tool and intervene as necessary  Determine patient's food preferences and provide high-protein, high-caloric foods as appropriate       INTERVENTIONS:  - Monitor oral intake, urinary output, labs, and treatment plans  - Assess nutrition and hydration status and recommend course of action  - Evaluate amount of meals eaten  - Assist patient with eating if necessary   - Allow adequate time for meals  - Recommend/ encourage appropriate diets, oral nutritional supplements, and vitamin/mineral supplements  - Order, calculate, and assess calorie counts as needed  - Recommend, monitor, and adjust tube feedings and TPN/PPN based on assessed needs  - Assess need for intravenous fluids  - Provide specific nutrition/hydration education as appropriate  - Include patient/family/caregiver in decisions related to nutrition  Outcome: Progressing  Note: Anticipate PO diet will resume

## 2020-08-15 NOTE — RESPIRATORY THERAPY NOTE
resp care      08/15/20 1257   Respiratory Assessment   Resp Comments Pt extubated at this time to 6lpm nc  pt satin 97%, bs diminished  no stridor noted, pt had positive cuff leak   bipap ordered for hs

## 2020-08-15 NOTE — RESPIRATORY THERAPY NOTE
RT Ventilator Management Note  Paula Mathis 63 y o  male MRN: [id number]  Unit/Bed#: [facility]U 04 Encounter: [id number]      Daily Screen       8/14/2020  0824 8/15/2020  0728          Patient safety screen outcome[de-identified]  Failed  Passed      Not Ready for Weaning due to[de-identified]  Underline problem not resolved;PEEP > 8cmH2O        Spont breathing trial % for 30 min:                  Physical Exam:   Assessment Type: (P) Assess only  General Appearance: (P) Awake  Respiratory Pattern: (P) Assisted  Chest Assessment: (P) Chest expansion symmetrical  Bilateral Breath Sounds: (P) Diminished  Suction: (P) ET Tube  O2 Device: vent      Resp Comments: (P) Pt placed on 5/5 at this time  rsbi 72, spo2 94  Pt tolerating well, no issues noted

## 2020-08-15 NOTE — SOCIAL WORK
Pt intubated  TC to pt daughter, Mega Love  CM introduced self/role with dcp  Pt resides with his daughter in a multi story town home  Pt has 10 DEVI to the main living area with half bathroom through the front door or the garage  Pt has an additional flight of steps to bedroom/bathroom  Pt independent with ADLs and ambulation PTA  Pt drives  Retired  Prior hx of VNA and STR  No hx of MH or drug/alcohol abuse  Pharmacy is [facility]  Mega Love believes she is pt POA  Mega Love reports she is pt only child  Mega Love reports they do have 4 dogs in the home which has made it challenging for VNA in the past      Pt is MA pending  Mega Love reports when pt previously applied for state MA he was denied because he made too much through his SSD by $7/$15  Mega Love reports pt has difficulty affording his medication because he is on a limited income  CM informed Mega Love that a rep from [facility] should be reaching out to her to complete the MA application for the hospital      CM to follow for dcp  CM reviewed d/c planning process including the following: identifying help at home, patient preference for d/c planning needs, Discharge Lounge, [facility] Meds to Bed program, availability of treatment team to discuss questions or concerns patient and/or family may have regarding understanding medications and recognizing signs and symptoms once discharged  CM also encouraged patient to follow up with all recommended appointments after discharge  Patient advised of importance for patient and family to participate in managing patients medical well being

## 2020-08-16 ENCOUNTER — APPOINTMENT (INPATIENT)
Dept: RADIOLOGY | Facility: HOSPITAL | Age: 65
DRG: 871 | End: 2020-08-16

## 2020-08-16 PROBLEM — N17.9 ACUTE RENAL FAILURE (ARF) (HCC): Status: RESOLVED | Noted: 2020-08-12 | Resolved: 2020-08-16

## 2020-08-16 PROBLEM — A41.9 SEPSIS (HCC): Status: RESOLVED | Noted: 2020-08-12 | Resolved: 2020-08-16

## 2020-08-16 PROBLEM — E87.1 HYPONATREMIA: Status: RESOLVED | Noted: 2020-08-12 | Resolved: 2020-08-16

## 2020-08-16 PROBLEM — J96.01 ACUTE RESPIRATORY FAILURE WITH HYPOXIA (HCC): Status: RESOLVED | Noted: 2020-08-14 | Resolved: 2020-08-16

## 2020-08-16 PROBLEM — G93.41 METABOLIC ENCEPHALOPATHY: Status: RESOLVED | Noted: 2020-08-12 | Resolved: 2020-08-16

## 2020-08-16 PROBLEM — N17.9 AKI (ACUTE KIDNEY INJURY) (HCC): Status: ACTIVE | Noted: 2020-08-16

## 2020-08-16 PROBLEM — I48.91 NEW ONSET ATRIAL FIBRILLATION (HCC): Status: ACTIVE | Noted: 2020-08-16

## 2020-08-16 LAB
ALBUMIN SERPL BCP-MCNC: 1.6 G/DL (ref 3.5–5)
ALP SERPL-CCNC: 75 U/L (ref 46–116)
ALT SERPL W P-5'-P-CCNC: 29 U/L (ref 12–78)
ANION GAP SERPL CALCULATED.3IONS-SCNC: 7 MMOL/L (ref 4–13)
ANION GAP SERPL CALCULATED.3IONS-SCNC: 8 MMOL/L (ref 4–13)
AST SERPL W P-5'-P-CCNC: 71 U/L (ref 5–45)
BASOPHILS # BLD AUTO: 0.01 THOUSANDS/ΜL (ref 0–0.1)
BASOPHILS NFR BLD AUTO: 0 % (ref 0–1)
BILIRUB SERPL-MCNC: 0.56 MG/DL (ref 0.2–1)
BUN SERPL-MCNC: 21 MG/DL (ref 5–25)
BUN SERPL-MCNC: 22 MG/DL (ref 5–25)
CALCIUM SERPL-MCNC: 7.5 MG/DL (ref 8.3–10.1)
CALCIUM SERPL-MCNC: 7.6 MG/DL (ref 8.3–10.1)
CHLORIDE SERPL-SCNC: 102 MMOL/L (ref 100–108)
CHLORIDE SERPL-SCNC: 105 MMOL/L (ref 100–108)
CO2 SERPL-SCNC: 25 MMOL/L (ref 21–32)
CO2 SERPL-SCNC: 27 MMOL/L (ref 21–32)
CREAT SERPL-MCNC: 1.04 MG/DL (ref 0.6–1.3)
CREAT SERPL-MCNC: 1.12 MG/DL (ref 0.6–1.3)
EOSINOPHIL # BLD AUTO: 0.16 THOUSAND/ΜL (ref 0–0.61)
EOSINOPHIL NFR BLD AUTO: 2 % (ref 0–6)
ERYTHROCYTE [DISTWIDTH] IN BLOOD BY AUTOMATED COUNT: 16.6 % (ref 11.6–15.1)
GFR SERPL CREATININE-BSD FRML MDRD: 70 ML/MIN/1.73SQ M
GFR SERPL CREATININE-BSD FRML MDRD: 76 ML/MIN/1.73SQ M
GLUCOSE SERPL-MCNC: 150 MG/DL (ref 65–140)
GLUCOSE SERPL-MCNC: 165 MG/DL (ref 65–140)
GLUCOSE SERPL-MCNC: 191 MG/DL (ref 65–140)
GLUCOSE SERPL-MCNC: 196 MG/DL (ref 65–140)
GLUCOSE SERPL-MCNC: 253 MG/DL (ref 65–140)
GLUCOSE SERPL-MCNC: 294 MG/DL (ref 65–140)
HCT VFR BLD AUTO: 35.6 % (ref 36.5–49.3)
HGB BLD-MCNC: 11.9 G/DL (ref 12–17)
IMM GRANULOCYTES # BLD AUTO: 0.1 THOUSAND/UL (ref 0–0.2)
IMM GRANULOCYTES NFR BLD AUTO: 1 % (ref 0–2)
LYMPHOCYTES # BLD AUTO: 0.49 THOUSANDS/ΜL (ref 0.6–4.47)
LYMPHOCYTES NFR BLD AUTO: 6 % (ref 14–44)
MAGNESIUM SERPL-MCNC: 2.1 MG/DL (ref 1.6–2.6)
MCH RBC QN AUTO: 28.6 PG (ref 26.8–34.3)
MCHC RBC AUTO-ENTMCNC: 33.4 G/DL (ref 31.4–37.4)
MCV RBC AUTO: 86 FL (ref 82–98)
MONOCYTES # BLD AUTO: 0.46 THOUSAND/ΜL (ref 0.17–1.22)
MONOCYTES NFR BLD AUTO: 5 % (ref 4–12)
NEUTROPHILS # BLD AUTO: 7.6 THOUSANDS/ΜL (ref 1.85–7.62)
NEUTS SEG NFR BLD AUTO: 86 % (ref 43–75)
NRBC BLD AUTO-RTO: 0 /100 WBCS
PHOSPHATE SERPL-MCNC: 2 MG/DL (ref 2.3–4.1)
PLATELET # BLD AUTO: 194 THOUSANDS/UL (ref 149–390)
PMV BLD AUTO: 11.2 FL (ref 8.9–12.7)
POTASSIUM SERPL-SCNC: 3.4 MMOL/L (ref 3.5–5.3)
POTASSIUM SERPL-SCNC: 3.6 MMOL/L (ref 3.5–5.3)
PROT SERPL-MCNC: 5.9 G/DL (ref 6.4–8.2)
RBC # BLD AUTO: 4.16 MILLION/UL (ref 3.88–5.62)
SODIUM SERPL-SCNC: 137 MMOL/L (ref 136–145)
SODIUM SERPL-SCNC: 137 MMOL/L (ref 136–145)
WBC # BLD AUTO: 8.82 THOUSAND/UL (ref 4.31–10.16)

## 2020-08-16 PROCEDURE — 83735 ASSAY OF MAGNESIUM: CPT | Performed by: NURSE PRACTITIONER

## 2020-08-16 PROCEDURE — G1004 CDSM NDSC: HCPCS

## 2020-08-16 PROCEDURE — 84100 ASSAY OF PHOSPHORUS: CPT | Performed by: NURSE PRACTITIONER

## 2020-08-16 PROCEDURE — 99254 IP/OBS CNSLTJ NEW/EST MOD 60: CPT | Performed by: INTERNAL MEDICINE

## 2020-08-16 PROCEDURE — 82948 REAGENT STRIP/BLOOD GLUCOSE: CPT

## 2020-08-16 PROCEDURE — 71250 CT THORAX DX C-: CPT

## 2020-08-16 PROCEDURE — 80048 BASIC METABOLIC PNL TOTAL CA: CPT | Performed by: NURSE PRACTITIONER

## 2020-08-16 PROCEDURE — 99233 SBSQ HOSP IP/OBS HIGH 50: CPT | Performed by: INTERNAL MEDICINE

## 2020-08-16 PROCEDURE — 85025 COMPLETE CBC W/AUTO DIFF WBC: CPT | Performed by: NURSE PRACTITIONER

## 2020-08-16 PROCEDURE — 80053 COMPREHEN METABOLIC PANEL: CPT | Performed by: NURSE PRACTITIONER

## 2020-08-16 PROCEDURE — NC001 PR NO CHARGE: Performed by: INTERNAL MEDICINE

## 2020-08-16 RX ORDER — POTASSIUM CHLORIDE 20 MEQ/1
40 TABLET, EXTENDED RELEASE ORAL ONCE
Status: COMPLETED | OUTPATIENT
Start: 2020-08-16 | End: 2020-08-16

## 2020-08-16 RX ORDER — METOPROLOL TARTRATE 5 MG/5ML
5 INJECTION INTRAVENOUS ONCE
Status: COMPLETED | OUTPATIENT
Start: 2020-08-16 | End: 2020-08-16

## 2020-08-16 RX ADMIN — METRONIDAZOLE 500 MG: 500 TABLET ORAL at 21:00

## 2020-08-16 RX ADMIN — DIBASIC SODIUM PHOSPHATE, MONOBASIC POTASSIUM PHOSPHATE AND MONOBASIC SODIUM PHOSPHATE 2 TABLET: 852; 155; 130 TABLET ORAL at 16:06

## 2020-08-16 RX ADMIN — ACETAMINOPHEN 975 MG: 650 SUSPENSION ORAL at 11:19

## 2020-08-16 RX ADMIN — INSULIN LISPRO 5 UNITS: 100 INJECTION, SOLUTION INTRAVENOUS; SUBCUTANEOUS at 05:35

## 2020-08-16 RX ADMIN — MAGNESIUM OXIDE TAB 400 MG (240 MG ELEMENTAL MG) 800 MG: 400 (240 MG) TAB at 06:20

## 2020-08-16 RX ADMIN — CEFTRIAXONE SODIUM 1000 MG: 10 INJECTION, POWDER, FOR SOLUTION INTRAVENOUS at 13:30

## 2020-08-16 RX ADMIN — INSULIN LISPRO 5 UNITS: 100 INJECTION, SOLUTION INTRAVENOUS; SUBCUTANEOUS at 17:01

## 2020-08-16 RX ADMIN — POTASSIUM CHLORIDE 40 MEQ: 1500 TABLET, EXTENDED RELEASE ORAL at 06:20

## 2020-08-16 RX ADMIN — METOPROLOL TARTRATE 12.5 MG: 25 TABLET ORAL at 12:55

## 2020-08-16 RX ADMIN — ACETAMINOPHEN 975 MG: 650 SUSPENSION ORAL at 03:42

## 2020-08-16 RX ADMIN — DIBASIC SODIUM PHOSPHATE, MONOBASIC POTASSIUM PHOSPHATE AND MONOBASIC SODIUM PHOSPHATE 2 TABLET: 852; 155; 130 TABLET ORAL at 11:19

## 2020-08-16 RX ADMIN — DOCUSATE SODIUM AND SENNOSIDES 1 TABLET: 8.6; 5 TABLET ORAL at 09:06

## 2020-08-16 RX ADMIN — CHLORHEXIDINE GLUCONATE 0.12% ORAL RINSE 15 ML: 1.2 LIQUID ORAL at 09:06

## 2020-08-16 RX ADMIN — METRONIDAZOLE 500 MG: 500 TABLET ORAL at 05:35

## 2020-08-16 RX ADMIN — INSULIN LISPRO 2 UNITS: 100 INJECTION, SOLUTION INTRAVENOUS; SUBCUTANEOUS at 21:32

## 2020-08-16 RX ADMIN — ACETAMINOPHEN 975 MG: 650 SUSPENSION ORAL at 20:58

## 2020-08-16 RX ADMIN — INSULIN LISPRO 6 UNITS: 100 INJECTION, SOLUTION INTRAVENOUS; SUBCUTANEOUS at 17:01

## 2020-08-16 RX ADMIN — APIXABAN 5 MG: 5 TABLET, FILM COATED ORAL at 17:00

## 2020-08-16 RX ADMIN — POTASSIUM CHLORIDE 40 MEQ: 1500 TABLET, EXTENDED RELEASE ORAL at 16:06

## 2020-08-16 RX ADMIN — METRONIDAZOLE 500 MG: 500 TABLET ORAL at 13:34

## 2020-08-16 RX ADMIN — INSULIN LISPRO 7 UNITS: 100 INJECTION, SOLUTION INTRAVENOUS; SUBCUTANEOUS at 11:57

## 2020-08-16 RX ADMIN — DIBASIC SODIUM PHOSPHATE, MONOBASIC POTASSIUM PHOSPHATE AND MONOBASIC SODIUM PHOSPHATE 2 TABLET: 852; 155; 130 TABLET ORAL at 09:06

## 2020-08-16 RX ADMIN — INSULIN LISPRO 5 UNITS: 100 INJECTION, SOLUTION INTRAVENOUS; SUBCUTANEOUS at 11:56

## 2020-08-16 RX ADMIN — HEPARIN SODIUM 5000 UNITS: 5000 INJECTION INTRAVENOUS; SUBCUTANEOUS at 05:35

## 2020-08-16 RX ADMIN — HEPARIN SODIUM 5000 UNITS: 5000 INJECTION INTRAVENOUS; SUBCUTANEOUS at 13:34

## 2020-08-16 RX ADMIN — INSULIN LISPRO 5 UNITS: 100 INJECTION, SOLUTION INTRAVENOUS; SUBCUTANEOUS at 06:59

## 2020-08-16 RX ADMIN — METOPROLOL TARTRATE 5 MG: 5 INJECTION, SOLUTION INTRAVENOUS at 12:56

## 2020-08-16 RX ADMIN — MELATONIN 6 MG: at 21:00

## 2020-08-16 RX ADMIN — ASPIRIN 81 MG 81 MG: 81 TABLET ORAL at 09:06

## 2020-08-16 RX ADMIN — METOPROLOL TARTRATE 12.5 MG: 25 TABLET ORAL at 20:59

## 2020-08-16 NOTE — ASSESSMENT & PLAN NOTE
Secondary to sepsis and acute hypoxic respiratory failure   Troponin peaked at 0 38   No longer trending  8/14/20 Echo: EF 60%, no RWMA, mild MR, mild AI   Continue to monitor telemetry for EKG changes   Aspirin 81 mg daily

## 2020-08-16 NOTE — ASSESSMENT & PLAN NOTE
Lab Results   Component Value Date    HGBA1C 8 7 (H) 08/12/2020       Recent Labs     08/15/20  1639 08/15/20  2351 08/16/20  0532 08/16/20  1051   POCGLU 119 131 165* 191*       Blood Sugar Average: Last 72 hrs:  (P) 191 [id number]     Last hemoglobin A1c 8 7 % on 8/12/20   AC/HS sliding scale regimen   Mealtime Lispro 5u TID   Continue to hold home antihyperglycemics:  Metformin 2,000 daily, Glipizide 5mg daily   Adjust insulin regimen as needed to maintain goal -180

## 2020-08-16 NOTE — CONSULTS
Consultation - Cardiology   Aleksandra Burr 63 y o  male MRN: [id number]  [facility] Encounter: [id number]      Assessment and Plan:    New onset atrial fibrillation  -developed in setting of septic shock an active infection  -chads Vasc score of 2, would recommend anticoagulation when stable with heparin drip or NOAC  -remained stable in 90s to low 100s, can start low-dose beta-blocker Lopressor 12 5 mg b i d  To maintain rate control for now  -TTE shows EF of 60% with dilated left atrium and mild MR, mild AI    Pneumonia  -left lower lobe pneumonia causing acute hypoxic respiratory failure requiring intubation, currently improved, satting well on room air  -currently on antibiotics    Essential hypertension  -holding home BP medications of amlodipine 5 mg daily lisinopril 20 mg daily in setting of resolving septic shock    Diabetes mellitus type 2 in obese (HCC)    Sepsis (HCC)    Acute renal failure (ARF) ([facility] [address] )  -currently currently improving    Metabolic encephalopathy  -now resolved    Elevated troponin  -mildly elevated troponin peaking at 0 38 likely secondary to demand ischemia in setting of septic shock    Acute respiratory failure with hypoxia (HCC)    Rhabdomyolysis        History of Present Illness   Physician Requesting Consult: Setven Amaya MD  Reason for Consult / Principal Problem:  New onset atrial fibrillation  HPI: Aleksandra Burr is a 63y o  year old male who presents with septic shock secondary to left lower lobe pneumonia  His past medical history of type 2 diabetes and hypertension  Patient initially presented with altered mental status, septic shock and acute renal failure requiring intubation and multiple pressors  His currently significantly improved, was extubated 2 days ago and titrated off pressors yesterday  During this time, he developed new onset atrial fibrillation  He is currently rate controlled and asymptomatic    Denies any prior history of atrial fibrillation, CAD, strokes or other cardiac history  Inpatient consult to Cardiology     Performed by  Gisselle Luis MD     Authorized by SALONI Douglas              Review of Systems:  Review of Systems   Constitutional: Positive for fatigue  Negative for diaphoresis and fever  HENT: Negative for congestion  Eyes: Negative for photophobia  Respiratory: Negative for apnea, cough, chest tightness and shortness of breath  Cardiovascular: Negative for chest pain and palpitations  Gastrointestinal: Negative for abdominal distention, abdominal pain, diarrhea, nausea and vomiting  Genitourinary: Negative for difficulty urinating and dysuria  Musculoskeletal: Negative for arthralgias and joint swelling  Skin: Negative for color change and rash  Neurological: Negative for dizziness, syncope and headaches  Psychiatric/Behavioral: Negative for agitation           Historical Information   Past Medical History:   Diagnosis Date    Arthritis     knees    Diabetes mellitus ([address] )     Hypertension     Measles, [place] (rubella)     Mumps     Snoring     Loudly     Past Surgical History:   Procedure Laterality Date    CYSTOSCOPY      KNEE ARTHROSCOPY Left     knee    ORIF PELVIC FRACTURE      TOTAL KNEE ARTHROPLASTY Left 6/10/2019    Procedure: ARTHROPLASTY KNEE TOTAL;  Surgeon: Yadira Cruz MD;  [facility];  Service: Orthopedics     Social History     Substance and Sexual Activity   Alcohol Use Yes    Frequency: Monthly or less    Drinks per session: 1 or 2    Comment: Occasionally, not in excess - As per [employer]      Social History     Substance and Sexual Activity   Drug Use Never     Social History     Tobacco Use   Smoking Status Never Smoker   Smokeless Tobacco Never Used     Family History: non-contributory    Meds/Allergies   all current active meds have been reviewed  No Known Allergies    Objective   Vitals: Blood pressure 115/70, pulse 104, temperature 99 3 °F (37 4 °C), resp  rate (!) 26, height 5' 6" (1 676 m), weight 120 kg (264 lb 4 8 oz), SpO2 95 %  , Body mass index is 42 66 kg/m² ,   Orthostatic Blood Pressures      Most Recent Value   Blood Pressure  115/70 filed at 08/16/2020 0800   Patient Position - Orthostatic VS  Lying filed at 08/15/2020 1200            Intake/Output Summary (Last 24 hours) at 8/16/2020 0857  Last data filed at 8/16/2020 0345  Gross per 24 hour   Intake 467 53 ml   Output 2180 ml   Net -1712 47 ml       Invasive Devices     Central Venous Catheter Line            CVC Central Lines 08/14/20 Triple 16cm 2 days          Peripheral Intravenous Line            Peripheral IV 08/14/20 Right Wrist 2 days          Arterial Line            Arterial Line 08/14/20 Radial 2 days          Drain            Urethral Catheter Temperature probe 2 days                    Physical Exam:  Physical Exam  Constitutional:       General: He is not in acute distress  Appearance: He is well-developed  He is not diaphoretic  Eyes:      Conjunctiva/sclera: Conjunctivae normal       Pupils: Pupils are equal, round, and reactive to light  Neck:      Musculoskeletal: Normal range of motion and neck supple  Vascular: No JVD  Cardiovascular:      Rate and Rhythm: Tachycardia present  Rhythm irregularly irregular  Heart sounds: No murmur  No friction rub  No gallop  Pulmonary:      Effort: Pulmonary effort is normal  No respiratory distress  Breath sounds: No stridor  Wheezing present  Abdominal:      General: Bowel sounds are normal  There is no distension  Palpations: Abdomen is soft  Tenderness: There is no abdominal tenderness  Musculoskeletal: Normal range of motion  Skin:     General: Skin is warm and dry  Neurological:      Mental Status: He is alert and oriented to person, place, and time     Psychiatric:         Behavior: Behavior normal            Lab Results:     Lab Results   Component Value Date CKTOTAL 1,692 (H) 08/15/2020    CKTOTAL 3,753 (H) 08/14/2020    CKTOTAL 5,452 (H) 08/14/2020    CKMB 1 5 08/15/2020    CKMB 3 2 08/14/2020    CKMB 4 7 08/14/2020    CKMBINDEX <1 0 08/15/2020    CKMBINDEX <1 0 08/14/2020    CKMBINDEX <1 0 08/14/2020    TROPONINI 0 32 (H) 08/12/2020    TROPONINI 0 35 (H) 08/12/2020    TROPONINI 0 38 (H) 08/12/2020       Lab Results   Component Value Date    GLUCOSE 170 (H) 08/13/2020    CALCIUM 7 6 (L) 08/16/2020     03/17/2017    K 3 4 (L) 08/16/2020    CO2 25 08/16/2020     08/16/2020    BUN 22 08/16/2020    CREATININE 1 04 08/16/2020       Lab Results   Component Value Date    WBC 8 82 08/16/2020    HGB 11 9 (L) 08/16/2020    HCT 35 6 (L) 08/16/2020    MCV 86 08/16/2020     08/16/2020       Lab Results   Component Value Date    CHOL 218 (H) 03/17/2017    CHOL 199 07/27/2016    CHOL 204 (H) 01/12/2016     Lab Results   Component Value Date    HDL 43 05/30/2019    HDL 31 (L) 03/29/2019    HDL 42 03/17/2017     Lab Results   Component Value Date    LDLCALC 127 (H) 05/30/2019    LDLCALC 112 (H) 03/29/2019     Lab Results   Component Value Date    TRIG 96 05/30/2019    TRIG 153 (H) 03/29/2019    TRIG 118 03/17/2017       Lab Results   Component Value Date    ALT 29 08/16/2020    AST 71 (H) 08/16/2020       Results from last 7 days   Lab Units 08/12/20  1650   INR  1 23*         Imaging: I have personally reviewed pertinent reports        EKG:  Atrial fibrillation

## 2020-08-16 NOTE — ASSESSMENT & PLAN NOTE
· Metoprolol 12 5mg q12h   · Can increase as tolerated by BP if rate is not controlled   · Metoprolol 5mg q6h PRN for HR > 110  · Optimize electrolytes K>4 0, Mag > 2 0  · CHADS-VASc score 2: Moderate - high risk   · Eliquis initiated at 5mg BID  · Cardiology consulted, appreciate recommendations

## 2020-08-16 NOTE — PROGRESS NOTES
Transfer Note - Fariha Montana 1955, 63 y o  male MRN: [id number]    Unit/Bed#: [facility] 04 Encounter: [id number]    Primary Care Provider: Yadira Lewis MD   Date and time admitted to hospital: 8/12/2020  4:15 PM        * Community acquired pneumonia of left lower lobe of lung  Assessment & Plan  · Antibiotics day #4 of Ceftriaxone/Flagyl     · Flagyl kept in the event of aspiration event during episodes of somnolence   · Continue for 5-7 days  · Procalcitonin: 7 00 - 6 42 - 5 97 - 6 95 - 5 74  · Check in AM and use to tailor antibiotics  Continue Tylenol 975mg q8h   Continue to monitor fever and WBC curve  · 8/12/20 BCX x2: No growth   8/12/20 COVID: Negative   8/13/20 strep pneumo/Legionella antigen: Negative   8/14/20 sputum culture: Mixed respiratory aida    8/12/20 urinalysis: Negative nitrites, moderate bacteria, unable to evaluate leukocytes   8/16/20 CT chest: Extensive left lower lobe pneumonia  Mild juxtapleural ground glass opacity in the left upper lobe which may be infectious or inflammatory  Small left and trace right effusion   Right paratracheal nodes which are slightly increased in number, likely reactive    New onset atrial fibrillation (HCC)  Assessment & Plan  · Metoprolol 12 5mg q12h   · Can increase as tolerated by BP if rate is not controlled   · Metoprolol 5mg q6h PRN for HR > 110  · Optimize electrolytes K>4 0, Mag > 2 0  · CHADS-VASc score 2: Moderate - high risk   · Eliquis initiated at 5mg BID  · Cardiology consulted, appreciate recommendations     Essential hypertension  Assessment & Plan  Maintain MAP >65   Maintain SBP < 180  Hold home antihypertensive medications for one more day:   Amlodipine 5 mg daily   Lisinopril 20 mg daily    Diabetes mellitus type 2 in obese [facility])  Assessment & Plan  Lab Results   Component Value Date    HGBA1C 8 7 (H) 08/12/2020       Recent Labs     08/15/20  1639 08/15/20  2351 08/16/20  0532 08/16/20  1051   POCGLU 119 131 165* 191*       Blood Sugar Average: Last 72 hrs:  (P) 191 [id number]     Last hemoglobin A1c 8 7 % on 8/12/20   AC/HS sliding scale regimen   Mealtime Lispro 5u TID   Continue to hold home antihyperglycemics:  Metformin 2,000 daily, Glipizide 5mg daily   Adjust insulin regimen as needed to maintain goal -180      Non-MI Troponin Elevation  Assessment & Plan  Secondary to sepsis and acute hypoxic respiratory failure   Troponin peaked at 0 38   No longer trending  8/14/20 Echo: EF 60%, no RWMA, mild MR, mild AI   Continue to monitor telemetry for EKG changes   Aspirin 81 mg daily      [facility]  Assessment & Plan  Likely secondary to hypoperfusion from hypotension/prerenal ATN   Baseline creatinine: 1 01-0 86   Admission creatinine: 1 59   Current creatinine: 1 04   No sosa catheter   Strict q4h I/O monitoring   Continue to follow renal function tests    Rhabdomyolysis  Assessment & Plan  Likely rhabdomyolysis secondary to high fevers   CK trending down   6850 - 4728 - 9829  No longer trending         Code Status: Level 1 - Full Code  POA:    POLST:      Reason for ICU admission:   Acute hypoxic respiratory failure     Active problems:   Principal Problem:    Community acquired pneumonia of left lower lobe of lung  Active Problems:    New onset atrial fibrillation (HCC)    Essential hypertension    Diabetes mellitus type 2 in obese (HCC)    Non-MI Troponin Elevation    LAURENCE (acute kidney injury) ([facility] [address] )    Rhabdomyolysis  Resolved Problems:    Sepsis [facility] )    Hyponatremia    Acute renal failure (ARF) (HCC)    Metabolic encephalopathy    Acute respiratory failure with hypoxia [facility])      Consultants:   Cardiology     History of Present Illness:   Per Dr Octaviano Velasco on 8/12/20  "Jolene Polanco is a 63 y o  male who presents with altered mental status   Patient was brought to the hospital by his daughter due to disorientation and ambulatory dysfunction for the past 2 days,  associated with decreasing oral intake and somnolence  He lives with his daughter  Patient with underlying hypertension, diabetes, hyperlipidemia and osteoarthritis  He is not compliant with his medication and currently not on any medications at home"     Patient denied chest pain or shortness of breath no cough  In the emergency room, he was found to be febrile with sepsis, and  left-sided pneumonia and chest x-ray    Summary of clinical course:   Patient was transferred to the ICU from the floor for severe respiratory distress and was ultimately intubated  Patient remained intubated for two days, and was successfully extubated on 8/15/20 in the AM   He remained on vasopressors, which were ventrally able to be titrated off on 8/15/20 in the evening  Although the patient's urine culture only grew mixed respiratory aida, the patient's infectious markers and clinical status continued to rapidly improve  The patient was deescalated to ceftriaxone for community-acquired pneumonia as well as Flagyl for the possibility of aspiration  The plan is to complete 5-7 days total of antibiotics based on his oxygen requirements, fever curve, and procalcitonin  Patient also had an LAURENCE, which rapidly improved  He tolerated diuresis on  without complication  He was noted to be in new onset AFib during his ICU admission  The patient and his wife are both good historians, and do not report any issues with arrhythmias in the past   Cardiology was consulted in order to establish for follow-up  The patient was started on PO metoprolol and Eliquis for anticoagulation  He did have 1 episode of tachycardia, which was treated with IV metoprolol  He is otherwise tolerating an oral diet, out of bed, and appropriate for transfer to [facility] with telemetry monitoring  Recent or scheduled procedures:   20: ETT  20: Right IJ TLC   20: Right radial arterial line     Diagnostic Imagin20 CT head: No acute intracranial abnormality     20 CT c-spine:  No acute fracture or subluxation in the cervical spine   Multilevel moderate degenerative change in the midlower cervical spine with resultant moderate central canal and mild-moderate neural foraminal narrowing, most pronounced at the C5-6 and C6-7 levels  8/14/20 Echo: EF 60%, no RWMA, mild MR, mild AI   8/16/20 CT chest: Extensive left lower lobe pneumonia  Mild juxtapleural ground glass opacity in the left upper lobe which may be infectious or inflammatory  Small left and trace right effusion  Right paratracheal nodes which are slightly increased in number, likely reactive    Cultures:   8/12/20 BCX x2: No growth   8/12/20 COVID: Negative   8/13/20 strep pneumo/Legionella antigen: Negative   8/14/20 sputum culture: Mixed respiratory aida    8/12/20 urinalysis: Negative nitrites, moderate bacteria, unable to evaluate leukocytes       Mobilization Plan: Activity as tolerated   PT/OT    Nutrition Plan:   Cardiac diet  Consistent Carb diet #2    Invasive Devices Review  Invasive Devices     Peripheral Intravenous Line            Peripheral IV 08/14/20 Right Wrist 2 days          Arterial Line            Arterial Line 08/14/20 Radial 2 days                Rationale for remaining devices: N/A    VTE Pharmacologic Prophylaxis: Apixaban (Eliquis)  VTE Mechanical Prophylaxis: sequential compression device    Discharge Plan:   Discharge timing and plan dependent on cardiology medications and PT/OT clearance and recommendations     Home medications that are not reordered and reason why:   Antihyperglycemics - on SSI  Antihypertensives - resolving shock, started on metoprolol for a fib       Spoke with Dr Carleton Closs  regarding transfer  Please call [phone] with any questions or concerns  Portions of the record may have been created with voice recognition software  Occasional wrong word or "sound a like" substitutions may have occurred due to the inherent limitations of voice recognition software    Read the chart carefully and recognize, using context, where substitutions have occurred

## 2020-08-16 NOTE — ASSESSMENT & PLAN NOTE
Likely secondary to hypoperfusion from hypotension/prerenal ATN   Baseline creatinine: 1 01-0 86   Admission creatinine: 1 59   Current creatinine: 1 04   No sosa catheter   Strict q4h I/O monitoring   Continue to follow renal function tests

## 2020-08-16 NOTE — ASSESSMENT & PLAN NOTE
Maintain MAP >65   Maintain SBP < 180  Hold home antihypertensive medications for one more day:   Amlodipine 5 mg daily   Lisinopril 20 mg daily

## 2020-08-16 NOTE — PROGRESS NOTES
Daily Progress Note - Critical Care   Ronak Solares 63 y o  male MRN: [id number]  Unit/Bed#: MICU 04 Encounter: [id number]        ----------------------------------------------------------------------------------------  HPI/24hr events:   · No acute events   · Remained off vasopressors  · Nasal cannula     ---------------------------------------------------------------------------------------  SUBJECTIVE  "I feel good"     Review of Systems   Respiratory: Positive for cough  Negative for shortness of breath  Cardiovascular: Negative for chest pain  Gastrointestinal: Negative for diarrhea  Neurological: Negative for headaches       Review of systems was reviewed and negative unless stated above in HPI/24-hour events   ---------------------------------------------------------------------------------------  Assessment and Plan    Neuro:   Acute toxic/metabolic encephalopathy - Resolved    Routine neuro checks   Sleep/wake cycle regulation as able   Melatonin 6mg qHS  CAM-ICU BID    CV:   Septic shock - Resolved   Discontinue central line today   Maintain MAP >65   Hold home antihypertensive medications for one more day:   Amlodipine 5 mg daily   Lisinopril 20 mg daily  · New-onset atrial fibrillation  · Rate controlled  · Optimize electrolytes K>4 0, Mag > 2 0  · Consult cardiology   · CHADS-VASc score 2: Moderate - high risk   · Should start ATC within next 24 hours    None MI troponin elevation   Secondary to sepsis and acute hypoxic respiratory failure   Troponin peaked at 0 38   No longer trending  8/14/20 Echo: EF 60%, no RWMA, mild MR, mild AI   Continue to monitor telemetry for EKG changes   Aspirin 81 mg daily    Pulm:   Acute hypoxic respiratory failure - Resolved   Multifactorial secondary to pneumonia as well as component of volume overload from volume resuscitation   Currently on small amount of nasal cannula  Titrate supplemental O2 to maintain SpO2 > 90%   Suction as needed and closely monitor secretions  Maintain HOB >30 degrees  Q4h oral care with chlorhexidine BID   Monitor peak and plateau airway pressures  Maintain Ppeak < 45cm H2O, Pplat < 30cm H2O    GI:   No acute issues   Stress ulcer prophylaxis: No indicated   Bowel regimen: Senna/colace BID    :   LAURENCE - Resolved   Likely secondary to hypoperfusion from hypotension/prerenal ATN   Baseline creatinine: 1 01-0 86   Admission creatinine: 1 59   Current creatinine: 1 04   Discontinue sosa catheter   Strict q2h I/O monitoring   Continue to follow renal function tests  Elevated CK   Likely rhabdomyolysis secondary to high fevers   CK trending down   5452 - 3753 - 1692  No longer trending     F/E/N:   Maintenance fluids: INone  Diuresis plan: None  Still significantly fluid positive  Can attempt lasix 40mg one more time    Maintain electrolytes: K > 4 0, Mag > 2 0, Phos >3 0   Nutrition: Start PO diet  Cardiac/diabetic diet     Heme/Onc:   No acute issues   Baseline hemoglobin: 12 4-14 9   Admission hemoglobin: 15 4   Current hemoglobin: 11 9  No sign of bleeding   Transfuse for hemoglobin <7 0   Baseline platelets: 409 - 205   Admission platelets: 825   Current platelets: 245   Transfuse for plts <15,000 or < 50,000 in the presence of bleeding   VTE prophylaxis: Sub-q heparin TID, SCD's to BLE    Endo/Rheum:   Insulin dependent type 2 diabetes   Last hemoglobin A1c 8 7 % on 8/12/20   Transition to AC/HS sliding scale regimen   Mealtime Lispro 5u TID   Continue to hold home antihyperglycemics: Metformin 2,000 daily, Glipizide 5mg daily   Adjust insulin regimen as needed to maintain goal -180    ID:   Sepsis secondary to left lower lobe pneumonia vs UTI   Antibiotics de-escalated yesterday to Ceftriaxone/Flagyl    Antibiotic day # 4, continue for 5-7 days   Can wean based on PCT trend    8/12/20 BCX x2: No growth   8/12/20 COVID: Negative   8/13/20 strep pneumo/Legionella antigen: Negative   8/14/20 sputum culture: 1+ polys, no bacteria Continue to follow final results  20 urinalysis: Negative nitrites, moderate bacteria, unable to evaluate leukocytes   Unfortunately, no urine culture  Procalcitonin: 7 00 - 6 42 - 5 97 - 6 95 - 5 74   Check in AM   Patient persistently febrile   Continue Tylenol 975mg q8h   Continue to monitor fever and WBC curve     MSK/Skin:   No acute issues   PT/OT    Reposition q2h, eliminate pressure points while in bed   Close skin surveillance     Disposition: Transfer to Med Surg with Telemetry   Code Status: Level 1 - Full Code  ---------------------------------------------------------------------------------------  Invasive Devices Review  Invasive Devices     Central Venous Catheter Line            CVC Central Lines 20 Triple 16cm 2 days          Peripheral Intravenous Line            Peripheral IV 20 Right Wrist 2 days          Arterial Line            Arterial Line 20 Radial 2 days          Drain            Urethral Catheter Temperature probe 2 days              Can any invasive devices be discontinued today? Yes  ---------------------------------------------------------------------------------------  OBJECTIVE    Vitals   Vitals:    20 0500 20 0600 20 0615 20 0700   BP:   117/82 144/87   Pulse: (!) 106 102 100 (!) 112   Resp:       Temp: 100 4 °F (38 °C) 99 3 °F (37 4 °C) 99 3 °F (37 4 °C) 99 3 °F (37 4 °C)   TempSrc:       SpO2: 96% 97% 97% 96%   Weight:       Height:         Temp (24hrs), Av °F (37 8 °C), Min:99 °F (37 2 °C), Max:101 1 °F (38 4 °C)  Current: Temperature: 99 3 °F (37 4 °C)  HR: 66 - 112   BP: 73/66 - 149/88  MAP: 64 - 115   RR: 26  SpO2: 95 - 100%      Physical Exam  Vitals signs reviewed  Constitutional:       General: He is awake  Appearance: He is well-developed  He is obese  Interventions: Nasal cannula in place  Eyes:      Conjunctiva/sclera: Conjunctivae normal       Pupils: Pupils are equal, round, and reactive to light  Cardiovascular:      Rate and Rhythm: Normal rate  Rhythm regularly irregular  Pulses:           Radial pulses are 2+ on the right side and 2+ on the left side  Dorsalis pedis pulses are 2+ on the right side and 2+ on the left side  Heart sounds: Normal heart sounds  Pulmonary:      Breath sounds: Examination of the left-middle field reveals rhonchi  Examination of the right-lower field reveals decreased breath sounds  Examination of the left-lower field reveals decreased breath sounds  Decreased breath sounds and rhonchi present  No wheezing or rales  Genitourinary:     Comments: [place]: clear, yellow urine   Musculoskeletal:      Right lower le+ Edema present  Left lower le+ Edema present  Skin:     General: Skin is warm  Capillary Refill: Capillary refill takes less than 2 seconds  Neurological:      General: No focal deficit present  Mental Status: He is alert  GCS: GCS eye subscore is 4  GCS verbal subscore is 5  GCS motor subscore is 6  Motor: Motor function is intact             Laboratory and Diagnostics:  Results from last 7 days   Lab Units 20  0437 08/15/20  0448 20  0519 20  1657 20  0506 20  2156 20  1928 20  1650   WBC Thousand/uL 8 82 10 75* 18 04*  --  14 34*  --   --  15 53*   HEMOGLOBIN g/dL 11 9* 11 8* 12 7  --  14 7  --   --  15 4   I STAT HEMOGLOBIN g/dl  --   --   --  13 3  --   --   --   --    HEMATOCRIT % 35 6* 34 7* 37 9  --  43 1  --   --  43 5   HEMATOCRIT, ISTAT %  --   --   --  39  --   --   --   --    PLATELETS Thousands/uL 194 170 245  --  228 217 206 258   NEUTROS PCT % 86*  --   --   --  91*  --   --   --    BANDS PCT %  --   --   --   --   --   --   --  7   MONOS PCT % 5  --   --   --  5  --   --   --    MONO PCT %  --   --   --   --   --   --   --  6     Results from last 7 days   Lab Units 20  0437 08/15/20  0448 20  0519 20  1715 20  1657 20  1519 08/12/20  1650   SODIUM mmol/L 137 136 135* 133*  --  134* 129*   POTASSIUM mmol/L 3 4* 3 5 3 6 3 1*  --  3 2* 3 2*   CHLORIDE mmol/L 105 106 101 101  --  98* 94*   CO2 mmol/L 25 21 18* 21  --  20* 21   CO2, I-STAT mmol/L  --   --   --   --  24  --   --    ANION GAP mmol/L 7 9 16* 11  --  16* 14*   BUN mg/dL 22 24 26* 22  --  21 19   CREATININE mg/dL 1 04 1 32* 1 54* 1 49*  --  1 44* 1 59*   CALCIUM mg/dL 7 6* 7 3* 7 6* 7 9*  --  7 7* 8 6   GLUCOSE RANDOM mg/dL 150* 273* 224* 169*  --  235* 358*   ALT U/L 29  --   --   --   --  27 24   AST U/L 71*  --   --   --   --  157* 156*   ALK PHOS U/L 75  --   --   --   --  74 80   ALBUMIN g/dL 1 6*  --   --   --   --  2 4* 2 8*   TOTAL BILIRUBIN mg/dL 0 56  --   --   --   --  0 79 1 08*     Results from last 7 days   Lab Units 08/16/20  0437 08/15/20  0448 08/14/20  0519 08/13/20  0506   MAGNESIUM mg/dL  --  2 4 2 3 2 3   PHOSPHORUS mg/dL 2 0* 1 6* 3 3 1 4*      Results from last 7 days   Lab Units 08/12/20  1650   INR  1 23*   PTT seconds 28      Results from last 7 days   Lab Units 08/12/20  2156 08/12/20  1928 08/12/20  1650   TROPONIN I ng/mL 0 32* 0 35* 0 38*     Results from last 7 days   Lab Units 08/14/20  0651 08/13/20  1656 08/13/20  0119 08/12/20  2156 08/12/20  1650   LACTIC ACID mmol/L 1 5 2 0 1 9 2 3* 3 9*     ABG:  Results from last 7 days   Lab Units 08/14/20  0520   PH ART  7 387   PCO2 ART mm Hg 28 3*   PO2 ART mm Hg 243 6*   HCO3 ART mmol/L 16 6*   BASE EXC ART mmol/L -6 9   ABG SOURCE  Line, Arterial       Results from last 7 days   Lab Units 08/15/20  0448 08/14/20  0830 08/13/20  0610 08/12/20  2129 08/12/20  1650   PROCALCITONIN ng/ml 5 74* 6 95* 5 97* 6 42* 7 00*       Micro  Results from last 7 days   Lab Units 08/14/20  0340 08/13/20  0418 08/12/20  1650   BLOOD CULTURE   --   --  No Growth at 72 hrs  No Growth at 72 hrs     SPUTUM CULTURE  Culture too young- will reincubate  --   --    GRAM STAIN RESULT  1+ Polys  No bacteria seen  --   -- LEGIONELLA URINARY ANTIGEN   --  Negative  --    STREP PNEUMONIAE ANTIGEN, URINE   --  Negative  --        EKG: Afib   Imaging: No new imaging I have personally reviewed pertinent reports  and I have personally reviewed pertinent films in PACS    Intake and Output  I/O       08/14 0701 - 08/15 0700 08/15 0701 - 08/16 0700 08/16 0701 - 08/17 0700    P  O        I V  (mL/kg) 5692 4 (47 4) 468 5 (3 9)     NG/      IV Piggyback 300 300     Feedings 166      Total Intake(mL/kg) 6398 4 (53 3) 768 5 (6 4)     Urine (mL/kg/hr) 1520 (0 5) 2330 (0 8)     Emesis/NG output 150      Stool       Total Output 1670 2330     Net +4728 4 -1561 5                  Height and Weights   Height: 5' 6" (167 6 cm)  IBW: 63 8 kg  Body mass index is 42 66 kg/m²  Weight (last 2 days)     None            Nutrition       Diet Orders   (From admission, onward)             Start     Ordered    08/16/20 0607  Diet Cardiovascular; Cardiac; Consistent Carbohydrate Diet Level 2 (5 carb servings/75 grams CHO/meal)  Diet effective now     Question Answer Comment   Diet Type Cardiovascular    Cardiac Cardiac    Other Restriction(s): Consistent Carbohydrate Diet Level 2 (5 carb servings/75 grams CHO/meal)    RD to adjust diet per protocol?  Yes        08/16/20 3328                  Active Medications  Scheduled Meds:  Current Facility-Administered Medications   Medication Dose Route Frequency Provider Last Rate    acetaminophen  975 mg Oral [place] SALONI Trevino      aspirin  81 mg Oral Daily DO Jefferson      cefTRIAXone  1,000 mg Intravenous Q24H SALONI Pedroza Stopped (08/15/20 1400)    chlorhexidine  15 mL Swish & Spit Q12H [facility] Piyush Douglass PA-C      heparin (porcine)  5,000 Units Subcutaneous Q8H [facility] Diogenes Recinos DO      insulin lispro  1-6 Units Subcutaneous HS SALONI Pedroza      insulin lispro  2-12 Units Subcutaneous TID SALONI Sandoval      insulin lispro  5 Units Subcutaneous TID With Meals SALONI Edgar      melatonin  6 mg Oral HS SALONI Edgar      metroNIDAZOLE  500 mg Oral [facility] SALONI Edgar      ondansetron  4 mg Intravenous Q6H PRN Beto Cruz, DO      potassium-sodium phosphateS  2 tablet Oral TID With Meals SALONI Edgar      senna-docusate sodium  1 tablet Oral BID SALONI Herrera       Continuous Infusions:     PRN Meds:   ondansetron, 4 mg, Q6H PRN        Allergies   No Known Allergies  ---------------------------------------------------------------------------------------  Care Time Delivered:   No Critical Care time spent       SALONI Herrera        Portions of the record may have been created with voice recognition software  Occasional wrong word or "sound a like" substitutions may have occurred due to the inherent limitations of voice recognition software    Read the chart carefully and recognize, using context, where substitutions have occurred

## 2020-08-16 NOTE — ASSESSMENT & PLAN NOTE
· Antibiotics day #4 of Ceftriaxone/Flagyl     · Flagyl kept in the event of aspiration event during episodes of somnolence   · Continue for 5-7 days  · Procalcitonin: 7 00 - 6 42 - 5 97 - 6 95 - 5 74  · Check in AM and use to tailor antibiotics  Continue Tylenol 975mg q8h   Continue to monitor fever and WBC curve  · 8/12/20 BCX x2: No growth   8/12/20 COVID: Negative   8/13/20 strep pneumo/Legionella antigen: Negative   8/14/20 sputum culture: Mixed respiratory aida    8/12/20 urinalysis: Negative nitrites, moderate bacteria, unable to evaluate leukocytes   8/16/20 CT chest: Extensive left lower lobe pneumonia  Mild juxtapleural ground glass opacity in the left upper lobe which may be infectious or inflammatory  Small left and trace right effusion   Right paratracheal nodes which are slightly increased in number, likely reactive

## 2020-08-16 NOTE — ASSESSMENT & PLAN NOTE
Likely rhabdomyolysis secondary to high fevers   CK trending down   5483 - 9364 - 6691  No longer trending

## 2020-08-17 PROBLEM — J96.01 ACUTE RESPIRATORY FAILURE WITH HYPOXIA AND HYPERCAPNIA (HCC): Status: ACTIVE | Noted: 2020-08-17

## 2020-08-17 PROBLEM — J96.02 ACUTE RESPIRATORY FAILURE WITH HYPOXIA AND HYPERCAPNIA (HCC): Status: ACTIVE | Noted: 2020-08-17

## 2020-08-17 LAB
ANION GAP SERPL CALCULATED.3IONS-SCNC: 7 MMOL/L (ref 4–13)
BACTERIA SPT RESP CULT: ABNORMAL
BACTERIA SPT RESP CULT: ABNORMAL
BASOPHILS # BLD AUTO: 0.02 THOUSANDS/ΜL (ref 0–0.1)
BASOPHILS NFR BLD AUTO: 0 % (ref 0–1)
BUN SERPL-MCNC: 17 MG/DL (ref 5–25)
CALCIUM SERPL-MCNC: 7.9 MG/DL (ref 8.3–10.1)
CHLORIDE SERPL-SCNC: 103 MMOL/L (ref 100–108)
CO2 SERPL-SCNC: 29 MMOL/L (ref 21–32)
CREAT SERPL-MCNC: 0.93 MG/DL (ref 0.6–1.3)
EOSINOPHIL # BLD AUTO: 0.19 THOUSAND/ΜL (ref 0–0.61)
EOSINOPHIL NFR BLD AUTO: 2 % (ref 0–6)
ERYTHROCYTE [DISTWIDTH] IN BLOOD BY AUTOMATED COUNT: 16.6 % (ref 11.6–15.1)
GFR SERPL CREATININE-BSD FRML MDRD: 87 ML/MIN/1.73SQ M
GLUCOSE SERPL-MCNC: 117 MG/DL (ref 65–140)
GLUCOSE SERPL-MCNC: 148 MG/DL (ref 65–140)
GLUCOSE SERPL-MCNC: 174 MG/DL (ref 65–140)
GLUCOSE SERPL-MCNC: 174 MG/DL (ref 65–140)
GLUCOSE SERPL-MCNC: 199 MG/DL (ref 65–140)
GRAM STN SPEC: ABNORMAL
GRAM STN SPEC: ABNORMAL
HCT VFR BLD AUTO: 37 % (ref 36.5–49.3)
HGB BLD-MCNC: 12.3 G/DL (ref 12–17)
IMM GRANULOCYTES # BLD AUTO: 0.17 THOUSAND/UL (ref 0–0.2)
IMM GRANULOCYTES NFR BLD AUTO: 2 % (ref 0–2)
LYMPHOCYTES # BLD AUTO: 0.62 THOUSANDS/ΜL (ref 0.6–4.47)
LYMPHOCYTES NFR BLD AUTO: 8 % (ref 14–44)
MAGNESIUM SERPL-MCNC: 2 MG/DL (ref 1.6–2.6)
MCH RBC QN AUTO: 28.7 PG (ref 26.8–34.3)
MCHC RBC AUTO-ENTMCNC: 33.2 G/DL (ref 31.4–37.4)
MCV RBC AUTO: 86 FL (ref 82–98)
MONOCYTES # BLD AUTO: 0.49 THOUSAND/ΜL (ref 0.17–1.22)
MONOCYTES NFR BLD AUTO: 6 % (ref 4–12)
NEUTROPHILS # BLD AUTO: 6.28 THOUSANDS/ΜL (ref 1.85–7.62)
NEUTS SEG NFR BLD AUTO: 82 % (ref 43–75)
NRBC BLD AUTO-RTO: 0 /100 WBCS
PLATELET # BLD AUTO: 247 THOUSANDS/UL (ref 149–390)
PMV BLD AUTO: 10.9 FL (ref 8.9–12.7)
POTASSIUM SERPL-SCNC: 3.5 MMOL/L (ref 3.5–5.3)
RBC # BLD AUTO: 4.28 MILLION/UL (ref 3.88–5.62)
SODIUM SERPL-SCNC: 139 MMOL/L (ref 136–145)
WBC # BLD AUTO: 7.77 THOUSAND/UL (ref 4.31–10.16)

## 2020-08-17 PROCEDURE — 97164 PT RE-EVAL EST PLAN CARE: CPT

## 2020-08-17 PROCEDURE — 85025 COMPLETE CBC W/AUTO DIFF WBC: CPT | Performed by: NURSE PRACTITIONER

## 2020-08-17 PROCEDURE — 94760 N-INVAS EAR/PLS OXIMETRY 1: CPT

## 2020-08-17 PROCEDURE — 97530 THERAPEUTIC ACTIVITIES: CPT

## 2020-08-17 PROCEDURE — 80048 BASIC METABOLIC PNL TOTAL CA: CPT | Performed by: NURSE PRACTITIONER

## 2020-08-17 PROCEDURE — 82948 REAGENT STRIP/BLOOD GLUCOSE: CPT

## 2020-08-17 PROCEDURE — 99232 SBSQ HOSP IP/OBS MODERATE 35: CPT | Performed by: INTERNAL MEDICINE

## 2020-08-17 PROCEDURE — 97168 OT RE-EVAL EST PLAN CARE: CPT

## 2020-08-17 PROCEDURE — 83735 ASSAY OF MAGNESIUM: CPT | Performed by: NURSE PRACTITIONER

## 2020-08-17 PROCEDURE — 97116 GAIT TRAINING THERAPY: CPT

## 2020-08-17 RX ORDER — INSULIN GLARGINE 100 [IU]/ML
20 INJECTION, SOLUTION SUBCUTANEOUS
Status: DISCONTINUED | OUTPATIENT
Start: 2020-08-17 | End: 2020-08-18 | Stop reason: HOSPADM

## 2020-08-17 RX ADMIN — INSULIN LISPRO 2 UNITS: 100 INJECTION, SOLUTION INTRAVENOUS; SUBCUTANEOUS at 08:29

## 2020-08-17 RX ADMIN — INSULIN LISPRO 5 UNITS: 100 INJECTION, SOLUTION INTRAVENOUS; SUBCUTANEOUS at 08:29

## 2020-08-17 RX ADMIN — ACETAMINOPHEN 975 MG: 650 SUSPENSION ORAL at 04:58

## 2020-08-17 RX ADMIN — CEFTRIAXONE SODIUM 1000 MG: 10 INJECTION, POWDER, FOR SOLUTION INTRAVENOUS at 12:30

## 2020-08-17 RX ADMIN — METRONIDAZOLE 500 MG: 500 TABLET ORAL at 05:00

## 2020-08-17 RX ADMIN — METRONIDAZOLE 500 MG: 500 TABLET ORAL at 21:41

## 2020-08-17 RX ADMIN — DOCUSATE SODIUM AND SENNOSIDES 1 TABLET: 8.6; 5 TABLET ORAL at 17:31

## 2020-08-17 RX ADMIN — MELATONIN 6 MG: at 21:40

## 2020-08-17 RX ADMIN — DOCUSATE SODIUM AND SENNOSIDES 1 TABLET: 8.6; 5 TABLET ORAL at 08:28

## 2020-08-17 RX ADMIN — METRONIDAZOLE 500 MG: 500 TABLET ORAL at 13:12

## 2020-08-17 RX ADMIN — METOPROLOL TARTRATE 25 MG: 25 TABLET, FILM COATED ORAL at 21:41

## 2020-08-17 RX ADMIN — INSULIN LISPRO 1 UNITS: 100 INJECTION, SOLUTION INTRAVENOUS; SUBCUTANEOUS at 21:40

## 2020-08-17 RX ADMIN — METOPROLOL TARTRATE 12.5 MG: 25 TABLET ORAL at 08:27

## 2020-08-17 RX ADMIN — ASPIRIN 81 MG 81 MG: 81 TABLET ORAL at 08:29

## 2020-08-17 RX ADMIN — APIXABAN 5 MG: 5 TABLET, FILM COATED ORAL at 08:28

## 2020-08-17 RX ADMIN — ACETAMINOPHEN 975 MG: 650 SUSPENSION ORAL at 12:29

## 2020-08-17 RX ADMIN — APIXABAN 5 MG: 5 TABLET, FILM COATED ORAL at 17:31

## 2020-08-17 RX ADMIN — INSULIN GLARGINE 20 UNITS: 100 INJECTION, SOLUTION SUBCUTANEOUS at 21:40

## 2020-08-17 RX ADMIN — INSULIN LISPRO 2 UNITS: 100 INJECTION, SOLUTION INTRAVENOUS; SUBCUTANEOUS at 12:29

## 2020-08-17 RX ADMIN — METOPROLOL TARTRATE 12.5 MG: 25 TABLET ORAL at 13:12

## 2020-08-17 NOTE — PLAN OF CARE
Problem: PHYSICAL THERAPY ADULT  Goal: Performs mobility at highest level of function for planned discharge setting  See evaluation for individualized goals  Description: Treatment/Interventions: Functional transfer training, LE strengthening/ROM, Elevations, Therapeutic exercise, Endurance training, Patient/family training, Equipment eval/education, Bed mobility, Gait training, Spoke to nursing, OT  Equipment Recommended: Walker(RW)       See flowsheet documentation for full assessment, interventions and recommendations  Outcome: Progressing  Note: Prognosis: Good  Problem List: Decreased strength, Decreased endurance, Impaired balance, Decreased mobility, Decreased cognition, Decreased safety awareness, Obesity  Assessment: Pt seen for PT re-evaluation after change in medical status  Pt with medical decline, required transfer to ICU, intubated, extubated on 8/15, new onset Afib  Prior to admission, pt was independent with all mobility  Currently, pt requires supervision for bed mobility, Jennifer for transfers and Jennifer for ambulation 2' with RW  Pt limited by tachycardia and GONSALEZ  Pt will benefit from continued skilled PT intervention during course of hospital stay to improve strength, endurance and balance to improve safety with functional mobility  Recommend IP rehab upon hospital D/C due to poor endurance and need for cardiovascular training prior to discharge home with 10-15 DEVI  Barriers to Discharge: Inaccessible home environment, Decreased caregiver support     PT Discharge Recommendation: [id number]     PT - OK to Discharge: Yes(to IP rehab)    See flowsheet documentation for full assessment

## 2020-08-17 NOTE — ASSESSMENT & PLAN NOTE
Chest x-ray with Left perihilar/lower lobe alveolar opacification suspicious for pneumonia  Patient presented with fever, tachycardia, change in mental status and leukocytosis with elevated lactic acid  Continue antibiotics  Monitor closely

## 2020-08-17 NOTE — PROGRESS NOTES
Progress Note - Kaci Crowley 1955, 63 y o  male MRN: [id number]    Unit/Bed#: [facility] 825-01 Encounter: [id number]    Primary Care Provider: Johana Corrales MD   Date and time admitted to hospital: 8/12/2020  4:15 PM        * Community acquired pneumonia of left lower lobe of lung  Assessment & Plan  Chest x-ray with Left perihilar/lower lobe alveolar opacification suspicious for pneumonia  Patient presented with fever, tachycardia, change in mental status and leukocytosis with elevated lactic acid  Continue antibiotics  Monitor closely    Acute respiratory failure with hypoxia and hypercapnia (HCC)  Assessment & Plan  Resolving  Monitor closely  Outpatient sleep study strongly recommended    New onset atrial fibrillation ([facility] [address] )  Assessment & Plan  New onset atrial fibrillation  Continue metoprolol  Eliquis anticoagulation  Cardiology following    Non-MI Troponin Elevation  Assessment & Plan  Likely non MI elevated troponin secondary to sepsis  Continue to monitor    Diabetes mellitus type 2 in obese [facility])  Assessment & Plan  Lab Results   Component Value Date    HGBA1C 8 7 (H) 08/12/2020       Recent Labs     08/16/20  2050 08/17/20  0746 08/17/20  1158 08/17/20  1617   POCGLU 196* 174* 199* 117       Blood Sugar Average: Last 72 hrs:  (P) 182 4375   Patient noncompliant with home medications  Continue insulin  Titrate based on Accu-Cheks  Medication compliance discussed with the patient  Outpatient follow-up with primary care physician    Essential hypertension  Assessment & Plan  Patient is not compliant with his home regimen  Currently does not take any blood pressure medications  Continue to monitor  Medication compliance discussed      VTE Pharmacologic Prophylaxis:   Pharmacologic: Apixaban (Eliquis)  Mechanical VTE Prophylaxis in Place: Yes    Patient Centered Rounds: I have performed bedside rounds with nursing staff today      Discussions with Specialists or Other Care Team Provider: Education and Discussions with Family / Patient:  Discussed with the patient, reports he is keeping his family updated    Time Spent for Care: 30 minutes  More than 50% of total time spent on counseling and coordination of care as described above  Current Length of Stay: 5 day(s)    Current Patient Status: Inpatient   Certification Statement: The patient will continue to require additional inpatient hospital stay due to As outlined    Discharge Plan:  Awaiting clinical and symptomatic improvement    Code Status: Level 1 - Full Code      Subjective:     Comfortably in bed  Reports feeling better  Appetite fair  Encouraged out of bed and ambulation as able  History chart labs medications reviewed    Objective:     Vitals:   Temp (24hrs), Av 7 °F (37 1 °C), Min:97 5 °F (36 4 °C), Max:100 2 °F (37 9 °C)    Temp:  [97 5 °F (36 4 °C)-100 2 °F (37 9 °C)] 98 1 °F (36 7 °C)  HR:  [] 92  Resp:  [18-21] 18  BP: (139-162)/() 139/76  SpO2:  [91 %-98 %] 95 %  Body mass index is 42 66 kg/m²  Input and Output Summary (last 24 hours):        Intake/Output Summary (Last 24 hours) at 2020 1707  Last data filed at 2020 1653  Gross per 24 hour   Intake 240 ml   Output 600 ml   Net -360 ml       Physical Exam:     Physical Exam    Comfortably sitting up in bed  Morbidly obese  Short thick neck  Lungs diminished breath sounds bilateral  Heart sounds S1-S2 noted  Abdomen soft nontender  Awake obeys simple commands  Pulses noted  No rash    Additional Data:     Labs:    Results from last 7 days   Lab Units 20  0457  20  1650   WBC Thousand/uL 7 77   < > 15 53*   HEMOGLOBIN g/dL 12 3   < > 15 4   I STAT HEMOGLOBIN   --    < >  --    HEMATOCRIT % 37 0   < > 43 5   HEMATOCRIT, ISTAT   --    < >  --    PLATELETS Thousands/uL 247   < > 258   BANDS PCT %  --   --  7   NEUTROS PCT % 82*   < >  --    LYMPHS PCT % 8*   < >  --    LYMPHO PCT %  --   --  2*   MONOS PCT % 6   < >  --    MONO PCT %  -- --  6   EOS PCT % 2   < > 0    < > = values in this interval not displayed  Results from last 7 days   Lab Units 08/17/20  0457  08/16/20  0437   SODIUM mmol/L 139   < > 137   POTASSIUM mmol/L 3 5   < > 3 4*   CHLORIDE mmol/L 103   < > 105   CO2 mmol/L 29   < > 25   BUN mg/dL 17   < > 22   CREATININE mg/dL 0 93   < > 1 04   ANION GAP mmol/L 7   < > 7   CALCIUM mg/dL 7 9*   < > 7 6*   ALBUMIN g/dL  --   --  1 6*   TOTAL BILIRUBIN mg/dL  --   --  0 56   ALK PHOS U/L  --   --  75   ALT U/L  --   --  29   AST U/L  --   --  71*   GLUCOSE RANDOM mg/dL 148*   < > 150*    < > = values in this interval not displayed  Results from last 7 days   Lab Units 08/12/20  1650   INR  1 23*     Results from last 7 days   Lab Units 08/17/20  1617 08/17/20  1158 08/17/20  0746 08/16/20  2050 08/16/20  1659 08/16/20  1051 08/16/20  0532 08/15/20  2351 08/15/20  1639 08/15/20  1109 08/15/20  0726 08/15/20  0105   POC GLUCOSE mg/dl 117 199* 174* 196* 253* 191* 165* 131 119 209* 269* 234*     Results from last 7 days   Lab Units 08/12/20  1928   HEMOGLOBIN A1C % 8 7*     Results from last 7 days   Lab Units 08/15/20  0448 08/14/20  0830 08/14/20  0651 08/13/20  1656 08/13/20  0610 08/13/20  0119 08/12/20  2156 08/12/20  2129 08/12/20  1650   LACTIC ACID mmol/L  --   --  1 5 2 0  --  1 9 2 3*  --  3 9*   PROCALCITONIN ng/ml 5 74* 6 95*  --   --  5 97*  --   --  6 42* 7 00*           * I Have Reviewed All Lab Data Listed Above  * Additional Pertinent Lab Tests Reviewed: All Labs Within Last 24 Hours Reviewed    Imaging:    Imaging Reports Reviewed Today Include:  Imaging studies noted  Imaging Personally Reviewed by Myself Includes:     Recent Cultures (last 7 days):     Results from last 7 days   Lab Units 08/14/20  0340 08/13/20  0418 08/12/20  1650   BLOOD CULTURE   --   --  No Growth After 4 Days  No Growth After 4 Days     SPUTUM CULTURE  1+ Growth of Candida albicans*  1+ Growth of   --   --    GRAM STAIN RESULT  1+ Polys No bacteria seen  --   --    LEGIONELLA URINARY ANTIGEN   --  Negative  --        Last 24 Hours Medication List:   Current Facility-Administered Medications   Medication Dose Route Frequency Provider Last Rate    acetaminophen  975 mg Oral Q8H SALONI Ribera      apixaban  5 mg Oral BID SALONI Gardiner      aspirin  81 mg Oral Daily [address] [place], SALONI      cefTRIAXone  1,000 mg Intravenous Q24H SALONI Gardiner 1,000 mg (08/17/20 1230)    insulin glargine  20 Units Subcutaneous HS Fannie Jimenez MD      insulin lispro  1-6 Units Subcutaneous HS SALONI Gardiner      insulin lispro  10 Units Subcutaneous TID With Meals Fannie Jimenez MD      insulin lispro  2-12 Units Subcutaneous TID AC SALONI Pena      melatonin  6 mg Oral HS SALONI Gardiner      metoprolol tartrate  25 mg Oral Q12H [address] SALONI Dawkins      metroNIDAZOLE  500 mg Oral [facility] [address], SALONI      ondansetron  4 mg Intravenous Q6H PRN SALONI Gardiner      senna-docusate sodium  1 tablet Oral BID SALONI Gardiner          Today, Patient Was Seen By: Fannie Jimenez MD    ** Please Note: Dictation voice to text software may have been used in the creation of this document   **

## 2020-08-17 NOTE — CASE MANAGEMENT
MARIA ESTHER hill texted [address] and notified her patient has no insurance and will not be able to afford Eliquis unless cardiology supplies the medication every month

## 2020-08-17 NOTE — ASSESSMENT & PLAN NOTE
Lab Results   Component Value Date    HGBA1C 8 7 (H) 08/12/2020       Recent Labs     08/16/20  2050 08/17/20  0746 08/17/20  1158 08/17/20  1617   POCGLU 196* 174* 199* 117       Blood Sugar Average: Last 72 hrs:  (P) 182 6061   Patient noncompliant with home medications  Continue insulin  Titrate based on Accu-Cheks  Medication compliance discussed with the patient  Outpatient follow-up with primary care physician

## 2020-08-17 NOTE — CASE MANAGEMENT
Cm received a request for the attending MD to call the patients brannon Lainez Bachelor [phone]     Pt is being recommended STR by therapy

## 2020-08-17 NOTE — PROGRESS NOTES
General Cardiology   Progress Note -  [employer]   Nick Resides 63 y o  male MRN: [id number]    Unit/Bed#: [facility] 825-01 Encounter: [id number]    Assessment/ Plan:    1  New onset atrial fibrillation: This occurred in setting of sepsis/PNA/intubation  He remains in afib with rates 100-110s; some tachycardia yesterday requiring IV metoprolol  Echo with preserved LV systolic function; dilated LA  Has been getting PO metoprolol tartrate 12 5mg BID; BP elevated; will increase BB to metoprolol tartrate 25mg BID for more optimal rate control  He is on eliquis 5mg BID which was started by critical care yesterday  CHADS-Vasc 2  Can consider rhythm control strategy as OP once he is fully recovered from his PNA  Would also recommend testing for WILFREDO as his body habitus and hx of snoring suggest      He will follow up with Dr Juliet Krishnamurthy on 9/2/2020 at [facility] office  2  PNA/acute resp failure: now extubated  3  HTN: BP elevated; Avg 144/87; home Norvasc and lisinopril held in setting of sepsis; monitor BP with addition and up-titration of BB  Would suggest resuming lisinopril first (as Cr allows) as he has indication of DM  4  Acute renal failure: Cr improving; down to 0 93 today from peak 1 59 on 8/12  5  Dm Type II  6  Rhabdomyolysis       Subjective: Pt seen for follow up; no events overnight  He reports feeling well today  He remains in afib; not feeling any palpitations  Has been moved out of critical care unit  Review of Systems   Constitution: Negative for chills, decreased appetite and fever  Cardiovascular: Negative for chest pain, dyspnea on exertion, irregular heartbeat, leg swelling, orthopnea, palpitations and syncope  Respiratory: Positive for snoring  Negative for cough and shortness of breath  Gastrointestinal: Negative for abdominal pain, nausea and vomiting  Genitourinary: Negative for dysuria  Neurological: Negative for dizziness and light-headedness     Psychiatric/Behavioral: Negative for altered mental status  All other systems reviewed and are negative  Objective:   Vitals: Blood pressure 153/95, pulse 100, temperature 97 5 °F (36 4 °C), resp  rate 18, height 5' 6" (1 676 m), weight 120 kg (264 lb 4 8 oz), SpO2 91 % ,     Body mass index is 42 66 kg/m²  ,     Systolic (88WSJ), RWP:659 , Min:119 , OYM:926     Diastolic (09TCK), VZO:15, Min:78, Max:103      Intake/Output Summary (Last 24 hours) at 8/17/2020 0949  Last data filed at 8/17/2020 0301  Gross per 24 hour   Intake 760 ml   Output 301 ml   Net 459 ml     Weight (last 2 days)     None        Telemetry Review: Atrial fibrillation; rates 100-110s; no bradycardia    Physical Exam  Vitals signs reviewed  Constitutional:       General: He is not in acute distress  Appearance: He is obese  He is not diaphoretic  HENT:      Head: Normocephalic and atraumatic  Neck:      Musculoskeletal: Normal range of motion  Cardiovascular:      Rate and Rhythm: Normal rate  Rhythm irregular  Heart sounds: No murmur  Pulmonary:      Effort: Pulmonary effort is normal       Breath sounds: No wheezing or rales  Comments: BS decreased; no rales; no cough or resp distress  Abdominal:      Comments: Obese  Soft, NT   Musculoskeletal:         General: No swelling  Skin:     General: Skin is warm and dry  Neurological:      General: No focal deficit present  Mental Status: He is alert and oriented to person, place, and time     Psychiatric:         Mood and Affect: Mood normal          Behavior: Behavior normal        LABORATORY RESULTS  Results from last 7 days   Lab Units 08/15/20  0448 08/14/20  1231 08/14/20  0519 08/12/20  2156 08/12/20  1928 08/12/20  1650   CK TOTAL U/L 1,692* 3,753* 5,452*  --   --   --    TROPONIN I ng/mL  --   --   --  0 32* 0 35* 0 38*   CK MB INDEX % <1 0 <1 0 <1 0  --   --   --      CBC with diff:   Results from last 7 days   Lab Units 08/17/20  0457 08/16/20  0437 08/15/20  0448 08/14/20  7859 08/13/20  1657 08/13/20  0506 08/12/20  2156 08/12/20  1928 08/12/20  1650   WBC Thousand/uL 7 77 8 82 10 75* 18 04*  --  14 34*  --   --  15 53*   HEMOGLOBIN g/dL 12 3 11 9* 11 8* 12 7  --  14 7  --   --  15 4   I STAT HEMOGLOBIN g/dl  --   --   --   --  13 3  --   --   --   --    HEMATOCRIT % 37 0 35 6* 34 7* 37 9  --  43 1  --   --  43 5   HEMATOCRIT, ISTAT %  --   --   --   --  39  --   --   --   --    MCV fL 86 86 85 86  --  85  --   --  84   PLATELETS Thousands/uL 247 194 170 245  --  228 217 206 258   MCH pg 28 7 28 6 28 8 28 9  --  28 8  --   --  29 8   MCHC g/dL 33 2 33 4 34 0 33 5  --  34 1  --   --  35 4   RDW % 16 6* 16 6* 16 2* 15 7*  --  15 3*  --   --  15 2*   MPV fL 10 9 11 2 10 1 10 7  --  10 7 9 9 9 4 10 1   NRBC AUTO /100 WBCs 0 0  --   --   --  0  --   --  0     CMP:  Results from last 7 days   Lab Units 08/17/20  0457 08/16/20  1300 08/16/20  0437 08/15/20  0448 08/14/20  0519 08/13/20  1715 08/13/20  1657 08/13/20  0506 08/12/20  1650   POTASSIUM mmol/L 3 5 3 6 3 4* 3 5 3 6 3 1*  --  3 2* 3 2*   CHLORIDE mmol/L 103 102 105 106 101 101  --  98* 94*   CO2 mmol/L 29 27 25 21 18* 21  --  20* 21   CO2, I-STAT mmol/L  --   --   --   --   --   --  24  --   --    BUN mg/dL 17 21 22 24 26* 22  --  21 19   CREATININE mg/dL 0 93 1 12 1 04 1 32* 1 54* 1 49*  --  1 44* 1 59*   GLUCOSE, ISTAT mg/dl  --   --   --   --   --   --  170*  --   --    CALCIUM mg/dL 7 9* 7 5* 7 6* 7 3* 7 6* 7 9*  --  7 7* 8 6   AST U/L  --   --  71*  --   --   --   --  157* 156*   ALT U/L  --   --  29  --   --   --   --  27 24   ALK PHOS U/L  --   --  75  --   --   --   --  74 80   EGFR ml/min/1 73sq m 87 70 76 57 47 49  --  51 46     BMP:  Results from last 7 days   Lab Units 08/17/20  0457 08/16/20  1300 08/16/20  0437 08/15/20  0448 08/14/20  0519 08/13/20  1715 08/13/20  1657 08/13/20  0506   POTASSIUM mmol/L 3 5 3 6 3 4* 3 5 3 6 3 1*  --  3 2*   CHLORIDE mmol/L 103 102 105 106 101 101  --  98*   CO2 mmol/L 29 27 25 21 18* 21 --  20*   CO2, I-STAT mmol/L  --   --   --   --   --   --  24  --    BUN mg/dL 17 21 22 24 26* 22  --  21   CREATININE mg/dL 0 93 1 12 1 04 1 32* 1 54* 1 49*  --  1 44*   GLUCOSE, ISTAT mg/dl  --   --   --   --   --   --  170*  --    CALCIUM mg/dL 7 9* 7 5* 7 6* 7 3* 7 6* 7 9*  --  7 7*      Results from last 7 days   Lab Units 20  0457 20  1300 08/15/20  0448 20  0519 20  0506   MAGNESIUM mg/dL 2 0 2 1 2 4 2 3 2 3     Results from last 7 days   Lab Units 20  1928   HEMOGLOBIN A1C % 8 7*     Results from last 7 days   Lab Units 20  1715 20  0506   TSH 3RD GENERATON uIU/mL  --  0 118*   FREE T4 ng/dL 1 45  --      Results from last 7 days   Lab Units 20  1650   INR  1 23*     Lipid Profile:   Lab Results   Component Value Date    CHOL 218 (H) 2017    CHOL 199 2016    CHOL 204 (H) 2016     Lab Results   Component Value Date    HDL 43 2019    HDL 31 (L) 2019    HDL 42 2017     Lab Results   Component Value Date    LDLCALC 127 (H) 2019    LDLCALC 112 (H) 2019     Lab Results   Component Value Date    TRIG 96 2019    TRIG 153 (H) 2019    TRIG 118 2017     Cardiac testing:   Results for orders placed during the hospital encounter of 20   Echo complete with contrast if indicated    Narrative [facility]  [address]  [phone]    Transthoracic Echocardiogram  Limited 2D, M-mode, Doppler, and Color Doppler    Study date:  14-Aug-2020    Patient: Ricky Leal  MR number: [id number]  Account number: [de-identified]  : 1955  Age: 63 years  Gender: Male  Status: Inpatient  Location: Bedside  Height: 66 in  Weight: 264 lb  BP: 112/ 68 mmHg    Indications: Hypertensive heart disease      Diagnoses: I11 9 - Hypertensive heart disease without heart failure    Sonographer:  Roge Wheeler  Primary Physician:  Steffanie Osler, MD  Referring Physician:  Lalo Marie DO  Group:  [facility]  Interpreting Physician:  Collette Nail, MD    SUMMARY    LEFT VENTRICLE:  Systolic function was normal  Ejection fraction was estimated to be 60 %  There were no regional wall motion abnormalities  LEFT ATRIUM:  The atrium was dilated  MITRAL VALVE:  There was mild regurgitation  AORTIC VALVE:  There was mild regurgitation  PROCEDURE: The procedure was performed at the bedside  This was a routine study  The transthoracic approach was used  The study included limited 2D imaging, M-mode, limited spectral Doppler, and color Doppler  The heart rate was 70 bpm, at  the start of the study  Images were obtained from the parasternal, apical, and subcostal acoustic windows  Echocardiographic views were limited due to poor acoustic window availability, decreased penetration, and lung interference  This  was a technically difficult study  LEFT VENTRICLE: Size was normal  Systolic function was normal  Ejection fraction was estimated to be 60 %  There were no regional wall motion abnormalities  Wall thickness was normal  DOPPLER: Transmitral flow pattern: atrial fibrillation  The study was not technically sufficient to allow evaluation of LV diastolic function  RIGHT VENTRICLE: The size was normal  Systolic function was normal  Wall thickness was normal     LEFT ATRIUM: The atrium was dilated  RIGHT ATRIUM: Size was normal     MITRAL VALVE: Valve structure was normal  There was normal leaflet separation  DOPPLER: The transmitral velocity was within the normal range  There was no evidence for stenosis  There was mild regurgitation  AORTIC VALVE: The valve was trileaflet  Leaflets exhibited normal thickness and normal cuspal separation  DOPPLER: Transaortic velocity was within the normal range  There was no evidence for stenosis  There was mild regurgitation      TRICUSPID VALVE: The valve structure was normal  There was normal leaflet separation  DOPPLER: The transtricuspid velocity was within the normal range  There was no evidence for stenosis  There was trace regurgitation  PULMONIC VALVE: Leaflets exhibited normal thickness, no calcification, and normal cuspal separation  DOPPLER: The transpulmonic velocity was within the normal range  There was trace regurgitation  PERICARDIUM: There was no pericardial effusion  The pericardium was normal in appearance  AORTA: The root exhibited normal size  SYSTEMIC VEINS: IVC: The inferior vena cava was normal in size   Respirophasic changes were normal     SYSTEM MEASUREMENT TABLES    2D  %FS: 30 19 %  Ao Diam: 3 31 cm  EDV(Teich): 140 49 ml  EF(Teich): 57 %  ESV(Teich): 60 41 ml  IVSd: 0 89 cm  LA Area: 22 59 cm2  LA Diam: 4 59 cm  LVEDV MOD A4C: 180 71 ml  LVEF MOD A4C: 60 66 %  LVESV MOD A4C: 71 1 ml  LVIDd: 5 39 cm  LVIDs: 3 76 cm  LVLd A4C: 8 65 cm  LVLs A4C: 7 44 cm  LVPWd: 1 17 cm  RA Area: 19 37 cm2  RVIDd: 3 89 cm  SV MOD A4C: 109 61 ml  SV(Teich): 80 08 ml    CW  AR Dec [place]: 1 7 m/s2  AR Dec Time: 2501 23 ms  AR PHT: 725 36 ms  AR Vmax: 4 25 m/s  AR maxP 15 mmHg  AV Vmax: 1 08 m/s  AV maxP 69 mmHg    MM  TAPSE: 1 51 cm    PW  LVOT Vmax: 0 88 m/s  LVOT maxPG: 3 13 mmHg    [facility]    Prepared and electronically signed by    Jessica Emmanuel MD  Signed 14-Aug-2020 14:43:29       Meds/Allergies   all current active meds have been reviewed  Medications Prior to Admission   Medication    ACCU-CHEK LARRY PLUS test strip    ACCU-CHEK FASTCLIX LANCETS MISC    acetaminophen (TYLENOL) 500 mg tablet    amLODIPine (NORVASC) 5 mg tablet    aspirin (ECOTRIN LOW STRENGTH) 81 mg EC tablet    glipiZIDE (GLUCOTROL XL) 5 mg 24 hr tablet    ketoconazole (NIZORAL) 2 % cream    lisinopril (ZESTRIL) 20 mg tablet    metFORMIN (GLUCOPHAGE-XR) 500 mg 24 hr tablet     Assessment:  Principal Problem:    Community acquired pneumonia of left lower lobe of lung  Active Problems:    Essential hypertension    Diabetes mellitus type 2 in obese (HCC)    Non-MI Troponin Elevation    Rhabdomyolysis    New onset atrial fibrillation (HCC)    LAURENCE (acute kidney injury) ([facility] [address] )    Counseling / Coordination of Care  Total floor / unit time spent today 20 minutes  Greater than 50% of total time was spent with the patient and / or family counseling and / or coordination of care  ** Please Note: Dragon 360 Dictation voice to text software may have been used in the creation of this document   **

## 2020-08-17 NOTE — PLAN OF CARE
Problem: OCCUPATIONAL THERAPY ADULT  Goal: Performs self-care activities at highest level of function for planned discharge setting  See evaluation for individualized goals  Description: Treatment Interventions: ADL retraining, Functional transfer training, Endurance training, UE strengthening/ROM, Cognitive reorientation, Patient/family training, Equipment evaluation/education, Compensatory technique education, Continued evaluation, Energy conservation, Activityengagement          See flowsheet documentation for full assessment, interventions and recommendations  Note: Limitation: Decreased ADL status, Decreased Safe judgement during ADL, Decreased endurance, Decreased self-care trans, Decreased high-level ADLs  Prognosis: Fair  Assessment: Pt seen for a re-evaluation 2* to ICU transfer to 8th floor/extubation on 8/15/20  Pt is a 63 y o  male who was [facility] to [facility] on 8/12/2020 with Community acquired pneumonia of left lower lobe of lung , diabetes, non-MI troponin elevation, rhabdomyolysis, new onset of a-fib, LAURENCE , HTN Pt's problem list also includes PMH of vitamin D defiency, morbid obesity, osteoarthritis of left knee, bladder wall thickening, s/p left knee replacement 8/19  At baseline pt was completing I ADL's/IADL's, no AD with functional ambulation, +drives, retired  Pt lives with daughter in a multi-story town home with 10STE, additional FF of stairs to bed/bathroom  Currently pt requires min a UB, mod a LB for overall ADLS and min a with RW for functional mobility/transfers  Pt currently presents with impairments in the following categories -difficulty performing ADLS and difficulty performing IADLS  activity tolerance, endurance and standing balance/tolerance   These impairments, as well as pt's fatigue, pain, decreased caregiver support and risk for falls  limit pt's ability to safely engage in all baseline areas of occupation, includingbathing, dressing, toileting, functional mobility/transfers, community mobility, care of pets , laundry , driving, house maintenance, medication management, meal prep, cleaning, social participation  and leisure activities  From OT standpoint, recommend  upon D/C  OT will continue to follow to address the below stated goals  OT Discharge Recommendation: Post-Acute Rehabilitation Services  OT - OK to Discharge:  Yes

## 2020-08-17 NOTE — OCCUPATIONAL THERAPY NOTE
Occupational Therapy Re-Evaluation     Patient Name: Rebecca Johnson  Today's Date: 8/17/2020  Problem List  Principal Problem:    Community acquired pneumonia of left lower lobe of lung  Active Problems:    Essential hypertension    Diabetes mellitus type 2 in obese ([facility] [address] )    Non-MI Troponin Elevation    Rhabdomyolysis    New onset atrial fibrillation ([facility] [address] )    LAURENCE (acute kidney injury) [facility])    Past Medical History  Past Medical History:   Diagnosis Date    Arthritis     knees    Diabetes mellitus ([facility] [address] )     Hypertension     Measles, [place] (rubella)     Mumps     Snoring     Loudly     Past Surgical History  Past Surgical History:   Procedure Laterality Date    CYSTOSCOPY      KNEE ARTHROSCOPY Left     knee    ORIF PELVIC FRACTURE      TOTAL KNEE ARTHROPLASTY Left 6/10/2019    Procedure: ARTHROPLASTY KNEE TOTAL;  Surgeon: Ning Tan MD;  Location: [facility];  Service: Orthopedics             08/17/20 0845   Note Type   Note type Re-eval   Restrictions/Precautions   Weight Bearing Precautions Per Order No   Other Precautions Cognitive; Chair Alarm; Bed Alarm;Telemetry; Fall Risk   Pain Assessment   Pain Assessment Tool Pain Assessment not indicated - pt denies pain   Pain Score No Pain   Home Living   Type of Home House   Home Layout Multi-level;Bed/bath upstairs; 10 Stairs to enter with rails, full flight to bed/bathroom   Bathroom Shower/Tub Tub/shower unit   Bathroom Toilet Standard   Bathroom Accessibility Accessible   Home Equipment Walker;Cane   Prior Function   Level of [place] Independent with ADLs and functional mobility   Lives With Daughter   Receives Help From Family   ADL Assistance Independent   IADLs Independent   Falls in the last 6 months 1 to 4   Vocational Retired   Lifestyle   Autonomy Pt with ADLS, shares homemaking/ IADL'S with daughter, functional mobility without device PTA  (+)    Reciprocal Relationships Daughter   Service to Others Retired   ADL   Eating Assistance 7  Independent   Grooming Assistance 5  Supervision/Setup   UB Bathing Assistance 4  Minimal Assistance   LB Bathing Assistance 3  Moderate Assistance   UB Dressing Assistance 4  Minimal Assistance   LB Dressing Assistance 3  Moderate Assistance   LB Dressing Deficit Don/doff L sock  (able to don left sock with LE supported on bed)   Toileting Assistance  2  Maximal Assistance   Bed Mobility   Supine to Sit 5  Supervision   Additional items Assist x 1   Additional Comments pt left in chair with all needs met and alarm activated   Transfers   Sit to Stand 4  Minimal assistance   Additional items Assist x 1   Stand to Sit 4  Minimal assistance   Additional items Assist x 1   Stand pivot 4  Minimal assistance   Additional items Assist x 1  (+RW bed>chair verbal cues for safe hand placement)   Functional Mobility   Functional Mobility 4  Minimal assistance   Additional Comments min a x 1 with 3 small steps to chair, pt limited by afib heart rate fluctuating between  bmp pt asymptomatic-RN aware   Additional items Rolling walker   Balance   Static Sitting Good   Dynamic Sitting Fair +   Static Standing Fair -   Dynamic Standing Poor +   Ambulatory Poor +   Activity Tolerance   Activity Tolerance Patient limited by fatigue;Treatment limited secondary to medical complications (Comment)  (a-fib, heart rate )   Medical Staff Made Aware PT Diane   Nurse Made Aware RN cleared pt for therapy   RUE Assessment   RUE Assessment WFL   LUE Assessment   LUE Assessment WFL   Hand Function   Gross Motor Coordination Functional   Fine Motor Coordination Functional   Cognition   Overall Cognitive Status Impaired   Arousal/Participation Alert; Cooperative   Attention Attends with cues to redirect   Orientation Level Oriented to person;Oriented to time;Disoriented to time  (stating "cedar crest")   Memory Decreased recall of precautions;Decreased recall of recent events;Decreased short term memory   Following Commands Follows one step commands with increased time or repetition   Comments Pt alert and oriented to self, date, not place, impaired immediate recall when reoriented and asked again post 5 minutes, unable to answer correctly  Continue to formally assess  Assessment   Limitation Decreased ADL status; Decreased Safe judgement during ADL;Decreased endurance;Decreased self-care trans;Decreased high-level ADLs   Prognosis Fair   Assessment Pt seen for a re-evaluation 2* to ICU transfer intubated/extubation on 8/15/20  Pt is a 63 y o  male who was admitted to [facility] on 8/12/2020 with Community acquired pneumonia of left lower lobe of lung , diabetes, non-MI troponin elevation, rhabdomyolysis, new onset of a-fib, LAURENCE , HTN Pt's problem list also includes PMH of vitamin D defiency, morbid obesity, osteoarthritis of left knee, bladder wall thickening, s/p left knee replacement 8/19  At baseline pt was completing I ADL's/IADL's, no AD with functional ambulation, +drives, retired  Pt lives with daughter in a multi-story town home with 10STE, additional FF of stairs to bed/bathroom  Currently pt requires min a UB, mod a LB for overall ADLS and min a with RW for functional mobility/transfers  Pt currently presents with impairments in the following categories -difficulty performing ADLS and difficulty performing IADLS  activity tolerance, endurance and standing balance/tolerance  These impairments, as well as pt's fatigue, pain, decreased caregiver support and risk for falls  limit pt's ability to safely engage in all baseline areas of occupation, includingbathing, dressing, toileting, functional mobility/transfers, community mobility, care of pets , laundry , driving, house maintenance, medication management, meal prep, cleaning, social participation  and leisure activities  From OT standpoint, recommend STR upon D/C  OT will continue to follow to address the below stated goals      Goals   Patient Goals use the bathroom vs commode   LTG Time Frame 10-14   Long Term Goal #1 see goals below   Plan   Treatment Interventions ADL retraining;Functional transfer training; Endurance training;UE strengthening/ROM; Patient/family training;Cognitive reorientation; Compensatory technique education; Energy conservation; Activityengagement   Goal Expiration Date 08/31/20   OT Treatment Day 1   OT Frequency 3-5x/wk   Additional Treatment Session   Start Time 0901   End Time 0911       Recommendation   OT Discharge Recommendation Post-Acute Rehabilitation Services   OT - OK to Discharge Yes   Barthel Index   Feeding 10   Bathing 0   Grooming Score 5   Dressing Score 5   Bladder Score 10   Bowels Score 10   Toilet Use Score 5   Transfers (Bed/Chair) Score 10   Mobility (Level Surface) Score 0   Stairs Score 0   Barthel Index Score 55   Modified Glenn Scale   Modified Rachelle Scale 4     OT treatment session:  Pt seen for an additional OT treatment session with focus on unsupported EOB sitting tolerance, activity tolerance, transfers  Pt in a-fib with heart rate up to 137 bmp, pt asymptomatic, RN aware  Pt limited mobility and functional tasks this session 2* to a-fib, fluctuating heart rate  From OT standpoint recommend STR   Pt continues to function below baseline levels occupational performance remains limited with the above noted deficits  Continue plan of care to maximize functional I with all ADL's/mobility    Lizabeth OLIVER, OTR/L

## 2020-08-17 NOTE — PHYSICAL THERAPY NOTE
Physical Therapy Re-Evaluation    Patient's Name: Blanca Cervantes    Admitting Diagnosis  UTI (urinary tract infection) [N39 0]  Altered mental status [R41 82]  Pneumonia [J18 9]  Elevated troponin [R79 89]  Sepsis ([facility] ) [A41 9]    Problem List  Patient Active Problem List   Diagnosis    Essential hypertension    Diabetes mellitus type 2 in obese ([facility] )    Vitamin D deficiency    Morbid obesity ([facility]    Osteoarthritis of left knee    Bladder wall thickening    Status post left knee replacement    Community acquired pneumonia of left lower lobe of lung    Non-MI Troponin Elevation    Rhabdomyolysis    New onset atrial fibrillation ([facility] )    [facility]       Past Medical History  Past Medical History:   Diagnosis Date    Arthritis     knees    Diabetes mellitus ([facility]     Hypertension     Measles, [place] (rubella)     Mumps     Snoring     Loudly       Past Surgical History  Past Surgical History:   Procedure Laterality Date    CYSTOSCOPY      KNEE ARTHROSCOPY Left     knee    ORIF PELVIC FRACTURE      TOTAL KNEE ARTHROPLASTY Left 6/10/2019    Procedure: ARTHROPLASTY KNEE TOTAL;  Surgeon: Sebastián Garcia MD;  Location: [facility];  Service: Orthopedics        08/17/20 0903   Note Type   Note type Re-eval   Pain Assessment   Pain Assessment Tool Pain Assessment not indicated - pt denies pain   Pain Score No Pain   Home Living   Type of [address] Multi-level;Bed/bath upstairs;Stairs to enter with rails  (10-15 DEVI)   Bathroom Shower/Tub Tub/shower unit   Bathroom Toilet Standard   Home Equipment Walker;Cane   Prior Function   Level of [place] Independent with ADLs and functional mobility   Lives With Daughter   ADL Assistance Independent   IADLs Independent   Falls in the last 6 months 1 to 4   Comments Pt reports fall occured while unable to stand from bed, slid off the bed    Pt reports owning 4 hale retrievers   Restrictions/Precautions   Weight Bearing Precautions Per Order No   Other Precautions Cognitive; Bed Alarm; Chair Alarm; Fall Risk;Telemetry   General   Family/Caregiver Present No   Cognition   Overall Cognitive Status Impaired   Arousal/Participation Alert   Attention Attends with cues to redirect   Orientation Level Oriented to person;Oriented to time;Disoriented to place; Disoriented to situation   Memory Decreased recall of recent events;Decreased recall of precautions   Following Commands Follows one step commands with increased time or repetition   Comments Pt uses sarcasm to mask confusion  RLE Assessment   RLE Assessment WFL   Strength RLE   RLE Overall Strength 4/5   LLE Assessment   LLE Assessment WFL   Strength LLE   LLE Overall Strength 4/5   Light Touch   RLE Light Touch Grossly intact   LLE Light Touch Grossly intact   Bed Mobility   Supine to Sit 5  Supervision   Additional items Assist x 1   Transfers   Sit to Stand 4  Minimal assistance   Additional items Assist x 1; Increased time required;Verbal cues   Stand to Sit 4  Minimal assistance   Additional items Assist x 1; Increased time required;Verbal cues   Additional Comments Performed from EOB, cues for hand placement, control  HR elevated to 133 bpm with standing activity, GONSALEZ   Ambulation/Elevation   Gait pattern Excessively slow; Foward flexed;Decreased foot clearance   Gait Assistance 4  Minimal assist   Additional items Assist x 1   Assistive Device Rolling walker   Distance 2 ft, lateral steps toward HOB  Limited by GONSALEZ and tachycardia   Stair Management Assistance Not tested   Balance   Static Sitting Good   Dynamic Sitting Fair -   Static Standing Fair -   Dynamic Standing Poor +   Ambulatory Poor +   Activity Tolerance   Activity Tolerance Patient limited by fatigue   Medical Staff Made Aware OT, Trang   Nurse Made Aware RN updated  Chair alarm intact   Assessment   Prognosis Good   Problem List Decreased strength;Decreased endurance; Impaired balance;Decreased mobility; Decreased cognition;Decreased safety awareness; Obesity   Assessment Pt seen for PT re-evaluation after change in medical status  Pt with medical decline, required transfer to ICU, intubated, extubated on 8/15, new onset Afib  Prior to admission, pt was independent with all mobility  Currently, pt requires supervision for bed mobility, Jennifer for transfers and Jennifer for ambulation 2' with RW  Pt limited by tachycardia and GONSALEZ  Pt will benefit from continued skilled PT intervention during course of hospital stay to improve strength, endurance and balance to improve safety with functional mobility  Recommend IP rehab upon hospital D/C due to poor endurance and need for cardiovascular training prior to discharge home with 10-15 DEVI  Barriers to Discharge Inaccessible home environment;Decreased caregiver support   Goals   Patient Goals to walk   STG Expiration Date 08/27/20   Short Term Goal #1 In 10 days pt will be able to: 1  Demonstrate ability to perform all aspects of bed mobility independently to increase functional independence  2  Perform functional transfers with independently to facilitate safe return to previous living environment  3   Ambulate 150 ft with LRAD independently with stable vitals to improve safety with household distances and reduce fall risk  4  Climb 15 steps with supervision and 1 HR to simulate entrance to home  5  Improve LE strength grades by 1 to increase ease of functional mobility with transfers and gait  6  Pt will demonstrate improved balance by one grade order to decrease risk of falls  Plan   Treatment/Interventions Functional transfer training;LE strengthening/ROM; Therapeutic exercise; Endurance training;Cognitive reorientation;Patient/family training;Elevations; Equipment eval/education; Bed mobility;Gait training   PT Frequency Other (Comment)  (3-5x/week)   Recommendation   PT Discharge Recommendation Post-Acute Rehabilitation Services   PT - OK to Discharge Yes  (to IP rehab) Barthel Index   Feeding 10   Bathing 0   Grooming Score 5   Dressing Score 5   Bladder Score 10   Bowels Score 10   Toilet Use Score 5   Transfers (Bed/Chair) Score 10   Mobility (Level Surface) Score 0   Stairs Score 0   Barthel Index Score 55     Treatment:  08:53-09:03  GT: Ambulation 4 ft with RW, Jennifer to bedside chair  VC's for proximity to RW, pacing, proximity to surface prior to sitting  Pt with continued tachycardia, unable to progress further ambulation, although pt motivated to improve  RN reports just received medications  Chair alarm intact  Encouraged OOB to chair 3x/day with RN staff, pt verbalized understanding  -133 bpm with activity, recovered to 109 with seated rest break, pt denies any symptoms except SUNSHINE Leal, PT, DPT

## 2020-08-18 VITALS
WEIGHT: 264.3 LBS | TEMPERATURE: 98.3 F | BODY MASS INDEX: 42.47 KG/M2 | HEIGHT: 66 IN | SYSTOLIC BLOOD PRESSURE: 117 MMHG | DIASTOLIC BLOOD PRESSURE: 82 MMHG | OXYGEN SATURATION: 92 % | RESPIRATION RATE: 17 BRPM | HEART RATE: 76 BPM

## 2020-08-18 LAB
BACTERIA BLD CULT: NORMAL
BACTERIA BLD CULT: NORMAL
GLUCOSE SERPL-MCNC: 104 MG/DL (ref 65–140)
GLUCOSE SERPL-MCNC: 122 MG/DL (ref 65–140)
PROCALCITONIN SERPL-MCNC: 1.5 NG/ML

## 2020-08-18 PROCEDURE — 99232 SBSQ HOSP IP/OBS MODERATE 35: CPT | Performed by: INTERNAL MEDICINE

## 2020-08-18 PROCEDURE — 99239 HOSP IP/OBS DSCHRG MGMT >30: CPT | Performed by: INTERNAL MEDICINE

## 2020-08-18 PROCEDURE — 97530 THERAPEUTIC ACTIVITIES: CPT

## 2020-08-18 PROCEDURE — 84145 PROCALCITONIN (PCT): CPT | Performed by: INTERNAL MEDICINE

## 2020-08-18 PROCEDURE — 97116 GAIT TRAINING THERAPY: CPT

## 2020-08-18 PROCEDURE — 82948 REAGENT STRIP/BLOOD GLUCOSE: CPT

## 2020-08-18 PROCEDURE — 97535 SELF CARE MNGMENT TRAINING: CPT

## 2020-08-18 RX ORDER — CEFDINIR 300 MG/1
300 CAPSULE ORAL EVERY 12 HOURS SCHEDULED
Status: DISCONTINUED | OUTPATIENT
Start: 2020-08-19 | End: 2020-08-18 | Stop reason: HOSPADM

## 2020-08-18 RX ORDER — METOPROLOL TARTRATE 50 MG/1
50 TABLET, FILM COATED ORAL EVERY 12 HOURS SCHEDULED
Qty: 60 TABLET | Refills: 0 | Status: SHIPPED | OUTPATIENT
Start: 2020-08-18 | End: 2020-09-03 | Stop reason: SDUPTHER

## 2020-08-18 RX ORDER — LISINOPRIL 10 MG/1
10 TABLET ORAL DAILY
Qty: 30 TABLET | Refills: 0 | Status: SHIPPED | OUTPATIENT
Start: 2020-08-18 | End: 2020-09-03 | Stop reason: SDUPTHER

## 2020-08-18 RX ORDER — METOPROLOL TARTRATE 50 MG/1
50 TABLET, FILM COATED ORAL EVERY 12 HOURS SCHEDULED
Status: DISCONTINUED | OUTPATIENT
Start: 2020-08-18 | End: 2020-08-18 | Stop reason: HOSPADM

## 2020-08-18 RX ORDER — AMOXICILLIN AND CLAVULANATE POTASSIUM 875; 125 MG/1; MG/1
1 TABLET, FILM COATED ORAL EVERY 12 HOURS SCHEDULED
Qty: 6 TABLET | Refills: 0 | Status: SHIPPED | OUTPATIENT
Start: 2020-08-18 | End: 2020-08-21

## 2020-08-18 RX ADMIN — METOPROLOL TARTRATE 50 MG: 50 TABLET, FILM COATED ORAL at 08:06

## 2020-08-18 RX ADMIN — APIXABAN 5 MG: 5 TABLET, FILM COATED ORAL at 08:06

## 2020-08-18 RX ADMIN — ACETAMINOPHEN 975 MG: 650 SUSPENSION ORAL at 11:49

## 2020-08-18 RX ADMIN — METRONIDAZOLE 500 MG: 500 TABLET ORAL at 05:03

## 2020-08-18 RX ADMIN — DOCUSATE SODIUM AND SENNOSIDES 1 TABLET: 8.6; 5 TABLET ORAL at 08:06

## 2020-08-18 RX ADMIN — ASPIRIN 81 MG 81 MG: 81 TABLET ORAL at 08:06

## 2020-08-18 NOTE — SOCIAL WORK
Dr Jw Quarles met with family  Pt will be d/c home today  Cardiology providing Eliquis  Cm will assist with Augmentin, lopressor, and Zestril for d/c home  Cost $29 79  Daughter Myranda Kaye will transport home  CM had spoken to Myranda Kaye this morning and then updated her again after Dr Jw Quarles decided to d/c  Family will transport home      Cm educated the patient and the family about [facility] and [address] $4 list

## 2020-08-18 NOTE — DISCHARGE SUMMARY
Discharge- Rigoberto Gloss 1955, 63 y o  male MRN: [id number]    [facility]: [id number]    Primary Care Provider: Geovany Smith MD   Date and time admitted to hospital: 8/12/2020  4:15 PM        * Community acquired pneumonia of left lower lobe of lung  Assessment & Plan  Improved  Chest x-ray with Left perihilar/lower lobe alveolar opacification suspicious for pneumonia  Patient presented with fever, tachycardia, change in mental status and leukocytosis with elevated lactic acid  Transition to p o  Augmentin to complete 7 day course of antibiotics  Monitor closely    Acute respiratory failure with hypoxia and hypercapnia (HCC)  Assessment & Plan  Resolving  Monitor closely  Outpatient sleep study strongly recommended    New onset atrial fibrillation ([facility] )  Assessment & Plan  New onset atrial fibrillation  Continue metoprolol  Eliquis anticoagulation  Cardiology following    Non-MI Troponin Elevation  Assessment & Plan  Likely non MI elevated troponin secondary to sepsis  Continue to monitor    Diabetes mellitus type 2 in obese [facility])  Assessment & Plan  Lab Results   Component Value Date    HGBA1C 8 7 (H) 08/12/2020       Recent Labs     08/17/20  1617 08/17/20  2139 08/18/20  0755 08/18/20  1108   POCGLU 117 174* 122 104       Blood Sugar Average: Last 72 hrs:  (P) 332 [id number]   Patient noncompliant with home medications  Received insulin while inpatient  Titrate based on Accu-Chek    Medication compliance discussed with the patient    Continue glipizide and metformin at discharge  Outpatient follow-up with primary care physician    Essential hypertension  Assessment & Plan  Patient is not compliant with his home regimen  Patient is placed on metoprolol for atrial fibrillation  At home on amlodipine lisinopril, hold amlodipine continue him lisinopril 10 mg p o   Daily  Continue to monitor  Medication compliance discussed        Discharge Summary - [facility]    Patient Information: Jarred Jolly 63 y o  male MRN: [id number]  Unit/Bed#: [facility] 825-01 Encounter: [id number]    Discharging Physician / Practitioner: Pardeep Harden MD  PCP: Mau Oshea MD  Admission Date: 8/12/2020  Discharge Date: 08/18/20    Disposition:     Home    Reason for Admission:  Altered mental status    Discharge Diagnoses:     Principal Problem:    Community acquired pneumonia of left lower lobe of lung  Active Problems:    Essential hypertension    Diabetes mellitus type 2 in obese ([facility] )    Non-MI Troponin Elevation    Rhabdomyolysis    New onset atrial fibrillation ([facility] )    LAURENCE (acute kidney injury) ([facility] )    Acute respiratory failure with hypoxia and hypercapnia ([facility] )  Resolved Problems:    Sepsis ([facility] )    Hyponatremia    Acute renal failure (ARF) ([facility] )    Metabolic encephalopathy    Acute respiratory failure with hypoxia ([facility] )      Consultations During Hospital Stay:  · Cardiology    Procedures Performed:     · Chest x-ray mild cardiomegaly, left lower lobe opacification suspicious for pneumonia  · CT head no acute interval abnormality  · CT cervical spine no acute fracture subluxation cervical spine  · CT chest left lower lobe pneumonia  · 2D echo EF 60%, no regional wall motion abnormalities      Hospital Course:     Jarred Jolly is a 63 y o  male patient who originally presented to the hospital on 8/12/2020 due to altered mental status  Patient presented with altered mental status ambulatory dysfunction poor p o  Intake  He was noted to have acute hypoxic and hypercapnic respiratory failure, he had to be intubated on was admitted to ICU  After stabilization patient was extubated and transferred to medical-surgical floor  Patient was noted to have left lower lobe pneumonia, he responded well to antibiotics she has been transitioned to p o  Augmentin to complete 7 days of antibiotics    Patient is clinically and symptomatically improved hemodynamically stable maintaining O2 sats on ambient air  He was also noted to have new onset atrial fibrillation has been placed on metoprolol 50 mg b i d , Eliquis for anticoagulation  He is recommended to follow-up with cardiology as outpatient  He has history of diabetes and hypertension however he has history of medication noncompliance  Compliance with medications follow-up with primary care physician strongly recommended he verbalized understanding  In outpatient sleep study strongly recommended given his obesity, atrial fibrillation  He verbalized understanding    Patient is symptomatically improved hemodynamically stable continue O2 sats on ambient air and is noted deemed ready for discharge  Kindly review the chart for details    Condition at Discharge: fair     Discharge Day Visit / Exam:     Subjective:      Sitting up in chair  Reports feeling better  Requesting discharge    Vitals: Blood Pressure: 117/82 (08/18/20 1142)  Pulse: 76 (08/18/20 1142)  Temperature: 98 3 °F (36 8 °C) (08/18/20 1142)  Temp Source: Oral (08/18/20 0804)  Respirations: 17 (08/18/20 1142)  Height: 5' 6" (167 6 cm) (08/15/20 1645)  Weight - Scale: 120 kg (264 lb 4 8 oz) (08/12/20 2130)  SpO2: 92 % (08/18/20 1142)  Exam:   Physical Exam    Comfortably sitting up in chair  Morbidly obese  Short thick neck  Lungs diminished breath sounds bilateral  Heart sounds S1-S2 noted  Abdomen soft nontender  Abdominal obesity noted  Awake alert obeys simple commands  No rash    Discharge instructions/Information to patient and family:   See after visit summary for information provided to patient and family        Discharge plan discussed with Cardiology  Discharge plan discussed the patient, daughter at bedside discussed in detail questions answered  Outpatient follow-up with Cardiology, primary care physician  Follow-up Chest imaging studies in 4-6 weeks to document resolution of pneumonia in coordination with primary care physician    Provisions for Follow-Up Care:  See after visit summary for information related to follow-up care and any pertinent home health orders  Planned Readmission: no     Discharge Statement:  I spent 45 minutes discharging the patient  This time was spent on the day of discharge  I had direct contact with the patient on the day of discharge  Greater than 50% of the total time was spent examining patient, answering all patient questions, arranging and discussing plan of care with patient as well as directly providing post-discharge instructions  Additional time then spent on discharge activities  Discharge Medications:  See after visit summary for reconciled discharge medications provided to patient and family        ** Please Note: This note has been constructed using a voice recognition system **

## 2020-08-18 NOTE — PROGRESS NOTES
General Cardiology   Progress Note -  [employer]   Aleksandra Burr 63 y o  male MRN: [id number]    Unit/Bed#: [facility] 825-01 Encounter: [id number]    Assessment/ Plan:    1  New onset atrial fibrillation: This occurred in setting of sepsis/PNA/intubation  He remains in afib with mildly elevated rates; 100-110s; increase metoprolol to 50mg BID  Echo with preserved LV systolic function; dilated LA  AC with eliquis 5mg BID; he does not have rx coverage and eliquis costly; working on getting him 30 day supply of smaples from office; he has follow up on 9/2 with cardiology and in the meantime he will work on medical assistance and trying to obtain rx plan; he is aware that we cannot always supply him with samples and that ultimately he may have to transition to warfarin but this can be discussed at OP follow up  Recommend OP eval for WILFREDO as well       He will follow up with Dr Marielena Ruggiero on 9/2/2020 at [facility]       2  PNA/acute resp failure: now extubated  3  HTN: BP elevated; Avg 149/95; home Norvasc and lisinopril held in setting of sepsis; monitor BP with addition and up-titration of BB  Resume lisinipril when ok from IM due to his recent LAURENCE  4  Acute renal failure: Cr improving; down to 0 93 from peak 1 59 on 8/12  5  Dm Type II  6  Rhabdomyolysis     Subjective: pt seen for follow up; no events overnight  He reports feeling fine today  No chest pain, palpitations or SOB  Review of Systems   Constitution: Negative for chills, decreased appetite, diaphoresis and fever  Cardiovascular: Negative for chest pain, dyspnea on exertion, leg swelling, orthopnea, palpitations and syncope  Respiratory: Negative for cough, shortness of breath and wheezing  Gastrointestinal: Negative for abdominal pain, nausea and vomiting  Genitourinary: Negative for dysuria  Neurological: Negative for dizziness and light-headedness  Psychiatric/Behavioral: Negative for altered mental status     All other systems reviewed and are negative  Objective:   Vitals: Blood pressure 168/99, pulse 78, temperature 98 3 °F (36 8 °C), temperature source Oral, resp  rate 16, height 5' 6" (1 676 m), weight 120 kg (264 lb 4 8 oz), SpO2 94 %  ,     Body mass index is 42 66 kg/m²  ,     Systolic (26HLD), DGD:415 , Min:132 , YCA:834     Diastolic (80SLQ), JD, Min:75, Max:112      Intake/Output Summary (Last 24 hours) at 2020 0901  Last data filed at 2020 1822  Gross per 24 hour   Intake 220 ml   Output 600 ml   Net -380 ml     Weight (last 2 days)     None        Telemetry Review: Atrial fibrillation with intermittent RVR    Physical Exam  Vitals signs reviewed  Constitutional:       General: He is not in acute distress  Appearance: He is obese  He is not ill-appearing  HENT:      Head: Normocephalic and atraumatic  Cardiovascular:      Rate and Rhythm: Tachycardia present  Rhythm irregular  Heart sounds: S1 normal and S2 normal  No murmur  Pulmonary:      Effort: Pulmonary effort is normal       Breath sounds: Normal breath sounds  No wheezing or rales  Comments: On RA  Abdominal:      Palpations: Abdomen is soft  Musculoskeletal:         General: No swelling or tenderness  Right lower leg: No edema  Left lower leg: No edema  Neurological:      General: No focal deficit present  Mental Status: He is alert and oriented to person, place, and time     Psychiatric:         Mood and Affect: Mood normal          Behavior: Behavior normal        LABORATORY RESULTS  Results from last 7 days   Lab Units 08/15/20  0448 20  1231 20  0519 20  2156 20  1928 20  1650   CK TOTAL U/L 1,692* 3,753* 5,452*  --   --   --    TROPONIN I ng/mL  --   --   --  0 32* 0 35* 0 38*   CK MB INDEX % <1 0 <1 0 <1 0  --   --   --      CBC with diff:   Results from last 7 days   Lab Units 20  0457 20  0437 08/15/20  0448 20  0519 20  1657 20  0506 20  2156 08/12/20  1928 08/12/20  1650   WBC Thousand/uL 7 77 8 82 10 75* 18 04*  --  14 34*  --   --  15 53*   HEMOGLOBIN g/dL 12 3 11 9* 11 8* 12 7  --  14 7  --   --  15 4   I STAT HEMOGLOBIN g/dl  --   --   --   --  13 3  --   --   --   --    HEMATOCRIT % 37 0 35 6* 34 7* 37 9  --  43 1  --   --  43 5   HEMATOCRIT, ISTAT %  --   --   --   --  39  --   --   --   --    MCV fL 86 86 85 86  --  85  --   --  84   PLATELETS Thousands/uL 247 194 170 245  --  228 217 206 258   MCH pg 28 7 28 6 28 8 28 9  --  28 8  --   --  29 8   MCHC g/dL 33 2 33 4 34 0 33 5  --  34 1  --   --  35 4   RDW % 16 6* 16 6* 16 2* 15 7*  --  15 3*  --   --  15 2*   MPV fL 10 9 11 2 10 1 10 7  --  10 7 9 9 9 4 10 1   NRBC AUTO /100 WBCs 0 0  --   --   --  0  --   --  0       CMP:  Results from last 7 days   Lab Units 08/17/20  0457 08/16/20  1300 08/16/20  0437 08/15/20  0448 08/14/20  0519 08/13/20  1715 08/13/20  1657 08/13/20  0506 08/12/20  1650   POTASSIUM mmol/L 3 5 3 6 3 4* 3 5 3 6 3 1*  --  3 2* 3 2*   CHLORIDE mmol/L 103 102 105 106 101 101  --  98* 94*   CO2 mmol/L 29 27 25 21 18* 21  --  20* 21   CO2, I-STAT mmol/L  --   --   --   --   --   --  24  --   --    BUN mg/dL 17 21 22 24 26* 22  --  21 19   CREATININE mg/dL 0 93 1 12 1 04 1 32* 1 54* 1 49*  --  1 44* 1 59*   GLUCOSE, ISTAT mg/dl  --   --   --   --   --   --  170*  --   --    CALCIUM mg/dL 7 9* 7 5* 7 6* 7 3* 7 6* 7 9*  --  7 7* 8 6   AST U/L  --   --  71*  --   --   --   --  157* 156*   ALT U/L  --   --  29  --   --   --   --  27 24   ALK PHOS U/L  --   --  75  --   --   --   --  74 80   EGFR ml/min/1 73sq m 87 70 76 57 47 49  --  51 46       BMP:  Results from last 7 days   Lab Units 08/17/20  0457 08/16/20  1300 08/16/20  0437 08/15/20  0448 08/14/20  0519 08/13/20  1715 08/13/20  1657 08/13/20  0506   POTASSIUM mmol/L 3 5 3 6 3 4* 3 5 3 6 3 1*  --  3 2*   CHLORIDE mmol/L 103 102 105 106 101 101  --  98*   CO2 mmol/L 29 27 25 21 18* 21  --  20*   CO2, I-STAT mmol/L  --   --   -- --   --   --  24  --    BUN mg/dL 17 21 22 24 26* 22  --  21   CREATININE mg/dL 0 93 1 12 1 04 1 32* 1 54* 1 49*  --  1 44*   GLUCOSE, ISTAT mg/dl  --   --   --   --   --   --  170*  --    CALCIUM mg/dL 7 9* 7 5* 7 6* 7 3* 7 6* 7 9*  --  7 7*       No results found for: NTBNP          Results from last 7 days   Lab Units 20  0457 20  1300 08/15/20  0448 20  0519 20  0506   MAGNESIUM mg/dL 2 0 2 1 2 4 2 3 2 3          Results from last 7 days   Lab Units 20  1928   HEMOGLOBIN A1C % 8 7*          Results from last 7 days   Lab Units 20  1715 20  0506   TSH 3RD GENERATON uIU/mL  --  0 118*   FREE T4 ng/dL 1 45  --        Results from last 7 days   Lab Units 20  1650   INR  1 23*       Lipid Profile:   Lab Results   Component Value Date    CHOL 218 (H) 2017    CHOL 199 2016    CHOL 204 (H) 2016     Lab Results   Component Value Date    HDL 43 2019    HDL 31 (L) 2019    HDL 42 2017     Lab Results   Component Value Date    LDLCALC 127 (H) 2019    LDLCALC 112 (H) 2019     Lab Results   Component Value Date    TRIG 96 2019    TRIG 153 (H) 2019    TRIG 118 2017       Cardiac testing:   Results for orders placed during the hospital encounter of 20   Echo complete with contrast if indicated    Narrative [address]  [phone]    Transthoracic Echocardiogram  Limited 2D, M-mode, Doppler, and Color Doppler    Study date:  14-Aug-2020    Patient: Ricky Leal  MR number: [id number]  Account number: [de-identified]  : 1955  Age: 63 years  Gender: Male  Status: Inpatient  Location: Bedside  Height: 66 in  Weight: 264 lb  BP: 112/ 68 mmHg    Indications: Hypertensive heart disease      Diagnoses: I11 9 - Hypertensive heart disease without heart failure    Sonographer:  Roge Wheeler  Primary Physician:  Steffanie Osler, MD  Referring Physician:  Mary Ratliff DO  Group:  [id number]  Interpreting Physician:  Stefano Scott MD    SUMMARY    LEFT VENTRICLE:  Systolic function was normal  Ejection fraction was estimated to be 60 %  There were no regional wall motion abnormalities  LEFT ATRIUM:  The atrium was dilated  MITRAL VALVE:  There was mild regurgitation  AORTIC VALVE:  There was mild regurgitation  PROCEDURE: The procedure was performed at the bedside  This was a routine study  The transthoracic approach was used  The study included limited 2D imaging, M-mode, limited spectral Doppler, and color Doppler  The heart rate was 70 bpm, at  the start of the study  Images were obtained from the parasternal, apical, and subcostal acoustic windows  Echocardiographic views were limited due to poor acoustic window availability, decreased penetration, and lung interference  This  was a technically difficult study  LEFT VENTRICLE: Size was normal  Systolic function was normal  Ejection fraction was estimated to be 60 %  There were no regional wall motion abnormalities  Wall thickness was normal  DOPPLER: Transmitral flow pattern: atrial fibrillation  The study was not technically sufficient to allow evaluation of LV diastolic function  RIGHT VENTRICLE: The size was normal  Systolic function was normal  Wall thickness was normal     LEFT ATRIUM: The atrium was dilated  RIGHT ATRIUM: Size was normal     MITRAL VALVE: Valve structure was normal  There was normal leaflet separation  DOPPLER: The transmitral velocity was within the normal range  There was no evidence for stenosis  There was mild regurgitation  AORTIC VALVE: The valve was trileaflet  Leaflets exhibited normal thickness and normal cuspal separation  DOPPLER: Transaortic velocity was within the normal range  There was no evidence for stenosis  There was mild regurgitation      TRICUSPID VALVE: The valve structure was normal  There was normal leaflet separation  DOPPLER: The transtricuspid velocity was within the normal range  There was no evidence for stenosis  There was trace regurgitation  PULMONIC VALVE: Leaflets exhibited normal thickness, no calcification, and normal cuspal separation  DOPPLER: The transpulmonic velocity was within the normal range  There was trace regurgitation  PERICARDIUM: There was no pericardial effusion  The pericardium was normal in appearance  AORTA: The root exhibited normal size  SYSTEMIC VEINS: IVC: The inferior vena cava was normal in size  Respirophasic changes were normal     SYSTEM MEASUREMENT TABLES    2D  %FS: 30 19 %  Ao Diam: 3 31 cm  EDV(Teich): 140 49 ml  EF(Teich): 57 %  ESV(Teich): 60 41 ml  IVSd: 0 89 cm  LA Area: 22 59 cm2  LA Diam: 4 59 cm  LVEDV MOD A4C: 180 71 ml  LVEF MOD A4C: 60 66 %  LVESV MOD A4C: 71 1 ml  LVIDd: 5 39 cm  LVIDs: 3 76 cm  LVLd A4C: 8 65 cm  LVLs A4C: 7 44 cm  LVPWd: 1 17 cm  RA Area: 19 37 cm2  RVIDd: 3 89 cm  SV MOD A4C: 109 61 ml  SV(Teich): 80 08 ml    CW  AR Dec [place]: 1 7 m/s2  AR Dec Time: 2501 23 ms  AR PHT: 725 36 ms  AR Vmax: 4 25 m/s  AR maxP 15 mmHg  AV Vmax: 1 08 m/s  AV maxP 69 mmHg    MM  TAPSE: 1 51 cm    PW  LVOT Vmax: 0 88 m/s  LVOT maxPG: 3 13 mmHg    [facility]    Prepared and electronically signed by    Moy Anton MD  Signed 14-Aug-2020 14:43:29       No results found for this or any previous visit  No results found for this or any previous visit  No procedure found  No results found for this or any previous visit        Meds/Allergies   all current active meds have been reviewed  Medications Prior to Admission   Medication    ACCU-CHEK LARRY PLUS test strip    ACCU-CHEK FASTCLIX LANCETS MISC    acetaminophen (TYLENOL) 500 mg tablet    amLODIPine (NORVASC) 5 mg tablet    aspirin (ECOTRIN LOW STRENGTH) 81 mg EC tablet    glipiZIDE (GLUCOTROL XL) 5 mg 24 hr tablet    ketoconazole (NIZORAL) 2 % cream    lisinopril (ZESTRIL) 20 mg tablet    metFORMIN (GLUCOPHAGE-XR) 500 mg 24 hr tablet            Assessment:  Principal Problem:    Community acquired pneumonia of left lower lobe of lung  Active Problems:    Essential hypertension    Diabetes mellitus type 2 in obese (HCC)    Non-MI Troponin Elevation    Rhabdomyolysis    New onset atrial fibrillation ([facility] )    [facility]ENCE [facility]acute [facility]    Acute respiratory failure with hypoxia and hypercapnia ([facility] )    Counseling / Coordination of Care  Total floor / unit time spent today 20 minutes  Greater than 50% of total time was spent with the patient and / or family counseling and / or coordination of care  ** Please Note: Dragon 360 Dictation voice to text software may have been used in the creation of this document   **

## 2020-08-18 NOTE — PHYSICAL THERAPY NOTE
Physical Therapy Treatment Note    Patient's Name: Sapna Whittaker  : 20 1118   Pain Assessment   Pain Assessment Tool Pain Assessment not indicated - pt denies pain   Pain Score No Pain   Restrictions/Precautions   Weight Bearing Precautions Per Order No   Other Precautions Cognitive; Chair Alarm; Bed Alarm; Fall Risk;Telemetry   General   Chart Reviewed Yes   Family/Caregiver Present No   Cognition   Overall Cognitive Status Impaired   Arousal/Participation Alert; Cooperative   Attention Attends with cues to redirect   Orientation Level Oriented X4   Memory Decreased recall of precautions;Decreased recall of recent events   Following Commands Follows one step commands with increased time or repetition   Comments Pt with poor safety awareness, requires cues to attend to task    Bed Mobility   Additional Comments Pt sitting EOB upon PT arrival    Transfers   Sit to Stand 5  Supervision   Stand to Sit 5  Supervision   Additional Comments with RW, VC's for hand placement, control    Ambulation/Elevation   Gait pattern Excessively slow;Decreased foot clearance; Improper Weight shift   Gait Assistance 5  Supervision   Additional items Assist x 1   Assistive Device Rolling walker   Distance 80 ft x3, sitting rest break between trials  +2 for chair follow  VC's for proximity to RW, pacing for energy conservation  -133 bpm throughout  Stair Management Assistance 5  Supervision   Additional items Assist x 1   Stair Management Technique One rail R;Nonreciprocal  (to simulate home environment)   Number of Stairs 5  (5 steps x2 trials, seated rest break between trials )   Balance   Static Sitting Good   Dynamic Sitting Fair   Static Standing Fair -   Dynamic Standing Fair -   Ambulatory Poor +   Activity Tolerance   Activity Tolerance Patient limited by fatigue   Medical Staff Made Aware Diane HOLM   Nurse Made Aware RN updated    Chair alarm intact   Assessment   Prognosis Good   Problem List Decreased strength;Decreased endurance; Impaired balance;Decreased mobility; Decreased safety awareness; Obesity   Assessment Pt with improved activity tolerance and HR control this session  Able to increase ambulation distance and perform stair climb with supervision  Pt requires rest breaks secondary to fatigue and GONSALEZ  Pt unable to complete full flight of stairs without seated rest break, instructed in chair placement at top/bottom of stairs and pacing with stair climb, pt verbalized understanding  Pt with strong desire to return home vs IP rehab  Pt will benefit from continued skilled PT intervention during course of hospital stay to improve strength, endurance and balance to improve safety with functional mobility  Pt would benefit from IP rehab, although refusing  If pt discharged home, recommend HHPT upon hospital D/C  Barriers to Discharge Inaccessible home environment   Goals   Patient Goals to go home   STG Expiration Date 08/27/20   PT Treatment Day 1   Plan   Treatment/Interventions Functional transfer training;LE strengthening/ROM; Elevations; Therapeutic exercise; Endurance training;Patient/family training;Cognitive reorientation; Bed mobility;Gait training;Equipment eval/education   Progress Progressing toward goals   PT Frequency Other (Comment)  (3-5x/week)   Recommendation   PT Discharge Recommendation Post-Acute Rehabilitation Services   Equipment Recommended Walker   PT - OK to Discharge Yes  (to IP rehab)       Radha Bravo, PT, DPT

## 2020-08-18 NOTE — INCIDENTAL FINDINGS
The following findings require follow up:  Radiographic finding   Finding: Extensive left lower lobe pneumonia     Follow up required:  Chest imaging study   Follow up should be done within 2-3 month(s)    Please notify the following clinician to assist with the follow up:   Dr Naya Adames MD PCP

## 2020-08-18 NOTE — OCCUPATIONAL THERAPY NOTE
[address] Treatment Note     Patient Name: Nick Villalobos  Today's Date: 8/18/2020  Problem List  Principal Problem:    Community acquired pneumonia of left lower lobe of lung  Active Problems:    Essential hypertension    Diabetes mellitus type 2 in obese ([facility] )    Non-MI Troponin Elevation    Rhabdomyolysis    New onset atrial fibrillation ([facility] )    LAURENCE (acute kidney injury) ([facility] )    Acute respiratory failure with hypoxia and hypercapnia ([facility] )          08/18/20 1116   Restrictions/Precautions   Weight Bearing Precautions Per Order No   Braces or Orthoses   (none)   Lifestyle   Autonomy I ADL's, shares homemaking tasks with daughter, no AD with functional ambulation, +drives, +retired   Reciprocal Relationships daughter, family   Service to Others retired   Intrinsic Gratification Reports that he "can not afford any hobbies" now that he is retired    Pain Assessment   Pain Assessment Tool 0-10   Pain Score No Pain   ADL   Eating Assistance 7  Independent   Grooming Assistance 5  Supervision/Setup   UB Bathing Assistance 5  Supervision/Setup   LB Bathing Assistance 4  Minimal Assistance   UB Dressing Assistance 5  Supervision/Setup   LB Dressing Assistance 3  Moderate Assistance   Toileting Assistance  5  Supervision/Setup   Functional Standing Tolerance   Time 3 mins   Activity adls and mobility   Comments RW + chair follow for longer distances for safety   Bed Mobility   Additional Comments seated EOB on arrival therefore did not assess   Transfers   Sit to Stand 5  Supervision   Stand to Sit 5  Supervision   Stand pivot 4  Minimal assistance   Additional Comments all transfers completed with RW, cues for safety and hand placement  cues also required for energy conservation techniques and activity pacing   Functional Mobility   Functional Mobility 4  Minimal assistance   Additional Comments short household distances   longer distances recommend SBA of 2nd person for chair follow for safety and decreased activity tolerance   Additional items Rolling walker   Cognition   Overall Cognitive Status Impaired   Arousal/Participation Alert; Cooperative   Attention Attends with cues to redirect   Orientation Level Oriented X4   Memory Decreased recall of recent events;Decreased recall of precautions   Following Commands Follows one step commands with increased time or repetition   Comments pleasant and cooperative throughout session  motivated for therapy and wants to return home  fair safety awareness and require cues to correct at times with self care, transfers and mobility  educated on importance of rest breaks (seated vs standing), energy conservation and activity pacing  continue to reinforce education while in the hospital to maximize independence with self care and transfers/mobility   Activity Tolerance   Activity Tolerance Patient limited by fatigue   Medical Staff Made Aware PT and CM for DC planning; cleared to see per RN and aware of findings at end of session   Assessment   Assessment Arrived to patient seated EOB agreeable to OT session  Patient with HR at 86BPM at rest  Monitored closely with activity  Patient reports that his daughter that he lives with can assist with stairs and bathing tasks once discharge  Patient continues to present with SOB and impulsivity with self care and functional transfers and requires cues to correct at times for safety  Completing functional transfers with S to min A this date with use of RW  With activity, HR elevated to 136 and recovered to 126 resting at end of session  RN [facility] aware of all vitals throughout  Memory noted to be improved from last OT session (yesterday, 8/17)  Cues for safety throughout to maximize independence with self care and transfers/mobility  Pt participated in various therapeutic exercises and functional activities with emphasis on BUE strengthening, and BUE use during functional tasks    At this time, patient showing improvements from previous session in the areas of self care, endurance, HR vitals/signs, strength, transfers, ambulation  From OT standpoint recommending home with family support and skilled therapy at time of discharge  Recommending patient have assistance with higher level ADLS such as tub transfers and bathing and IADLs such as cooking, med mgmt, laundry and meal prep/grocery shopping  At end of session seated in bedside chair, alarm active, with all needs in reach at end of session  Pt would benefit from continued skilled OT while in the hospital to achieve POC goals and ensure safe d/c     Plan   Goal Expiration Date 08/31/20   OT Treatment Day 2   OT Frequency 3-5x/wk   Recommendation   OT Discharge Recommendation Home with skilled therapy   Additional Comments  recommend if patient discharges home, having chair sitting at top of steps to enter home d/t dec endurance and strength  recommend daughter be present and assist/supervise with bathing and tub/shower transfer tasks and higher level IADLs         Cristobal Meyer MS, OTR/L

## 2020-08-18 NOTE — ASSESSMENT & PLAN NOTE
Lab Results   Component Value Date    HGBA1C 8 7 (H) 08/12/2020       Recent Labs     08/17/20  1617 08/17/20  2139 08/18/20  0755 08/18/20  1108   POCGLU 117 174* 122 104       Blood Sugar Average: Last 72 hrs:  (P) 578 [id number]   Patient noncompliant with home medications  Received insulin while inpatient  Titrate based on Accu-Chek    Medication compliance discussed with the patient    Continue glipizide and metformin at discharge  Outpatient follow-up with primary care physician

## 2020-08-18 NOTE — PLAN OF CARE
Problem: Potential for Falls  Goal: Patient will remain free of falls  Description: INTERVENTIONS:  - Assess patient frequently for physical needs  -  Identify cognitive and physical deficits and behaviors that affect risk of falls  -  [place] fall precautions as indicated by assessment   - Educate patient/family on patient safety including physical limitations  - Instruct patient to call for assistance with activity based on assessment  - Modify environment to reduce risk of injury  - Consider OT/PT consult to assist with strengthening/mobility  Outcome: Progressing     Problem: Nutrition/Hydration-ADULT  Goal: Nutrient/Hydration intake appropriate for improving, restoring or maintaining nutritional needs  Description: Monitor and assess patient's nutrition/hydration status for malnutrition  Collaborate with interdisciplinary team and initiate plan and interventions as ordered  Monitor patient's weight and dietary intake as ordered or per policy  Utilize nutrition screening tool and intervene as necessary  Determine patient's food preferences and provide high-protein, high-caloric foods as appropriate       INTERVENTIONS:  - Monitor oral intake, urinary output, labs, and treatment plans  - Assess nutrition and hydration status and recommend course of action  - Evaluate amount of meals eaten  - Assist patient with eating if necessary   - Allow adequate time for meals  - Recommend/ encourage appropriate diets, oral nutritional supplements, and vitamin/mineral supplements  - Order, calculate, and assess calorie counts as needed  - Recommend, monitor, and adjust tube feedings and TPN/PPN based on assessed needs  - Assess need for intravenous fluids  - Provide specific nutrition/hydration education as appropriate  - Include patient/family/caregiver in decisions related to nutrition  Outcome: Progressing     Problem: Prexisting or High Potential for Compromised Skin Integrity  Goal: Skin integrity is maintained or improved  Description: INTERVENTIONS:  - Identify patients at risk for skin breakdown  - Assess and monitor skin integrity  - Assess and monitor nutrition and hydration status  - Monitor labs   - Assess for incontinence   - Turn and reposition patient  - Assist with mobility/ambulation  - Relieve pressure over bony prominences  - Avoid friction and shearing  - Provide appropriate hygiene as needed including keeping skin clean and dry  - Evaluate need for skin moisturizer/barrier cream  - Collaborate with interdisciplinary team   - Patient/family teaching  - Consider wound care consult   Outcome: Progressing

## 2020-08-18 NOTE — PLAN OF CARE
Problem: PHYSICAL THERAPY ADULT  Goal: Performs mobility at highest level of function for planned discharge setting  See evaluation for individualized goals  Description: Treatment/Interventions: Functional transfer training, LE strengthening/ROM, Elevations, Therapeutic exercise, Endurance training, Patient/family training, Equipment eval/education, Bed mobility, Gait training, Spoke to nursing, OT  Equipment Recommended: Walker(RW)       See flowsheet documentation for full assessment, interventions and recommendations  Outcome: Progressing  Note: Prognosis: Good  Problem List: Decreased strength, Decreased endurance, Impaired balance, Decreased mobility, Decreased safety awareness, Obesity  Assessment: Pt with improved activity tolerance and HR control this session  Able to increase ambulation distance and perform stair climb with supervision  Pt requires rest breaks secondary to fatigue and GONSALEZ  Pt unable to complete full flight of stairs without seated rest break, instructed in chair placement at top/bottom of stairs and pacing with stair climb, pt verbalized understanding  Pt with strong desire to return home vs IP rehab  Pt will benefit from continued skilled PT intervention during course of hospital stay to improve strength, endurance and balance to improve safety with functional mobility  Pt would benefit from IP rehab, although refusing  If pt discharged home, recommend HHPT upon hospital D/C  Barriers to Discharge: Inaccessible home environment     PT Discharge Recommendation: [id number]     PT - OK to Discharge: Yes(to IP rehab)    See flowsheet documentation for full assessment

## 2020-08-18 NOTE — QUICK NOTE
Spoke to patient and daughter regarding [facility], [place]; they will  30 day supply of eliquis samples from our [place] [id number] office upon discharge; they will be working to obtain rx coverage/apply for medical assistance  Long term [facility] [place] plan will need to be addressed at OP follow up on 9/2/2020

## 2020-08-18 NOTE — PLAN OF CARE
Problem: OCCUPATIONAL THERAPY ADULT  Goal: Performs self-care activities at highest level of function for planned discharge setting  See evaluation for individualized goals  Description: Treatment Interventions: ADL retraining, Functional transfer training, Endurance training, UE strengthening/ROM, Cognitive reorientation, Patient/family training, Equipment evaluation/education, Compensatory technique education, Continued evaluation, Energy conservation, Activityengagement          See flowsheet documentation for full assessment, interventions and recommendations  Outcome: Progressing  Note: Limitation: Decreased ADL status, Decreased Safe judgement during ADL, Decreased endurance, Decreased self-care trans, Decreased high-level ADLs  Prognosis: Fair  Assessment: Arrived to patient seated EOB agreeable to OT session  Patient with HR at 86BPM at rest  Monitored closely with activity  Patient reports that his daughter that he lives with can assist with stairs and bathing tasks once discharge  Patient continues to present with SOB and impulsivity with self care and functional transfers and requires cues to correct at times for safety  Completing functional transfers with S to min A this date with use of RW  With activity, HR elevated to 136 and recovered to 126 resting at end of session  RN Antelmo Duran aware of all vitals throughout  Memory noted to be improved from last OT session (yesterday, 8/17)  Cues for safety throughout to maximize independence with self care and transfers/mobility  Pt participated in various therapeutic exercises and functional activities with emphasis on BUE strengthening, and BUE use during functional tasks  At this time, patient showing improvements from previous session in the areas of self care, endurance, HR vitals/signs, strength, transfers, ambulation  From OT standpoint recommending home with family support and skilled therapy at time of discharge   Recommending patient have assistance with higher level ADLS such as tub transfers and bathing and IADLs such as cooking, med mgmt, laundry and meal prep/grocery shopping  At end of session seated in bedside chair, alarm active, with all needs in reach at end of session  Pt would benefit from continued skilled OT while in the hospital to achieve POC goals and ensure safe d/c       OT Discharge Recommendation: Home with skilled therapy  OT - OK to Discharge:  Yes

## 2020-08-18 NOTE — ASSESSMENT & PLAN NOTE
Improved  Chest x-ray with Left perihilar/lower lobe alveolar opacification suspicious for pneumonia  Patient presented with fever, tachycardia, change in mental status and leukocytosis with elevated lactic acid  Transition to p o   Augmentin to complete 7 day course of antibiotics  Monitor closely

## 2020-08-18 NOTE — ASSESSMENT & PLAN NOTE
Patient is not compliant with his home regimen  Patient is placed on metoprolol for atrial fibrillation  At home on amlodipine lisinopril, hold amlodipine continue him lisinopril 10 mg p o   Daily  Continue to monitor  Medication compliance discussed

## 2020-08-19 ENCOUNTER — TRANSITIONAL CARE MANAGEMENT (OUTPATIENT)
Dept: FAMILY MEDICINE CLINIC | Facility: CLINIC | Age: 65
End: 2020-08-19

## 2020-08-27 ENCOUNTER — OFFICE VISIT (OUTPATIENT)
Dept: FAMILY MEDICINE CLINIC | Facility: CLINIC | Age: 65
End: 2020-08-27

## 2020-08-27 VITALS
SYSTOLIC BLOOD PRESSURE: 128 MMHG | BODY MASS INDEX: 42.52 KG/M2 | OXYGEN SATURATION: 93 % | DIASTOLIC BLOOD PRESSURE: 78 MMHG | TEMPERATURE: 98.2 F | HEIGHT: 66 IN | RESPIRATION RATE: 19 BRPM | HEART RATE: 85 BPM | WEIGHT: 264.6 LBS

## 2020-08-27 DIAGNOSIS — I48.91 NEW ONSET ATRIAL FIBRILLATION (HCC): ICD-10-CM

## 2020-08-27 DIAGNOSIS — E66.9 DIABETES MELLITUS TYPE 2 IN OBESE (HCC): ICD-10-CM

## 2020-08-27 DIAGNOSIS — E11.69 DIABETES MELLITUS TYPE 2 IN OBESE (HCC): ICD-10-CM

## 2020-08-27 DIAGNOSIS — I10 ESSENTIAL HYPERTENSION: ICD-10-CM

## 2020-08-27 DIAGNOSIS — E66.01 MORBID OBESITY (HCC): ICD-10-CM

## 2020-08-27 DIAGNOSIS — J96.02 ACUTE RESPIRATORY FAILURE WITH HYPOXIA AND HYPERCAPNIA (HCC): ICD-10-CM

## 2020-08-27 DIAGNOSIS — J18.9 COMMUNITY ACQUIRED PNEUMONIA OF LEFT LOWER LOBE OF LUNG: Primary | ICD-10-CM

## 2020-08-27 DIAGNOSIS — Z12.5 ENCOUNTER FOR PROSTATE CANCER SCREENING: ICD-10-CM

## 2020-08-27 DIAGNOSIS — J96.01 ACUTE RESPIRATORY FAILURE WITH HYPOXIA AND HYPERCAPNIA (HCC): ICD-10-CM

## 2020-08-27 DIAGNOSIS — R77.8 ELEVATED TROPONIN: ICD-10-CM

## 2020-08-27 PROCEDURE — 99495 TRANSJ CARE MGMT MOD F2F 14D: CPT | Performed by: FAMILY MEDICINE

## 2020-08-27 RX ORDER — METFORMIN HYDROCHLORIDE 500 MG/1
2000 TABLET, EXTENDED RELEASE ORAL DAILY
Qty: 120 TABLET | Refills: 2 | Status: SHIPPED | OUTPATIENT
Start: 2020-08-27 | End: 2020-10-08 | Stop reason: SDUPTHER

## 2020-08-27 RX ORDER — GLIPIZIDE 5 MG/1
5 TABLET, FILM COATED, EXTENDED RELEASE ORAL DAILY
Qty: 30 TABLET | Refills: 2 | Status: SHIPPED | OUTPATIENT
Start: 2020-08-27 | End: 2020-10-08 | Stop reason: SDUPTHER

## 2020-08-27 NOTE — PROGRESS NOTES
Consider referral to clinical pharmacy for diabetes management       Demographics  Interaction Method: Virtual  Type of Intervention: New    Topic(s) Addressed  Diabetes  Transitions of Care    Intervention(s) Made      Non-Pharmacologic:      Other    Tool(s) Used  Not Applicable    Time Spent:   Time Spent in Direct Patient Care: 0 minutes    Time Spent in Care Coordination: 10 minutes    Recommendation(s) Accepted by the Patient/Caregiver: Not Applicable

## 2020-08-27 NOTE — PROGRESS NOTES
FAMILY PRACTICE OFFICE VISIT       NAME: Jolene Polanco  AGE: 63 y o  SEX: male       : 1955        MRN: [id number]        Assessment and Plan     Problem List Items Addressed This Visit        Endocrine    Diabetes mellitus type 2 in obese ([address] ) (Chronic)       Lab Results   Component Value Date    HGBA1C 8 7 (H) 2020 ·   Poorly controlled type 2 diabetes since patient lost insurance and ran out of medications prior to recent hospitalization  · His back on regimen of metformin and glipizide  · Start Accu-Cheks at home  · Patient will repeat blood work including hemoglobin A1c prior to office visit in October  · Patient is past due diabetic eye exam         Relevant Medications    metFORMIN (GLUCOPHAGE-XR) 500 mg 24 hr tablet    glipiZIDE (GLUCOTROL XL) 5 mg 24 hr tablet    Other Relevant Orders    Ambulatory referral to clinical pharmacy    Lipid panel    Hemoglobin A1C       Respiratory    Community acquired pneumonia of left lower lobe of lung - Primary (Chronic)     · Left lower lobe pneumonia  · Patient was treated in ICU, status post intubation  · He was transitioned to oral Augmentin upon discharge  · Patient denies symptoms of dyspnea or wheezing, intermittent dry cough, improving  · Repeat CT chest prior to follow-up in October    CT chest  2020  Extensive left lower lobe pneumonia    Mild juxtapleural ground glass opacity in the left upper lobe which may be infectious or inflammatory    Small left and trace right effusion    Right paratracheal nodes which are slightly increased in number, likely reactive  Relevant Orders    CT chest wo contrast    Acute respiratory failure with hypoxia and hypercapnia (HCC)     Resolved   s/p  Intubation and ICU stay            Cardiovascular and Mediastinum    New onset atrial fibrillation (HCC) (Chronic)     · Patient appears in normal sinus rhythm today  · Continue metoprolol and Eliquis    · High concerned about underlying sleep apnea in this gentleman with morbid obesity, we will discuss at follow-up office visit, patient is concerned about high cost of medical testing and current lack of insurance; patient denies symptoms of daytime fatigue or witnessed apnea  · Pending follow-up with [facility], Dr Bethany Russell September 2nd         Essential hypertension     · Blood pressure is well controlled  · Amlodipine is on hold  · Current medications include metoprolol 50 mg b i d  and lisinopril 10 mg daily         Relevant Orders    Comprehensive metabolic panel    Lipid panel    TSH, 3rd generation    CBC and differential       Other    Morbid obesity ([address] )     · Patient has gained at least 20 lb since last office visit in 6/2019  · He is motivated to start healthy lifestyle changes, dietary changes and regular physical activity         Non-MI Troponin Elevation     · Likely secondary to sepsis  · Patient denies symptoms of angina dyspnea, palpitations or dizziness  · Pending follow-up with [facility] on September 2nd  · Echocardiogram reveals normal ejection fraction at 60%           Other Visit Diagnoses     Encounter for prostate cancer screening        Relevant Orders    PSA, Total Screen      Patient presents for follow-up after recent hospitalization for left lower lobe pneumonia  He presented with cough, fever and acute respiratory failure  Patient was treated intensive care unit, status post intubation  He completed outpatient course of Augmentin for 7 days  Patient reports overall significant improvement of his symptoms, he is experiencing intermittent cough but denies symptoms of dyspnea, fever colored mucus expectoration  Patient was found to be in atrial fibrillation during recent hospitalization, non mi troponin I elevation  He is scheduled for follow-up with [facility]  Patient has restarted daily medications for type 2 diabetes, hypertension    He is concerned about lack of medical coverage and cost of medical care  Reportedly, in patient's social work team was consulted and patient is in the process of application for medical insurance, will contact outpatient chronic care team/ for long-term follow-up  Patient will proceed with CT chest and blood work prior to our follow-up visit in early October  He is scheduled for cardiology follow-up on September 2nd  I strongly advised patient to contact me in the interim with any questions, concerns or changes in symptoms  We will plan on administration of Pneumovax and FLU BLOK at next office visit  There are no Patient Instructions on file for this visit  Discussed with the patient and all questioned fully answered  He will call me if any problems arise  M*Modal software was used to dictate this note  It may contain errors with dictating incorrect words/spelling  Please contact provider directly with any questions  Chief Complaint     Chief Complaint   Patient presents with    Transition of Care Management       History of Present Illness      TCM Call (since 7/27/2020)     Date and time call was made  8/19/2020  2:05 [address] [facility] reviewed  Records reviewed    Patient was hospitialized at  [facility]    Date of Admission  08/12/20    Date of discharge  08/18/20    Diagnosis   LLL Pneumonia, UTI, Altered mental status    Disposition  Home    Were the patients medications reviewed and updated  No    Current Symptoms  None      TCM Call (since 7/27/2020)     Post hospital issues  None    Should patient be enrolled in anticoag monitoring? No    Scheduled for follow up?   Yes    Did you obtain your prescribed medications  Yes    Do you need help managing your prescriptions or medications  No    Is transportation to your appointment needed  No    I have advised the patient to call PCP with any new or worsening symptoms    Rose Trejo MA    Living Arrangements  Spouse or Significiant other    Support System  Family; Spouse    The type of support provided  Physical; Emotional    Do you have social support  Yes, as much as I need    Are you recieving any outpatient services  No    Are you recieving home care services  No    Are you using any community resources  No    Current waiver services  No    Have you fallen in the last 12 months  No    Interperter language line needed  No    Counseling  Patient    Counseling topics  Activities of daily living; Importance of RX   compliance    Comments  F/U with Dr Núñez 8/27/20      Patient presents for follow-up after recent hospitalization  Patient presented with acute respiratory failure was diagnosed with left lower lobe pneumonia  He was originally intubated and treated intensive care unit  Patient subsequently was transferred to [facility] and was discharged on outpatient course of Augmentin for 7 days  In patient testing included chest x-ray and CT chest revealing left lower lobe pneumonia  Patient has developed atrial fibrillation while inpatient  He is no longer on amlodipine, new medications include metoprolol 50 mg twice a day and Eliquis 5 mg twice a day  Patient is scheduled for follow-up with [facility],  Dr Rushing - OV 9/2/20  Patient had transient  non MI  troponin elevation likely  secondary sepsis  Patient denies symptoms of chest pain, palpitations, shortness of breath or dizziness  Occasional cough, mainly dry, no colored mucus expectoration  Cough comes as attacks  No requirements for inhaler, patient denies symptoms of wheezing or chest tightness  He is still feeling fatigued but is improving day by day  Patient is trying to consume few nutritious meals per day  Inpatient blood work reviewed  AST was elevated- improved  HbA1C 8 7   Patient self discontinued medications for type 2 diabetes since our last visit in 2019 since he lost insurance and ran out of prescriptions         ECHO 5/16/8889  Systolic function was normal  Ejection fraction was estimated to be 60 %  There were no regional wall motion abnormalities   LEFT ATRIUM:  The atrium was dilated    MITRAL VALVE:  There was mild regurgitation   AORTIC VALVE:  There was mild regurgitation  CT chest  8/16/2020  Extensive left lower lobe pneumonia    Mild juxtapleural ground glass opacity in the left upper lobe which may be infectious or inflammatory    Small left and trace right effusion    Right paratracheal nodes which are slightly increased in number, likely reactive                Review of Systems   Review of Systems   Constitutional: Positive for activity change, appetite change ( improved) and fatigue  Negative for fever  HENT: Negative  Eyes: Negative  Respiratory: Positive for cough (Intermittent, dry)  Negative for chest tightness, shortness of breath and wheezing  Cardiovascular: Negative  Gastrointestinal: Negative  Endocrine: Negative  Genitourinary: Negative  Musculoskeletal: Positive for arthralgias  Allergic/Immunologic: Negative  Neurological: Negative  Hematological: Negative  Psychiatric/Behavioral: Positive for dysphoric mood         Active Problem List     Patient Active Problem List   Diagnosis    Essential hypertension    Diabetes mellitus type 2 in obese [address] [address]    Vitamin D deficiency    Morbid obesity ([facility] )    Osteoarthritis of left knee    Bladder wall thickening    Status post left knee replacement    Community acquired pneumonia of left lower lobe of lung    Non-MI Troponin Elevation    Rhabdomyolysis    New onset atrial fibrillation ([facility] )    LAURENCE (acute kidney injury) ([facility] )    Acute respiratory failure with hypoxia and hypercapnia ([facility] )       Past Medical History:  Past Medical History:   Diagnosis Date    Arthritis     knees    Diabetes mellitus ([facility] )     Hypertension     Measles, [place] (rubella)     Mumps     Snoring     Loudly       Past Surgical History:  Past Surgical History:   Procedure Laterality Date    CYSTOSCOPY      KNEE ARTHROSCOPY Left     knee    ORIF PELVIC FRACTURE      TOTAL KNEE ARTHROPLASTY Left 6/10/2019    Procedure: ARTHROPLASTY KNEE TOTAL;  Surgeon: Rowena Wallace MD;  Location: [facility];  Service: Orthopedics       Family History:  Family History   Problem Relation Age of Onset    Ovarian cancer Mother     Heart disease Mother     Diabetes type II Mother     Colon cancer Father     Heart disease Father     Nephrolithiasis Father        Social History:  Social History     Socioeconomic History    Marital status: Single     Spouse name: Not on file    Number of children: Not on file    Years of education: Not on file    Highest education level: Not on file   Occupational History    Not on file   Social Needs    Financial resource strain: Not on file    Food insecurity     Worry: Not on file     Inability: Not on file    Transportation needs     Medical: Not on file     Non-medical: Not on file   Tobacco Use    Smoking status: Never Smoker    Smokeless tobacco: Never Used   Substance and Sexual Activity    Alcohol use: Yes     Frequency: Monthly or less     Drinks per session: 1 or 2     Comment: Occasionally, not in excess - As per [employer]     Drug use: Never    Sexual activity: Not on file   Lifestyle    Physical activity     Days per week: Not on file     Minutes per session: Not on file    Stress: Not on file   Relationships    Social connections     Talks on phone: Not on file     Gets together: Not on file     Attends [employer] service: Not on file     Active member of club or organization: Not on file     Attends meetings of clubs or organizations: Not on file     Relationship status: Not on file    Intimate partner violence     Fear of current or ex partner: Not on file     Emotionally abused: Not on file     Physically abused: Not on file     Forced sexual activity: Not on file   Other Topics Concern    Not on file   Social History Narrative    Not on file           Objective     Vitals:    08/27/20 1301 08/27/20 1327   BP: 130/88 128/78   BP Location: Right arm    Patient Position: Sitting    Cuff Size: Large    Pulse: 85    Resp: 19    Temp: 98 2 °F (36 8 °C)    TempSrc: Temporal    SpO2: 93%    Weight: 120 kg (264 lb 9 6 oz)    Height: 5' 6" (1 676 m)      Wt Readings from Last 3 Encounters:   08/27/20 120 kg (264 lb 9 6 oz)   08/12/20 120 kg (264 lb 4 8 oz)   08/12/19 113 kg (250 lb)       Physical Exam  Vitals signs and nursing note reviewed  Constitutional:       General: He is not in acute distress  Appearance: Normal appearance  He is well-developed  He is obese  He is not toxic-appearing  Comments: Appears fatigued   HENT:      Head: Normocephalic and atraumatic  Eyes:      Conjunctiva/sclera: Conjunctivae normal    Neck:      Musculoskeletal: Neck supple  Vascular: No carotid bruit  Cardiovascular:      Rate and Rhythm: Normal rate and regular rhythm  Heart sounds: Normal heart sounds  No murmur  Pulmonary:      Effort: Pulmonary effort is normal  No respiratory distress  Breath sounds: Normal air entry  No decreased air movement  Examination of the left-middle field reveals rhonchi  Examination of the left-lower field reveals rhonchi  Rhonchi present  No wheezing or rales  Abdominal:      General: Bowel sounds are normal  There is no distension or abdominal bruit  Musculoskeletal: Normal range of motion  Right lower leg: No edema  Left lower leg: No edema  Neurological:      General: No focal deficit present  Mental Status: He is alert and oriented to person, place, and time  Cranial Nerves: No cranial nerve deficit  Psychiatric:         Mood and Affect: Mood normal          Behavior: Behavior normal          Thought Content:  Thought content normal          Pertinent Laboratory/Diagnostic Studies:  Lab Results   Component Value Date    GLUCOSE 170 (H) 08/13/2020    BUN 17 08/17/2020 CREATININE 0 93 08/17/2020    CALCIUM 7 9 (L) 08/17/2020     03/17/2017    K 3 5 08/17/2020    CO2 29 08/17/2020     08/17/2020     Lab Results   Component Value Date    ALT 29 08/16/2020    AST 71 (H) 08/16/2020    ALKPHOS 75 08/16/2020    BILITOT 0 8 03/17/2017       Lab Results   Component Value Date    WBC 7 77 08/17/2020    HGB 12 3 08/17/2020    HCT 37 0 08/17/2020    MCV 86 08/17/2020     08/17/2020       No results found for: TSH    Lab Results   Component Value Date    CHOL 218 (H) 03/17/2017     Lab Results   Component Value Date    TRIG 96 05/30/2019     Lab Results   Component Value Date    HDL 43 05/30/2019     Lab Results   Component Value Date    LDLCALC 127 (H) 05/30/2019     Lab Results   Component Value Date    HGBA1C 8 7 (H) 08/12/2020       Results for orders placed or performed during the hospital encounter of 08/12/20   Blood culture #1    Specimen: Arm, Right; Blood   Result Value Ref Range    Blood Culture No Growth After 5 Days  Blood culture #2    Specimen: Arm, Left; Blood   Result Value Ref Range    Blood Culture No Growth After 5 Days      [facility]ovid-[facility]    Specimen: Nasopharyngeal Swab; Nares   Result Value Ref Range    SARS-CoV-2 Negative Negative   Legionella antigen, urine    Specimen: Urine, Clean Catch   Result Value Ref Range    Legionella Urinary Antigen Negative Negative   Strep Pneumoniae, Urine    Specimen: Urine, Clean Catch   Result Value Ref Range    Strep pneumoniae antigen, urine Negative Negative   Sputum culture and Gram stain    Specimen: Tracheal Aspirate; Sputum   Result Value Ref Range    Sputum Culture 1+ Growth of Candida albicans (A)     Sputum Culture 1+ Growth of      Gram Stain Result 1+ Polys     Gram Stain Result No bacteria seen        Susceptibility    Candida albicans - SALLY     ZID Performed      CBC and differential   Result Value Ref Range    WBC 15 53 (H) 4 31 - 10 16 Thousand/uL    RBC 5 17 3 88 - 5 62 Million/uL    Hemoglobin 15 4 12 0 - 17 0 g/dL    Hematocrit 43 5 36 5 - 49 3 %    MCV 84 82 - 98 fL    MCH 29 8 26 8 - 34 3 pg    MCHC 35 4 31 4 - 37 4 g/dL    RDW 15 2 (H) 11 6 - 15 1 %    MPV 10 1 8 9 - 12 7 fL    Platelets 183 789 - 050 Thousands/uL    nRBC 0 /100 WBCs   Comprehensive metabolic panel   Result Value Ref Range    Sodium 129 (L) 136 - 145 mmol/L    Potassium 3 2 (L) 3 5 - 5 3 mmol/L    Chloride 94 (L) 100 - 108 mmol/L    CO2 21 21 - 32 mmol/L    ANION GAP 14 (H) 4 - 13 mmol/L    BUN 19 5 - 25 mg/dL    Creatinine 1 59 (H) 0 60 - 1 30 mg/dL    Glucose 358 (H) 65 - 140 mg/dL    Calcium 8 6 8 3 - 10 1 mg/dL     (H) 5 - 45 U/L    ALT 24 12 - 78 U/L    Alkaline Phosphatase 80 46 - 116 U/L    Total Protein 8 1 6 4 - 8 2 g/dL    Albumin 2 8 (L) 3 5 - 5 0 g/dL    Total Bilirubin 1 08 (H) 0 20 - 1 00 mg/dL    eGFR 46 ml/min/1 73sq m   Lactic acid   Result Value Ref Range    LACTIC ACID 3 9 (HH) 0 5 - 2 0 mmol/L   Procalcitonin   Result Value Ref Range    Procalcitonin 7 00 (H) <=0 25 ng/ml   Protime-INR   Result Value Ref Range    Protime 15 4 (H) 11 6 - 14 5 seconds    INR 1 23 (H) 0 84 - 1 19   APTT   Result Value Ref Range    PTT 28 23 - 37 seconds   UA w Reflex to Microscopic w Reflex to Culture    Specimen: Urine, Clean Catch   Result Value Ref Range    Color, UA Dk Yellow     Clarity, UA Cloudy     Specific [place], UA 1 031 (H) 1 003 - 1 030    pH, UA 5 5 4 5, 5 0, 5 5, 6 0, 6 5, 7 0, 7 5, 8 0    Leukocytes, UA (A) Negative     Elevated glucose may cause decreased leukocyte values   See urine microscopic for [facility] result/    Nitrite, UA Negative Negative    Protein,  (3+) (A) Negative mg/dl    Glucose, UA >=1000 (1%) (A) Negative mg/dl    Ketones, UA 40 (2+) (A) Negative mg/dl    Urobilinogen, UA 0 2 0 2, 1 0 E U /dl E U /dl    Bilirubin, UA Interference- unable to analyze (A) Negative    Blood, UA Large (A) Negative   Troponin I   Result Value Ref Range    Troponin I 0 38 (H) <=0 04 ng/mL Urine Microscopic   Result Value Ref Range    RBC, UA None Seen None Seen, 0-5 /hpf    WBC, UA 2-4 (A) None Seen, 0-5, 5-55, 5-65 /hpf    Epithelial Cells Moderate (A) None Seen, Occasional /hpf    Bacteria, UA Moderate (A) None Seen, Occasional /hpf    Fine granular casts 3-5 /lpf    MUCUS THREADS Moderate (A) None Seen   Lactic acid 2 Hours   Result Value Ref Range    LACTIC ACID 2 3 (HH) 0 5 - 2 0 mmol/L   Procalcitonin Reflex   Result Value Ref Range    Procalcitonin 5 97 (H) <=0 25 ng/ml   Procalcitonin   Result Value Ref Range    Procalcitonin 6 42 (H) <=0 25 ng/ml   Platelet count   Result Value Ref Range    Platelets 880 108 - 417 Thousands/uL    MPV 9 4 8 9 - 12 7 fL   Hemoglobin A1c w/EAG Estimation (Orders if not completed within the last 90 days)   Result Value Ref Range    Hemoglobin A1C 8 7 (H) Normal 3 8-5 6%; PreDiabetic 5 7-6 4%;  Diabetic >=6 5%; Glycemic control for adults with diabetes <7 0% %     mg/dl   Troponin I   Result Value Ref Range    Troponin I 0 35 (H) <=0 04 ng/mL   Troponin I   Result Value Ref Range    Troponin I 0 32 (H) <=0 04 ng/mL   Platelet count   Result Value Ref Range    Platelets 149 535 - 246 Thousands/uL    MPV 9 9 8 9 - 12 7 fL   Lactic acid, plasma   Result Value Ref Range    LACTIC ACID 1 9 0 5 - 2 0 mmol/L   TSH, 3rd generation   Result Value Ref Range    TSH 3RD GENERATON 0 118 (L) 0 358 - 3 740 uIU/mL   Comprehensive metabolic panel   Result Value Ref Range    Sodium 134 (L) 136 - 145 mmol/L    Potassium 3 2 (L) 3 5 - 5 3 mmol/L    Chloride 98 (L) 100 - 108 mmol/L    CO2 20 (L) 21 - 32 mmol/L    ANION GAP 16 (H) 4 - 13 mmol/L    BUN 21 5 - 25 mg/dL    Creatinine 1 44 (H) 0 60 - 1 30 mg/dL    Glucose 235 (H) 65 - 140 mg/dL    Calcium 7 7 (L) 8 3 - 10 1 mg/dL     (H) 5 - 45 U/L    ALT 27 12 - 78 U/L    Alkaline Phosphatase 74 46 - 116 U/L    Total Protein 7 0 6 4 - 8 2 g/dL    Albumin 2 4 (L) 3 5 - 5 0 g/dL    Total Bilirubin 0 79 0 20 - 1 00 mg/dL eGFR 51 ml/min/1 73sq m   Magnesium   Result Value Ref Range    Magnesium 2 3 1 6 - 2 6 mg/dL   Phosphorus   Result Value Ref Range    Phosphorus 1 4 (L) 2 3 - 4 1 mg/dL   CBC and differential   Result Value Ref Range    WBC 14 34 (H) 4 31 - 10 16 Thousand/uL    RBC 5 10 3 88 - 5 62 Million/uL    Hemoglobin 14 7 12 0 - 17 0 g/dL    Hematocrit 43 1 36 5 - 49 3 %    MCV 85 82 - 98 fL    MCH 28 8 26 8 - 34 3 pg    MCHC 34 1 31 4 - 37 4 g/dL    RDW 15 3 (H) 11 6 - 15 1 %    MPV 10 7 8 9 - 12 7 fL    Platelets 817 731 - 500 Thousands/uL    nRBC 0 /100 WBCs    Neutrophils Relative 91 (H) 43 - 75 %    Immat GRANS % 1 0 - 2 %    Lymphocytes Relative 3 (L) 14 - 44 %    Monocytes Relative 5 4 - 12 %    Eosinophils Relative 0 0 - 6 %    Basophils Relative 0 0 - 1 %    Neutrophils Absolute 13 04 (H) 1 85 - 7 62 Thousands/µL    Immature Grans Absolute 0 12 0 00 - 0 20 Thousand/uL    Lymphocytes Absolute 0 46 (L) 0 60 - 4 47 Thousands/µL    Monocytes Absolute 0 71 0 17 - 1 22 Thousand/µL    Eosinophils Absolute 0 00 0 00 - 0 61 Thousand/µL    Basophils Absolute 0 01 0 00 - 0 10 Thousands/µL   Beta Hydroxybutyrate   Result Value Ref Range    BETA-HYDROXYBUTYRATE 0 5 <0 6 mmol/L   Basic metabolic panel   Result Value Ref Range    Sodium 133 (L) 136 - 145 mmol/L    Potassium 3 1 (L) 3 5 - 5 3 mmol/L    Chloride 101 100 - 108 mmol/L    CO2 21 21 - 32 mmol/L    ANION GAP 11 4 - 13 mmol/L    BUN 22 5 - 25 mg/dL    Creatinine 1 49 (H) 0 60 - 1 30 mg/dL    Glucose 169 (H) 65 - 140 mg/dL    Calcium 7 9 (L) 8 3 - 10 1 mg/dL    eGFR 49 ml/min/1 73sq m   Lactic acid, plasma   Result Value Ref Range    LACTIC ACID 2 0 0 5 - 2 0 mmol/L   Ethanol   Result Value Ref Range    Ethanol Lvl <3 0 - 3 mg/dL   Salicylate level   Result Value Ref Range    Salicylate Lvl <3 (L) 3 - 20 mg/dL   Acetaminophen level-If concentration is detectable, please discuss with medical  on call     Result Value Ref Range    Acetaminophen Level <2 (L) 10 - 20 ug/mL   T4, free   Result Value Ref Range    Free T4 1 45 0 76 - 1 46 ng/dL   Blood gas, arterial   Result Value Ref Range    pH, Arterial 7 478 (H) 7 350 - 7 450    pCO2, Arterial 30 1 (L) 36 0 - 44 0 mm Hg    pO2, Arterial 89 0 75 0 - 129 0 mm Hg    HCO3, Arterial 21 8 (L) 22 0 - 28 0 mmol/L    Base Excess, Arterial -0 7 mmol/L    O2 Content, Arterial 19 3 16 0 - 23 0 mL/dL    O2 HGB,Arterial  97 0 94 0 - 97 0 %    SOURCE Radial, Left     CAYDEN TEST Yes     Non Vent type BIPAP BIPAP     IPAP 12     EPAP 8     BIPAP fio2 40 %   Blood gas, arterial   Result Value Ref Range    pH, Arterial 7 387 7 350 - 7 450    PH ART TC 7 368 7 350 - 7 450    pCO2, Arterial 28 3 (LL) 36 0 - 44 0 mm Hg    PCO2 (TC) Arterial 30 0 (L) 36 0 - 44 0 mm Hg    pO2, Arterial 243 6 (H) 75 0 - 129 0 mm Hg    PO2 (TC) Arterial 249 9 (H) 75 0 - 129 0 mm Hg    HCO3, Arterial 16 6 (L) 22 0 - 28 0 mmol/L    Base Excess, Arterial -6 9 mmol/L    O2 Content, Arterial 19 4 16 0 - 23 0 mL/dL    O2 HGB,Arterial  98 8 (H) 94 0 - 97 0 %    SOURCE Line, Arterial     Temperature 100 9 Degrees Fehrenheit    Vent Type- AC AC     AC Rate 26     Tidal Volume 400 ml    Inspired Air (FIO2) 100     PEEP 10    Basic metabolic panel   Result Value Ref Range    Sodium 135 (L) 136 - 145 mmol/L    Potassium 3 6 3 5 - 5 3 mmol/L    Chloride 101 100 - 108 mmol/L    CO2 18 (L) 21 - 32 mmol/L    ANION GAP 16 (H) 4 - 13 mmol/L    BUN 26 (H) 5 - 25 mg/dL    Creatinine 1 54 (H) 0 60 - 1 30 mg/dL    Glucose 224 (H) 65 - 140 mg/dL    Calcium 7 6 (L) 8 3 - 10 1 mg/dL    eGFR 47 ml/min/1 73sq m   CBC   Result Value Ref Range    WBC 18 04 (H) 4 31 - 10 16 Thousand/uL    RBC 4 40 3 88 - 5 62 Million/uL    Hemoglobin 12 7 12 0 - 17 0 g/dL    Hematocrit 37 9 36 5 - 49 3 %    MCV 86 82 - 98 fL    MCH 28 9 26 8 - 34 3 pg    MCHC 33 5 31 4 - 37 4 g/dL    RDW 15 7 (H) 11 6 - 15 1 %    Platelets 533 684 - 875 Thousands/uL    MPV 10 7 8 9 - 12 7 fL   Phosphorus   Result Value Ref Range Phosphorus 3 3 2 3 - 4 1 mg/dL   Magnesium   Result Value Ref Range    Magnesium 2 3 1 6 - 2 6 mg/dL   Calcium, ionized   Result Value Ref Range    Calcium, Ionized 1 03 (L) 1 12 - 1 32 mmol/L   Lactic acid, plasma   Result Value Ref Range    LACTIC ACID 1 5 0 5 - 2 0 mmol/L   CK (with reflex to MB)   Result Value Ref Range    Total CK 5,452 (H) 39 - 308 U/L   Procalcitonin   Result Value Ref Range    Procalcitonin 6 95 (H) <=0 25 ng/ml   CKMB   Result Value Ref Range    CK-MB Index <1 0 0 0 - 2 5 %    CK-MB 4 7 0 0 - 5 0 ng/mL   CK (with reflex to MB)   Result Value Ref Range    Total CK 3,753 (H) 39 - 308 U/L   Procalcitonin Reflex   Result Value Ref Range    Procalcitonin 5 74 (H) <=0 25 ng/ml   CKMB   Result Value Ref Range    CK-MB Index <1 0 0 0 - 2 5 %    CK-MB 3 2 0 0 - 5 0 ng/mL   Basic metabolic panel   Result Value Ref Range    Sodium 136 136 - 145 mmol/L    Potassium 3 5 3 5 - 5 3 mmol/L    Chloride 106 100 - 108 mmol/L    CO2 21 21 - 32 mmol/L    ANION GAP 9 4 - 13 mmol/L    BUN 24 5 - 25 mg/dL    Creatinine 1 32 (H) 0 60 - 1 30 mg/dL    Glucose 273 (H) 65 - 140 mg/dL    Calcium 7 3 (L) 8 3 - 10 1 mg/dL    eGFR 57 ml/min/1 73sq m   CBC   Result Value Ref Range    WBC 10 75 (H) 4 31 - 10 16 Thousand/uL    RBC 4 10 3 88 - 5 62 Million/uL    Hemoglobin 11 8 (L) 12 0 - 17 0 g/dL    Hematocrit 34 7 (L) 36 5 - 49 3 %    MCV 85 82 - 98 fL    MCH 28 8 26 8 - 34 3 pg    MCHC 34 0 31 4 - 37 4 g/dL    RDW 16 2 (H) 11 6 - 15 1 %    Platelets 585 535 - 736 Thousands/uL    MPV 10 1 8 9 - 12 7 fL   Magnesium   Result Value Ref Range    Magnesium 2 4 1 6 - 2 6 mg/dL   Phosphorus   Result Value Ref Range    Phosphorus 1 6 (L) 2 3 - 4 1 mg/dL   CK (with reflex to MB)   Result Value Ref Range    Total CK 1,692 (H) 39 - 308 U/L   CKMB   Result Value Ref Range    CK-MB Index <1 0 0 0 - 2 5 %    CK-MB 1 5 0 0 - 5 0 ng/mL   CBC and differential   Result Value Ref Range    WBC 8 82 4 31 - 10 16 Thousand/uL    RBC 4 16 3 88 - 5 62 Million/uL    Hemoglobin 11 9 (L) 12 0 - 17 0 g/dL    Hematocrit 35 6 (L) 36 5 - 49 3 %    MCV 86 82 - 98 fL    MCH 28 6 26 8 - 34 3 pg    MCHC 33 4 31 4 - 37 4 g/dL    RDW 16 6 (H) 11 6 - 15 1 %    MPV 11 2 8 9 - 12 7 fL    Platelets 529 543 - 104 Thousands/uL    nRBC 0 /100 WBCs    Neutrophils Relative 86 (H) 43 - 75 %    Immat GRANS % 1 0 - 2 %    Lymphocytes Relative 6 (L) 14 - 44 %    Monocytes Relative 5 4 - 12 %    Eosinophils Relative 2 0 - 6 %    Basophils Relative 0 0 - 1 %    Neutrophils Absolute 7 60 1 85 - 7 62 Thousands/µL    Immature Grans Absolute 0 10 0 00 - 0 20 Thousand/uL    Lymphocytes Absolute 0 49 (L) 0 60 - 4 47 Thousands/µL    Monocytes Absolute 0 46 0 17 - 1 22 Thousand/µL    Eosinophils Absolute 0 16 0 00 - 0 61 Thousand/µL    Basophils Absolute 0 01 0 00 - 0 10 Thousands/µL   Comprehensive metabolic panel   Result Value Ref Range    Sodium 137 136 - 145 mmol/L    Potassium 3 4 (L) 3 5 - 5 3 mmol/L    Chloride 105 100 - 108 mmol/L    CO2 25 21 - 32 mmol/L    ANION GAP 7 4 - 13 mmol/L    BUN 22 5 - 25 mg/dL    Creatinine 1 04 0 60 - 1 30 mg/dL    Glucose 150 (H) 65 - 140 mg/dL    Calcium 7 6 (L) 8 3 - 10 1 mg/dL    AST 71 (H) 5 - 45 U/L    ALT 29 12 - 78 U/L    Alkaline Phosphatase 75 46 - 116 U/L    Total Protein 5 9 (L) 6 4 - 8 2 g/dL    Albumin 1 6 (L) 3 5 - 5 0 g/dL    Total Bilirubin 0 56 0 20 - 1 00 mg/dL    eGFR 76 ml/min/1 73sq m   Phosphorus   Result Value Ref Range    Phosphorus 2 0 (L) 2 3 - 4 1 mg/dL   Basic metabolic panel   Result Value Ref Range    Sodium 137 136 - 145 mmol/L    Potassium 3 6 3 5 - 5 3 mmol/L    Chloride 102 100 - 108 mmol/L    CO2 27 21 - 32 mmol/L    ANION GAP 8 4 - 13 mmol/L    BUN 21 5 - 25 mg/dL    Creatinine 1 12 0 60 - 1 30 mg/dL    Glucose 294 (H) 65 - 140 mg/dL    Calcium 7 5 (L) 8 3 - 10 1 mg/dL    eGFR 70 ml/min/1 73sq m   Magnesium   Result Value Ref Range    Magnesium 2 1 1 6 - 2 6 mg/dL   Basic metabolic panel   Result Value Ref Range Sodium 139 136 - 145 mmol/L    Potassium 3 5 3 5 - 5 3 mmol/L    Chloride 103 100 - 108 mmol/L    CO2 29 21 - 32 mmol/L    ANION GAP 7 4 - 13 mmol/L    BUN 17 5 - 25 mg/dL    Creatinine 0 93 0 60 - 1 30 mg/dL    Glucose 148 (H) 65 - 140 mg/dL    Calcium 7 9 (L) 8 3 - 10 1 mg/dL    eGFR 87 ml/min/1 73sq m   CBC and differential   Result Value Ref Range    WBC 7 77 4 31 - 10 16 Thousand/uL    RBC 4 28 3 88 - 5 62 Million/uL    Hemoglobin 12 3 12 0 - 17 0 g/dL    Hematocrit 37 0 36 5 - 49 3 %    MCV 86 82 - 98 fL    MCH 28 7 26 8 - 34 3 pg    MCHC 33 2 31 4 - 37 4 g/dL    RDW 16 6 (H) 11 6 - 15 1 %    MPV 10 9 8 9 - 12 7 fL    Platelets 630 470 - 702 Thousands/uL    nRBC 0 /100 WBCs    Neutrophils Relative 82 (H) 43 - 75 %    Immat GRANS % 2 0 - 2 %    Lymphocytes Relative 8 (L) 14 - 44 %    Monocytes Relative 6 4 - 12 %    Eosinophils Relative 2 0 - 6 %    Basophils Relative 0 0 - 1 %    Neutrophils Absolute 6 28 1 85 - 7 62 Thousands/µL    Immature Grans Absolute 0 17 0 00 - 0 20 Thousand/uL    Lymphocytes Absolute 0 62 0 60 - 4 47 Thousands/µL    Monocytes Absolute 0 49 0 17 - 1 22 Thousand/µL    Eosinophils Absolute 0 19 0 00 - 0 61 Thousand/µL    Basophils Absolute 0 02 0 00 - 0 10 Thousands/µL   Magnesium   Result Value Ref Range    Magnesium 2 0 1 6 - 2 6 mg/dL   Procalcitonin   Result Value Ref Range    Procalcitonin 1 50 (H) <=0 25 ng/ml   ECG 12 lead   Result Value Ref Range    Ventricular Rate 114 BPM    Atrial Rate 114 BPM    DC Interval 142 ms    QRSD Interval 96 ms    QT Interval 324 ms    QTC Interval 446 ms    P Axis -44 degrees    QRS Axis -62 degrees    T Wave Axis 54 degrees   ECG 12 lead   Result Value Ref Range    Ventricular Rate 159 BPM    Atrial Rate 159 BPM    DC Interval  ms    QRSD Interval 100 ms    QT Interval 271 ms    QTC Interval 441 ms    P Axis  degrees    QRS Axis -74 degrees    T Wave [place] 77 degrees   ECG 12 lead   Result Value Ref Range    Ventricular Rate 71 BPM    Atrial Rate 71 BPM    NC Interval  ms    QRSD Interval 96 ms    QT Interval 388 ms    QTC Interval 422 ms    P Axis  degrees    QRS Axis -35 degrees    T Wave Axis 23 degrees   Urine Macroscopic, POC   Result Value Ref Range    Color, UA Sharlene     Clarity, UA Cloudy     pH, UA 5 5 4 5 - 8 0    Leukocytes, UA Negative Negative    Nitrite, UA Negative Negative    Protein, UA >=300 (A) Negative mg/dl    Glucose,  (1/2%) (A) Negative mg/dl    Ketones, UA 40 (2+) (A) Negative mg/dl    Urobilinogen, UA 0 2 0 2, 1 0 E U /dl E U /dl    Bilirubin, UA Interference- unable to analyze (A) Negative    Blood, UA Large (A) Negative    Specific Gravity, UA >=1 030 1 003 - 1 030   Fingerstick Glucose (POCT)   Result Value Ref Range    POC Glucose 305 (H) 65 - 140 mg/dl   Fingerstick Glucose (POCT)   Result Value Ref Range    POC Glucose 301 (H) 65 - 140 mg/dl   Fingerstick Glucose (POCT)   Result Value Ref Range    POC Glucose 246 (H) 65 - 140 mg/dl   Fingerstick Glucose (POCT)   Result Value Ref Range    POC Glucose 278 (H) 65 - 140 mg/dl   Fingerstick Glucose (POCT)   Result Value Ref Range    POC Glucose 179 (H) 65 - 140 mg/dl   POCT Blood Gas (CG8+)   Result Value Ref Range    pH, Art i-STAT 7 570 (HH) 7 350 - 7 450    pH, i-STAT Temp Corrected 7 572     pCO2, Art i-STAT 25 0 (LL) 36 0 - 44 0 mm HG    pCO2, i-STAT TC 24 8 mm HG    pO2, ART i-STAT 70 0 (L) 75 0 - 129 0 mm HG    pO2, i-STAT TC 69 mm HG    BE, i-STAT 2 -2 - 3 mmol/L    HCO3, Art i-STAT 22 9 22 0 - 28 0 mmol/L    CO2, i-STAT 24 21 - 32 mmol/L    O2 Sat, i-STAT 96 (H) 60 - 85 %    SODIUM, I-STAT 131 (L) 136 - 145 mmol/l    Potassium, i-STAT 2 9 (L) 3 5 - 5 3 mmol/L    Calcium, Ionized i-STAT 1 05 (L) 1 12 - 1 32 mmol/L    Hct, i-STAT 39 36 5 - 49 3 %    Hgb, i-STAT 13 3 12 0 - 17 0 g/dl    Glucose, i-STAT 170 (H) 65 - 140 mg/dl    POC FIO2  28 L    Specimen Type ARTERIAL    Fingerstick Glucose (POCT)   Result Value Ref Range    POC Glucose 148 (H) 65 - 140 mg/dl   Fingerstick Glucose (POCT)   Result Value Ref Range    POC Glucose 204 (H) 65 - 140 mg/dl   Fingerstick Glucose (POCT)   Result Value Ref Range    POC Glucose 168 (H) 65 - 140 mg/dl   Fingerstick Glucose (POCT)   Result Value Ref Range    POC Glucose 142 (H) 65 - 140 mg/dl   Fingerstick Glucose (POCT)   Result Value Ref Range    POC Glucose 234 (H) 65 - 140 mg/dl   Fingerstick Glucose (POCT)   Result Value Ref Range    POC Glucose 269 (H) 65 - 140 mg/dl   Fingerstick Glucose (POCT)   Result Value Ref Range    POC Glucose 209 (H) 65 - 140 mg/dl   Fingerstick Glucose (POCT)   Result Value Ref Range    POC Glucose 119 65 - 140 mg/dl   Fingerstick Glucose (POCT)   Result Value Ref Range    POC Glucose 131 65 - 140 mg/dl   Fingerstick Glucose (POCT)   Result Value Ref Range    POC Glucose 165 (H) 65 - 140 mg/dl   Fingerstick Glucose (POCT)   Result Value Ref Range    POC Glucose 191 (H) 65 - 140 mg/dl   Fingerstick Glucose (POCT)   Result Value Ref Range    POC Glucose 253 (H) 65 - 140 mg/dl   Fingerstick Glucose (POCT)   Result Value Ref Range    POC Glucose 196 (H) 65 - 140 mg/dl   Fingerstick Glucose (POCT)   Result Value Ref Range    POC Glucose 174 (H) 65 - 140 mg/dl   Fingerstick Glucose (POCT)   Result Value Ref Range    POC Glucose 199 (H) 65 - 140 mg/dl   Fingerstick Glucose (POCT)   Result Value Ref Range    POC Glucose 117 65 - 140 mg/dl   Fingerstick Glucose (POCT)   Result Value Ref Range    POC Glucose 174 (H) 65 - 140 mg/dl   Fingerstick Glucose (POCT)   Result Value Ref Range    POC Glucose 122 65 - 140 mg/dl   Fingerstick Glucose (POCT)   Result Value Ref Range    POC Glucose 104 65 - 140 mg/dl   Manual Differential(PHLEBS Do Not Order)   Result Value Ref Range    Segmented % 82 (H) 43 - 75 %    Bands % 7 0 - 8 %    Lymphocytes % 2 (L) 14 - 44 %    Monocytes % 6 4 - 12 %    Eosinophils, % 0 0 - 6 %    Basophils % 0 0 - 1 %    Atypical Lymphocytes % 3 (H) <=0 %    Absolute Neutrophils 13 82 (H) 1 85 - 7 62 Thousand/uL    Lymphocytes Absolute 0 31 (L) 0 60 - 4 47 Thousand/uL    Monocytes Absolute 0 93 0 00 - 1 22 Thousand/uL    Eosinophils Absolute 0 00 0 00 - 0 40 Thousand/uL    Basophils Absolute 0 00 0 00 - 0 10 Thousand/uL    Total Counted      RBC Morphology Present     Anisocytosis Present     Polychromasia Present     Platelet Estimate Adequate Adequate       Orders Placed This Encounter   Procedures    CT chest wo contrast    Comprehensive metabolic panel    Lipid panel    TSH, 3rd generation    PSA, Total Screen    CBC and differential    Hemoglobin A1C    Ambulatory referral to clinical pharmacy       ALLERGIES:  No Known Allergies    Current Medications     Current Outpatient Medications   Medication Sig Dispense Refill    ACCU-CHEK LARRY PLUS test strip       ACCU-CHEK FASTCLIX LANCETS MISC       acetaminophen (TYLENOL) 500 mg tablet Take 500 mg by mouth every 6 (six) hours as needed      apixaban (ELIQUIS) 5 mg Take 1 tablet (5 mg total) by mouth 2 (two) times a day  0    aspirin (ECOTRIN LOW STRENGTH) 81 mg EC tablet Take 1 tablet (81 mg total) by mouth daily 90 tablet 2    glipiZIDE (GLUCOTROL XL) 5 mg 24 hr tablet Take 1 tablet (5 mg total) by mouth daily 30 tablet 2    ketoconazole (NIZORAL) 2 % cream Apply to  Both feet  Once a day (Patient taking differently: Apply topically daily as needed Apply to  Both feet  Once a day) 60 g 1    lisinopril (ZESTRIL) 10 mg tablet Take 1 tablet (10 mg total) by mouth daily 30 tablet 0    metFORMIN (GLUCOPHAGE-XR) 500 mg 24 hr tablet Take 4 tablets (2,000 mg total) by mouth daily Take 4 tablets daily 120 tablet 2    metoprolol tartrate (LOPRESSOR) 50 mg tablet Take 1 tablet (50 mg total) by mouth every 12 (twelve) hours 60 tablet 0     No current facility-administered medications for this visit          Medications Discontinued During This Encounter   Medication Reason    metFORMIN (GLUCOPHAGE-XR) 500 mg 24 hr tablet Reorder    glipiZIDE (GLUCOTROL XL) 5 mg 24 hr tablet Reorder       Health Maintenance     Health Maintenance   Topic Date Due    Pneumococcal Vaccine: Pediatrics (0 to 5 Years) and At-Risk Patients (6 to 59 Years) (1 of 1 - PPSV23) 05/12/1963    DM Eye Exam  05/12/1967    HIV Screening  05/12/1972    Annual Physical  05/12/1975    Colorectal Cancer Screening  05/12/2007    DTaP,Tdap,and Td Vaccines (2 - Td) 01/01/2011    PT PLAN OF CARE  08/04/2019    Depression Screening PHQ  03/20/2020    Diabetic Foot Exam  03/20/2020    BMI: Followup Plan  04/07/2020    Influenza Vaccine  07/01/2020    HEMOGLOBIN A1C  02/12/2021    BMI: Adult  08/27/2021    Hepatitis C Screening  Completed    HIB Vaccine  Aged Out    Hepatitis B Vaccine  Aged Out    IPV Vaccine  Aged Out    Hepatitis A Vaccine  Aged Out    Meningococcal ACWY Vaccine  Aged Out    HPV Vaccine  Aged Out       Immunization History   Administered Date(s) Administered    Influenza Quadrivalent Preservative Free 3 years and older IM 08/03/2016    Tdap 01/01/2001       Neela Medina MD

## 2020-08-27 NOTE — Clinical Note
New patient referral, in patient's  has started process for insurance application  I appreciate your follow-up and possible involvement of outpatient     Thank you

## 2020-08-28 ENCOUNTER — TELEPHONE (OUTPATIENT)
Dept: FAMILY MEDICINE CLINIC | Facility: CLINIC | Age: 65
End: 2020-08-28

## 2020-08-28 NOTE — TELEPHONE ENCOUNTER
Patient called stating he uses the Accucheck cyndie BS machine, and it takes the Accucheck Cyndie plus test strips  Thank you!

## 2020-08-28 NOTE — TELEPHONE ENCOUNTER
Please call in those strips with directions to check blood sugars twice a day number 60 and 5 refills to patient's pharmacy    Thank you

## 2020-08-28 NOTE — TELEPHONE ENCOUNTER
Called pt, he has 2 pharmacies on file  Pt is asking we call strips in to [facility]  RX called in as per Md's orders

## 2020-09-01 ENCOUNTER — PATIENT OUTREACH (OUTPATIENT)
Dept: FAMILY MEDICINE CLINIC | Facility: CLINIC | Age: 65
End: 2020-09-01

## 2020-09-01 NOTE — ASSESSMENT & PLAN NOTE
· Patient has gained at least 20 lb since last office visit in 6/2019    · He is motivated to start healthy lifestyle changes, dietary changes and regular physical activity

## 2020-09-01 NOTE — ASSESSMENT & PLAN NOTE
· Likely secondary to sepsis  · Patient denies symptoms of angina dyspnea, palpitations or dizziness  · Pending follow-up with [facility] on September 2nd    · Echocardiogram reveals normal ejection fraction at 60%

## 2020-09-01 NOTE — ASSESSMENT & PLAN NOTE
· Patient appears in normal sinus rhythm today  · Continue metoprolol and Eliquis    · High concerned about underlying sleep apnea in this gentleman with morbid obesity, we will discuss at follow-up office visit, patient is concerned about high cost of medical testing and current lack of insurance; patient denies symptoms of daytime fatigue or witnessed apnea  · Pending follow-up with [facility], Dr Bethany Russell September 2nd

## 2020-09-01 NOTE — ASSESSMENT & PLAN NOTE
· Left lower lobe pneumonia  · Patient was treated in ICU, status post intubation  · He was transitioned to oral Augmentin upon discharge  · Patient denies symptoms of dyspnea or wheezing, intermittent dry cough, improving  · Repeat CT chest prior to follow-up in October    CT chest  8/16/2020  Extensive left lower lobe pneumonia    Mild juxtapleural ground glass opacity in the left upper lobe which may be infectious or inflammatory    Small left and trace right effusion    Right paratracheal nodes which are slightly increased in number, likely reactive

## 2020-09-01 NOTE — ASSESSMENT & PLAN NOTE
· Blood pressure is well controlled  · Amlodipine is on hold    · Current medications include metoprolol 50 mg b i d  and lisinopril 10 mg daily

## 2020-09-01 NOTE — ASSESSMENT & PLAN NOTE
Lab Results   Component Value Date    HGBA1C 8 7 (H) 08/12/2020   · Poorly controlled type 2 diabetes since patient lost insurance and ran out of medications prior to recent hospitalization  · His back on regimen of metformin and glipizide    · Start Accu-Cheks at home  · Patient will repeat blood work including hemoglobin A1c prior to office visit in October  · Patient is past due diabetic eye exam

## 2020-09-02 ENCOUNTER — OFFICE VISIT (OUTPATIENT)
Dept: CARDIOLOGY CLINIC | Facility: CLINIC | Age: 65
End: 2020-09-02

## 2020-09-02 VITALS
OXYGEN SATURATION: 96 % | BODY MASS INDEX: 42.27 KG/M2 | HEIGHT: 66 IN | WEIGHT: 263 LBS | TEMPERATURE: 97.3 F | SYSTOLIC BLOOD PRESSURE: 140 MMHG | HEART RATE: 74 BPM | DIASTOLIC BLOOD PRESSURE: 88 MMHG

## 2020-09-02 DIAGNOSIS — I48.91 NEW ONSET ATRIAL FIBRILLATION (HCC): Primary | Chronic | ICD-10-CM

## 2020-09-02 DIAGNOSIS — R77.8 ELEVATED TROPONIN: ICD-10-CM

## 2020-09-02 DIAGNOSIS — J18.9 COMMUNITY ACQUIRED PNEUMONIA OF LEFT LOWER LOBE OF LUNG: Chronic | ICD-10-CM

## 2020-09-02 DIAGNOSIS — I10 ESSENTIAL HYPERTENSION: ICD-10-CM

## 2020-09-02 PROCEDURE — 99214 OFFICE O/P EST MOD 30 MIN: CPT | Performed by: INTERNAL MEDICINE

## 2020-09-02 NOTE — PROGRESS NOTES
Cardiology Follow Up    Jonathon France  1955  [id number]  [address]  [address]  [place] [address]  [phone]    3  New onset atrial fibrillation ([address] )     2  Essential hypertension     3  Community acquired pneumonia of left lower lobe of lung     4  Non-MI Troponin Elevation         Discussion/Summary:    - PAF: in the setting of pneumonia which had required intubation  Back in sinus rhythm today with metoprolol  On anticoagulation with Eliquis given HTN, DM  Would continue long term  If he cannot continue Eliquis because of cost, will have to transition him to warfarin  He is familiar with this because of his father (bad experience)  If this is short term, can always try and change in the future if coverage changes after he turns 67  Would plan to do some rhythm monitoring in the future with 1 week zio patch because he was asymptomatic with rapid afib  See if we need to adjust metoprolol  Elevated troponin during hospitalization likely secondary to PNA  However, will perform stress test in the future to exclude ischemia  Samples of Eliquis given today  He will call me when insurance issues sorted out, and I can order the testing  Otherwise, follow up in 3 months  History of Present Illness:     27-year-old gentleman  I met him when he was admitted to the hospital   He was there with a community-acquired pneumonia  However, this was quite severe, and required intubation  While in the ICU, he developed atrial fibrillation  This was rapid, and was treated with metoprolol  He was started on anticoagulation with Eliquis  Patient has been discharged home  Upon discharge, he was still in atrial fibrillation  He returns to the office today for follow-up  Prior to his hospitalization, for hypertension, he was on lisinopril and amlodipine    Currently, the amlodipine is discontinued in the metoprolol 50 mg twice a day has replace this  Blood pressure has remained relatively well controlled  He saw his family physician the other day as well at which time his heart rate was in the 80s and his blood pressure was controlled  Today, he is in sinus rhythm in the office  He is trying to get insurance coverage  He is still not heard back entirely from the company regarding this, but is hopeful to get medical assistance  He has samples of Eliquis still  Because he was on aspirin prior to his hospitalization, he has continued this in addition to anticoagulation as well  He denies any bruising or bleeding problems  He still with the condition  He is slowly getting his strength back  He does not have a lot of shortness of breath, but he occasionally gets a cough when he tries to take a big deep breath in         EF was preserved on echo  He had a very slightly elevated troponin in the setting of his MI  Problem List     Essential hypertension    Diabetes mellitus type 2 in obese (HCC) (Chronic)      Lab Results   Component Value Date    HGBA1C 8 7 (H) 08/12/2020         Vitamin D deficiency    Morbid obesity ([address] )    Osteoarthritis of left knee    Bladder wall thickening    Status post left knee replacement    Community acquired pneumonia of left lower lobe of lung (Chronic)    Non-MI Troponin Elevation    Rhabdomyolysis    New onset atrial fibrillation ([address] ) (Chronic)    Overview Signed 9/1/2020  7:09 AM by Chantal Wade MD     · August 2020 during hospitalization for left lower lobe pneumonia  · Eliquis and metoprolol  · [facility]     ECHO 6/42/4180  Systolic function was normal  Ejection fraction was estimated to be 60 %  There were no regional wall motion abnormalities   LEFT ATRIUM:  The atrium was dilated    MITRAL VALVE:  There was mild regurgitation   AORTIC VALVE:  There was mild regurgitation           LAURENCE (acute kidney injury) ([address] ) Acute respiratory failure with hypoxia and hypercapnia (HCC)        Past Medical History:   Diagnosis Date    Arthritis     knees    Diabetes mellitus ([address] )     Hypertension     Measles, [place] (rubella)     Mumps     Snoring     Loudly     Social History     Tobacco Use    Smoking status: Never Smoker    Smokeless tobacco: Never Used   Substance Use Topics    Alcohol use: Yes     Frequency: Monthly or less     Drinks per session: 1 or 2     Comment: Occasionally, not in excess - As per [employer]     Drug use: Never      Family History   Problem Relation Age of Onset    Ovarian cancer Mother     Heart disease Mother     Diabetes type II Mother     Colon cancer Father     Heart disease Father     Nephrolithiasis Father      Past Surgical History:   Procedure Laterality Date    CYSTOSCOPY      KNEE ARTHROSCOPY Left     knee    ORIF PELVIC FRACTURE      TOTAL KNEE ARTHROPLASTY Left 6/10/2019    Procedure: ARTHROPLASTY KNEE TOTAL;  Surgeon: Jerry Koo MD;  Location: [facility] OR;  Service: Orthopedics       Current Outpatient Medications:     ACCU-CHEK LARRY PLUS test strip, , Disp: , Rfl:     ACCU-CHEK FASTCLIX LANCETS MISC, , Disp: , Rfl:     acetaminophen (TYLENOL) 500 mg tablet, Take 500 mg by mouth every 6 (six) hours as needed, Disp: , Rfl:     apixaban (ELIQUIS) 5 mg, Take 1 tablet (5 mg total) by mouth 2 (two) times a day, Disp:  , Rfl: 0    glipiZIDE (GLUCOTROL XL) 5 mg 24 hr tablet, Take 1 tablet (5 mg total) by mouth daily, Disp: 30 tablet, Rfl: 2    lisinopril (ZESTRIL) 10 mg tablet, Take 1 tablet (10 mg total) by mouth daily, Disp: 30 tablet, Rfl: 0    metFORMIN (GLUCOPHAGE-XR) 500 mg 24 hr tablet, Take 4 tablets (2,000 mg total) by mouth daily Take 4 tablets daily, Disp: 120 tablet, Rfl: 2    metoprolol tartrate (LOPRESSOR) 50 mg tablet, Take 1 tablet (50 mg total) by mouth every 12 (twelve) hours, Disp: 60 tablet, Rfl: 0    ketoconazole (NIZORAL) 2 % cream, Apply to Both feet  Once a day (Patient not taking: Reported on 9/2/2020), Disp: 60 g, Rfl: 1  No Known Allergies    Vitals:    09/02/20 1405   BP: 140/88   BP Location: Right arm   Patient Position: Sitting   Cuff Size: Large   Pulse: 74   Temp: (!) 97 3 °F (36 3 °C)   SpO2: 96%   Weight: 119 kg (263 lb)   Height: 5' 6" (1 676 m)     Vitals:    09/02/20 1405   Weight: 119 kg (263 lb)      Height: 5' 6" (167 6 cm)   Body mass index is 42 45 kg/m²  Physical Exam:   GENERAL: Alert, well appearing, and in no distress  HEENT:  PERRL, EOMI, no scleral icterus, no conjunctival pallor  NECK:  Supple, No elevated JVP, no thyromegaly, no carotid bruits  HEART:  Regular rate and rhythm, normal S1/S2, no S3/S4, no murmur or rub  LUNGS:  Clear to auscultation bilaterally  ABDOMEN:  Obese, soft, non-tender, positive bowel sounds, no rebound or guarding  EXTREMITIES:  No edema  VASCULAR:  Normal pedal pulses   NEURO: No focal deficits,  SKIN: Normal without suspicious lesions on exposed skin    ROS:  Positive for fatigue, shortness of breath, weight gain  Except as noted in HPI, is otherwise reviewed in detail and a 12 point review of systems is negative      Labs:  Lab Results   Component Value Date     03/17/2017    K 3 5 08/17/2020     08/17/2020    CREATININE 0 93 08/17/2020    BUN 17 08/17/2020    CO2 29 08/17/2020    ALT 29 08/16/2020    AST 71 (H) 08/16/2020    INR 1 23 (H) 08/12/2020    GLUF 122 (H) 05/30/2019    HGBA1C 8 7 (H) 08/12/2020    WBC 7 77 08/17/2020    HGB 12 3 08/17/2020    HCT 37 0 08/17/2020     08/17/2020       Lab Results   Component Value Date    CHOL 218 (H) 03/17/2017    CHOL 199 07/27/2016    CHOL 204 (H) 01/12/2016     Lab Results   Component Value Date    LDLCALC 127 (H) 05/30/2019    LDLCALC 112 (H) 03/29/2019     Lab Results   Component Value Date    HDL 43 05/30/2019    HDL 31 (L) 03/29/2019    HDL 42 03/17/2017     Lab Results   Component Value Date    TRIG 96 05/30/2019    TRIG 153 (H) 03/29/2019    TRIG 118 03/17/2017     Testing:  Echo 8/14/20:  LEFT VENTRICLE:  Systolic function was normal  Ejection fraction was estimated to be 60 %  There were no regional wall motion abnormalities      LEFT ATRIUM:  The atrium was dilated      MITRAL VALVE:  There was mild regurgitation      AORTIC VALVE:  There was mild regurgitation  EKG:  Sinus rhythm, 77 beats per minute  Left axis deviation  LVH

## 2020-09-04 ENCOUNTER — PATIENT OUTREACH (OUTPATIENT)
Dept: FAMILY MEDICINE CLINIC | Facility: CLINIC | Age: 65
End: 2020-09-04

## 2020-09-04 DIAGNOSIS — Z78.9 NEED FOR FOLLOW-UP BY SOCIAL WORKER: Primary | ICD-10-CM

## 2020-09-04 NOTE — PROGRESS NOTES
Spoke with Denisa Alvarado  He just received his diabetic supplies yesterday FBS today is 163  He is interested in diabetic educational materials I will mail that to him  He did apply for [employer] for his hospital bill  He is retired and does not have any insurance  Medications are affordable except for Eliquis and diabetic test  strips  Cardiology has helped him with samples for Eliquis  I explained I will have  Shantanu Vicente reach out to talk about his insurance concerns, I will check back in 1 week to see how he is doing with his blood sugars  I did give him [facility] phone number for eye exam and instructed him he could check with [facility] and see what there cost is

## 2020-09-08 ENCOUNTER — PATIENT OUTREACH (OUTPATIENT)
Dept: FAMILY MEDICINE CLINIC | Facility: CLINIC | Age: 65
End: 2020-09-08

## 2020-09-08 NOTE — PROGRESS NOTES
OPCPAOLA MAYEN received inbasket message from [facility] ANH Winston to assist with patient's insurance coverage and medication costs  OPCPAOLA MAYEN outreached patient who confirmed that he does not have insurance coverage at this time and is needing assistance with the cost of his Eliquis and ACCU-CHEK LARRY PLUS test strips  Patient completed Medical Assistance Application through [employer] while in the hospital and has since provided them with all requested information/documents  No recent update received from [facility] on MA Application status  Patient's Cardiology office has been supplying him with Eliquis samples, he and his dtr researched coupons through [employer], and confirmed arias at [address] for test strips is $90 for 50 strips (not financially feasible for patient long-term)  Patient 63years old (ineligible for PACE/PACENET until 74years old)  OPCPAOLA MAYEN discussed Patient Assistance Programs (PAP) for medications with patient; patient agreeable to completing PAP Applications for both Eliquis and his test strips  Patient stated he lives with his dtr in a multi-story town-home, drives himself to appointments, and receives $1,530 through [employer]  Patient has [employer] card for food and states he has an adequate food supply  Patient denied any additional needs at this time  OPCPAOLA MAYEN to mail out PAP application, patient to review/complete, send to physician offices for physicians to complete/provide script, and have office staff fax in for review  OPCPAOLA MAYEN to follow-up with patient next week to ensure he receives applications via mail and check status of MA Application with [facility]  Update routed to [facility]

## 2020-09-11 ENCOUNTER — PATIENT OUTREACH (OUTPATIENT)
Dept: FAMILY MEDICINE CLINIC | Facility: CLINIC | Age: 65
End: 2020-09-11

## 2020-09-11 DIAGNOSIS — I48.91 NEW ONSET ATRIAL FIBRILLATION (HCC): ICD-10-CM

## 2020-09-11 DIAGNOSIS — I10 ESSENTIAL HYPERTENSION: ICD-10-CM

## 2020-09-11 RX ORDER — METOPROLOL TARTRATE 50 MG/1
50 TABLET, FILM COATED ORAL EVERY 12 HOURS SCHEDULED
Qty: 60 TABLET | Refills: 2 | Status: SHIPPED | OUTPATIENT
Start: 2020-09-11 | End: 2020-10-08 | Stop reason: SDUPTHER

## 2020-09-11 RX ORDER — LISINOPRIL 10 MG/1
10 TABLET ORAL DAILY
Qty: 30 TABLET | Refills: 2 | Status: SHIPPED | OUTPATIENT
Start: 2020-09-11 | End: 2020-10-08 | Stop reason: SDUPTHER

## 2020-09-11 NOTE — PROGRESS NOTES
Spoke with Dyan Vargas he did not receive the diabetic information I sent him yet  FBS running in the 140's had a low of 123 and high of 168  He needs refill for lisinopril and metoprolol  I will send note to pcp  I will check back next week

## 2020-09-17 ENCOUNTER — PATIENT OUTREACH (OUTPATIENT)
Dept: FAMILY MEDICINE CLINIC | Facility: CLINIC | Age: 65
End: 2020-09-17

## 2020-09-17 ENCOUNTER — CLINICAL SUPPORT (OUTPATIENT)
Dept: FAMILY MEDICINE CLINIC | Facility: CLINIC | Age: 65
End: 2020-09-17

## 2020-09-17 DIAGNOSIS — E11.69 DIABETES MELLITUS TYPE 2 IN OBESE (HCC): ICD-10-CM

## 2020-09-17 DIAGNOSIS — E66.9 DIABETES MELLITUS TYPE 2 IN OBESE (HCC): ICD-10-CM

## 2020-09-17 DIAGNOSIS — I48.91 NEW ONSET ATRIAL FIBRILLATION (HCC): ICD-10-CM

## 2020-09-17 NOTE — PROGRESS NOTES
[address] [place] [address], PharmD       Ronak Solares presented with all prescribed and OTC medications for medication review  COMPREHENSIVE MEDICATION THERAPY REVIEW    Medication list was reviewed with patient  Medications were reviewed for appropriateness, efficacy, and ADRs  Current Outpatient Medications:     ACCU-CHEK LARRY PLUS test strip, , Disp: , Rfl:     ACCU-CHEK FASTCLIX LANCETS MISC, , Disp: , Rfl:     apixaban (ELIQUIS) 5 mg, Take 1 tablet (5 mg total) by mouth 2 (two) times a day, Disp:  , Rfl: 0    glipiZIDE (GLUCOTROL XL) 5 mg 24 hr tablet, Take 1 tablet (5 mg total) by mouth daily, Disp: 30 tablet, Rfl: 2    lisinopril (ZESTRIL) 10 mg tablet, Take 1 tablet (10 mg total) by mouth daily, Disp: 30 tablet, Rfl: 2    metFORMIN (GLUCOPHAGE-XR) 500 mg 24 hr tablet, Take 4 tablets (2,000 mg total) by mouth daily Take 4 tablets daily, Disp: 120 tablet, Rfl: 2    metoprolol tartrate (LOPRESSOR) 50 mg tablet, Take 1 tablet (50 mg total) by mouth every 12 (twelve) hours, Disp: 60 tablet, Rfl: 2    The following drug therapy problems were identified:      1  Additional therapy Needed: assess new lipid panel to see ASCVD risk, probable need for statin therapy  2  Cost Issues: working on obtaining insurance, using cash pay at [facility] currently, okay for all medication except Eliquis which cardiology supplied samples of to get him through until he obtains insurance - thinks he only has 1 month left, will get coupon for 1 free month and mail to patient  3  Laboratory Monitoring Required: vitamin d deficient in past, lipid panel to assess for statin therapy - will obtain prior to upcoming PCP appt 10/8  4  Medication Record Reconciled  5  Other (vaccines, screenings, referrals, etc): vaccines, statins, DM eye and foot to be discussed at upcoming appointment with PCP  6   FBG significantly decreased per patient now 139,126,125,138mg/dL (down from 160/180mg  dL)    Patient Self-Reported Adherence Level:good    Barriers to Adherence:cost in past    Medication-Related Labs:     Patient was fasting for most recent labs    Sodium (mmol/L)   Date Value   08/17/2020 139     Potassium (mmol/L)   Date Value   08/17/2020 3 5   03/17/2017 4 0     Chloride (mmol/L)   Date Value   08/17/2020 103   03/17/2017 102     CO2 (mmol/L)   Date Value   08/17/2020 29     CO2, i-STAT (mmol/L)   Date Value   08/13/2020 24     ANION GAP (mmol/L)   Date Value   08/17/2020 7     BUN (mg/dL)   Date Value   08/17/2020 17   03/17/2017 10     Creatinine (mg/dL)   Date Value   08/17/2020 0 93   03/17/2017 0 88     Glucose, Fasting (mg/dL)   Date Value   05/30/2019 122 (H)     Calcium (mg/dL)   Date Value   08/17/2020 7 9 (L)   03/17/2017 9 4     AST (U/L)   Date Value   08/16/2020 71 (H)   03/17/2017 11     ALT (U/L)   Date Value   08/16/2020 29   03/17/2017 11     Alkaline Phosphatase (U/L)   Date Value   08/16/2020 75   03/17/2017 87     Total Protein (g/dL)   Date Value   08/16/2020 5 9 (L)     Albumin (g/dL)   Date Value   08/16/2020 1 6 (L)   03/17/2017 4 3     Total Bilirubin (mg/dL)   Date Value   08/16/2020 0 56     eGFR (ml/min/1 73sq m)   Date Value   08/17/2020 87     Hemoglobin A1C   Date Value   08/12/2020 8 7 % (H)   12/20/2019 7 4 % (H)   05/30/2019 7 3 % (H)   05/08/2019 8 6 % (H)   03/17/2017 7 0 % of total Hgb (H)   07/27/2016 6 6 % of total Hgb (H)   01/12/2016 6 4 % of total Hgb (H)     Glucose, i-STAT (mg/dl)   Date Value   08/13/2020 170 (H)      Cholesterol (mg/dL)   Date Value   05/30/2019 189   03/29/2019 174     Triglycerides (mg/dL)   Date Value   05/30/2019 96   03/29/2019 153 (H)   03/17/2017 118   07/27/2016 139   01/12/2016 97     HDL (mg/dL)   Date Value   03/17/2017 42   07/27/2016 41   01/12/2016 44     HDL, Direct (mg/dL)   Date Value   05/30/2019 43   03/29/2019 31 (L)     LDL Calculated (mg/dL)   Date Value   05/30/2019 127 (H)   03/29/2019 112 (H)     No components found for: CREATSERUM, CREATFLUID, CREATADULT, MICROALBUMIN, MICROALBUPOC, POTASSIUM  ALT (U/L)   Date Value   08/16/2020 29   03/17/2017 11     AST (U/L)   Date Value   08/16/2020 71 (H)   03/17/2017 11     Alkaline Phosphatase (U/L)   Date Value   08/16/2020 75   03/17/2017 87      No results found for: [id number]  MICROALBUMIN/CREATININE RATIO (mcg/mg creat)   Date Value   03/17/2017 48 (H)       No Known Allergies    Social History     Tobacco Use   Smoking Status Never Smoker   Smokeless Tobacco Never Used     Social History     Substance and Sexual Activity   Alcohol Use Yes    Frequency: Monthly or less    Drinks per session: 1 or 2    Comment: Occasionally, not in excess - As per [employer]        Education/Resources Provided:   Patient given Personal-Medication Record of current medications- printed with After Visit Summary   Patient given Medication Action plan as detailed above - printed in Patient instructions with After Visit Summary   Educated on rationale, goals, duration, dosing, monitoring, common AEs, importance of compliance, interactions, storage, missed doses for each medication   Patient verbalized understanding    Recommended Follow Up: Pharmacist in 3 weeks to review SMBG, assess adherence, assess labs, and check PAP   Patient instructed to follow-up with Dr Adam Powell for routine health maintenance in 1 month and to schedule yearly Medication Review with pharmacist       Medication list is now up-to-date and current        Demographics  Interaction Method: Phone  Type of Intervention: Follow-Up    Topic(s) Addressed  Diabetes  Dyslipidemia  Medication Management  Statin Use  Other    Intervention(s) Made    Pharmacologic:      Prevent or Manage Adverse Drug Reaction    Drug Interaction    Non-Pharmacologic:  Adherence Addressed    Preventive Care Gap Closure Recommendation    Lab Ordered    Other    Tool(s) Used  Not Applicable    Time Spent:   Time Spent in Direct Patient Care: 20 minutes    Time Spent in Care Coordination: 15 minutes    Recommendation(s) Accepted by the Patient/Caregiver:  All Accepted

## 2020-09-17 NOTE — PROGRESS NOTES
OPCM SW outreached patient to inquire if received update from [facility] on MA Application and if mailing was received with Eliquis and ACCU-CHEK LARRY PLUS Test Strips to apply for Patient Assistance Programs  Patient recently received an update from [facility] that additional information was needed; patient stated he already provided the requested information but resent it again per their request   Patient has not yet received mailing of PAP Applications for Eliquis and Test Strips  Patient stated he is otherwise doing well and denied any additional concerns at this time  OPCM SW to outreach patient again next week to ensure mailing has been received

## 2020-09-18 ENCOUNTER — PATIENT OUTREACH (OUTPATIENT)
Dept: FAMILY MEDICINE CLINIC | Facility: CLINIC | Age: 65
End: 2020-09-18

## 2020-09-18 NOTE — PROGRESS NOTES
Left message with my contact information for patient to return my call   Looking to see if he received my information on diabetes

## 2020-09-25 ENCOUNTER — PATIENT OUTREACH (OUTPATIENT)
Dept: FAMILY MEDICINE CLINIC | Facility: CLINIC | Age: 65
End: 2020-09-25

## 2020-09-25 NOTE — PROGRESS NOTES
OPCM SW outreached patient to ensure he received the Eliquis and ACCU-CHEK LARRY PLUS Test Strip PAP applications in the mail  Patient confirmed he received these but did not yet finish completing them  Patient to call [facility] with any questions while completing the application  Patient stated he has not yet received Diabetic Information that was sent to him via mail from [address]; [facility] to relay this update to Patric Buerger  Patient has not yet received an update from [facility] regarding the status of his MA application status either  [facility] to follow-up with patient in roughly 1 week and will outreach [facility] if no update received at that time  Patient agreeable to this plan

## 2020-09-25 NOTE — PROGRESS NOTES
Received message from [address] [place] [address] worker that patient still did not receive diabetic information I mailed him  I printed new copy and placed it in the mail today  I will outreach next week to see if he received it

## 2020-10-02 ENCOUNTER — PATIENT OUTREACH (OUTPATIENT)
Dept: FAMILY MEDICINE CLINIC | Facility: CLINIC | Age: 65
End: 2020-10-02

## 2020-10-05 ENCOUNTER — APPOINTMENT (OUTPATIENT)
Dept: LAB | Facility: CLINIC | Age: 65
End: 2020-10-05

## 2020-10-05 DIAGNOSIS — I10 ESSENTIAL HYPERTENSION: ICD-10-CM

## 2020-10-05 DIAGNOSIS — E55.9 VITAMIN D DEFICIENCY: ICD-10-CM

## 2020-10-05 DIAGNOSIS — E11.69 DIABETES MELLITUS TYPE 2 IN OBESE (HCC): ICD-10-CM

## 2020-10-05 DIAGNOSIS — Z12.5 ENCOUNTER FOR PROSTATE CANCER SCREENING: ICD-10-CM

## 2020-10-05 DIAGNOSIS — E66.9 DIABETES MELLITUS TYPE 2 IN OBESE (HCC): ICD-10-CM

## 2020-10-05 LAB
25(OH)D3 SERPL-MCNC: 12.4 NG/ML (ref 30–100)
ALBUMIN SERPL BCP-MCNC: 3.2 G/DL (ref 3.5–5)
ALP SERPL-CCNC: 90 U/L (ref 46–116)
ALT SERPL W P-5'-P-CCNC: 11 U/L (ref 12–78)
ANION GAP SERPL CALCULATED.3IONS-SCNC: 7 MMOL/L (ref 4–13)
AST SERPL W P-5'-P-CCNC: 6 U/L (ref 5–45)
BASOPHILS # BLD AUTO: 0.02 THOUSANDS/ΜL (ref 0–0.1)
BASOPHILS NFR BLD AUTO: 0 % (ref 0–1)
BILIRUB SERPL-MCNC: 0.41 MG/DL (ref 0.2–1)
BUN SERPL-MCNC: 17 MG/DL (ref 5–25)
CALCIUM ALBUM COR SERPL-MCNC: 9.6 MG/DL (ref 8.3–10.1)
CALCIUM SERPL-MCNC: 9 MG/DL (ref 8.3–10.1)
CHLORIDE SERPL-SCNC: 108 MMOL/L (ref 100–108)
CHOLEST SERPL-MCNC: 192 MG/DL (ref 50–200)
CO2 SERPL-SCNC: 24 MMOL/L (ref 21–32)
CREAT SERPL-MCNC: 1.04 MG/DL (ref 0.6–1.3)
EOSINOPHIL # BLD AUTO: 0.31 THOUSAND/ΜL (ref 0–0.61)
EOSINOPHIL NFR BLD AUTO: 3 % (ref 0–6)
ERYTHROCYTE [DISTWIDTH] IN BLOOD BY AUTOMATED COUNT: 15.9 % (ref 11.6–15.1)
EST. AVERAGE GLUCOSE BLD GHB EST-MCNC: 148 MG/DL
GFR SERPL CREATININE-BSD FRML MDRD: 76 ML/MIN/1.73SQ M
GLUCOSE P FAST SERPL-MCNC: 144 MG/DL (ref 65–99)
HBA1C MFR BLD: 6.8 %
HCT VFR BLD AUTO: 41.9 % (ref 36.5–49.3)
HDLC SERPL-MCNC: 39 MG/DL
HGB BLD-MCNC: 13.8 G/DL (ref 12–17)
IMM GRANULOCYTES # BLD AUTO: 0.03 THOUSAND/UL (ref 0–0.2)
IMM GRANULOCYTES NFR BLD AUTO: 0 % (ref 0–2)
LDLC SERPL CALC-MCNC: 123 MG/DL (ref 0–100)
LYMPHOCYTES # BLD AUTO: 1.46 THOUSANDS/ΜL (ref 0.6–4.47)
LYMPHOCYTES NFR BLD AUTO: 16 % (ref 14–44)
MCH RBC QN AUTO: 29.7 PG (ref 26.8–34.3)
MCHC RBC AUTO-ENTMCNC: 32.9 G/DL (ref 31.4–37.4)
MCV RBC AUTO: 90 FL (ref 82–98)
MONOCYTES # BLD AUTO: 0.64 THOUSAND/ΜL (ref 0.17–1.22)
MONOCYTES NFR BLD AUTO: 7 % (ref 4–12)
NEUTROPHILS # BLD AUTO: 6.73 THOUSANDS/ΜL (ref 1.85–7.62)
NEUTS SEG NFR BLD AUTO: 74 % (ref 43–75)
NONHDLC SERPL-MCNC: 153 MG/DL
NRBC BLD AUTO-RTO: 0 /100 WBCS
PLATELET # BLD AUTO: 337 THOUSANDS/UL (ref 149–390)
PMV BLD AUTO: 10.3 FL (ref 8.9–12.7)
POTASSIUM SERPL-SCNC: 4 MMOL/L (ref 3.5–5.3)
PROT SERPL-MCNC: 7.3 G/DL (ref 6.4–8.2)
PSA SERPL-MCNC: 0.3 NG/ML (ref 0–4)
RBC # BLD AUTO: 4.65 MILLION/UL (ref 3.88–5.62)
SODIUM SERPL-SCNC: 139 MMOL/L (ref 136–145)
TRIGL SERPL-MCNC: 152 MG/DL
TSH SERPL DL<=0.05 MIU/L-ACNC: 0.98 UIU/ML (ref 0.36–3.74)
WBC # BLD AUTO: 9.19 THOUSAND/UL (ref 4.31–10.16)

## 2020-10-05 PROCEDURE — G0103 PSA SCREENING: HCPCS

## 2020-10-05 PROCEDURE — 36415 COLL VENOUS BLD VENIPUNCTURE: CPT

## 2020-10-05 PROCEDURE — 84443 ASSAY THYROID STIM HORMONE: CPT

## 2020-10-05 PROCEDURE — 80053 COMPREHEN METABOLIC PANEL: CPT

## 2020-10-05 PROCEDURE — 80061 LIPID PANEL: CPT

## 2020-10-05 PROCEDURE — 82306 VITAMIN D 25 HYDROXY: CPT

## 2020-10-05 PROCEDURE — 85025 COMPLETE CBC W/AUTO DIFF WBC: CPT

## 2020-10-05 PROCEDURE — 83036 HEMOGLOBIN GLYCOSYLATED A1C: CPT

## 2020-10-08 ENCOUNTER — OFFICE VISIT (OUTPATIENT)
Dept: FAMILY MEDICINE CLINIC | Facility: CLINIC | Age: 65
End: 2020-10-08

## 2020-10-08 VITALS
SYSTOLIC BLOOD PRESSURE: 132 MMHG | BODY MASS INDEX: 42.81 KG/M2 | HEIGHT: 66 IN | DIASTOLIC BLOOD PRESSURE: 84 MMHG | WEIGHT: 266.4 LBS | OXYGEN SATURATION: 98 % | HEART RATE: 83 BPM | TEMPERATURE: 97.3 F | RESPIRATION RATE: 18 BRPM

## 2020-10-08 DIAGNOSIS — E66.9 DIABETES MELLITUS TYPE 2 IN OBESE (HCC): Primary | Chronic | ICD-10-CM

## 2020-10-08 DIAGNOSIS — I48.91 NEW ONSET ATRIAL FIBRILLATION (HCC): ICD-10-CM

## 2020-10-08 DIAGNOSIS — D22.9 SKIN MOLE: ICD-10-CM

## 2020-10-08 DIAGNOSIS — J18.9 COMMUNITY ACQUIRED PNEUMONIA OF LEFT LOWER LOBE OF LUNG: Chronic | ICD-10-CM

## 2020-10-08 DIAGNOSIS — I10 ESSENTIAL HYPERTENSION: ICD-10-CM

## 2020-10-08 DIAGNOSIS — E55.9 VITAMIN D DEFICIENCY: ICD-10-CM

## 2020-10-08 DIAGNOSIS — E11.69 DIABETES MELLITUS TYPE 2 IN OBESE (HCC): Primary | Chronic | ICD-10-CM

## 2020-10-08 DIAGNOSIS — E78.5 DYSLIPIDEMIA: ICD-10-CM

## 2020-10-08 PROBLEM — J96.01 ACUTE RESPIRATORY FAILURE WITH HYPOXIA AND HYPERCAPNIA (HCC): Status: RESOLVED | Noted: 2020-08-17 | Resolved: 2020-10-08

## 2020-10-08 PROBLEM — J96.02 ACUTE RESPIRATORY FAILURE WITH HYPOXIA AND HYPERCAPNIA (HCC): Status: RESOLVED | Noted: 2020-08-17 | Resolved: 2020-10-08

## 2020-10-08 PROCEDURE — 99214 OFFICE O/P EST MOD 30 MIN: CPT | Performed by: FAMILY MEDICINE

## 2020-10-08 RX ORDER — METOPROLOL TARTRATE 50 MG/1
TABLET, FILM COATED ORAL
Qty: 75 TABLET | Refills: 3 | Status: SHIPPED | OUTPATIENT
Start: 2020-10-08 | End: 2021-05-19

## 2020-10-08 RX ORDER — METFORMIN HYDROCHLORIDE 500 MG/1
2000 TABLET, EXTENDED RELEASE ORAL DAILY
Qty: 120 TABLET | Refills: 5 | Status: SHIPPED | OUTPATIENT
Start: 2020-10-08 | End: 2021-09-07

## 2020-10-08 RX ORDER — ERGOCALCIFEROL 1.25 MG/1
50000 CAPSULE ORAL WEEKLY
Qty: 4 CAPSULE | Refills: 2 | Status: SHIPPED | OUTPATIENT
Start: 2020-10-08 | End: 2021-06-14

## 2020-10-08 RX ORDER — LISINOPRIL 10 MG/1
10 TABLET ORAL DAILY
Qty: 30 TABLET | Refills: 2 | Status: SHIPPED | OUTPATIENT
Start: 2020-10-08 | End: 2021-06-14 | Stop reason: SDUPTHER

## 2020-10-08 RX ORDER — GLIPIZIDE 5 MG/1
5 TABLET, FILM COATED, EXTENDED RELEASE ORAL DAILY
Qty: 30 TABLET | Refills: 5 | Status: SHIPPED | OUTPATIENT
Start: 2020-10-08 | End: 2021-06-14 | Stop reason: SDUPTHER

## 2020-10-09 ENCOUNTER — PATIENT OUTREACH (OUTPATIENT)
Dept: FAMILY MEDICINE CLINIC | Facility: CLINIC | Age: 65
End: 2020-10-09

## 2020-10-16 ENCOUNTER — PATIENT OUTREACH (OUTPATIENT)
Dept: FAMILY MEDICINE CLINIC | Facility: CLINIC | Age: 65
End: 2020-10-16

## 2020-10-23 ENCOUNTER — PATIENT OUTREACH (OUTPATIENT)
Dept: FAMILY MEDICINE CLINIC | Facility: CLINIC | Age: 65
End: 2020-10-23

## 2020-11-21 ENCOUNTER — APPOINTMENT (EMERGENCY)
Dept: NON INVASIVE DIAGNOSTICS | Facility: HOSPITAL | Age: 65
End: 2020-11-21
Payer: COMMERCIAL

## 2020-11-21 ENCOUNTER — APPOINTMENT (EMERGENCY)
Dept: RADIOLOGY | Facility: HOSPITAL | Age: 65
End: 2020-11-21
Payer: COMMERCIAL

## 2020-11-21 ENCOUNTER — HOSPITAL ENCOUNTER (EMERGENCY)
Facility: HOSPITAL | Age: 65
Discharge: HOME | End: 2020-11-21
Attending: EMERGENCY MEDICINE
Payer: COMMERCIAL

## 2020-11-21 VITALS
HEIGHT: 66 IN | RESPIRATION RATE: 18 BRPM | TEMPERATURE: 98.2 F | BODY MASS INDEX: 43 KG/M2 | SYSTOLIC BLOOD PRESSURE: 173 MMHG | HEART RATE: 90 BPM | OXYGEN SATURATION: 99 % | DIASTOLIC BLOOD PRESSURE: 97 MMHG

## 2020-11-21 DIAGNOSIS — R29.898 LEFT ARM WEAKNESS: Primary | ICD-10-CM

## 2020-11-21 DIAGNOSIS — R20.0 LEFT ARM NUMBNESS: ICD-10-CM

## 2020-11-21 PROBLEM — I48.91 ATRIAL FIBRILLATION (HCC): Status: ACTIVE | Noted: 2020-11-21

## 2020-11-21 LAB
ALBUMIN SERPL BCP-MCNC: 3.5 G/DL (ref 3.5–5)
ALP SERPL-CCNC: 73 U/L (ref 46–116)
ALT SERPL W P-5'-P-CCNC: 13 U/L (ref 12–78)
ANION GAP SERPL CALCULATED.3IONS-SCNC: 7 MMOL/L (ref 4–13)
APTT PPP: 27 SECONDS (ref 23–37)
AST SERPL W P-5'-P-CCNC: 8 U/L (ref 5–45)
BASOPHILS # BLD AUTO: 0.01 THOUSANDS/ΜL (ref 0–0.1)
BASOPHILS NFR BLD AUTO: 0 % (ref 0–1)
BILIRUB SERPL-MCNC: 0.63 MG/DL (ref 0.2–1)
BUN SERPL-MCNC: 22 MG/DL (ref 5–25)
CALCIUM SERPL-MCNC: 9.2 MG/DL (ref 8.3–10.1)
CHLORIDE SERPL-SCNC: 107 MMOL/L (ref 100–108)
CO2 SERPL-SCNC: 24 MMOL/L (ref 21–32)
CREAT SERPL-MCNC: 1.19 MG/DL (ref 0.6–1.3)
EOSINOPHIL # BLD AUTO: 0.19 THOUSAND/ΜL (ref 0–0.61)
EOSINOPHIL NFR BLD AUTO: 2 % (ref 0–6)
ERYTHROCYTE [DISTWIDTH] IN BLOOD BY AUTOMATED COUNT: 15.3 % (ref 11.6–15.1)
GFR SERPL CREATININE-BSD FRML MDRD: 65 ML/MIN/1.73SQ M
GLUCOSE SERPL-MCNC: 158 MG/DL (ref 65–140)
HCT VFR BLD AUTO: 41.6 % (ref 36.5–49.3)
HGB BLD-MCNC: 13.9 G/DL (ref 12–17)
IMM GRANULOCYTES # BLD AUTO: 0.04 THOUSAND/UL (ref 0–0.2)
IMM GRANULOCYTES NFR BLD AUTO: 1 % (ref 0–2)
INR PPP: 0.98 (ref 0.84–1.19)
LYMPHOCYTES # BLD AUTO: 1.1 THOUSANDS/ΜL (ref 0.6–4.47)
LYMPHOCYTES NFR BLD AUTO: 14 % (ref 14–44)
MCH RBC QN AUTO: 30 PG (ref 26.8–34.3)
MCHC RBC AUTO-ENTMCNC: 33.4 G/DL (ref 31.4–37.4)
MCV RBC AUTO: 90 FL (ref 82–98)
MONOCYTES # BLD AUTO: 0.6 THOUSAND/ΜL (ref 0.17–1.22)
MONOCYTES NFR BLD AUTO: 7 % (ref 4–12)
NEUTROPHILS # BLD AUTO: 6.18 THOUSANDS/ΜL (ref 1.85–7.62)
NEUTS SEG NFR BLD AUTO: 76 % (ref 43–75)
NRBC BLD AUTO-RTO: 0 /100 WBCS
PLATELET # BLD AUTO: 232 THOUSANDS/UL (ref 149–390)
PMV BLD AUTO: 9.6 FL (ref 8.9–12.7)
POTASSIUM SERPL-SCNC: 4 MMOL/L (ref 3.5–5.3)
PROT SERPL-MCNC: 7.3 G/DL (ref 6.4–8.2)
PROTHROMBIN TIME: 12.9 SECONDS (ref 11.6–14.5)
RBC # BLD AUTO: 4.64 MILLION/UL (ref 3.88–5.62)
SODIUM SERPL-SCNC: 138 MMOL/L (ref 136–145)
WBC # BLD AUTO: 8.12 THOUSAND/UL (ref 4.31–10.16)

## 2020-11-21 PROCEDURE — 36415 COLL VENOUS BLD VENIPUNCTURE: CPT | Performed by: EMERGENCY MEDICINE

## 2020-11-21 PROCEDURE — G1004 CDSM NDSC: HCPCS

## 2020-11-21 PROCEDURE — 93880 EXTRACRANIAL BILAT STUDY: CPT | Performed by: SURGERY

## 2020-11-21 PROCEDURE — 93880 EXTRACRANIAL BILAT STUDY: CPT

## 2020-11-21 PROCEDURE — 99285 EMERGENCY DEPT VISIT HI MDM: CPT | Performed by: EMERGENCY MEDICINE

## 2020-11-21 PROCEDURE — 85730 THROMBOPLASTIN TIME PARTIAL: CPT | Performed by: EMERGENCY MEDICINE

## 2020-11-21 PROCEDURE — 85610 PROTHROMBIN TIME: CPT | Performed by: EMERGENCY MEDICINE

## 2020-11-21 PROCEDURE — 85025 COMPLETE CBC W/AUTO DIFF WBC: CPT | Performed by: EMERGENCY MEDICINE

## 2020-11-21 PROCEDURE — 99284 EMERGENCY DEPT VISIT MOD MDM: CPT

## 2020-11-21 PROCEDURE — 70450 CT HEAD/BRAIN W/O DYE: CPT

## 2020-11-21 PROCEDURE — 80053 COMPREHEN METABOLIC PANEL: CPT | Performed by: EMERGENCY MEDICINE

## 2020-11-21 RX ORDER — ATORVASTATIN CALCIUM 40 MG/1
40 TABLET, FILM COATED ORAL
Status: DISCONTINUED | OUTPATIENT
Start: 2020-11-21 | End: 2020-11-21 | Stop reason: HOSPADM

## 2020-11-21 RX ADMIN — ATORVASTATIN CALCIUM 40 MG: 40 TABLET, FILM COATED ORAL at 15:42

## 2020-11-27 ENCOUNTER — TELEPHONE (OUTPATIENT)
Dept: FAMILY MEDICINE CLINIC | Facility: CLINIC | Age: 65
End: 2020-11-27

## 2020-12-02 DIAGNOSIS — I48.91 NEW ONSET ATRIAL FIBRILLATION (HCC): ICD-10-CM

## 2020-12-07 PROBLEM — I48.0 PAROXYSMAL ATRIAL FIBRILLATION (HCC): Status: ACTIVE | Noted: 2020-11-21

## 2020-12-07 PROBLEM — I48.0 PAROXYSMAL ATRIAL FIBRILLATION (HCC): Status: ACTIVE | Noted: 2020-08-16

## 2020-12-08 ENCOUNTER — OFFICE VISIT (OUTPATIENT)
Dept: CARDIOLOGY CLINIC | Facility: CLINIC | Age: 65
End: 2020-12-08
Payer: COMMERCIAL

## 2020-12-08 ENCOUNTER — TELEPHONE (OUTPATIENT)
Dept: CARDIOLOGY CLINIC | Facility: CLINIC | Age: 65
End: 2020-12-08

## 2020-12-08 VITALS
HEIGHT: 66 IN | BODY MASS INDEX: 44.63 KG/M2 | WEIGHT: 277.7 LBS | SYSTOLIC BLOOD PRESSURE: 130 MMHG | DIASTOLIC BLOOD PRESSURE: 84 MMHG | HEART RATE: 66 BPM

## 2020-12-08 DIAGNOSIS — E78.5 DYSLIPIDEMIA: ICD-10-CM

## 2020-12-08 DIAGNOSIS — R77.8 ELEVATED TROPONIN: ICD-10-CM

## 2020-12-08 DIAGNOSIS — I48.0 PAROXYSMAL ATRIAL FIBRILLATION (HCC): Primary | ICD-10-CM

## 2020-12-08 DIAGNOSIS — I10 ESSENTIAL HYPERTENSION: ICD-10-CM

## 2020-12-08 PROCEDURE — 93000 ELECTROCARDIOGRAM COMPLETE: CPT | Performed by: INTERNAL MEDICINE

## 2020-12-08 PROCEDURE — 99214 OFFICE O/P EST MOD 30 MIN: CPT | Performed by: INTERNAL MEDICINE

## 2020-12-08 RX ORDER — ATORVASTATIN CALCIUM 40 MG/1
40 TABLET, FILM COATED ORAL DAILY
Qty: 90 TABLET | Refills: 3 | Status: SHIPPED | OUTPATIENT
Start: 2020-12-08 | End: 2021-12-22

## 2020-12-22 ENCOUNTER — TELEPHONE (OUTPATIENT)
Dept: FAMILY MEDICINE CLINIC | Facility: CLINIC | Age: 65
End: 2020-12-22

## 2020-12-29 ENCOUNTER — OFFICE VISIT (OUTPATIENT)
Dept: FAMILY MEDICINE CLINIC | Facility: CLINIC | Age: 65
End: 2020-12-29
Payer: COMMERCIAL

## 2020-12-29 ENCOUNTER — HOSPITAL ENCOUNTER (OUTPATIENT)
Dept: NEUROLOGY | Facility: CLINIC | Age: 65
Discharge: HOME | End: 2020-12-29
Payer: COMMERCIAL

## 2020-12-29 VITALS
WEIGHT: 285.4 LBS | OXYGEN SATURATION: 96 % | RESPIRATION RATE: 18 BRPM | BODY MASS INDEX: 45.87 KG/M2 | TEMPERATURE: 97.6 F | HEART RATE: 65 BPM | SYSTOLIC BLOOD PRESSURE: 132 MMHG | HEIGHT: 66 IN | DIASTOLIC BLOOD PRESSURE: 80 MMHG

## 2020-12-29 DIAGNOSIS — R29.898 WEAKNESS OF LEFT HAND: ICD-10-CM

## 2020-12-29 DIAGNOSIS — R60.0 EDEMA OF HAND: Primary | ICD-10-CM

## 2020-12-29 DIAGNOSIS — R60.0 EDEMA OF HAND: ICD-10-CM

## 2020-12-29 DIAGNOSIS — E11.69 DIABETES MELLITUS TYPE 2 IN OBESE (HCC): Chronic | ICD-10-CM

## 2020-12-29 DIAGNOSIS — J18.9 COMMUNITY ACQUIRED PNEUMONIA OF LEFT LOWER LOBE OF LUNG: Chronic | ICD-10-CM

## 2020-12-29 DIAGNOSIS — I48.0 PAROXYSMAL ATRIAL FIBRILLATION (HCC): ICD-10-CM

## 2020-12-29 DIAGNOSIS — E66.9 DIABETES MELLITUS TYPE 2 IN OBESE (HCC): Chronic | ICD-10-CM

## 2020-12-29 PROCEDURE — 99214 OFFICE O/P EST MOD 30 MIN: CPT | Performed by: FAMILY MEDICINE

## 2020-12-29 PROCEDURE — 95886 MUSC TEST DONE W/N TEST COMP: CPT | Performed by: PSYCHIATRY & NEUROLOGY

## 2020-12-29 PROCEDURE — 95909 NRV CNDJ TST 5-6 STUDIES: CPT | Performed by: PSYCHIATRY & NEUROLOGY

## 2020-12-29 RX ORDER — FUROSEMIDE 20 MG/1
TABLET ORAL
Qty: 30 TABLET | Refills: 0 | Status: SHIPPED | OUTPATIENT
Start: 2020-12-29 | End: 2021-06-14

## 2020-12-29 NOTE — ASSESSMENT & PLAN NOTE
Left lower lobe pneumonia August 20 20  Patient is past due repeat CT chest   He denies symptoms of fever, cough, shortness of breath

## 2020-12-29 NOTE — PROGRESS NOTES
FAMILY PRACTICE OFFICE VISIT       NAME: Oly Guthrie  AGE: 63 y o  SEX: male       : 1955        MRN: [id number]        Assessment and Plan     Problem List Items Addressed This Visit        Endocrine    Diabetes mellitus type 2 in obese ([address] ) (Chronic)       Lab Results   Component Value Date    HGBA1C 6 8 (H) 10/05/2020 ·   Well controlled, continue metformin and glipizide            Respiratory    Community acquired pneumonia of left lower lobe of lung (Chronic)     Left lower lobe pneumonia   Patient is past due repeat CT chest   He denies symptoms of fever, cough, shortness of breath  Cardiovascular and Mediastinum    Paroxysmal atrial fibrillation (HCC)     · Patient is under care of [facility], continue Eliquis and metoprolol            Other    Edema of hand - Primary    Relevant Medications    furosemide (LASIX) 20 mg tablet    Other Relevant Orders    EMG 1 Limb (Completed)    XR hand 3+ vw left      Other Visit Diagnoses     Weakness of left hand        Relevant Orders    EMG 1 Limb (Completed)        Patient presents for evaluation of painless edema of left hand with subjective left hand weakness  He developed symptoms of left hand weakness in late November and was seen in emergency room on  with extensive evaluation including negative CT brain, normal carotid ultrasound an neurology consultation  Patient did not experience any other extremity numbness, weakness or paresthesias and denies any headaches, visual changes or any other neurological symptoms  Left hand weakness has resolved shortly after emergency room visit but patient has developed painless edema of the same left hand within past week  He admits to mild paresthesias of left hand likely secondary to edema development  He feels well otherwise  Patient denies any preceding injury or fall  No history of gout or connective tissue disease  No swelling in other joints      History of hospitalization earlier this summer for pneumonia requiring intubation  Patient denies symptoms of cough, shortness of breath or chest tightness  He is past due CT chest , patient was not able to proceed with testing on time due to lack of insurance  Plan:  · Proceed with stat EMG nerve conduction study left upper extremity  · Start Lasix 20 mg daily p r n  · Follow-up 7-10 days  · Hand x-ray  · I advised patient to schedule CT chest  · Patient will contact me with any changes in his symptoms in the interim  There are no Patient Instructions on file for this visit  Discussed with the patient and all questioned fully answered  He will call me if any problems arise  M*Modal software was used to dictate this note  It may contain errors with dictating incorrect words/spelling  Please contact provider directly with any questions  Chief Complaint     Chief Complaint   Patient presents with    Hand Swelling     Pt is here for left hand swelling 3 + wks       History of Present Illness     Patient presents for evaluation of left hand swelling and weakness  Reportedly he was not able to move his left hand and was seen emergency department of [facility] on November 21st   Patient was evaluated by Neurology to rule out CVA  No evidence of stroke  Patient was able to move his left hand easily by the end of ED visit  Patient states that gradually left hand symptoms have improved till a week ago when he started developing significant swelling in the same left hand  He denies symptoms of pain, numbness or tingling  No discoloration  No rash  No fever  Patient reports difficulty making a fist with some "pulling sensation" due to significant edema  Occasional paresthesias, likely due to development of swelling as well  ED notes reviewed  Patient was seen by [facility] Neurology, Dr Bailey Gave  No indications for brain MRI    Patient had unremarkable CT brain and normal carotid ultrasound with finding of carotid atherosclerosis 1-49% bilaterally  Patient denies any other neurological symptoms since emergency room visit on November 21st   He denies symptoms of headaches, numbness, tingling paresthesias elsewhere, no weakness, no visual changes or dizziness  Normal TSH on October 5th  Hemoglobin A1c 6 8 on October 5th  No new medications  Patient denies symptoms of neck pain or upper extremity numbness, tingling paresthesias of pain  No joint swelling or pain elsewhere  Left hand swelling is essentially painless  Review of Systems   Review of Systems   Constitutional: Negative  HENT: Negative  Eyes: Negative  Respiratory: Negative  Cardiovascular: Negative for chest pain, palpitations and leg swelling  Gastrointestinal: Negative  Endocrine: Negative  Musculoskeletal: Negative for neck pain  As outlined in HPI, painless swelling left hand   Skin: Negative  Negative for rash  Neurological: Positive for weakness ( subjective weakness left hand) and numbness (Mild paresthesias left hand, after development of edema)  Negative for dizziness, tremors, syncope, speech difficulty and headaches  Hematological: Negative  Psychiatric/Behavioral: Negative          Active Problem List     Patient Active Problem List   Diagnosis    Essential hypertension    Diabetes mellitus type 2 in obese [address] [address]    Vitamin D deficiency    Morbid obesity ([facility] [facility] [address]    Osteoarthritis of left knee    Bladder wall thickening    Status post left knee replacement    Community acquired pneumonia of left lower lobe of lung    Non-MI Troponin Elevation    Rhabdomyolysis    Paroxysmal atrial fibrillation ([facility] [facility] )    LAURENCE (acute kidney injury) ([facility] [facility] )    Dyslipidemia    Skin mole    Left arm weakness    Edema of hand       Past Medical History:  Past Medical History:   Diagnosis Date    Acute respiratory failure with hypoxia and hypercapnia ([facility] ) 8/17/2020    Arthritis     knees    Diabetes mellitus ([address] )     Hypertension     Measles, [place] (rubella)     Mumps     Snoring     Loudly       Past Surgical History:  Past Surgical History:   Procedure Laterality Date    CYSTOSCOPY      KNEE ARTHROSCOPY Left     knee    ORIF PELVIC FRACTURE      TOTAL KNEE ARTHROPLASTY Left 6/10/2019    Procedure: ARTHROPLASTY KNEE TOTAL;  Surgeon: Kyle Ortiz MD;  [facility]: [facility];  Service: Orthopedics       Family History:  Family History   Problem Relation Age of Onset    Ovarian cancer Mother     Heart disease Mother     Diabetes type II Mother     Colon cancer Father     Heart disease Father     Nephrolithiasis Father        Social History:  Social History     Socioeconomic History    Marital status: Single     Spouse name: Not on file    Number of children: Not on file    Years of education: Not on file    Highest education level: Not on file   Occupational History    Not on file   Social Needs    Financial resource strain: Not on file    Food insecurity     Worry: Not on file     Inability: Not on file    Transportation needs     Medical: Not on file     Non-medical: Not on file   Tobacco Use    Smoking status: Never Smoker    Smokeless tobacco: Never Used   Substance and Sexual Activity    Alcohol use: Yes     Frequency: Monthly or less     Drinks per session: 1 or 2     Comment: Occasionally, not in excess - As per [employer]     Drug use: Never    Sexual activity: Not on file   Lifestyle    Physical activity     Days per week: Not on file     Minutes per session: Not on file    Stress: Not on file   Relationships    Social connections     Talks on phone: Not on file     Gets together: Not on file     Attends [employer]ars[employer] service: Not on file     Active member of club or organization: Not on file     Attends meetings of clubs or organizations: Not on file     Relationship status: Not on file    Intimate partner violence     Fear of current or ex partner: Not on file Emotionally abused: Not on file     Physically abused: Not on file     Forced sexual activity: Not on file   Other Topics Concern    Not on file   Social History Narrative    Not on file           Objective     Vitals:    12/29/20 0853 12/29/20 0922   BP: 142/100 132/80   Pulse: 65    Resp: 18    Temp: 97 6 °F (36 4 °C)    TempSrc: Temporal    SpO2: 96%    Weight: 129 kg (285 lb 6 4 oz)    Height: 5' 6" (1 676 m)      Wt Readings from Last 3 Encounters:   12/29/20 129 kg (285 lb 6 4 oz)   12/08/20 126 kg (277 lb 11 2 oz)   10/08/20 121 kg (266 lb 6 4 oz)       Physical Exam  Vitals signs and nursing note reviewed  Constitutional:       General: He is not in acute distress  Appearance: Normal appearance  He is well-developed  He is not ill-appearing  HENT:      Head: Normocephalic and atraumatic  Eyes:      General: No scleral icterus  Extraocular Movements: Extraocular movements intact  Conjunctiva/sclera: Conjunctivae normal       Pupils: Pupils are equal, round, and reactive to light  Neck:      Musculoskeletal: Neck supple  Vascular: No carotid bruit  Cardiovascular:      Rate and Rhythm: Normal rate and regular rhythm  Heart sounds: Normal heart sounds  No murmur  Pulmonary:      Effort: Pulmonary effort is normal  No respiratory distress  Breath sounds: Normal breath sounds  No wheezing or rales  Abdominal:      General: Bowel sounds are normal  There is no distension or abdominal bruit  Musculoskeletal: Normal range of motion  Right lower leg: No edema  Left lower leg: No edema  Comments: Left hand:  +2 to 3 edema of dorsal and palmar surface  Patient has difficulty making a fist due to significant edema  No warmth, no discoloration, no rash  Left wrist:  No edema, full range of motion, no hesitation  Neck:  Full range of motion, no pain  Left arm and forearm:  No edema or discoloration  Skin:     General: Skin is warm        Findings: No rash    Neurological:      General: No focal deficit present  Mental Status: He is alert and oriented to person, place, and time  Cranial Nerves: No cranial nerve deficit  Psychiatric:         Mood and Affect: Mood normal          Behavior: Behavior normal          Thought Content:  Thought content normal          Pertinent Laboratory/Diagnostic Studies:  Lab Results   Component Value Date    GLUCOSE 170 (H) 08/13/2020    BUN 22 11/21/2020    CREATININE 1 19 11/21/2020    CALCIUM 9 2 11/21/2020     03/17/2017    K 4 0 11/21/2020    CO2 24 11/21/2020     11/21/2020     Lab Results   Component Value Date    ALT 13 11/21/2020    AST 8 11/21/2020    ALKPHOS 73 11/21/2020    BILITOT 0 8 03/17/2017       Lab Results   Component Value Date    WBC 8 12 11/21/2020    HGB 13 9 11/21/2020    HCT 41 6 11/21/2020    MCV 90 11/21/2020     11/21/2020       No results found for: TSH    Lab Results   Component Value Date    CHOL 218 (H) 03/17/2017     Lab Results   Component Value Date    TRIG 152 (H) 10/05/2020     Lab Results   Component Value Date    HDL 39 (L) 10/05/2020     Lab Results   Component Value Date    LDLCALC 123 (H) 10/05/2020     Lab Results   Component Value Date    HGBA1C 6 8 (H) 10/05/2020       Results for orders placed or performed during the hospital encounter of 11/21/20   CBC and differential   Result Value Ref Range    WBC 8 12 4 31 - 10 16 Thousand/uL    RBC 4 64 3 88 - 5 62 Million/uL    Hemoglobin 13 9 12 0 - 17 0 g/dL    Hematocrit 41 6 36 5 - 49 3 %    MCV 90 82 - 98 fL    MCH 30 0 26 8 - 34 3 pg    MCHC 33 4 31 4 - 37 4 g/dL    RDW 15 3 (H) 11 6 - 15 1 %    MPV 9 6 8 9 - 12 7 fL    Platelets 326 377 - 032 Thousands/uL    nRBC 0 /100 WBCs    Neutrophils Relative 76 (H) 43 - 75 %    Immat GRANS % 1 0 - 2 %    Lymphocytes Relative 14 14 - 44 %    Monocytes Relative 7 4 - 12 %    Eosinophils Relative 2 0 - 6 %    Basophils Relative 0 0 - 1 %    Neutrophils Absolute 6 18 1 85 - 7 62 Thousands/µL    Immature Grans Absolute 0 04 0 00 - 0 20 Thousand/uL    Lymphocytes Absolute 1 10 0 60 - 4 47 Thousands/µL    Monocytes Absolute 0 60 0 17 - 1 22 Thousand/µL    Eosinophils Absolute 0 19 0 00 - 0 61 Thousand/µL    Basophils Absolute 0 01 0 00 - 0 10 Thousands/µL   Comprehensive metabolic panel   Result Value Ref Range    Sodium 138 136 - 145 mmol/L    Potassium 4 0 3 5 - 5 3 mmol/L    Chloride 107 100 - 108 mmol/L    CO2 24 21 - 32 mmol/L    ANION GAP 7 4 - 13 mmol/L    BUN 22 5 - 25 mg/dL    Creatinine 1 19 0 60 - 1 30 mg/dL    Glucose 158 (H) 65 - 140 mg/dL    Calcium 9 2 8 3 - 10 1 mg/dL    AST 8 5 - 45 U/L    ALT 13 12 - 78 U/L    Alkaline Phosphatase 73 46 - 116 U/L    Total Protein 7 3 6 4 - 8 2 g/dL    Albumin 3 5 3 5 - 5 0 g/dL    Total Bilirubin 0 63 0 20 - 1 00 mg/dL    eGFR 65 ml/min/1 73sq m   Protime-INR   Result Value Ref Range    Protime 12 9 11 6 - 14 5 seconds    INR 0 98 0 84 - 1 19   APTT   Result Value Ref Range    PTT 27 23 - 37 seconds       Orders Placed This Encounter   Procedures    XR hand 3+ vw left    EMG 1 Limb       ALLERGIES:  No Known Allergies    Current Medications     Current Outpatient Medications   Medication Sig Dispense Refill    ACCU-CHEK LARRY PLUS test strip       ACCU-CHEK FASTCLIX LANCETS MISC       apixaban (ELIQUIS) 5 mg Take 1 tablet (5 mg total) by mouth 2 (two) times a day 56 tablet 0    atorvastatin (LIPITOR) 40 mg tablet Take 1 tablet (40 mg total) by mouth daily 90 tablet 3    ergocalciferol (VITAMIN D2) 50,000 units Take 1 capsule (50,000 Units total) by mouth once a week 4 capsule 2    glipiZIDE (GLUCOTROL XL) 5 mg 24 hr tablet Take 1 tablet (5 mg total) by mouth daily 30 tablet 5    metFORMIN (GLUCOPHAGE-XR) 500 mg 24 hr tablet Take 4 tablets (2,000 mg total) by mouth daily Take 4 tablets daily 120 tablet 5    metoprolol tartrate (LOPRESSOR) 50 mg tablet Take 1 5  tablets (75 mg) in the morning and 1 tablet (50 mg)  in the evening 75 tablet 3    furosemide (LASIX) 20 mg tablet Take 1 tablet once a day as needed for hand edema 30 tablet 0    lisinopril (ZESTRIL) 10 mg tablet Take 1 tablet (10 mg total) by mouth daily 30 tablet 2     No current facility-administered medications for this visit  There are no discontinued medications      Health Maintenance     Health Maintenance   Topic Date Due    Pneumococcal Vaccine: Pediatrics (0 to 5 Years) and At-Risk Patients (6 to 59 Years) (1 of 1 - PPSV23) 05/12/1963    DM Eye Exam  05/12/1967    HIV Screening  05/12/1972    Annual Physical  05/12/1975    Colorectal Cancer Screening  05/12/2007    DTaP,Tdap,and Td Vaccines (2 - Td) 01/01/2011    PT PLAN OF CARE  08/04/2019    Influenza Vaccine (1) 09/01/2020    HEMOGLOBIN A1C  04/05/2021    Depression Screening PHQ  10/08/2021    Diabetic Foot Exam  10/08/2021    BMI: Followup Plan  10/16/2021    BMI: Adult  12/29/2021    Hepatitis C Screening  Completed    HIB Vaccine  Aged Out    Hepatitis B Vaccine  Aged Out    IPV Vaccine  Aged Out    Hepatitis A Vaccine  Aged Out    Meningococcal ACWY Vaccine  Aged Out    HPV Vaccine  Aged Out       Immunization History   Administered Date(s) Administered    Influenza Quadrivalent Preservative Free 3 years and older IM 08/03/2016    Tdap 01/01/2001       Amadeo Sandoval MD

## 2021-01-05 ENCOUNTER — PROCEDURE VISIT (OUTPATIENT)
Dept: CARDIOLOGY CLINIC | Facility: CLINIC | Age: 66
End: 2021-01-05

## 2021-01-05 DIAGNOSIS — I48.0 PAROXYSMAL ATRIAL FIBRILLATION (HCC): Primary | ICD-10-CM

## 2021-01-05 PROCEDURE — RECHECK: Performed by: INTERNAL MEDICINE

## 2021-01-05 NOTE — ASSESSMENT & PLAN NOTE
Lab Results   Component Value Date    HGBA1C 6 8 (H) 10/05/2020   · Well controlled, continue metformin and glipizide

## 2021-01-08 ENCOUNTER — OFFICE VISIT (OUTPATIENT)
Dept: FAMILY MEDICINE CLINIC | Facility: CLINIC | Age: 66
End: 2021-01-08
Payer: COMMERCIAL

## 2021-01-08 VITALS
SYSTOLIC BLOOD PRESSURE: 154 MMHG | BODY MASS INDEX: 45.58 KG/M2 | OXYGEN SATURATION: 99 % | TEMPERATURE: 98.6 F | RESPIRATION RATE: 20 BRPM | HEART RATE: 91 BPM | DIASTOLIC BLOOD PRESSURE: 92 MMHG | HEIGHT: 66 IN | WEIGHT: 283.6 LBS

## 2021-01-08 DIAGNOSIS — R60.0 EDEMA OF HAND: Primary | ICD-10-CM

## 2021-01-08 DIAGNOSIS — I48.0 PAROXYSMAL ATRIAL FIBRILLATION (HCC): ICD-10-CM

## 2021-01-08 PROCEDURE — 99213 OFFICE O/P EST LOW 20 MIN: CPT | Performed by: FAMILY MEDICINE

## 2021-01-08 RX ORDER — PREDNISONE 10 MG/1
TABLET ORAL
Qty: 20 TABLET | Refills: 0 | Status: SHIPPED | OUTPATIENT
Start: 2021-01-08 | End: 2021-01-29 | Stop reason: SDUPTHER

## 2021-01-08 NOTE — PROGRESS NOTES
FAMILY PRACTICE OFFICE VISIT       NAME: Corey Sarmiento  AGE: 63 y o  SEX: male       : 1955        MRN: [id number]        Assessment and Plan     Problem List Items Addressed This Visit        Cardiovascular and Mediastinum    Paroxysmal atrial fibrillation (HCC)     · Continue Eliquis and metoprolol            Other    Edema of hand - Primary    Relevant Medications    predniSONE 10 mg tablet      Patient presents for follow-up of painless edema of left hand  He denies any recurrences of left upper extremity numbness, tingling or paresthesias  Patient denies symptoms of headaches or any other systemic manifestations  No other arthralgias or myalgias  Patient reports partial improvement with start of Lasix 20 mg daily  Results of recent EMG discussed with patient today:  Technically difficult study, no evidence of median neuropathy, no evidence of cervical radiculopathy  Patient is hesitant to proceed with left hand x-ray and CT chest (for follow-up after hospitalization/ICU/intubation due to left-sided pneumonia) due to high deductible medical insurance  Patient is hopeful to request curbside consult from 1 of hand surgeons within next few days and will contact me with an update  Plan:  ·   Trial of prednisone 40 mg daily for 2 days then 30 mg daily for 2 days then 20 mg daily for 2 days then 10 mg daily for 2 days  · Patient needs consultation from Orthopedic surgery/Hand specialist, he prefers to proceed with curbside consult at [place] [address] and will contact me with an update  He declines formal referral with [facility]'s Orthopedic team due to limited medical insurance and high Co pays  · Patient will contact me with any changes and updates regarding his symptoms      There are no Patient Instructions on file for this visit  Discussed with the patient and all questioned fully answered  He will call me if any problems arise  M*Modal software was used to dictate this note   It may contain errors with dictating incorrect words/spelling  Please contact provider directly with any questions  Chief Complaint     Chief Complaint   Patient presents with    Annual Exam       History of Present Illness     Patient presents for follow-up of painless right hand edema  He has been using Lasix daily and has noticed some improvement in uncomfortable localized right hand and palm swelling  We discussed results of EMG/nerve conduction study of left upper extremity  No evidence of carpal tunnel syndrome  No evidence of cervical radiculopathy  Limited study due to significant edema  Just recommends repeating study warranted once edema resolves  Patient's blood pressure is elevated upon arrival to the office today  He rushed and forgot to take his blood pressure medications  He will continue monitoring BP at home  Patient denies any other arthralgias, myalgias or joint swelling  He denies any headaches, numbness, tingling or paresthesias  Patient did not proceed with left hand x-ray and CT chest due to concern of limited insurance coverage and high insurance deductible  Review of Systems   Review of Systems   Constitutional: Negative  Respiratory: Negative  Cardiovascular: Negative  Gastrointestinal: Negative  Genitourinary: Negative  Musculoskeletal:        Painless left hand edema   Skin: Negative  Neurological: Negative  Hematological: Negative  Psychiatric/Behavioral: Negative          Active Problem List     Patient Active Problem List   Diagnosis    Essential hypertension    Diabetes mellitus type 2 in obese ([facility]    Vitamin D deficiency    Morbid obesity ([facility] )    Osteoarthritis of left knee    Bladder wall thickening    Status post left knee replacement    Community acquired pneumonia of left lower lobe of lung    Non-MI Troponin Elevation    Rhabdomyolysis    Paroxysmal atrial fibrillation ([facility]    [facility]    Dyslipidemia    Skin mole    Left arm weakness    Edema of hand       Past Medical History:  Past Medical History:   Diagnosis Date    Acute respiratory failure with hypoxia and hypercapnia ([facility] ) 8/17/2020    Arthritis     knees    Diabetes mellitus (HCC)     Hypertension     Measles, [place] (rubella)     Mumps     Snoring     Loudly       Past Surgical History:  Past Surgical History:   Procedure Laterality Date    CYSTOSCOPY      KNEE ARTHROSCOPY Left     knee    ORIF PELVIC FRACTURE      TOTAL KNEE ARTHROPLASTY Left 6/10/2019    Procedure: ARTHROPLASTY KNEE TOTAL;  Surgeon: Kay Tineo MD;  [facility];  Service: Orthopedics       Family History:  Family History   Problem Relation Age of Onset    Ovarian cancer Mother     Heart disease Mother     Diabetes type II Mother     Colon cancer Father     Heart disease Father     Nephrolithiasis Father        Social History:  Social History     Socioeconomic History    Marital status: Single     Spouse name: Not on file    Number of children: Not on file    Years of education: Not on file    Highest education level: Not on file   Occupational History    Not on file   Social Needs    Financial resource strain: Not on file    Food insecurity     Worry: Not on file     Inability: Not on file    Transportation needs     Medical: Not on file     Non-medical: Not on file   Tobacco Use    Smoking status: Never Smoker    Smokeless tobacco: Never Used   Substance and Sexual Activity    Alcohol use: Yes     Frequency: Monthly or less     Drinks per session: 1 or 2     Comment: Occasionally, not in excess - As per [employer]scripts     Drug use: Never    Sexual activity: Not on file   Lifestyle    Physical activity     Days per week: Not on file     Minutes per session: Not on file    Stress: Not on file   Relationships    Social connections     Talks on phone: Not on file     Gets together: Not on file     Attends [employer] service: Not on file     Active member of club or organization: Not on file     Attends meetings of clubs or organizations: Not on file     Relationship status: Not on file    Intimate partner violence     Fear of current or ex partner: Not on file     Emotionally abused: Not on file     Physically abused: Not on file     Forced sexual activity: Not on file   Other Topics Concern    Not on file   Social History Narrative    Not on file           Objective     Vitals:    01/08/21 0935   BP: 154/92   BP Location: Left arm   Patient Position: Sitting   Cuff Size: Large   Pulse: 91   Resp: 20   Temp: 98 6 °F (37 °C)   TempSrc: Temporal   SpO2: 99%   Weight: 129 kg (283 lb 9 6 oz)   Height: 5' 6" (1 676 m)     Wt Readings from Last 3 Encounters:   01/08/21 129 kg (283 lb 9 6 oz)   12/29/20 129 kg (285 lb 6 4 oz)   12/08/20 126 kg (277 lb 11 2 oz)       Physical Exam  Vitals signs and nursing note reviewed  Constitutional:       General: He is not in acute distress  Appearance: Normal appearance  He is well-developed  He is not ill-appearing  HENT:      Head: Normocephalic and atraumatic  Eyes:      Conjunctiva/sclera: Conjunctivae normal    Neck:      Musculoskeletal: Neck supple  Vascular: No carotid bruit  Cardiovascular:      Rate and Rhythm: Normal rate  Rhythm irregularly irregular  Heart sounds: Normal heart sounds  No murmur  Pulmonary:      Effort: Pulmonary effort is normal  No respiratory distress  Breath sounds: Normal breath sounds  No wheezing or rales  Abdominal:      General: There is no distension or abdominal bruit  Musculoskeletal: Normal range of motion  Comments: Left hand edema has improved,currently +1-2  Nonpitting  No warmth, no discoloration, no erythema  Patient is still unable to make full fist due to swelling  Patient admits to subjective weakness in left hand due to edema  Left wrist:  Full range of motion, no restrictions     Neurological:      General: No focal deficit present  Mental Status: He is alert and oriented to person, place, and time  Cranial Nerves: No cranial nerve deficit  Psychiatric:         Mood and Affect: Mood normal          Behavior: Behavior normal          Thought Content:  Thought content normal          Pertinent Laboratory/Diagnostic Studies:  Lab Results   Component Value Date    GLUCOSE 170 (H) 08/13/2020    BUN 22 11/21/2020    CREATININE 1 19 11/21/2020    CALCIUM 9 2 11/21/2020     03/17/2017    K 4 0 11/21/2020    CO2 24 11/21/2020     11/21/2020     Lab Results   Component Value Date    ALT 13 11/21/2020    AST 8 11/21/2020    ALKPHOS 73 11/21/2020    BILITOT 0 8 03/17/2017       Lab Results   Component Value Date    WBC 8 12 11/21/2020    HGB 13 9 11/21/2020    HCT 41 6 11/21/2020    MCV 90 11/21/2020     11/21/2020       No results found for: TSH    Lab Results   Component Value Date    CHOL 218 (H) 03/17/2017     Lab Results   Component Value Date    TRIG 152 (H) 10/05/2020     Lab Results   Component Value Date    HDL 39 (L) 10/05/2020     Lab Results   Component Value Date    LDLCALC 123 (H) 10/05/2020     Lab Results   Component Value Date    HGBA1C 6 8 (H) 10/05/2020       Results for orders placed or performed during the hospital encounter of 11/21/20   CBC and differential   Result Value Ref Range    WBC 8 12 4 31 - 10 16 Thousand/uL    RBC 4 64 3 88 - 5 62 Million/uL    Hemoglobin 13 9 12 0 - 17 0 g/dL    Hematocrit 41 6 36 5 - 49 3 %    MCV 90 82 - 98 fL    MCH 30 0 26 8 - 34 3 pg    MCHC 33 4 31 4 - 37 4 g/dL    RDW 15 3 (H) 11 6 - 15 1 %    MPV 9 6 8 9 - 12 7 fL    Platelets 585 012 - 324 Thousands/uL    nRBC 0 /100 WBCs    Neutrophils Relative 76 (H) 43 - 75 %    Immat GRANS % 1 0 - 2 %    Lymphocytes Relative 14 14 - 44 %    Monocytes Relative 7 4 - 12 %    Eosinophils Relative 2 0 - 6 %    Basophils Relative 0 0 - 1 %    Neutrophils Absolute 6 18 1 85 - 7 62 Thousands/µL    Immature Grans Absolute 0 04 0 00 - 0 20 Thousand/uL    Lymphocytes Absolute 1 10 0 60 - 4 47 Thousands/µL    Monocytes Absolute 0 60 0 17 - 1 22 Thousand/µL    Eosinophils Absolute 0 19 0 00 - 0 61 Thousand/µL    Basophils Absolute 0 01 0 00 - 0 10 Thousands/µL   Comprehensive metabolic panel   Result Value Ref Range    Sodium 138 136 - 145 mmol/L    Potassium 4 0 3 5 - 5 3 mmol/L    Chloride 107 100 - 108 mmol/L    CO2 24 21 - 32 mmol/L    ANION GAP 7 4 - 13 mmol/L    BUN 22 5 - 25 mg/dL    Creatinine 1 19 0 60 - 1 30 mg/dL    Glucose 158 (H) 65 - 140 mg/dL    Calcium 9 2 8 3 - 10 1 mg/dL    AST 8 5 - 45 U/L    ALT 13 12 - 78 U/L    Alkaline Phosphatase 73 46 - 116 U/L    Total Protein 7 3 6 4 - 8 2 g/dL    Albumin 3 5 3 5 - 5 0 g/dL    Total Bilirubin 0 63 0 20 - 1 00 mg/dL    eGFR 65 ml/min/1 73sq m   Protime-INR   Result Value Ref Range    Protime 12 9 11 6 - 14 5 seconds    INR 0 98 0 84 - 1 19   APTT   Result Value Ref Range    PTT 27 23 - 37 seconds       No orders of the defined types were placed in this encounter        ALLERGIES:  No Known Allergies    Current Medications     Current Outpatient Medications   Medication Sig Dispense Refill    ACCU-CHEK LARRY PLUS test strip       ACCU-CHEK FASTCLIX LANCETS MISC       apixaban (ELIQUIS) 5 mg Take 1 tablet (5 mg total) by mouth 2 (two) times a day 56 tablet 0    atorvastatin (LIPITOR) 40 mg tablet Take 1 tablet (40 mg total) by mouth daily 90 tablet 3    ergocalciferol (VITAMIN D2) 50,000 units Take 1 capsule (50,000 Units total) by mouth once a week 4 capsule 2    furosemide (LASIX) 20 mg tablet Take 1 tablet once a day as needed for hand edema 30 tablet 0    glipiZIDE (GLUCOTROL XL) 5 mg 24 hr tablet Take 1 tablet (5 mg total) by mouth daily 30 tablet 5    lisinopril (ZESTRIL) 10 mg tablet Take 1 tablet (10 mg total) by mouth daily 30 tablet 2    metFORMIN (GLUCOPHAGE-XR) 500 mg 24 hr tablet Take 4 tablets (2,000 mg total) by mouth daily Take 4 tablets daily 120 tablet 5    metoprolol tartrate (LOPRESSOR) 50 mg tablet Take 1 5  tablets (75 mg) in the morning and 1 tablet (50 mg)  in the evening 75 tablet 3    predniSONE 10 mg tablet Take 40 mg (4 tabs) daily x 2 days; then 30 mg (3 tabs) daily x 2 days then 20 mg (2 tabs) daily x 2 days then 10 mg ( 1 tab) dailyx 2 days 20 tablet 0     No current facility-administered medications for this visit  There are no discontinued medications      Health Maintenance     Health Maintenance   Topic Date Due    Pneumococcal Vaccine: Pediatrics (0 to 5 Years) and At-Risk Patients (6 to 59 Years) (1 of 1 - PPSV23) 05/12/1963    DM Eye Exam  05/12/1967    HIV Screening  05/12/1972    Annual Physical  05/12/1975    Colorectal Cancer Screening  05/12/2007    DTaP,Tdap,and Td Vaccines (2 - Td) 01/01/2011    PT PLAN OF CARE  08/04/2019    HEMOGLOBIN A1C  04/05/2021    Influenza Vaccine (1) 06/30/2021 (Originally 9/1/2020)    Depression Screening PHQ  10/08/2021    Diabetic Foot Exam  10/08/2021    BMI: Followup Plan  10/16/2021    BMI: Adult  01/08/2022    Hepatitis C Screening  Completed    HIB Vaccine  Aged Out    Hepatitis B Vaccine  Aged Out    IPV Vaccine  Aged Out    Hepatitis A Vaccine  Aged Out    Meningococcal ACWY Vaccine  Aged Out    HPV Vaccine  Aged Out       Immunization History   Administered Date(s) Administered    Influenza Quadrivalent Preservative Free 3 years and older IM 08/03/2016    Tdap 01/01/2001       Jen Ortiz MD

## 2021-01-11 ENCOUNTER — PATIENT OUTREACH (OUTPATIENT)
Dept: FAMILY MEDICINE CLINIC | Facility: CLINIC | Age: 66
End: 2021-01-11

## 2021-01-11 NOTE — PROGRESS NOTES
Request received from Lorene Pineda [address] for [facility] SW to outreach patient and discuss application he received from Femi Aceves  Patient previously applied for Medical Assistance through [employer] but was denied as he exceeded the income limit    [facility] attempted to outreach patient to further discuss application; no answer, voicemail left, and awaiting return call to further assist

## 2021-01-13 ENCOUNTER — HOSPITAL ENCOUNTER (OUTPATIENT)
Dept: NON INVASIVE DIAGNOSTICS | Facility: HOSPITAL | Age: 66
Discharge: HOME | End: 2021-01-13
Payer: COMMERCIAL

## 2021-01-13 DIAGNOSIS — I48.0 PAROXYSMAL ATRIAL FIBRILLATION (HCC): ICD-10-CM

## 2021-01-13 PROCEDURE — 93226 XTRNL ECG REC<48 HR SCAN A/R: CPT

## 2021-01-13 PROCEDURE — 93225 XTRNL ECG REC<48 HRS REC: CPT

## 2021-01-13 PROCEDURE — 93227 XTRNL ECG REC<48 HR R&I: CPT | Performed by: INTERNAL MEDICINE

## 2021-01-14 ENCOUNTER — PATIENT OUTREACH (OUTPATIENT)
Dept: FAMILY MEDICINE CLINIC | Facility: CLINIC | Age: 66
End: 2021-01-14

## 2021-01-14 NOTE — PROGRESS NOTES
2nd outreach attempt to patient to further discuss Medical Assistance application; no answer and unable to leave voicemail as phone continuously rang  Will attempt 3rd outreach at later date

## 2021-01-18 ENCOUNTER — TELEPHONE (OUTPATIENT)
Dept: CARDIOLOGY CLINIC | Facility: CLINIC | Age: 66
End: 2021-01-18

## 2021-01-18 NOTE — TELEPHONE ENCOUNTER
Left voicemail for patient to call the office back  ----- Message from Demarco Moreno MD sent at 1/14/2021  1:54 PM EST -----  Please let patient know Holter monitor was normal  No afib  No changes

## 2021-01-22 ENCOUNTER — PATIENT OUTREACH (OUTPATIENT)
Dept: FAMILY MEDICINE CLINIC | Facility: CLINIC | Age: 66
End: 2021-01-22

## 2021-01-22 NOTE — LETTER
[address] [address]    Re: Resources   1/22/2021       Dear Kim Shearer am a [address]  and wanted to be certain you had information to contact me should you desire assistance with or have questions about non-medical aspects of your care such as [but not limited to] medical insurance, housing, transportation, material needs, or emergency needs, as I have been unable to reach you  If I do not have an answer I will assist you in finding the appropriate agency or individual who can help  Please feel free to contact me at [phone]  Thank You      Sincerely,     CHANNING Schwartz

## 2021-01-22 NOTE — PROGRESS NOTES
3rd outreach attempt to patient to further discuss Medical Assistance application; no answer and voicemail left  Will mail Unable to Reach letter to patient, will remain available to assist with any future needs, and will relay update to [facility] ANH Otero and Dr Jarrett Reach

## 2021-01-28 ENCOUNTER — TELEPHONE (OUTPATIENT)
Dept: FAMILY MEDICINE CLINIC | Facility: CLINIC | Age: 66
End: 2021-01-28

## 2021-01-28 DIAGNOSIS — R60.0 EDEMA OF HAND: Primary | ICD-10-CM

## 2021-01-28 NOTE — TELEPHONE ENCOUNTER
Please contact patient  I would like to follow-up regarding his  Hand swelling  He  has reported improvement after completing prednisone a week ago  If swelling persists-please let me know,  so I can provide a referral for hand specialist at [facility]    Thank you

## 2021-01-29 RX ORDER — PREDNISONE 10 MG/1
TABLET ORAL
Qty: 20 TABLET | Refills: 0 | Status: SHIPPED | OUTPATIENT
Start: 2021-01-29 | End: 2021-04-21 | Stop reason: ALTCHOICE

## 2021-01-29 NOTE — TELEPHONE ENCOUNTER
Patient states he is still having swelling, he would like the referral to the hand specialist      Also he would like another Rx of prednisone       Dr Núñez please review and advise

## 2021-01-29 NOTE — TELEPHONE ENCOUNTER
I did place referral for [facility], Dr Nirmal Monte  I did send Rx for prednisone taper to the pharmacy  Thanks

## 2021-03-10 ENCOUNTER — CONSULT (OUTPATIENT)
Dept: OBGYN CLINIC | Facility: HOSPITAL | Age: 66
End: 2021-03-10
Payer: COMMERCIAL

## 2021-03-10 ENCOUNTER — TELEPHONE (OUTPATIENT)
Dept: OBGYN CLINIC | Facility: HOSPITAL | Age: 66
End: 2021-03-10

## 2021-03-10 ENCOUNTER — HOSPITAL ENCOUNTER (OUTPATIENT)
Dept: RADIOLOGY | Facility: HOSPITAL | Age: 66
Discharge: HOME | End: 2021-03-10
Attending: ORTHOPAEDIC SURGERY
Payer: COMMERCIAL

## 2021-03-10 VITALS
BODY MASS INDEX: 46.12 KG/M2 | DIASTOLIC BLOOD PRESSURE: 102 MMHG | WEIGHT: 287 LBS | SYSTOLIC BLOOD PRESSURE: 161 MMHG | HEART RATE: 63 BPM | HEIGHT: 66 IN

## 2021-03-10 DIAGNOSIS — M79.642 PAIN OF LEFT HAND: ICD-10-CM

## 2021-03-10 DIAGNOSIS — R60.0 HAND EDEMA: ICD-10-CM

## 2021-03-10 PROCEDURE — 99213 OFFICE O/P EST LOW 20 MIN: CPT | Performed by: ORTHOPAEDIC SURGERY

## 2021-03-10 PROCEDURE — 73130 X-RAY EXAM OF HAND: CPT

## 2021-03-10 NOTE — PROGRESS NOTES
ASSESSMENT/PLAN:    Assessment:     Left hand edema    Plan: We will order a venous duplex study of the patient's upper extremity  We will see him back when this test is complete  Follow Up: After Testing    To Do Next Visit:     _____________________________________________________  CHIEF COMPLAINT:  Chief Complaint   Patient presents with    Left Hand - Numbness, Swelling         SUBJECTIVE:  Joseph Vera is a 63 y o  male who presents with swelling to the left hand  This started  4 month(s) ago as Sudden  Patient states that since late November his swelling has improved but it is still very significant     Radiation: None  Previous Treatments: Patient was previously seen in the ED for evaluation and 2 medrol dose packs without relief  Associated symptoms: No Complaints    PAST MEDICAL HISTORY:  Past Medical History:   Diagnosis Date    Acute respiratory failure with hypoxia and hypercapnia (HCC) 8/17/2020    Arthritis     knees    Diabetes mellitus (HCC)     Hypertension     Measles, [place] (rubella)     Mumps     Snoring     Loudly       PAST SURGICAL HISTORY:  Past Surgical History:   Procedure Laterality Date    CYSTOSCOPY      KNEE ARTHROSCOPY Left     knee    ORIF PELVIC FRACTURE      TOTAL KNEE ARTHROPLASTY Left 6/10/2019    Procedure: ARTHROPLASTY KNEE TOTAL;  Surgeon: Anca Babcock MD;  [facility]: [facility];  Service: Orthopedics       FAMILY HISTORY:  Family History   Problem Relation Age of Onset    Ovarian cancer Mother     Heart disease Mother     Diabetes type II Mother     Colon cancer Father     Heart disease Father     Nephrolithiasis Father        SOCIAL HISTORY:  Social History     Tobacco Use    Smoking status: Never Smoker    Smokeless tobacco: Never Used   Substance Use Topics    Alcohol use: Yes     Frequency: Monthly or less     Drinks per session: 1 or 2     Comment: Occasionally, not in excess - As per Allscripts     Drug use: Never MEDICATIONS:    Current Outpatient Medications:     ACCU-CHEK LARRY PLUS test strip, , Disp: , Rfl:     ACCU-CHEK FASTCLIX LANCETS MISC, , Disp: , Rfl:     apixaban (ELIQUIS) 5 mg, Take 1 tablet (5 mg total) by mouth 2 (two) times a day, Disp: 56 tablet, Rfl: 0    atorvastatin (LIPITOR) 40 mg tablet, Take 1 tablet (40 mg total) by mouth daily, Disp: 90 tablet, Rfl: 3    ergocalciferol (VITAMIN D2) 50,000 units, Take 1 capsule (50,000 Units total) by mouth once a week, Disp: 4 capsule, Rfl: 2    furosemide (LASIX) 20 mg tablet, Take 1 tablet once a day as needed for hand edema, Disp: 30 tablet, Rfl: 0    glipiZIDE (GLUCOTROL XL) 5 mg 24 hr tablet, Take 1 tablet (5 mg total) by mouth daily, Disp: 30 tablet, Rfl: 5    lisinopril (ZESTRIL) 10 mg tablet, Take 1 tablet (10 mg total) by mouth daily, Disp: 30 tablet, Rfl: 2    metFORMIN (GLUCOPHAGE-XR) 500 mg 24 hr tablet, Take 4 tablets (2,000 mg total) by mouth daily Take 4 tablets daily, Disp: 120 tablet, Rfl: 5    metoprolol tartrate (LOPRESSOR) 50 mg tablet, Take 1 5  tablets (75 mg) in the morning and 1 tablet (50 mg)  in the evening, Disp: 75 tablet, Rfl: 3    predniSONE 10 mg tablet, Take 40 mg (4 tabs) daily x 2 days; then 30 mg (3 tabs) daily x 2 days then 20 mg (2 tabs) daily x 2 days then 10 mg ( 1 tab) dailyx 2 days, Disp: 20 tablet, Rfl: 0    ALLERGIES:  No Known Allergies    REVIEW OF SYSTEMS:  Pertinent items are noted in HPI      LABS:  HgA1c:   Lab Results   Component Value Date    HGBA1C 6 8 (H) 10/05/2020     BMP:   Lab Results   Component Value Date    GLUCOSE 170 (H) 08/13/2020    CALCIUM 9 2 11/21/2020     03/17/2017    K 4 0 11/21/2020    CO2 24 11/21/2020     11/21/2020    BUN 22 11/21/2020    CREATININE 1 19 11/21/2020         _____________________________________________________  PHYSICAL EXAMINATION:  Vital signs: BP (!) 161/102   Pulse 63   Ht 5' 6" (1 676 m)   Wt 130 kg (287 lb)   BMI 46 32 kg/m²   General: well developed and well nourished, alert, oriented times 3 and appears comfortable  Psychiatric: Normal  HEENT: Trachea Midline, No torticollis  Cardiovascular: No discernable arrhythmia  Pulmonary: No wheezing or stridor  Skin: No Lacerations  Neurovascular: Sensation Intact to the Median, Ulnar, Radial Nerve, Motor Intact to the Median, Ulnar, Radial Nerve and good palpable radial pulse, ulnar pulse is weak    MUSCULOSKELETAL EXAMINATION:  LEFT SIDE:  Significant edema to dorsal hand, volar hand, proximal, middle and distal phalanx, loss of transverse and longitudinal arch, swelling traces back  8cm back from wrist up forearm  No heat, no warmth  1cm loss of terminal flexion  _____________________________________________________  STUDIES REVIEWED:  EMG:  Done on 12/29/2020 was inconclusive due to hand edema  Xray of left hand was reviewed in PACS today by Dr Keaton Whittaker, demonstrates venous calcification   With left long finger MP joint osteoarthritis        PROCEDURES PERFORMED:  Procedures  No Procedures performed today   Scribe Attestation    I,:  Jackelin mEerson am acting as a scribe while in the presence of the attending physician :       I,:  Aishwarya Carrera MD personally performed the services described in this documentation    as scribed in my presence :

## 2021-03-17 ENCOUNTER — HOSPITAL ENCOUNTER (OUTPATIENT)
Dept: NON INVASIVE DIAGNOSTICS | Facility: CLINIC | Age: 66
Discharge: HOME | End: 2021-03-17
Payer: COMMERCIAL

## 2021-03-17 DIAGNOSIS — R60.0 HAND EDEMA: ICD-10-CM

## 2021-03-17 PROCEDURE — 93971 EXTREMITY STUDY: CPT

## 2021-03-17 PROCEDURE — 93971 EXTREMITY STUDY: CPT | Performed by: SURGERY

## 2021-04-21 ENCOUNTER — OFFICE VISIT (OUTPATIENT)
Dept: OBGYN CLINIC | Facility: HOSPITAL | Age: 66
End: 2021-04-21
Payer: COMMERCIAL

## 2021-04-21 VITALS
DIASTOLIC BLOOD PRESSURE: 101 MMHG | WEIGHT: 288 LBS | BODY MASS INDEX: 46.28 KG/M2 | HEIGHT: 66 IN | HEART RATE: 71 BPM | SYSTOLIC BLOOD PRESSURE: 165 MMHG

## 2021-04-21 DIAGNOSIS — I89.0 LYMPHEDEMA OF LEFT UPPER EXTREMITY: Primary | ICD-10-CM

## 2021-04-21 PROCEDURE — 99213 OFFICE O/P EST LOW 20 MIN: CPT | Performed by: ORTHOPAEDIC SURGERY

## 2021-04-21 NOTE — PROGRESS NOTES
ASSESSMENT/PLAN:    Assessment:   Left hand edema-  lymphedema    Plan:    the patient will meet with our occupational therapist in the office today for home exercise program for lymphedema exercises and compressive wrap  He also begin a formal course of occupational therapy for these remedies as well  We will see him back as needed  Follow Up:  PRN    To Do Next Visit:     _____________________________________________________  CHIEF COMPLAINT:  Chief Complaint   Patient presents with    Left Hand - Swelling, Follow-up     F/U after Venous Duplex study DOS 3/17/21         SUBJECTIVE:  Kaci Crowley is a 63 y o  male who presents for follow up regarding L hand edema  Since last visit, Kaci Crowley has tried activity modification and massage, elevation with only partial relief  Today there is swelling to the left hand however he notices significant improvements      Radiation: None  Associated symptoms: No Complaints    PAST MEDICAL HISTORY:  Past Medical History:   Diagnosis Date    Acute respiratory failure with hypoxia and hypercapnia (HCC) 8/17/2020    Arthritis     knees    Diabetes mellitus (HCC)     Hypertension     Measles, [place] (rubella)     Mumps     Snoring     Loudly       PAST SURGICAL HISTORY:  Past Surgical History:   Procedure Laterality Date    CYSTOSCOPY      KNEE ARTHROSCOPY Left     knee    ORIF PELVIC FRACTURE      TOTAL KNEE ARTHROPLASTY Left 6/10/2019    Procedure: ARTHROPLASTY KNEE TOTAL;  Surgeon: Rashid Mehta MD;  Location: [facility];  Service: Orthopedics       FAMILY HISTORY:  Family History   Problem Relation Age of Onset    Ovarian cancer Mother     Heart disease Mother     Diabetes type II Mother     Colon cancer Father     Heart disease Father     Nephrolithiasis Father        SOCIAL HISTORY:  Social History     Tobacco Use    Smoking status: Never Smoker    Smokeless tobacco: Never Used   Substance Use Topics    Alcohol use: Yes     Frequency: Monthly or less     Drinks per session: 1 or 2     Comment: Occasionally, not in excess - As per [employer]     Drug use: Never       MEDICATIONS:    Current Outpatient Medications:     ACCU-CHEK LARRY PLUS test strip, , Disp: , Rfl:     ACCU-CHEK FASTCLIX LANCETS MISC, , Disp: , Rfl:     apixaban (ELIQUIS) 5 mg, Take 1 tablet (5 mg total) by mouth 2 (two) times a day, Disp: 56 tablet, Rfl: 0    atorvastatin (LIPITOR) 40 mg tablet, Take 1 tablet (40 mg total) by mouth daily, Disp: 90 tablet, Rfl: 3    ergocalciferol (VITAMIN D2) 50,000 units, Take 1 capsule (50,000 Units total) by mouth once a week, Disp: 4 capsule, Rfl: 2    furosemide (LASIX) 20 mg tablet, Take 1 tablet once a day as needed for hand edema, Disp: 30 tablet, Rfl: 0    glipiZIDE (GLUCOTROL XL) 5 mg 24 hr tablet, Take 1 tablet (5 mg total) by mouth daily, Disp: 30 tablet, Rfl: 5    lisinopril (ZESTRIL) 10 mg tablet, Take 1 tablet (10 mg total) by mouth daily, Disp: 30 tablet, Rfl: 2    metFORMIN (GLUCOPHAGE-XR) 500 mg 24 hr tablet, Take 4 tablets (2,000 mg total) by mouth daily Take 4 tablets daily, Disp: 120 tablet, Rfl: 5    metoprolol tartrate (LOPRESSOR) 50 mg tablet, Take 1 5  tablets (75 mg) in the morning and 1 tablet (50 mg)  in the evening, Disp: 75 tablet, Rfl: 3    ALLERGIES:  No Known Allergies    REVIEW OF SYSTEMS:  Pertinent items are noted in HPI      LABS:  HgA1c:   Lab Results   Component Value Date    HGBA1C 6 8 (H) 10/05/2020     BMP:   Lab Results   Component Value Date    GLUCOSE 170 (H) 08/13/2020    CALCIUM 9 2 11/21/2020     03/17/2017    K 4 0 11/21/2020    CO2 24 11/21/2020     11/21/2020    BUN 22 11/21/2020    CREATININE 1 19 11/21/2020           _____________________________________________________  PHYSICAL EXAMINATION:  Vital signs: BP (!) 165/101   Pulse 71   Ht 5' 6" (1 676 m)   Wt 131 kg (288 lb)   BMI 46 48 kg/m²   General: well developed and well nourished, alert, oriented times 3 and appears comfortable  Psychiatric: Normal  HEENT: Trachea Midline, No torticollis  Cardiovascular: No discernable arrhythmia  Pulmonary: No wheezing or stridor  Skin: No Erythema  Neurovascular: Sensation Intact to the Median, Ulnar, Radial Nerve, Motor Intact to the Median, Ulnar, Radial Nerve and Pulses Intact    MUSCULOSKELETAL EXAMINATION:  LEFT SIDE: hand:  swelling to dorsal side of hand, no pitting edema, dorsal side of wrist and finger swelling present, overall well improved from previous visit      _____________________________________________________  STUDIES REVIEWED:  Images were reviewed in PACS by Dr Thedora Spatz and demonstrate: VAS duplex done on 3/17/2021 demonstrates a normal study         PROCEDURES PERFORMED:  Procedures  No Procedures performed today   Scribe Attestation    I,:  Wendy Cornejo am acting as a scribe while in the presence of the attending physician :       I,:  Regina Mccollum MD personally performed the services described in this documentation    as scribed in my presence :

## 2021-05-19 ENCOUNTER — TELEPHONE (OUTPATIENT)
Dept: FAMILY MEDICINE CLINIC | Facility: CLINIC | Age: 66
End: 2021-05-19

## 2021-05-19 ENCOUNTER — EVALUATION (OUTPATIENT)
Dept: PHYSICAL THERAPY | Facility: REHABILITATION | Age: 66
End: 2021-05-19
Payer: COMMERCIAL

## 2021-05-19 DIAGNOSIS — E11.69 DIABETES MELLITUS TYPE 2 IN OBESE (HCC): Primary | Chronic | ICD-10-CM

## 2021-05-19 DIAGNOSIS — I48.91 NEW ONSET ATRIAL FIBRILLATION (HCC): ICD-10-CM

## 2021-05-19 DIAGNOSIS — I89.0 LYMPHEDEMA: Primary | ICD-10-CM

## 2021-05-19 DIAGNOSIS — E78.5 DYSLIPIDEMIA: ICD-10-CM

## 2021-05-19 DIAGNOSIS — E66.9 DIABETES MELLITUS TYPE 2 IN OBESE (HCC): Primary | Chronic | ICD-10-CM

## 2021-05-19 PROCEDURE — 97161 PT EVAL LOW COMPLEX 20 MIN: CPT | Performed by: PHYSICAL THERAPIST

## 2021-05-19 RX ORDER — METOPROLOL TARTRATE 50 MG/1
TABLET, FILM COATED ORAL
Qty: 75 TABLET | Refills: 1 | Status: SHIPPED | OUTPATIENT
Start: 2021-05-19 | End: 2021-09-07

## 2021-05-19 NOTE — PROGRESS NOTES
PT Evaluation     Today's date: 2021  Patient name: Nick Villalobos  : 1955  MRN: [id number]  Referring provider: Elo Guerrero MD  Dx:   Encounter Diagnosis     ICD-10-CM    1  Lymphedema  I89 0        Start Time: 1530          Assessment  Assessment details: Patient has stage 0 edema with more atrophy then edema with only a couple areas with significant edema noted  Will benefit from manual drainage and compression glove  Volumetrics of RUE 4148 to LUE at 1526 with atrophy noted along with slight edema  Other impairment: edema    Goals  STG decrease edema   3 to  5 cm  LTG without edema    Plan  Plan details: Continue manual drainage with compression glove as needed  Patient would benefit from: skilled physical therapy  Planned therapy interventions: manual therapy  Frequency: 1x week  Duration in weeks: 4  Treatment plan discussed with: patient        Subjective Evaluation    History of Present Illness  Mechanism of injury: Patient reports that swelling started the beginning of 2020 with pins and needles in the entire LUE  No surgeries to LUE only left TKR, and pelvic fx  NCV test negative  Unable to use the hand for fine dexterity    Pain  No pain reported    Patient Goals  Patient goals for therapy: decreased edema and independence with ADLs/IADLs          Objective           Precautions: back pain, DM HBP, left TKR      Manuals             Manual drainage LUE 12 min                                                   Neuro Re-Ed                                                                                                        Ther Ex                                                                                                                     Ther Activity                                       Gait Training                                       Modalities

## 2021-05-20 NOTE — TELEPHONE ENCOUNTER
Please contact patient  I refilled his medication  He is due for blood work and checkup  Orders placed    Thank you

## 2021-05-20 NOTE — TELEPHONE ENCOUNTER
Spoke with pt  Made aware as per provider's orders  Pt verbalized understanding   Apt scheduled for 6/14th at 4pm

## 2021-05-26 ENCOUNTER — OFFICE VISIT (OUTPATIENT)
Dept: PHYSICAL THERAPY | Facility: REHABILITATION | Age: 66
End: 2021-05-26
Payer: COMMERCIAL

## 2021-05-26 DIAGNOSIS — I89.0 LYMPHEDEMA: Primary | ICD-10-CM

## 2021-05-26 PROCEDURE — 97140 MANUAL THERAPY 1/> REGIONS: CPT | Performed by: PHYSICAL THERAPIST

## 2021-05-26 NOTE — PROGRESS NOTES
Daily Note     Today's date: 2021  Patient name: Blanca Cervantes  : 1955  MRN: [id number]  Referring provider: Jonathan Delatorre MD  Dx:   Encounter Diagnosis     ICD-10-CM    1  Lymphedema  I89 0        Start Time: 1530          Subjective: It really worked and I can move my hand better      Objective: See treatment diary below      Assessment: Tolerated treatment well  Patient would benefit from continued PT      Plan: Continue per plan of care        Precautions: back pain, DM HBP, left TKR      Manuals            Manual drainage LUE 12 min 12 min           measurements  8 min                                     Neuro Re-Ed                                                                                                        Ther Ex                                                                                                                     Ther Activity                                       Gait Training                                       Modalities

## 2021-06-03 ENCOUNTER — OFFICE VISIT (OUTPATIENT)
Dept: PHYSICAL THERAPY | Facility: REHABILITATION | Age: 66
End: 2021-06-03
Payer: COMMERCIAL

## 2021-06-03 DIAGNOSIS — I89.0 LYMPHEDEMA: Primary | ICD-10-CM

## 2021-06-03 PROCEDURE — 97140 MANUAL THERAPY 1/> REGIONS: CPT | Performed by: PHYSICAL THERAPIST

## 2021-06-03 NOTE — PROGRESS NOTES
Daily Note     Today's date: 6/3/2021  Patient name: Rebecca Johnson  : 1955  MRN: [id number]  Referring provider: Liss Rodriguez MD  Dx:   Encounter Diagnosis     ICD-10-CM    1  Lymphedema  I89 0        Start Time: 1456          Subjective: I think it is more swollen today but overall it is still better      Objective: See treatment diary below      Assessment: Tolerated treatment well  Patient would benefit from continued PT      Plan: Continue per plan of care        Precautions: back pain, DM HBP, left TKR      Manuals 5/19 5/26 6/3          Manual drainage LUE 12 min 12 min 12 min          measurements  8 min 9 min                                    Neuro Re-Ed                                                                                                        Ther Ex                                                                                                                     Ther Activity                                       Gait Training                                       Modalities

## 2021-06-09 ENCOUNTER — OFFICE VISIT (OUTPATIENT)
Dept: PHYSICAL THERAPY | Facility: REHABILITATION | Age: 66
End: 2021-06-09
Payer: COMMERCIAL

## 2021-06-09 DIAGNOSIS — I89.0 LYMPHEDEMA: Primary | ICD-10-CM

## 2021-06-09 PROCEDURE — 97140 MANUAL THERAPY 1/> REGIONS: CPT | Performed by: PHYSICAL THERAPIST

## 2021-06-09 NOTE — PROGRESS NOTES
Daily Note     Today's date: 2021  Patient name: Tish Silva  : 1955  MRN: [id number]  Referring provider: Ranjeet Lee MD  Dx:   Encounter Diagnosis     ICD-10-CM    1  Lymphedema  I89 0        Start Time: 1419          Subject: Sleeps on each side about 50%      Objective: See treatment diary below    UPPER EXTREMITY LEFT CALCULATIONS      Most Recent Value   Volume UE (mL)  1475   Difference from last visit (mL)   -35   Difference from first visit (mL)   51   Difference from last visit (%)   -2   Difference from first visit (%)   3        Assessment: Tolerated treatment well  Patient would benefit from continued PT  Discussed elevating his hand while in sitting  Slight increase but very humid out      Plan: Continue per plan of care        Precautions: back pain, DM HBP, left TKR      Manuals 5/19 5/26 6/3 6/9         Manual drainage LUE 12 min 12 min 12 min 11 min         measurements  8 min 9 min 8 min                                   Neuro Re-Ed                                                                                                        Ther Ex                                                                                                                     Ther Activity                                       Gait Training                                       Modalities

## 2021-06-11 ENCOUNTER — TRANSCRIBE ORDERS (OUTPATIENT)
Dept: LAB | Facility: CLINIC | Age: 66
End: 2021-06-11

## 2021-06-11 ENCOUNTER — TELEPHONE (OUTPATIENT)
Dept: FAMILY MEDICINE CLINIC | Facility: CLINIC | Age: 66
End: 2021-06-11

## 2021-06-11 ENCOUNTER — APPOINTMENT (OUTPATIENT)
Dept: LAB | Facility: CLINIC | Age: 66
End: 2021-06-11
Payer: COMMERCIAL

## 2021-06-11 DIAGNOSIS — E66.9 DIABETES MELLITUS TYPE 2 IN OBESE (HCC): Chronic | ICD-10-CM

## 2021-06-11 DIAGNOSIS — E55.9 VITAMIN D DEFICIENCY: ICD-10-CM

## 2021-06-11 DIAGNOSIS — E11.69 DIABETES MELLITUS TYPE 2 IN OBESE (HCC): Chronic | ICD-10-CM

## 2021-06-11 DIAGNOSIS — E78.5 DYSLIPIDEMIA: ICD-10-CM

## 2021-06-11 LAB
25(OH)D3 SERPL-MCNC: 23.7 NG/ML (ref 30–100)
ALBUMIN SERPL BCP-MCNC: 3.4 G/DL (ref 3.5–5)
ALP SERPL-CCNC: 112 U/L (ref 46–116)
ALT SERPL W P-5'-P-CCNC: 11 U/L (ref 12–78)
ANION GAP SERPL CALCULATED.3IONS-SCNC: 8 MMOL/L (ref 4–13)
AST SERPL W P-5'-P-CCNC: 15 U/L (ref 5–45)
BILIRUB SERPL-MCNC: 0.76 MG/DL (ref 0.2–1)
BUN SERPL-MCNC: 21 MG/DL (ref 5–25)
CALCIUM ALBUM COR SERPL-MCNC: 9.6 MG/DL (ref 8.3–10.1)
CALCIUM SERPL-MCNC: 9.1 MG/DL (ref 8.3–10.1)
CHLORIDE SERPL-SCNC: 107 MMOL/L (ref 100–108)
CHOLEST SERPL-MCNC: 123 MG/DL (ref 50–200)
CO2 SERPL-SCNC: 23 MMOL/L (ref 21–32)
CREAT SERPL-MCNC: 1.17 MG/DL (ref 0.6–1.3)
ERYTHROCYTE [DISTWIDTH] IN BLOOD BY AUTOMATED COUNT: 14.6 % (ref 11.6–15.1)
EST. AVERAGE GLUCOSE BLD GHB EST-MCNC: 209 MG/DL
GFR SERPL CREATININE-BSD FRML MDRD: 65 ML/MIN/1.73SQ M
GLUCOSE P FAST SERPL-MCNC: 187 MG/DL (ref 65–99)
HBA1C MFR BLD: 8.9 %
HCT VFR BLD AUTO: 45.6 % (ref 36.5–49.3)
HDLC SERPL-MCNC: 30 MG/DL
HGB BLD-MCNC: 15.1 G/DL (ref 12–17)
LDLC SERPL CALC-MCNC: 67 MG/DL (ref 0–100)
MCH RBC QN AUTO: 29 PG (ref 26.8–34.3)
MCHC RBC AUTO-ENTMCNC: 33.1 G/DL (ref 31.4–37.4)
MCV RBC AUTO: 88 FL (ref 82–98)
PLATELET # BLD AUTO: 210 THOUSANDS/UL (ref 149–390)
PMV BLD AUTO: 10.8 FL (ref 8.9–12.7)
POTASSIUM SERPL-SCNC: 3.9 MMOL/L (ref 3.5–5.3)
PROT SERPL-MCNC: 7.2 G/DL (ref 6.4–8.2)
RBC # BLD AUTO: 5.21 MILLION/UL (ref 3.88–5.62)
SODIUM SERPL-SCNC: 138 MMOL/L (ref 136–145)
TRIGL SERPL-MCNC: 130 MG/DL
TSH SERPL DL<=0.05 MIU/L-ACNC: 1.08 UIU/ML (ref 0.36–3.74)
WBC # BLD AUTO: 8.38 THOUSAND/UL (ref 4.31–10.16)

## 2021-06-11 PROCEDURE — 84443 ASSAY THYROID STIM HORMONE: CPT

## 2021-06-11 PROCEDURE — 82306 VITAMIN D 25 HYDROXY: CPT

## 2021-06-11 PROCEDURE — 83036 HEMOGLOBIN GLYCOSYLATED A1C: CPT

## 2021-06-11 PROCEDURE — 80061 LIPID PANEL: CPT

## 2021-06-11 PROCEDURE — 85027 COMPLETE CBC AUTOMATED: CPT

## 2021-06-11 PROCEDURE — 36415 COLL VENOUS BLD VENIPUNCTURE: CPT

## 2021-06-11 PROCEDURE — 80053 COMPREHEN METABOLIC PANEL: CPT

## 2021-06-11 NOTE — TELEPHONE ENCOUNTER
----- Message from Noe Blanco MD sent at 6/11/2021  1:52 PM EDT -----  Please contact patient  Hemoglobin A1c is high at 8 9  Please advised him to keep appointment on Monday so we can discuss and adjust his medications    Thank you

## 2021-06-14 ENCOUNTER — OFFICE VISIT (OUTPATIENT)
Dept: FAMILY MEDICINE CLINIC | Facility: CLINIC | Age: 66
End: 2021-06-14
Payer: COMMERCIAL

## 2021-06-14 VITALS
TEMPERATURE: 98.4 F | HEIGHT: 66 IN | HEART RATE: 80 BPM | RESPIRATION RATE: 18 BRPM | WEIGHT: 284.4 LBS | DIASTOLIC BLOOD PRESSURE: 82 MMHG | BODY MASS INDEX: 45.71 KG/M2 | SYSTOLIC BLOOD PRESSURE: 146 MMHG | OXYGEN SATURATION: 97 %

## 2021-06-14 DIAGNOSIS — R06.00 DOE (DYSPNEA ON EXERTION): Primary | ICD-10-CM

## 2021-06-14 DIAGNOSIS — E66.9 DIABETES MELLITUS TYPE 2 IN OBESE (HCC): Chronic | ICD-10-CM

## 2021-06-14 DIAGNOSIS — R60.0 EDEMA OF HAND: ICD-10-CM

## 2021-06-14 DIAGNOSIS — E78.5 DYSLIPIDEMIA: ICD-10-CM

## 2021-06-14 DIAGNOSIS — E11.69 DIABETES MELLITUS TYPE 2 IN OBESE (HCC): Chronic | ICD-10-CM

## 2021-06-14 DIAGNOSIS — J18.9 COMMUNITY ACQUIRED PNEUMONIA OF LEFT LOWER LOBE OF LUNG: ICD-10-CM

## 2021-06-14 DIAGNOSIS — I10 ESSENTIAL HYPERTENSION: ICD-10-CM

## 2021-06-14 DIAGNOSIS — I48.0 PAROXYSMAL ATRIAL FIBRILLATION (HCC): ICD-10-CM

## 2021-06-14 PROCEDURE — 99215 OFFICE O/P EST HI 40 MIN: CPT | Performed by: FAMILY MEDICINE

## 2021-06-14 RX ORDER — GLIPIZIDE 10 MG/1
10 TABLET, FILM COATED, EXTENDED RELEASE ORAL DAILY
Qty: 30 TABLET | Refills: 5 | Status: SHIPPED | OUTPATIENT
Start: 2021-06-14 | End: 2021-08-13 | Stop reason: SDUPTHER

## 2021-06-14 RX ORDER — LISINOPRIL 20 MG/1
20 TABLET ORAL DAILY
Qty: 30 TABLET | Refills: 5 | Status: SHIPPED | OUTPATIENT
Start: 2021-06-14 | End: 2021-08-13 | Stop reason: SDUPTHER

## 2021-06-14 NOTE — PROGRESS NOTES
FAMILY PRACTICE OFFICE VISIT       NAME: Eleazar Galo  AGE: 61 y o  SEX: male       : 1955        MRN: [id number]        Assessment and Plan     1  GONSALEZ (dyspnea on exertion)  Assessment & Plan:   Symptoms are likely multifactorial     Pneumonia 2020: Patient reports persistent dyspnea  on exertion ever since  Will start with chest x-ray  Patient will need follow-up with Cardiology and may require nuclear stress test        2  Community acquired pneumonia of left lower lobe of lung  Assessment & Plan:   Left lower lobe pneumonia 2020 requiring ICU admission  Patient has never proceeded with follow-up CT chest as ordered due to high insurance deductible/financial restrictions  Patient will schedule chest x-ray now    Orders:  -     XR chest pa & lateral; Future; Expected date: 2021    3  Diabetes mellitus type 2 in obese [facility])  Assessment & Plan:    Lab Results   Component Value Date    HGBA1C 8 9 (H) 2021    Poor glycemic control, patient admits to dietary indiscretions  Continue metformin ER 2000 mg daily  Increase dose of glipizide from 5-10 mg daily  Continue blood sugar monitoring, will reassess hemoglobin A1c in the office in 3 months    Orders:  -     glipiZIDE (GLUCOTROL XL) 10 mg 24 hr tablet; Take 1 tablet (10 mg total) by mouth daily    4  Essential hypertension  Assessment & Plan:   Blood pressure is mildly elevated   Patient will increase dose of lisinopril from 10-20 mg daily  Continue metoprolol 75 mg in morning and 50 mg at night    Orders:  -     lisinopril (ZESTRIL) 20 mg tablet; Take 1 tablet (20 mg total) by mouth daily    5  Paroxysmal atrial fibrillation [facility])  Assessment & Plan:   Patient discontinued Eliquis due to high cost     He remains in normal sinus rhythm on exam today  Continue metoprolol      I advised patient to start aspirin 162 mg  Daily   echo 2020: Ejection fraction 60%, left atrium dilatation, no significant valvular abnormalities  Holter January 2021:  Patient remained in normal sinus rhythm, no AFib      6  Dyslipidemia  Assessment & Plan:   Excellent control, continue atorvastatin 40 mg daily   LDL is 67      7  Edema of hand  Assessment & Plan:   Left hand lymphedema   Patient reports significant improvement, he is in physical therapy        BMI Counseling: Body mass index is 45 9 kg/m²  The BMI is above normal  Nutrition recommendations include encouraging healthy choices of fruits and vegetables, consuming healthier snacks, moderation in carbohydrate intake and reducing intake of cholesterol  Exercise recommendations include exercising 3-5 times per week  There are no Patient Instructions on file for this visit  Return in about 3 months (around 9/14/2021) for follow up  Discussed with the patient and all questioned fully answered  He will call me if any problems arise  M*Modal software was used to dictate this note  It may contain errors with dictating incorrect words/spelling  Please contact provider directly with any questions  Chief Complaint     Chief Complaint   Patient presents with    Follow-up       History of Present Illness     Patient presents for follow-up of chronic medical conditions  He remains in physical therapy for treatment of left hand lymphedema and reports overall significant improvement of swelling  Overall he has been feeling fatigued and reports persistent symptoms of dyspnea on exertion ever since he was diagnosed with left lower lobe pneumonia in summer of 2020  Patient has required ICU care  He was supposed to proceed with CT chest for re-evaluation but has never scheduled this study due to high insurance deductible and significant financial limitations  Patient denies symptoms of cough, wheezing or chest pain or tightness  He is simply reports shortness of breath with exertion and feels winded with physical activity    Patient denies symptoms of leg edema  Patient has self discontinued Eliquis that was prescribed for paroxysmal atrial fibrillation since medication was very expensive and he cannot afford it  He has been compliant with med regimen for hypertension, hyperlipidemia and type 2 diabetes  We reviewed results of recent blood work revealing total cholesterol of 123 with LDL of 67, hemoglobin A1c of 8 9, normal CBC, CMP and TSH  Vitamin-D level is low 23  Patient has vitamin-D on hand but has not been using it lately  Patient denies symptoms of lightheadedness or dizziness  He denies any dyspnea at rest, his shortness of breath on exertion usually resolves as patient's since down  Patient admits to loud snoring  He reports daytime fatigue  Fasting blood sugars at home are in 150s  Patient is not on aspirin  Patient is under care of [facility] for treatment of paroxysmal Afib  CT chest performed in August 2020 revealed coronary calcifications  Echocardiogram performed on August 2020 revealed normal ejection fraction of 60%, no significant valvular abnormalities, no regional wall motion abnormalities  Holter monitor performed in January 2021: No evidence of AFib        Review of Systems   Review of Systems   Constitutional: Positive for fatigue  HENT: Negative  Eyes: Negative  Respiratory: Positive for shortness of breath  Negative for cough, chest tightness and wheezing  Cardiovascular: Negative  Negative for chest pain, palpitations and leg swelling  Gastrointestinal: Negative  Endocrine: Negative  Genitourinary: Negative  Musculoskeletal: Positive for arthralgias and myalgias  Skin: Negative  Allergic/Immunologic: Negative  Neurological: Negative  Negative for dizziness and light-headedness  Hematological: Negative  Psychiatric/Behavioral: Positive for sleep disturbance         Active Problem List     Patient Active Problem List   Diagnosis    Essential hypertension    Diabetes mellitus type 2 in obese ([facility] )    Vitamin D deficiency    Morbid obesity ([facility] )    Osteoarthritis of left knee    Bladder wall thickening    Status post left knee replacement    Community acquired pneumonia of left lower lobe of lung    Non-MI Troponin Elevation    Rhabdomyolysis    Paroxysmal atrial fibrillation (HCC)    Dyslipidemia    Skin mole    Left arm weakness    Edema of hand    GONSALEZ (dyspnea on exertion)       Past Medical History:  Past Medical History:   Diagnosis Date    Acute respiratory failure with hypoxia and hypercapnia ([place] [address] ) 8/17/2020    LAURENCE (acute kidney injury) ([place] [address] ) 8/16/2020    Arthritis     knees    Diabetes mellitus (HCC)     Hypertension     Measles, [place] (rubella)     Mumps     Snoring     Loudly       Past Surgical History:  Past Surgical History:   Procedure Laterality Date    CYSTOSCOPY      KNEE ARTHROSCOPY Left     knee    ORIF PELVIC FRACTURE      TOTAL KNEE ARTHROPLASTY Left 6/10/2019    Procedure: ARTHROPLASTY KNEE TOTAL;  Surgeon: Spencer Carrasco MD;  Location: [facility];  Service: Orthopedics       Family History:  Family History   Problem Relation Age of Onset    Ovarian cancer Mother     Heart disease Mother     Diabetes type II Mother     Colon cancer Father     Heart disease Father     Nephrolithiasis Father        Social History:  Social History     Socioeconomic History    Marital status: Single     Spouse name: Not on file    Number of children: Not on file    Years of education: Not on file    Highest education level: Not on file   Occupational History    Not on file   Tobacco Use    Smoking status: Never Smoker    Smokeless tobacco: Never Used   Vaping Use    Vaping Use: Never used   Substance and Sexual Activity    Alcohol use: Yes     Comment: Occasionally, not in excess - As per [employer]     Drug use: Never    Sexual activity: Not on file   Other Topics Concern    Not on file   Social History Narrative    Not on file Social Determinants of Health     Financial Resource Strain:     Difficulty of Paying Living Expenses:    Food Insecurity:     Worried About Running Out of Food in the Last Year:     [address] in the Last Year:    Transportation Needs:     Lack of Transportation (Medical):  Lack of Transportation (Non-Medical):    Physical Activity:     Days of Exercise per Week:     Minutes of Exercise per Session:    Stress:     Feeling of Stress :    Social Connections:     Frequency of Communication with Friends and Family:     Frequency of Social Gatherings with Friends and Family:     Attends Bahai Services:     Active Member of Clubs or Organizations:     Attends Club or Organization Meetings:     Marital Status:    Intimate Partner Violence:     Fear of Current or Ex-Partner:     Emotionally Abused:     Physically Abused:     Sexually Abused:          Objective     Vitals:    06/14/21 1550 06/14/21 1614   BP: 146/94 146/82   BP Location: Left arm    Patient Position: Sitting    Cuff Size: Large    Pulse: 81 80   Resp: 18    Temp: 98 4 °F (36 9 °C)    TempSrc: Temporal    SpO2: 97%    Weight: 129 kg (284 lb 6 4 oz)    Height: 5' 6" (1 676 m)        Wt Readings from Last 3 Encounters:   06/14/21 129 kg (284 lb 6 4 oz)   04/21/21 131 kg (288 lb)   03/10/21 130 kg (287 lb)       Physical Exam  Vitals and nursing note reviewed  Constitutional:       General: He is not in acute distress  Appearance: Normal appearance  He is well-developed  He is not ill-appearing  Comments: Morbidly obese, fatigued   HENT:      Head: Normocephalic and atraumatic  Eyes:      Conjunctiva/sclera: Conjunctivae normal    Neck:      Vascular: No carotid bruit  Cardiovascular:      Rate and Rhythm: Normal rate and regular rhythm  Heart sounds: Normal heart sounds  No murmur heard  Pulmonary:      Effort: Pulmonary effort is normal  No respiratory distress        Breath sounds: Normal breath sounds  No wheezing or rales  Abdominal:      General: Bowel sounds are normal  There is no distension or abdominal bruit  Musculoskeletal:         General: Normal range of motion  Cervical back: Neck supple  Right lower leg: No edema  Left lower leg: No edema  Comments: Left hand lymphedema has improved   Neurological:      Mental Status: He is alert and oriented to person, place, and time  Cranial Nerves: No cranial nerve deficit     Psychiatric:         Mood and Affect: Mood normal          Behavior: Behavior normal           Pertinent Laboratory/Diagnostic Studies:    Lab Results   Component Value Date    WBC 8 38 06/11/2021    HGB 15 1 06/11/2021    HCT 45 6 06/11/2021    MCV 88 06/11/2021     06/11/2021       No results found for: TSH    Lab Results   Component Value Date    CHOL 218 (H) 03/17/2017     Lab Results   Component Value Date    TRIG 130 06/11/2021     Lab Results   Component Value Date    HDL 30 (L) 06/11/2021     Lab Results   Component Value Date    LDLCALC 67 06/11/2021     Lab Results   Component Value Date    HGBA1C 8 9 (H) 06/11/2021     Lab Results   Component Value Date    SODIUM 138 06/11/2021    K 3 9 06/11/2021     06/11/2021    CO2 23 06/11/2021    AGAP 8 06/11/2021    BUN 21 06/11/2021    CREATININE 1 17 06/11/2021    GLUC 158 (H) 11/21/2020    GLUF 187 (H) 06/11/2021    CALCIUM 9 1 06/11/2021    AST 15 06/11/2021    ALT 11 (L) 06/11/2021    ALKPHOS 112 06/11/2021    PROT 7 1 03/17/2017    TP 7 2 06/11/2021    BILITOT 0 8 03/17/2017    TBILI 0 76 06/11/2021    EGFR 65 06/11/2021       Orders Placed This Encounter   Procedures    XR chest pa & lateral       ALLERGIES:  No Known Allergies    Current Medications     Current Outpatient Medications   Medication Sig Dispense Refill    ACCU-CHEK LARRY PLUS test strip       ACCU-CHEK FASTCLIX LANCETS MISC       atorvastatin (LIPITOR) 40 mg tablet Take 1 tablet (40 mg total) by mouth daily 90 tablet 3    glipiZIDE (GLUCOTROL XL) 10 mg 24 hr tablet Take 1 tablet (10 mg total) by mouth daily 30 tablet 5    lisinopril (ZESTRIL) 20 mg tablet Take 1 tablet (20 mg total) by mouth daily 30 tablet 5    metFORMIN (GLUCOPHAGE-XR) 500 mg 24 hr tablet Take 4 tablets (2,000 mg total) by mouth daily Take 4 tablets daily 120 tablet 5    metoprolol tartrate (LOPRESSOR) 50 mg tablet TAKE 1 & 1/2 (ONE & ONE-HALF) TABLETS BY MOUTH IN THE MORNING AND 1 IN THE EVENING 75 tablet 1     No current facility-administered medications for this visit         Medications Discontinued During This Encounter   Medication Reason    furosemide (LASIX) 20 mg tablet     ergocalciferol (VITAMIN D2) 50,000 units     apixaban (ELIQUIS) 5 mg     lisinopril (ZESTRIL) 10 mg tablet Reorder    glipiZIDE (GLUCOTROL XL) 5 mg 24 hr tablet Reorder       Health Maintenance     Health Maintenance   Topic Date Due    Pneumococcal Vaccine: Pediatrics (0 to 5 Years) and At-Risk Patients (6 to 59 Years) (1 of 2 - PPSV23) Never done    DM Eye Exam  Never done    COVID-19 Vaccine (1) Never done    HIV Screening  Never done    Annual Physical  Never done    Colorectal Cancer Screening  Never done    DTaP,Tdap,and Td Vaccines (2 - Td or Tdap) 01/01/2011    PT PLAN OF CARE  06/18/2021    Influenza Vaccine (Season Ended) 09/01/2021    Diabetic Foot Exam  10/08/2021    HEMOGLOBIN A1C  12/11/2021    Depression Screening PHQ  06/14/2022    BMI: Adult  06/14/2022    BMI: Followup Plan  06/17/2022    Hepatitis C Screening  Completed    HIB Vaccine  Aged Out    Hepatitis B Vaccine  Aged Out    IPV Vaccine  Aged Out    Hepatitis A Vaccine  Aged Out    Meningococcal ACWY Vaccine  Aged Out    HPV Vaccine  Aged Out       Immunization History   Administered Date(s) Administered    Influenza Quadrivalent Preservative Free 3 years and older IM 08/03/2016    Tdap 01/01/2001       Mau Oshea MD

## 2021-06-16 ENCOUNTER — OFFICE VISIT (OUTPATIENT)
Dept: PHYSICAL THERAPY | Facility: REHABILITATION | Age: 66
End: 2021-06-16
Payer: COMMERCIAL

## 2021-06-16 DIAGNOSIS — I89.0 LYMPHEDEMA: Primary | ICD-10-CM

## 2021-06-16 PROCEDURE — 97140 MANUAL THERAPY 1/> REGIONS: CPT | Performed by: PHYSICAL THERAPIST

## 2021-06-16 NOTE — PROGRESS NOTES
Daily Note     Today's date: 2021  Patient name: Shaheen Haddad  : 1955  MRN: [id number]  Referring provider: Gurpreet Velez MD  Dx:   Encounter Diagnosis     ICD-10-CM    1  Lymphedema  I89 0        Start Time:   Stop Time: 942  Total time in clinic (min): 19 minutes    Subject: It's feeling better    Objective: See treatment diary below    UPPER EXTREMITY LEFT CALCULATIONS      Most Recent Value   Volume UE (mL)  1477   Difference from last visit (mL)   -2   Difference from first visit (mL)   49   Difference from last visit (%)   0   Difference from first visit (%)   3        Assessment: Tolerated treatment well  Patient would benefit from continued PT  Discussed elevating his hand while in sitting  Slight increase but very humid out      Plan: Continue per plan of care        Precautions: back pain, DM HBP, left TKR      Manuals  6/3 6/9 6/16        Manual drainage LUE 12 min 12 min 12 min 11 min 11 min        measurements  8 min 9 min 8 min 8 min                                  Neuro Re-Ed                                                                                                        Ther Ex                                                                                                                     Ther Activity                                       Gait Training                                       Modalities

## 2021-06-17 ENCOUNTER — HOSPITAL ENCOUNTER (OUTPATIENT)
Dept: RADIOLOGY | Facility: HOSPITAL | Age: 66
Discharge: HOME | End: 2021-06-17
Payer: COMMERCIAL

## 2021-06-17 DIAGNOSIS — J18.9 COMMUNITY ACQUIRED PNEUMONIA OF LEFT LOWER LOBE OF LUNG: ICD-10-CM

## 2021-06-17 PROBLEM — R06.00 DOE (DYSPNEA ON EXERTION): Status: ACTIVE | Noted: 2021-06-17

## 2021-06-17 PROBLEM — N17.9 AKI (ACUTE KIDNEY INJURY) (HCC): Status: RESOLVED | Noted: 2020-08-16 | Resolved: 2021-06-17

## 2021-06-17 PROBLEM — R06.09 DOE (DYSPNEA ON EXERTION): Status: ACTIVE | Noted: 2021-06-17

## 2021-06-17 PROCEDURE — 71046 X-RAY EXAM CHEST 2 VIEWS: CPT

## 2021-06-17 NOTE — ASSESSMENT & PLAN NOTE
Left lower lobe pneumonia August 2020 requiring ICU admission  Patient has never proceeded with follow-up CT chest as ordered due to high insurance deductible/financial restrictions      Patient will schedule chest x-ray now

## 2021-06-17 NOTE — ASSESSMENT & PLAN NOTE
Symptoms are likely multifactorial     Pneumonia August 2020: Patient reports persistent dyspnea  on exertion ever since  Will start with chest x-ray      Patient will need follow-up with Cardiology and may require nuclear stress test

## 2021-06-17 NOTE — ASSESSMENT & PLAN NOTE
Blood pressure is mildly elevated   Patient will increase dose of lisinopril from 10-20 mg daily  Continue metoprolol 75 mg in morning and 50 mg at night

## 2021-06-17 NOTE — ASSESSMENT & PLAN NOTE
Lab Results   Component Value Date    HGBA1C 8 9 (H) 06/11/2021    Poor glycemic control, patient admits to dietary indiscretions  Continue metformin ER 2000 mg daily  Increase dose of glipizide from 5-10 mg daily      Continue blood sugar monitoring, will reassess hemoglobin A1c in the office in 3 months

## 2021-06-17 NOTE — ASSESSMENT & PLAN NOTE
Patient discontinued Eliquis due to high cost     He remains in normal sinus rhythm on exam today  Continue metoprolol  I advised patient to start aspirin 162 mg  Daily   echo August 2020: Ejection fraction 60%, left atrium dilatation, no significant valvular abnormalities      Holter January 2021:  Patient remained in normal sinus rhythm, no AFib

## 2021-06-18 ENCOUNTER — TELEPHONE (OUTPATIENT)
Dept: FAMILY MEDICINE CLINIC | Facility: CLINIC | Age: 66
End: 2021-06-18

## 2021-06-18 DIAGNOSIS — R06.00 DOE (DYSPNEA ON EXERTION): Primary | ICD-10-CM

## 2021-06-18 NOTE — TELEPHONE ENCOUNTER
Please contact patient  I am happy to inform him that chest x-ray is normal     Patient should schedule follow-up with  [facility], Dr Rushing, as symptoms of dyspnea on exertion could be possibly cardiac related  I also would strongly advised to schedule evaluation with [facility]'s pulmonology as they can assess his breathing with PFTs and make further recommendations      I will place a referral     Thank you

## 2021-06-23 ENCOUNTER — OFFICE VISIT (OUTPATIENT)
Dept: PHYSICAL THERAPY | Facility: REHABILITATION | Age: 66
End: 2021-06-23
Payer: COMMERCIAL

## 2021-06-23 DIAGNOSIS — I89.0 LYMPHEDEMA: Primary | ICD-10-CM

## 2021-06-23 PROCEDURE — 97140 MANUAL THERAPY 1/> REGIONS: CPT | Performed by: PHYSICAL THERAPIST

## 2021-06-23 NOTE — PROGRESS NOTES
Daily Note     Today's date: 2021  Patient name: Aleksandra Burr  : 1955  MRN: [id number]  Referring provider: Damien Coffey MD  Dx:   Encounter Diagnosis     ICD-10-CM    1  Lymphedema  I89 0        Start Time: 12          Subject: It's been up and down this week    Objective: See treatment diary below       UPPER EXTREMITY LEFT CALCULATIONS      Most Recent Value   Volume UE (mL)  1387   Difference from last visit (mL)   90   Difference from first visit (mL)   139   Difference from last visit (%)   6   Difference from first visit (%)   9        Assessment: Tolerated treatment well  Patient would benefit from continued PT  Down again this week    Plan: Continue per plan of care   skipping to every other week     Precautions: back pain, DM HBP, left TKR      Manuals 5/19 5/26 6/3 6/9 6/16 6/23       Manual drainage LUE 12 min 12 min 12 min 11 min 11 min 14 min       measurements  8 min 9 min 8 min 8 min 8 min                                 Neuro Re-Ed                                                                                                        Ther Ex                                                                                                                     Ther Activity                                       Gait Training                                       Modalities

## 2021-07-08 ENCOUNTER — OFFICE VISIT (OUTPATIENT)
Dept: PHYSICAL THERAPY | Facility: REHABILITATION | Age: 66
End: 2021-07-08
Payer: COMMERCIAL

## 2021-07-08 DIAGNOSIS — I89.0 LYMPHEDEMA: Primary | ICD-10-CM

## 2021-07-08 PROCEDURE — 97140 MANUAL THERAPY 1/> REGIONS: CPT | Performed by: PHYSICAL THERAPIST

## 2021-07-08 NOTE — PROGRESS NOTES
PT Re-Evaluation     Today's date: 2021  Patient name: Andrew Irene  : 1955  MRN: [id number]  Referring provider: Jim Nissen, MD  Dx:   Encounter Diagnosis     ICD-10-CM    1  Lymphedema  I89 0        Start Time: 1455          Assessment  Assessment details: Patient has stage 0 edema which has shown decrease from initial eval but had a flare up in the 2 weeks trial without the treatment  Has a glove but has not been wearing it with the heat  Will benefit from manual drainage and compression glove  Volumetrics of RUE 9512 to LUE at 1566 with atrophy noted along with slight edema  Other impairment: edema    Goals  STG decrease edema   3 to  5 cm  LTG without edema    Plan  Plan details: Continue manual drainage with compression glove as needed  Patient was a no show for his last appointment and has not called back to reschedule  Has not been seen for one month  Met functional level but as noted above was still having some mild swelling  DC from skilled care at this time  Patient would benefit from: skilled physical therapy  Planned therapy interventions: manual therapy  Frequency: 2x month  Duration in weeks: 4  Treatment plan discussed with: patient        Subjective Evaluation    History of Present Illness  Mechanism of injury: Patient reports that swelling started the beginning of 2020 with pins and needles in the entire LUE  No surgeries to LUE only left TKR, and pelvic fx  NCV test negative  Unable to use the hand for fine dexterity    Pain  No pain reported    Patient Goals  Patient goals for therapy: decreased edema and independence with ADLs/IADLs          Objective           Precautions: back pain, DM HBP, left TKR     Manuals  6/3 6/  7/8         Manual drainage LUE 12 min 12 min 12 min 11 min 11 min 14 min  14 min         measurements   8 min 9 min 8 min 8 min 8 min  8 min                                                         Neuro Re-Ed                                                                                                                                                                                               Ther Ex                                                                                                                                                                                                                       Ther Activity                                                                       Gait Training                                                                       Modalities

## 2021-08-05 DIAGNOSIS — E11.69 DIABETES MELLITUS TYPE 2 IN OBESE (HCC): Primary | ICD-10-CM

## 2021-08-05 DIAGNOSIS — E66.9 DIABETES MELLITUS TYPE 2 IN OBESE (HCC): Primary | ICD-10-CM

## 2021-08-07 DIAGNOSIS — I10 ESSENTIAL HYPERTENSION: ICD-10-CM

## 2021-08-09 RX ORDER — LISINOPRIL 10 MG/1
TABLET ORAL
Qty: 30 TABLET | Refills: 0 | Status: SHIPPED | OUTPATIENT
Start: 2021-08-09 | End: 2021-08-13

## 2021-08-12 ENCOUNTER — TELEPHONE (OUTPATIENT)
Dept: FAMILY MEDICINE CLINIC | Facility: CLINIC | Age: 66
End: 2021-08-12

## 2021-08-12 DIAGNOSIS — I10 ESSENTIAL HYPERTENSION: ICD-10-CM

## 2021-08-12 DIAGNOSIS — E66.9 DIABETES MELLITUS TYPE 2 IN OBESE (HCC): Chronic | ICD-10-CM

## 2021-08-12 DIAGNOSIS — E11.69 DIABETES MELLITUS TYPE 2 IN OBESE (HCC): Chronic | ICD-10-CM

## 2021-08-12 NOTE — TELEPHONE ENCOUNTER
Patient stopped into office to advise on the below  Patient picked up his medication yesterday and he was given the following:    Lisinopril 10 mg (30 pills)  Glipizide 5 mg (30 pills)    As per previous refill and providers note from 6/14, he should be on Lisinopril 20mg and Glipizide 10mg  Therefore, patient only received half his dosage for the month   Asking for the remainder to be sent [facility] and scripts corrected

## 2021-08-13 RX ORDER — GLIPIZIDE 10 MG/1
10 TABLET, FILM COATED, EXTENDED RELEASE ORAL DAILY
Qty: 30 TABLET | Refills: 5 | Status: SHIPPED | OUTPATIENT
Start: 2021-08-13 | End: 2022-06-09

## 2021-08-13 RX ORDER — LISINOPRIL 20 MG/1
20 TABLET ORAL DAILY
Qty: 30 TABLET | Refills: 5 | Status: SHIPPED | OUTPATIENT
Start: 2021-08-13 | End: 2021-10-04 | Stop reason: SDUPTHER

## 2021-08-13 NOTE — TELEPHONE ENCOUNTER
I did send correct prescriptions to patient's pharmacy again as I did back in June when I switched his prescriptions  Pharmacy likely dispensed " old refills" instead of new correct doses as of our last office visit on June 14th         Patient should return wrong Rx bottles that were given to him by the pharmacy and  new correct prescriptions today        Thank you

## 2021-09-06 DIAGNOSIS — I48.91 NEW ONSET ATRIAL FIBRILLATION (HCC): ICD-10-CM

## 2021-09-06 DIAGNOSIS — E66.9 DIABETES MELLITUS TYPE 2 IN OBESE (HCC): Chronic | ICD-10-CM

## 2021-09-06 DIAGNOSIS — E11.69 DIABETES MELLITUS TYPE 2 IN OBESE (HCC): Chronic | ICD-10-CM

## 2021-09-07 RX ORDER — METOPROLOL TARTRATE 50 MG/1
TABLET, FILM COATED ORAL
Qty: 75 TABLET | Refills: 3 | Status: SHIPPED | OUTPATIENT
Start: 2021-09-07 | End: 2021-09-16

## 2021-09-07 RX ORDER — METFORMIN HYDROCHLORIDE 500 MG/1
TABLET, EXTENDED RELEASE ORAL
Qty: 120 TABLET | Refills: 3 | Status: SHIPPED | OUTPATIENT
Start: 2021-09-07 | End: 2022-02-14

## 2021-09-16 ENCOUNTER — OFFICE VISIT (OUTPATIENT)
Dept: FAMILY MEDICINE CLINIC | Facility: CLINIC | Age: 66
End: 2021-09-16
Payer: COMMERCIAL

## 2021-09-16 VITALS
TEMPERATURE: 98.5 F | WEIGHT: 285.4 LBS | HEIGHT: 66 IN | DIASTOLIC BLOOD PRESSURE: 80 MMHG | SYSTOLIC BLOOD PRESSURE: 132 MMHG | HEART RATE: 66 BPM | BODY MASS INDEX: 45.87 KG/M2 | OXYGEN SATURATION: 99 %

## 2021-09-16 DIAGNOSIS — E78.5 DYSLIPIDEMIA: ICD-10-CM

## 2021-09-16 DIAGNOSIS — R06.00 DOE (DYSPNEA ON EXERTION): ICD-10-CM

## 2021-09-16 DIAGNOSIS — I48.0 PAROXYSMAL ATRIAL FIBRILLATION (HCC): ICD-10-CM

## 2021-09-16 DIAGNOSIS — E11.69 DIABETES MELLITUS TYPE 2 IN OBESE (HCC): Primary | Chronic | ICD-10-CM

## 2021-09-16 DIAGNOSIS — I10 ESSENTIAL HYPERTENSION: ICD-10-CM

## 2021-09-16 DIAGNOSIS — E66.9 DIABETES MELLITUS TYPE 2 IN OBESE (HCC): Primary | Chronic | ICD-10-CM

## 2021-09-16 PROCEDURE — 99214 OFFICE O/P EST MOD 30 MIN: CPT | Performed by: FAMILY MEDICINE

## 2021-09-16 RX ORDER — ASPIRIN 81 MG/1
81 TABLET ORAL DAILY
COMMUNITY

## 2021-09-16 RX ORDER — METOPROLOL TARTRATE 50 MG/1
TABLET, FILM COATED ORAL
Qty: 90 TABLET | Refills: 3 | Status: SHIPPED | OUTPATIENT
Start: 2021-09-16 | End: 2022-03-26 | Stop reason: SDUPTHER

## 2021-09-16 RX ORDER — FLUTICASONE PROPIONATE AND SALMETEROL 113; 14 UG/1; UG/1
1 POWDER, METERED RESPIRATORY (INHALATION) 2 TIMES DAILY
Qty: 1 EACH | Refills: 6 | Status: SHIPPED | OUTPATIENT
Start: 2021-09-16

## 2021-09-16 NOTE — PROGRESS NOTES
FAMILY PRACTICE OFFICE VISIT       NAME: Dave Mascorro  AGE: 61 y o  SEX: male       : 1955        MRN: [id number]        Assessment and Plan     1  Diabetes mellitus type 2 in obese [facility])  Assessment & Plan:    Lab Results   Component Value Date    HGBA1C 8 9 (H) 2021      patient will proceed with blood work including CMP and hemoglobin A1c   continue regimen of glipizide and metformin    Orders:  -     Comprehensive metabolic panel; Future  -     Hemoglobin A1C; Future    2  GONSALEZ (dyspnea on exertion)  Assessment & Plan:   Recent chest x-ray is normal   Patient should proceed with follow-up by [facility]  Trial of fluticasone/ salmeterol inhaler    Orders:  -     Fluticasone-Salmeterol,sensor, (AirDuo Digihaler) 113-14 MCG/ACT AEPB; Inhale 1 puff 2 (two) times a day Rinse mouth after use  3  Paroxysmal atrial fibrillation [facility])  Assessment & Plan:   Patient remains off Eliquis as he is not able to afford medication  He remains in normal sinus rhythm  Since patient's blood pressure was elevated upon arrival to the office I advised him to increase dose of metoprolol from 50 mg twice a day to 100 mg in the morning and 50 mg in the afternoon  He remains on aspirin 162 mg daily  Patient should schedule follow-up with [facility]    Orders:  -     metoprolol tartrate (LOPRESSOR) 50 mg tablet; Take 2 tablets in the morning and 1 tablet at night    4  Essential hypertension  Assessment & Plan:   Continue lisinopril 20 mg daily   Dose of metoprolol is increased from 50 mg b i d  to 100 mg in the morning and 50 mg in the afternoon      5  Dyslipidemia  Assessment & Plan:   Continue Lipitor 40 mg daily      Patient presents for follow-up of chronic medical conditions  He is past due follow-up with [facility], I will contact our Cardiology team   Patient will proceed with blood work now    Unfortunately, he is not able to proceed with multiple health maintenance screenings due to limited insurance coverage  Patient is not able to afford Eliquis due to high cost, his prescription plan is quite limited  There are no Patient Instructions on file for this visit  Return in about 4 months (around 1/16/2022)  Discussed with the patient and all questioned fully answered  He will call me if any problems arise  M*Modal software was used to dictate this note  It may contain errors with dictating incorrect words/spelling  Please contact provider directly with any questions  Chief Complaint     Chief Complaint   Patient presents with    Follow-up       History of Present Illness     Patient presents for follow-up of chronic medical conditions  He has been feeling stably  He admits to unchanged dyspnea on exertion  Chest x-ray performed few months ago was normal/ negative  Patient is due for routine blood work  His fasting blood sugars are ranging from 140 to 160  Random blood sugars in the afternoon are ranging from 100 up to 140  No reported hypoglycemia  Patient denies symptoms of dizziness,palpitations or chest pain  He does not feel short of breath at rest   He admits to cough that he develops after laying down usually at nighttime only  He denies symptoms of burping or belching  No daytime cough  No wheezing  Patient has never smoked  Patient states that shortness of breath on exertion has been present since his hospitalization in July of 2020 for pneumonia  Patient has been off Eliquis for at least 4 to 6 months  Medication was expensive and he was not able to afford  He uses 162 mg of aspirin daily  Patient checks his pulse periodically and believes that he has been in normal sinus rhythm  Patient developed episodes of atrial fibrillation during hospitalization in summer of 2020 for pneumonia  Patient was not able to keep his follow-up with [facility] in spring of 2020, his appointment was not rescheduled as of now  Patient remains on daily medications for hypertension, hyperlipidemia, type 2 diabetes, med list updated  Review of Systems   Review of Systems   Constitutional: Negative  HENT: Negative  Eyes: Negative  Respiratory: Positive for cough and shortness of breath  As per HPI   Cardiovascular: Negative  Gastrointestinal: Negative  Endocrine: Negative  Genitourinary: Negative  Musculoskeletal: Positive for arthralgias  Skin: Negative  Allergic/Immunologic: Negative  Neurological: Negative  Hematological: Negative  Psychiatric/Behavioral: Negative          Active Problem List     Patient Active Problem List   Diagnosis    Essential hypertension    Diabetes mellitus type 2 in obese ([facility] )    Vitamin D deficiency    Morbid obesity ([facility] )    Osteoarthritis of left knee    Bladder wall thickening    Status post left knee replacement    Non-MI Troponin Elevation    Paroxysmal atrial fibrillation (HCC)    Dyslipidemia    Skin mole    Left arm weakness    Edema of hand    GONSALEZ (dyspnea on exertion)       Past Medical History:  Past Medical History:   Diagnosis Date    Acute respiratory failure with hypoxia and hypercapnia ([place] [address] ) 8/17/2020    LAURENCE (acute kidney injury) ([place] [address] ) 8/16/2020    Arthritis     knees    Community acquired pneumonia of left lower lobe of lung 8/12/2020    Diabetes mellitus ([place] [address] )     Hypertension     Measles, [place] (rubella)     Mumps     Snoring     Loudly       Past Surgical History:  Past Surgical History:   Procedure Laterality Date    CYSTOSCOPY      KNEE ARTHROSCOPY Left     knee    ORIF PELVIC FRACTURE      TOTAL KNEE ARTHROPLASTY Left 6/10/2019    Procedure: ARTHROPLASTY KNEE TOTAL;  Surgeon: Noble Simons MD;  Location: [facility];  Service: Orthopedics       Family History:  Family History   Problem Relation Age of Onset    Ovarian cancer Mother     Heart disease Mother     Diabetes type II Mother     Colon cancer Father  Heart disease Father     Nephrolithiasis Father        Social History:  Social History     Socioeconomic History    Marital status: Single     Spouse name: Not on file    Number of children: Not on file    Years of education: Not on file    Highest education level: Not on file   Occupational History    Not on file   Tobacco Use    Smoking status: Never Smoker    Smokeless tobacco: Never Used   Vaping Use    Vaping Use: Never used   Substance and Sexual Activity    Alcohol use: Yes     Comment: Occasionally, not in excess - As per [employer]     Drug use: Never    Sexual activity: Not on file   Other Topics Concern    Not on file   Social History Narrative    Not on file     Social Determinants of Health     Financial Resource Strain:     Difficulty of Paying Living Expenses:    Food Insecurity:     Worried About Running Out of Food in the Last Year:     [address] in the Last Year:    Transportation Needs:     Lack of Transportation (Medical):      Lack of Transportation (Non-Medical):    Physical Activity:     Days of Exercise per Week:     Minutes of Exercise per Session:    Stress:     Feeling of Stress :    Social Connections:     Frequency of Communication with Friends and Family:     Frequency of Social Gatherings with Friends and Family:     Attends [employer] Services:     Active Member of Clubs or Organizations:     Attends Club or Organization Meetings:     Marital Status:    Intimate Partner Violence:     Fear of Current or Ex-Partner:     Emotionally Abused:     Physically Abused:     Sexually Abused:          Objective     Vitals:    09/16/21 1453 09/16/21 1541   BP: 154/96 132/80   BP Location: Left arm    Patient Position: Sitting    Cuff Size: Large    Pulse: 66    Temp: 98 5 °F (36 9 °C)    TempSrc: Temporal    SpO2: 99%    Weight: 129 kg (285 lb 6 4 oz)    Height: 5' 6" (1 676 m)        Wt Readings from Last 3 Encounters:   09/16/21 129 kg (285 lb 6 4 oz) 06/14/21 129 kg (284 lb 6 4 oz)   04/21/21 131 kg (288 lb)       Physical Exam  Vitals and nursing note reviewed  Constitutional:       General: He is not in acute distress  Appearance: Normal appearance  He is well-developed  He is not ill-appearing  HENT:      Head: Normocephalic and atraumatic  Eyes:      Conjunctiva/sclera: Conjunctivae normal    Neck:      Vascular: No carotid bruit  Cardiovascular:      Rate and Rhythm: Normal rate and regular rhythm  Heart sounds: Normal heart sounds  No murmur heard  Pulmonary:      Effort: Pulmonary effort is normal  No respiratory distress  Breath sounds: Normal breath sounds  No wheezing or rales  Abdominal:      General: Bowel sounds are normal  There is no distension or abdominal bruit  Musculoskeletal:         General: Normal range of motion  Cervical back: Neck supple  Right lower leg: No edema  Left lower leg: No edema  Neurological:      General: No focal deficit present  Mental Status: He is alert and oriented to person, place, and time  Cranial Nerves: No cranial nerve deficit  Psychiatric:         Mood and Affect: Mood normal          Behavior: Behavior normal          Thought Content:  Thought content normal           Pertinent Laboratory/Diagnostic Studies:    Lab Results   Component Value Date    WBC 8 38 06/11/2021    HGB 15 1 06/11/2021    HCT 45 6 06/11/2021    MCV 88 06/11/2021     06/11/2021       No results found for: TSH    Lab Results   Component Value Date    CHOL 218 (H) 03/17/2017     Lab Results   Component Value Date    TRIG 130 06/11/2021     Lab Results   Component Value Date    HDL 30 (L) 06/11/2021     Lab Results   Component Value Date    LDLCALC 67 06/11/2021     Lab Results   Component Value Date    HGBA1C 8 9 (H) 06/11/2021     Lab Results   Component Value Date    SODIUM 138 06/11/2021    K 3 9 06/11/2021     06/11/2021    CO2 23 06/11/2021    AGAP 8 06/11/2021 BUN 21 06/11/2021    CREATININE 1 17 06/11/2021    GLUC 158 (H) 11/21/2020    GLUF 187 (H) 06/11/2021    CALCIUM 9 1 06/11/2021    AST 15 06/11/2021    ALT 11 (L) 06/11/2021    ALKPHOS 112 06/11/2021    PROT 7 1 03/17/2017    TP 7 2 06/11/2021    BILITOT 0 8 03/17/2017    TBILI 0 76 06/11/2021    EGFR 65 06/11/2021       Orders Placed This Encounter   Procedures    Comprehensive metabolic panel    Hemoglobin A1C       ALLERGIES:  No Known Allergies    Current Medications     Current Outpatient Medications   Medication Sig Dispense Refill    ACCU-CHEK LARRY PLUS test strip       ACCU-CHEK FASTCLIX LANCETS MISC       aspirin (ECOTRIN LOW STRENGTH) 81 mg EC tablet Take 81 mg by mouth daily      atorvastatin (LIPITOR) 40 mg tablet Take 1 tablet (40 mg total) by mouth daily 90 tablet 3    Ergocalciferol (VITAMIN D2 PO) Take 2,000 Units by mouth      glipiZIDE (GLUCOTROL XL) 10 mg 24 hr tablet Take 1 tablet (10 mg total) by mouth daily 30 tablet 5    lisinopril (ZESTRIL) 20 mg tablet Take 1 tablet (20 mg total) by mouth daily 30 tablet 5    metFORMIN (GLUCOPHAGE-XR) 500 mg 24 hr tablet TAKE 4 TABLETS BY MOUTH ONCE DAILY 120 tablet 3    Fluticasone-Salmeterol,sensor, (AirDuo Digihaler) 113-14 MCG/ACT AEPB Inhale 1 puff 2 (two) times a day Rinse mouth after use  1 each 6    metoprolol tartrate (LOPRESSOR) 50 mg tablet Take 2 tablets in the morning and 1 tablet at night 90 tablet 3     No current facility-administered medications for this visit         Medications Discontinued During This Encounter   Medication Reason    metoprolol tartrate (LOPRESSOR) 50 mg tablet        Health Maintenance     Health Maintenance   Topic Date Due    Pneumococcal Vaccine: Pediatrics (0 to 5 Years) and At-Risk Patients (6 to 59 Years) (1 of 2 - PPSV23) Never done    DM Eye Exam  Never done    COVID-19 Vaccine (1) Never done    HIV Screening  Never done    Annual Physical  Never done    Colorectal Cancer Screening  Never done  DTaP,Tdap,and Td Vaccines (2 - Td or Tdap) 01/01/2011    Influenza Vaccine (1) 09/01/2021    Diabetic Foot Exam  10/08/2021    HEMOGLOBIN A1C  12/11/2021    Depression Screening  06/14/2022    BMI: Followup Plan  06/17/2022    BMI: Adult  09/16/2022    Hepatitis C Screening  Completed    HIB Vaccine  Aged Out    Hepatitis B Vaccine  Aged Out    IPV Vaccine  Aged Out    Hepatitis A Vaccine  Aged Out    Meningococcal ACWY Vaccine  Aged Out    HPV Vaccine  Aged Out       Immunization History   Administered Date(s) Administered    Influenza Quadrivalent Preservative Free 3 years and older IM 08/03/2016    Tdap 01/01/2001       Radha Manrique MD

## 2021-09-19 ENCOUNTER — TELEPHONE (OUTPATIENT)
Dept: FAMILY MEDICINE CLINIC | Facility: CLINIC | Age: 66
End: 2021-09-19

## 2021-09-19 PROBLEM — M62.82 RHABDOMYOLYSIS: Status: RESOLVED | Noted: 2020-08-14 | Resolved: 2021-09-19

## 2021-09-19 PROBLEM — J18.9 COMMUNITY ACQUIRED PNEUMONIA OF LEFT LOWER LOBE OF LUNG: Chronic | Status: RESOLVED | Noted: 2020-08-12 | Resolved: 2021-09-19

## 2021-09-19 NOTE — ASSESSMENT & PLAN NOTE
Lab Results   Component Value Date    HGBA1C 8 9 (H) 06/11/2021      patient will proceed with blood work including CMP and hemoglobin A1c   continue regimen of glipizide and metformin

## 2021-09-19 NOTE — TELEPHONE ENCOUNTER
This patient was not able to keep his appointment with Dr Rushing in spring of 2021  He was not able to re-schedule this appointment at that time  I would appreciate if one of team members could reach out to him and set up this follow-up        Thank you

## 2021-09-19 NOTE — ASSESSMENT & PLAN NOTE
Continue lisinopril 20 mg daily   Dose of metoprolol is increased from 50 mg b i d  to 100 mg in the morning and 50 mg in the afternoon

## 2021-09-19 NOTE — ASSESSMENT & PLAN NOTE
Recent chest x-ray is normal   Patient should proceed with follow-up by [facility]      Trial of fluticasone/ salmeterol inhaler

## 2021-09-19 NOTE — ASSESSMENT & PLAN NOTE
Patient remains off Eliquis as he is not able to afford medication  He remains in normal sinus rhythm  Since patient's blood pressure was elevated upon arrival to the office I advised him to increase dose of metoprolol from 50 mg twice a day to 100 mg in the morning and 50 mg in the afternoon  He remains on aspirin 162 mg daily      Patient should schedule follow-up with [facility]

## 2021-09-20 ENCOUNTER — APPOINTMENT (OUTPATIENT)
Dept: LAB | Facility: CLINIC | Age: 66
End: 2021-09-20
Payer: COMMERCIAL

## 2021-09-20 DIAGNOSIS — E66.9 DIABETES MELLITUS TYPE 2 IN OBESE (HCC): Chronic | ICD-10-CM

## 2021-09-20 DIAGNOSIS — E11.69 DIABETES MELLITUS TYPE 2 IN OBESE (HCC): Chronic | ICD-10-CM

## 2021-09-20 LAB
ALBUMIN SERPL BCP-MCNC: 3.4 G/DL (ref 3.5–5)
ALP SERPL-CCNC: 119 U/L (ref 46–116)
ALT SERPL W P-5'-P-CCNC: 13 U/L (ref 12–78)
ANION GAP SERPL CALCULATED.3IONS-SCNC: 6 MMOL/L (ref 4–13)
AST SERPL W P-5'-P-CCNC: 8 U/L (ref 5–45)
BILIRUB SERPL-MCNC: 1 MG/DL (ref 0.2–1)
BUN SERPL-MCNC: 20 MG/DL (ref 5–25)
CALCIUM ALBUM COR SERPL-MCNC: 9.5 MG/DL (ref 8.3–10.1)
CALCIUM SERPL-MCNC: 9 MG/DL (ref 8.3–10.1)
CHLORIDE SERPL-SCNC: 107 MMOL/L (ref 100–108)
CO2 SERPL-SCNC: 25 MMOL/L (ref 21–32)
CREAT SERPL-MCNC: 1.17 MG/DL (ref 0.6–1.3)
EST. AVERAGE GLUCOSE BLD GHB EST-MCNC: 197 MG/DL
GFR SERPL CREATININE-BSD FRML MDRD: 65 ML/MIN/1.73SQ M
GLUCOSE P FAST SERPL-MCNC: 144 MG/DL (ref 65–99)
HBA1C MFR BLD: 8.5 %
POTASSIUM SERPL-SCNC: 3.8 MMOL/L (ref 3.5–5.3)
PROT SERPL-MCNC: 7.6 G/DL (ref 6.4–8.2)
SODIUM SERPL-SCNC: 138 MMOL/L (ref 136–145)

## 2021-09-20 PROCEDURE — 80053 COMPREHEN METABOLIC PANEL: CPT

## 2021-09-20 PROCEDURE — 36415 COLL VENOUS BLD VENIPUNCTURE: CPT

## 2021-09-20 PROCEDURE — 83036 HEMOGLOBIN GLYCOSYLATED A1C: CPT

## 2021-09-20 NOTE — TELEPHONE ENCOUNTER
Patient called back and was scheduled at the [facility] office on 10/1/21 with Dr Kaitlynn Frederick

## 2021-09-23 ENCOUNTER — TELEPHONE (OUTPATIENT)
Dept: FAMILY MEDICINE CLINIC | Facility: CLINIC | Age: 66
End: 2021-09-23

## 2021-09-23 DIAGNOSIS — E66.9 DIABETES MELLITUS TYPE 2 IN OBESE (HCC): Primary | ICD-10-CM

## 2021-09-23 DIAGNOSIS — E11.69 DIABETES MELLITUS TYPE 2 IN OBESE (HCC): Primary | ICD-10-CM

## 2021-09-23 DIAGNOSIS — I48.0 PAROXYSMAL ATRIAL FIBRILLATION (HCC): ICD-10-CM

## 2021-09-23 DIAGNOSIS — E78.5 DYSLIPIDEMIA: ICD-10-CM

## 2021-09-23 RX ORDER — GLIPIZIDE 5 MG/1
5 TABLET, FILM COATED, EXTENDED RELEASE ORAL DAILY
Qty: 90 TABLET | Refills: 1 | Status: SHIPPED | OUTPATIENT
Start: 2021-09-23 | End: 2022-03-26 | Stop reason: DRUGHIGH

## 2021-09-23 NOTE — TELEPHONE ENCOUNTER
Please contact patient  I reviewed his blood work  Hemoglobin A1c, test for diabetes, has improved and went down from 8 9 to 8 5  It is still above desired level  I recommend to continue on the same dose of metformin of 4 tablets once a day  As far as glipizide, I recommend to continue on 10 mg once a day in the morning and add extra 5 mg at dinner time  ( or vice versa)  I will send this prescription for Glipizide ER 5 mg tablets to his pharmacy and will place new blood work orders for patient to be done prior to his next office visit in January       Thank you

## 2021-10-04 ENCOUNTER — OFFICE VISIT (OUTPATIENT)
Dept: CARDIOLOGY CLINIC | Facility: CLINIC | Age: 66
End: 2021-10-04
Payer: COMMERCIAL

## 2021-10-04 VITALS
HEART RATE: 87 BPM | SYSTOLIC BLOOD PRESSURE: 148 MMHG | DIASTOLIC BLOOD PRESSURE: 98 MMHG | BODY MASS INDEX: 45.64 KG/M2 | WEIGHT: 284 LBS | HEIGHT: 66 IN | OXYGEN SATURATION: 98 %

## 2021-10-04 DIAGNOSIS — R06.00 DOE (DYSPNEA ON EXERTION): ICD-10-CM

## 2021-10-04 DIAGNOSIS — I48.0 PAROXYSMAL ATRIAL FIBRILLATION (HCC): Primary | ICD-10-CM

## 2021-10-04 DIAGNOSIS — I10 ESSENTIAL HYPERTENSION: ICD-10-CM

## 2021-10-04 PROCEDURE — 93000 ELECTROCARDIOGRAM COMPLETE: CPT | Performed by: INTERNAL MEDICINE

## 2021-10-04 PROCEDURE — 99214 OFFICE O/P EST MOD 30 MIN: CPT | Performed by: INTERNAL MEDICINE

## 2021-10-04 RX ORDER — LISINOPRIL 40 MG/1
40 TABLET ORAL DAILY
Qty: 30 TABLET | Refills: 11 | Status: SHIPPED | OUTPATIENT
Start: 2021-10-04 | End: 2022-04-28 | Stop reason: SDUPTHER

## 2021-11-17 ENCOUNTER — HOSPITAL ENCOUNTER (EMERGENCY)
Facility: HOSPITAL | Age: 66
Discharge: HOME | End: 2021-11-17
Attending: EMERGENCY MEDICINE
Payer: COMMERCIAL

## 2021-11-17 ENCOUNTER — APPOINTMENT (EMERGENCY)
Dept: RADIOLOGY | Facility: HOSPITAL | Age: 66
End: 2021-11-17
Payer: COMMERCIAL

## 2021-11-17 ENCOUNTER — TELEPHONE (OUTPATIENT)
Dept: FAMILY MEDICINE CLINIC | Facility: CLINIC | Age: 66
End: 2021-11-17

## 2021-11-17 VITALS
BODY MASS INDEX: 45.8 KG/M2 | SYSTOLIC BLOOD PRESSURE: 171 MMHG | HEIGHT: 66 IN | OXYGEN SATURATION: 95 % | RESPIRATION RATE: 18 BRPM | HEART RATE: 79 BPM | DIASTOLIC BLOOD PRESSURE: 81 MMHG | WEIGHT: 285 LBS | TEMPERATURE: 97.7 F

## 2021-11-17 DIAGNOSIS — M54.16 LUMBAR RADICULOPATHY: Primary | ICD-10-CM

## 2021-11-17 PROCEDURE — 96372 THER/PROPH/DIAG INJ SC/IM: CPT

## 2021-11-17 PROCEDURE — 72100 X-RAY EXAM L-S SPINE 2/3 VWS: CPT

## 2021-11-17 PROCEDURE — 99284 EMERGENCY DEPT VISIT MOD MDM: CPT | Performed by: EMERGENCY MEDICINE

## 2021-11-17 PROCEDURE — 99283 EMERGENCY DEPT VISIT LOW MDM: CPT

## 2021-11-17 RX ORDER — KETOROLAC TROMETHAMINE 30 MG/ML
15 INJECTION, SOLUTION INTRAMUSCULAR; INTRAVENOUS ONCE
Status: COMPLETED | OUTPATIENT
Start: 2021-11-17 | End: 2021-11-17

## 2021-11-17 RX ORDER — METHOCARBAMOL 500 MG/1
1500 TABLET, FILM COATED ORAL 3 TIMES DAILY
Qty: 27 TABLET | Refills: 0 | Status: SHIPPED | OUTPATIENT
Start: 2021-11-17 | End: 2022-03-26 | Stop reason: ALTCHOICE

## 2021-11-17 RX ORDER — LIDOCAINE 50 MG/G
2 PATCH TOPICAL ONCE
Status: DISCONTINUED | OUTPATIENT
Start: 2021-11-17 | End: 2021-11-17 | Stop reason: HOSPADM

## 2021-11-17 RX ORDER — METHOCARBAMOL 500 MG/1
1000 TABLET, FILM COATED ORAL ONCE
Status: COMPLETED | OUTPATIENT
Start: 2021-11-17 | End: 2021-11-17

## 2021-11-17 RX ADMIN — METHOCARBAMOL 1000 MG: 500 TABLET, FILM COATED ORAL at 15:17

## 2021-11-17 RX ADMIN — LIDOCAINE 5% 2 PATCH: 700 PATCH TOPICAL at 15:18

## 2021-11-17 RX ADMIN — KETOROLAC TROMETHAMINE 15 MG: 30 INJECTION, SOLUTION INTRAMUSCULAR at 15:21

## 2021-11-19 ENCOUNTER — NURSE TRIAGE (OUTPATIENT)
Dept: PHYSICAL THERAPY | Facility: OTHER | Age: 66
End: 2021-11-19

## 2021-11-19 DIAGNOSIS — M54.41 ACUTE BILATERAL LOW BACK PAIN WITH BILATERAL SCIATICA: Primary | ICD-10-CM

## 2021-11-19 DIAGNOSIS — M54.42 ACUTE BILATERAL LOW BACK PAIN WITH BILATERAL SCIATICA: Primary | ICD-10-CM

## 2021-12-27 ENCOUNTER — TELEPHONE (OUTPATIENT)
Dept: ADMINISTRATIVE | Facility: OTHER | Age: 66
End: 2021-12-27

## 2022-02-12 DIAGNOSIS — E11.69 DIABETES MELLITUS TYPE 2 IN OBESE (HCC): Chronic | ICD-10-CM

## 2022-02-12 DIAGNOSIS — E66.9 DIABETES MELLITUS TYPE 2 IN OBESE (HCC): Chronic | ICD-10-CM

## 2022-02-14 RX ORDER — METFORMIN HYDROCHLORIDE 500 MG/1
TABLET, EXTENDED RELEASE ORAL
Qty: 120 TABLET | Refills: 0 | Status: SHIPPED | OUTPATIENT
Start: 2022-02-14 | End: 2022-06-09

## 2022-03-25 RX ORDER — OXYCODONE HYDROCHLORIDE AND ACETAMINOPHEN 5; 325 MG/1; MG/1
TABLET ORAL
COMMUNITY
Start: 2022-02-02 | End: 2022-07-05 | Stop reason: ALTCHOICE

## 2022-03-25 RX ORDER — LIDOCAINE 50 MG/G
PATCH TOPICAL
COMMUNITY
Start: 2022-02-02 | End: 2022-07-05 | Stop reason: ALTCHOICE

## 2022-03-25 RX ORDER — GABAPENTIN 300 MG/1
300 CAPSULE ORAL 3 TIMES DAILY
COMMUNITY
Start: 2022-02-02 | End: 2022-07-05 | Stop reason: ALTCHOICE

## 2022-03-26 ENCOUNTER — APPOINTMENT (OUTPATIENT)
Dept: LAB | Facility: CLINIC | Age: 67
End: 2022-03-26
Payer: COMMERCIAL

## 2022-03-26 ENCOUNTER — OFFICE VISIT (OUTPATIENT)
Dept: FAMILY MEDICINE CLINIC | Facility: CLINIC | Age: 67
End: 2022-03-26

## 2022-03-26 VITALS
DIASTOLIC BLOOD PRESSURE: 70 MMHG | HEART RATE: 71 BPM | OXYGEN SATURATION: 97 % | BODY MASS INDEX: 46 KG/M2 | SYSTOLIC BLOOD PRESSURE: 128 MMHG | TEMPERATURE: 97.8 F | HEIGHT: 66 IN

## 2022-03-26 DIAGNOSIS — E66.9 DIABETES MELLITUS TYPE 2 IN OBESE (HCC): Chronic | ICD-10-CM

## 2022-03-26 DIAGNOSIS — R79.89 ELEVATED SERUM CREATININE: ICD-10-CM

## 2022-03-26 DIAGNOSIS — E11.69 DIABETES MELLITUS TYPE 2 IN OBESE (HCC): Chronic | ICD-10-CM

## 2022-03-26 DIAGNOSIS — E11.69 DIABETES MELLITUS TYPE 2 IN OBESE (HCC): ICD-10-CM

## 2022-03-26 DIAGNOSIS — Z01.818 PRE-OP EXAMINATION: Primary | ICD-10-CM

## 2022-03-26 DIAGNOSIS — E66.9 DIABETES MELLITUS TYPE 2 IN OBESE (HCC): ICD-10-CM

## 2022-03-26 DIAGNOSIS — Z12.11 SCREENING FOR COLON CANCER: Primary | ICD-10-CM

## 2022-03-26 DIAGNOSIS — I48.0 PAROXYSMAL ATRIAL FIBRILLATION (HCC): ICD-10-CM

## 2022-03-26 DIAGNOSIS — E78.5 DYSLIPIDEMIA: ICD-10-CM

## 2022-03-26 DIAGNOSIS — M48.07 STENOSIS OF LUMBOSACRAL SPINE: ICD-10-CM

## 2022-03-26 DIAGNOSIS — I10 ESSENTIAL HYPERTENSION: ICD-10-CM

## 2022-03-26 LAB
ALBUMIN SERPL BCP-MCNC: 3 G/DL (ref 3.5–5)
ALP SERPL-CCNC: 90 U/L (ref 46–116)
ALT SERPL W P-5'-P-CCNC: 12 U/L (ref 12–78)
ANION GAP SERPL CALCULATED.3IONS-SCNC: 3 MMOL/L (ref 4–13)
AST SERPL W P-5'-P-CCNC: 13 U/L (ref 5–45)
BILIRUB SERPL-MCNC: 0.58 MG/DL (ref 0.2–1)
BUN SERPL-MCNC: 17 MG/DL (ref 5–25)
CALCIUM ALBUM COR SERPL-MCNC: 10.7 MG/DL (ref 8.3–10.1)
CALCIUM SERPL-MCNC: 9.9 MG/DL (ref 8.3–10.1)
CHLORIDE SERPL-SCNC: 102 MMOL/L (ref 100–108)
CHOLEST SERPL-MCNC: 114 MG/DL
CO2 SERPL-SCNC: 29 MMOL/L (ref 21–32)
CREAT SERPL-MCNC: 1.28 MG/DL (ref 0.6–1.3)
ERYTHROCYTE [DISTWIDTH] IN BLOOD BY AUTOMATED COUNT: 14.5 % (ref 11.6–15.1)
EST. AVERAGE GLUCOSE BLD GHB EST-MCNC: 157 MG/DL
GFR SERPL CREATININE-BSD FRML MDRD: 58 ML/MIN/1.73SQ M
GLUCOSE P FAST SERPL-MCNC: 159 MG/DL (ref 65–99)
HBA1C MFR BLD: 7.1 %
HCT VFR BLD AUTO: 37 % (ref 36.5–49.3)
HDLC SERPL-MCNC: 33 MG/DL
HGB BLD-MCNC: 12.8 G/DL (ref 12–17)
LDLC SERPL CALC-MCNC: 56 MG/DL (ref 0–100)
MCH RBC QN AUTO: 29.2 PG (ref 26.8–34.3)
MCHC RBC AUTO-ENTMCNC: 34.6 G/DL (ref 31.4–37.4)
MCV RBC AUTO: 85 FL (ref 82–98)
PLATELET # BLD AUTO: 329 THOUSANDS/UL (ref 149–390)
PMV BLD AUTO: 9.3 FL (ref 8.9–12.7)
POTASSIUM SERPL-SCNC: 4.7 MMOL/L (ref 3.5–5.3)
PROT SERPL-MCNC: 7.9 G/DL (ref 6.4–8.2)
RBC # BLD AUTO: 4.38 MILLION/UL (ref 3.88–5.62)
SODIUM SERPL-SCNC: 134 MMOL/L (ref 136–145)
TRIGL SERPL-MCNC: 123 MG/DL
TSH SERPL DL<=0.05 MIU/L-ACNC: 0.38 UIU/ML (ref 0.36–3.74)
WBC # BLD AUTO: 7.68 THOUSAND/UL (ref 4.31–10.16)

## 2022-03-26 PROCEDURE — 80061 LIPID PANEL: CPT

## 2022-03-26 PROCEDURE — 80053 COMPREHEN METABOLIC PANEL: CPT

## 2022-03-26 PROCEDURE — 36415 COLL VENOUS BLD VENIPUNCTURE: CPT

## 2022-03-26 PROCEDURE — 99215 OFFICE O/P EST HI 40 MIN: CPT | Performed by: FAMILY MEDICINE

## 2022-03-26 PROCEDURE — 83036 HEMOGLOBIN GLYCOSYLATED A1C: CPT

## 2022-03-26 PROCEDURE — 85027 COMPLETE CBC AUTOMATED: CPT

## 2022-03-26 PROCEDURE — 84443 ASSAY THYROID STIM HORMONE: CPT

## 2022-03-26 RX ORDER — METOPROLOL TARTRATE 50 MG/1
TABLET, FILM COATED ORAL
Qty: 120 TABLET | Refills: 5 | Status: SHIPPED | OUTPATIENT
Start: 2022-03-26 | End: 2022-04-28 | Stop reason: SDUPTHER

## 2022-03-26 RX ORDER — OXYCODONE HYDROCHLORIDE 5 MG/1
TABLET ORAL
COMMUNITY
Start: 2022-02-25 | End: 2022-03-26 | Stop reason: ALTCHOICE

## 2022-03-26 NOTE — ASSESSMENT & PLAN NOTE
Patient is on regimen of aspirin and metoprolol  Recent EKG reveals sinus tachycardia  Patient is under care of [facility], most recent visit October 2021  He denies symptoms of palpitations, angina or dyspnea

## 2022-03-26 NOTE — ASSESSMENT & PLAN NOTE
Lab Results   Component Value Date    HGBA1C 8 0 (H) 01/28/2022     Patient remains on regimen of metformin 2000 mg daily and glipizide 10 mg daily  He was not able to afford any other therapeutics for treatment of type 2 diabetes due to lack of prescription coverage  Proceed with hemoglobin A1c today  Patient's eating habits have been significantly affected by severe low back pain and difficulty ambulating  He admits to drinking a lot of ginger ale and fruit juice  We may consider medication adjustment based on results of hemoglobin A1c and BMP  Most recent creatinine performed at [facility] over a week ago was elevated at 1 7  No history of CRI

## 2022-03-26 NOTE — ASSESSMENT & PLAN NOTE
No history of CRI  Most recent creatinine performed on March 19th at [facility] was 1 7  Patient's baseline is around 1 1-1 2  I suspect there is significant component of dehydration as well as weight loss  Patient will proceed with repeat BMP today  Reduce dose of lisinopril from 40 to 20 mg daily, patient will increase dose of metoprolol to keep blood pressure well controlled  If renal function remains abnormal-we may consider medical stabilization prior to surgical procedure

## 2022-03-26 NOTE — ASSESSMENT & PLAN NOTE
Blood pressure is well controlled  Most recent BMP performed at [facility] reveals elevated creatinine 1 73  No history of abnormal creatinine in the past     It could be triggered by dehydration   Proceed with BMP today  Patient will decrease dose of lisinopril from 40 to 20 mg daily  He will change dose of metoprolol from 100 mg in the morning and 50 mg in the evening 200 mg twice a day      Most recent EKG performed at [facility] revealed sinus tachycardia with heart rate of 107

## 2022-03-26 NOTE — ASSESSMENT & PLAN NOTE
Pending L1-L5 decompression and fusion scheduled at [facility] on April 14th  Patient will proceed with repeat BMP and hemoglobin A1c today  I will review results of recent EKG with patient's cardiologist to make sure he has no significant concerns  I will finalize surgical clearance based on the above

## 2022-03-26 NOTE — ASSESSMENT & PLAN NOTE
Severe low back pain and ambulatory dysfunction  Pending L1-L5 decompression and fusion at [facility] scheduled on April 14th

## 2022-03-26 NOTE — PROGRESS NOTES
FAMILY PRACTICE OFFICE VISIT       NAME: Nelly Otero  AGE: 61 y o  SEX: male       : 1955        MRN: [id number]        Assessment and Plan     1  Pre-op examination  Assessment & Plan:  Pending L1-L5 decompression and fusion scheduled at [facility] on   Patient will proceed with repeat BMP and hemoglobin A1c today  I will review results of recent EKG with patient's cardiologist to make sure he has no significant concerns  I will finalize surgical clearance based on the above  2  Stenosis of lumbosacral spine  Assessment & Plan:  Severe low back pain and ambulatory dysfunction  Pending L1-L5 decompression and fusion at [facility] scheduled on   3  Diabetes mellitus type 2 in obese [facility])  Assessment & Plan:    Lab Results   Component Value Date    HGBA1C 8 0 (H) 2022     Patient remains on regimen of metformin 2000 mg daily and glipizide 10 mg daily  He was not able to afford any other therapeutics for treatment of type 2 diabetes due to lack of prescription coverage  Proceed with hemoglobin A1c today  Patient's eating habits have been significantly affected by severe low back pain and difficulty ambulating  He admits to drinking a lot of ginger ale and fruit juice  We may consider medication adjustment based on results of hemoglobin A1c and BMP  Most recent creatinine performed at [facility] over a week ago was elevated at 1 7  No history of CRI  Orders:  -     Hemoglobin A1C; Future    4  Paroxysmal atrial fibrillation [facility])  Assessment & Plan:  Patient is on regimen of aspirin and metoprolol  Recent EKG reveals sinus tachycardia  Patient is under care of [facility], most recent visit 2021  He denies symptoms of palpitations, angina or dyspnea  Orders:  -     metoprolol tartrate (LOPRESSOR) 50 mg tablet; Take 2 tablets  TWICE a day    5   Essential hypertension  Assessment & Plan:  Blood pressure is well controlled  Most recent BMP performed at [facility] reveals elevated creatinine 1 73  No history of abnormal creatinine in the past     It could be triggered by dehydration   Proceed with BMP today  Patient will decrease dose of lisinopril from 40 to 20 mg daily  He will change dose of metoprolol from 100 mg in the morning and 50 mg in the evening 200 mg twice a day  Most recent EKG performed at [facility] revealed sinus tachycardia with heart rate of 107      6  Elevated serum creatinine  Assessment & Plan:  No history of CRI  Most recent creatinine performed on March 19th at [facility] was 1 7  Patient's baseline is around 1 1-1 2  I suspect there is significant component of dehydration as well as weight loss  Patient will proceed with repeat BMP today  Reduce dose of lisinopril from 40 to 20 mg daily, patient will increase dose of metoprolol to keep blood pressure well controlled  If renal function remains abnormal-we may consider medical stabilization prior to surgical procedure  Orders:  -     Basic metabolic panel; Future           There are no Patient Instructions on file for this visit  No follow-ups on file  Discussed with the patient and all questioned fully answered  He will call me if any problems arise  M*Modal software was used to dictate this note  It may contain errors with dictating incorrect words/spelling  Please contact provider directly with any questions         Chief Complaint     Chief Complaint   Patient presents with    Pre-op Exam     Back Surgery    Covid-19 Vaccine     Completed will upload to [address] [place] [address]     Order placed    Diabetic Eye Exam     Needs Referral    Diabetic Foot Exam     Shoes and socks removed       History of Present Illness     Patient presents for preop evaluation prior to L1-L5 decompression and fusion scheduled at [facility] on April 14th     He had pre-admission testing an EKG at [facility]   Results reviewed  CBC reveals slightly decreased hemoglobin of 12 2  BMP reveals elevated creatinine at 1 73  Patient with no previous history of abnormal creatinine/GFR  He has been suffering from rather severe low back pain since December of 2021  Reportedly he was hospitalized at [facility] for severe low back pain and subsequently had lumbar MRI  Patient has failed conservative treatment and was considered a good surgical candidate  He is scheduled for decompression in 2 weeks  Patient admits to significant pain with standing or walking  He is able to ambulate at home being hunched over  He admits that his diet has not been the best due to difficulty getting around the house  He is here today with 1 of the friends  Patient's daughter has been helping him as well  Patient admits to leg weakness  Low back pain is worse when he is standing  No bowel or bladder dysfunction, he urinates without trouble  Patient has not been monitoring blood sugars lately  Most recent hemoglobin A1c performed at [facility] in January was 8 0  He remains on regimen of metformin and glipizide  We were not able to use any other medications for treatment of type 2 diabetes in the past due to lack of prescription coverage/financial restrictions  Patient admits had he has been drinking a lot of Puneet-D and ginger ale lately  EKG performed at [facility] reveals sinus tachycardia at 107 beats per minute  No significant changes from previous tracing that was performed by [facility] in October of 2021 aside from sinus tachycardia  Patient denies symptoms of chest pain, palpitations, shortness of breath or dizziness  Most recent chest x-ray was performed at [facility] in June of 2021        Review of Systems   Review of Systems   Constitutional: Positive for activity change, appetite change and fatigue  Negative for fever  HENT: Negative  Eyes: Negative  Respiratory: Negative  Cardiovascular: Negative  Gastrointestinal: Negative  Endocrine: Negative  Genitourinary: Negative  Musculoskeletal: Positive for arthralgias and back pain  Allergic/Immunologic: Negative  Neurological: Positive for weakness (Leg weakness)  Hematological: Negative  Psychiatric/Behavioral: Negative          Active Problem List     Patient Active Problem List   Diagnosis    Essential hypertension    Diabetes mellitus type 2 in obese [facility] )    Vitamin D deficiency    Morbid obesity ([place] [address] )    Osteoarthritis of left knee    Bladder wall thickening    Status post left knee replacement    Non-MI Troponin Elevation    Paroxysmal atrial fibrillation (HCC)    Dyslipidemia    Skin mole    Left arm weakness    Edema of hand    GONSALEZ (dyspnea on exertion)    Stenosis of lumbosacral spine    Elevated serum creatinine    Pre-op examination       Past Medical History:  Past Medical History:   Diagnosis Date    Acute respiratory failure with hypoxia and hypercapnia ([place] [address] ) 8/17/2020    LAURENCE (acute kidney injury) ([place] [address] ) 8/16/2020    Arthritis     knees    Community acquired pneumonia of left lower lobe of lung 8/12/2020    Diabetes mellitus ([place] [address] )     Hypertension     Measles, [place] (rubella)     Mumps     Snoring     Loudly       Past Surgical History:  Past Surgical History:   Procedure Laterality Date    CYSTOSCOPY      KNEE ARTHROSCOPY Left     knee    ORIF PELVIC FRACTURE      TOTAL KNEE ARTHROPLASTY Left 6/10/2019    Procedure: ARTHROPLASTY KNEE TOTAL;  Surgeon: Ning Tan MD;  Location: [facility];  Service: Orthopedics       Family History:  Family History   Problem Relation Age of Onset    Ovarian cancer Mother     Heart disease Mother     Diabetes type II Mother     Colon cancer Father     Heart disease Father     Nephrolithiasis Father        Social History:  Social History     Socioeconomic History    Marital status: Single     Spouse name: Not on file    Number of children: Not on file    Years of education: Not on file    Highest education level: Not on file   Occupational History    Not on file   Tobacco Use    Smoking status: Never Smoker    Smokeless tobacco: Never Used   Vaping Use    Vaping Use: Never used   Substance and Sexual Activity    Alcohol use: Yes     Comment: Occasionally, not in excess - As per [employer]     Drug use: Never    Sexual activity: Not on file   Other Topics Concern    Not on file   Social History Narrative    Not on file     Social Determinants of Health     Financial Resource Strain: Not on file   Food Insecurity: Not on file   Transportation Needs: Not on file   Physical Activity: Not on file   Stress: Not on file   Social Connections: Not on file   Intimate Partner Violence: Not on file   Housing Stability: Not on file         Objective     Vitals:    03/26/22 1028 03/26/22 1104   BP: 130/80 128/70   BP Location: Left arm    Patient Position: Sitting    Cuff Size: Standard    Pulse: 71    Temp: 97 8 °F (36 6 °C)    TempSrc: Temporal    SpO2: 97%    Height: 5' 6" (1 676 m)        Wt Readings from Last 3 Encounters:   11/17/21 129 kg (285 lb)   10/04/21 129 kg (284 lb)   09/16/21 129 kg (285 lb 6 4 oz)       Physical Exam  Vitals and nursing note reviewed  Constitutional:       General: He is not in acute distress  Appearance: Normal appearance  He is well-developed  He is ill-appearing (Chronically ill, fatigued in a wheelchair)  HENT:      Head: Normocephalic and atraumatic  Eyes:      Conjunctiva/sclera: Conjunctivae normal    Neck:      Vascular: No carotid bruit  Cardiovascular:      Rate and Rhythm: Normal rate and regular rhythm  Pulses: no weak pulses          Dorsalis pedis pulses are 2+ on the right side and 2+ on the left side  Heart sounds: Normal heart sounds  No murmur heard        Pulmonary: Effort: Pulmonary effort is normal  No respiratory distress  Breath sounds: Normal breath sounds  No wheezing or rales  Abdominal:      General: Bowel sounds are normal  There is no abdominal bruit  Palpations: Abdomen is soft  Musculoskeletal:      Cervical back: Neck supple  Comments: In a wheelchair   Feet:      Right foot:      Skin integrity: Callus and dry skin present  No ulcer, skin breakdown, erythema or warmth  Left foot:      Skin integrity: Callus and dry skin present  No ulcer, skin breakdown, erythema or warmth  Neurological:      General: No focal deficit present  Mental Status: He is alert and oriented to person, place, and time  Cranial Nerves: No cranial nerve deficit  Psychiatric:         Mood and Affect: Mood normal          Behavior: Behavior normal          Thought Content: Thought content normal        Patient's shoes and socks removed  Right Foot/Ankle   Right Foot Inspection  Skin Exam: skin normal, skin intact, dry skin, callus and callus  No warmth, no erythema, no maceration, no abnormal color, no pre-ulcer and no ulcer  Toe Exam: ROM and strength within normal limits  No swelling    Sensory   Vibration: intact  Proprioception: intact  Monofilament testing: diminished    Vascular  The right DP pulse is 2+  Left Foot/Ankle  Left Foot Inspection  Skin Exam: skin normal, skin intact, dry skin and callus  No warmth, no erythema, no maceration, normal color, no pre-ulcer and no ulcer  Toe Exam: ROM and strength within normal limits  No swelling  Sensory   Vibration: intact  Proprioception: intact  Monofilament testing: diminished    Vascular  The left DP pulse is 2+       Assign Risk Category  No deformity present  Loss of protective sensation  No weak pulses  Risk: 1       Pertinent Laboratory/Diagnostic Studies:    Lab Results   Component Value Date    WBC 8 38 06/11/2021    HGB 15 1 06/11/2021    HCT 45 6 06/11/2021    MCV 88 06/11/2021     06/11/2021       No results found for: TSH    Lab Results   Component Value Date    CHOL 218 (H) 03/17/2017     Lab Results   Component Value Date    TRIG 130 06/11/2021     Lab Results   Component Value Date    HDL 30 (L) 06/11/2021     Lab Results   Component Value Date    LDLCALC 67 06/11/2021     Lab Results   Component Value Date    HGBA1C 8 0 (H) 01/28/2022     Lab Results   Component Value Date    SODIUM 138 09/20/2021    K 3 8 09/20/2021     09/20/2021    CO2 25 09/20/2021    AGAP 6 09/20/2021    BUN 20 09/20/2021    CREATININE 1 17 09/20/2021    GLUC 158 (H) 11/21/2020    GLUF 144 (H) 09/20/2021    CALCIUM 9 0 09/20/2021    AST 8 09/20/2021    ALT 13 09/20/2021    ALKPHOS 119 (H) 09/20/2021    PROT 7 1 03/17/2017    TP 7 6 09/20/2021    BILITOT 0 8 03/17/2017    TBILI 1 00 09/20/2021    EGFR 65 09/20/2021       Orders Placed This Encounter   Procedures    Basic metabolic panel    Hemoglobin A1C       ALLERGIES:  No Known Allergies    Current Medications     Current Outpatient Medications   Medication Sig Dispense Refill    ACCU-CHEK LARRY PLUS test strip       ACCU-CHEK FASTCLIX LANCETS MISC       aspirin (ECOTRIN LOW STRENGTH) 81 mg EC tablet Take 81 mg by mouth daily      atorvastatin (LIPITOR) 40 mg tablet Take 1 tablet by mouth once daily 30 tablet 11    diclofenac (VOLTAREN) 75 mg EC tablet       Ergocalciferol (VITAMIN D2 PO) Take 2,000 Units by mouth      gabapentin (NEURONTIN) 300 mg capsule Take 300 mg by mouth 3 (three) times a day      glipiZIDE (GLUCOTROL XL) 10 mg 24 hr tablet Take 1 tablet (10 mg total) by mouth daily 30 tablet 5    lisinopril (ZESTRIL) 40 mg tablet Take 1 tablet (40 mg total) by mouth daily 30 tablet 11    metFORMIN (GLUCOPHAGE-XR) 500 mg 24 hr tablet TAKE 4 TABLETS BY MOUTH ONCE DAILY 120 tablet 0    metoprolol tartrate (LOPRESSOR) 50 mg tablet Take 2 tablets  TWICE a day 120 tablet 5    oxyCODONE-acetaminophen (PERCOCET) 5-325 mg per tablet TAKE 1 TABLET BY MOUTH EVERY 6 HOURS AS NEEDED FOR MODERATE PAIN (PAIN SCORE 4-6) OR SEVERE PAIN (PAIN SCORE 7-10)  MAX DAILY AMOUNT: 4 TABLETS      Fluticasone-Salmeterol,sensor, (AirDuo Digihaler) 113-14 MCG/ACT AEPB Inhale 1 puff 2 (two) times a day Rinse mouth after use  (Patient not taking: Reported on 10/4/2021) 1 each 6    lidocaine (LIDODERM) 5 % PLACE 1 PATCH ON THE SKIN DAILY  REMOVE AND DISCARD PATCH WITHIN 12 HOURS OR AS DIRECTED BY MD  (Patient not taking: Reported on 3/26/2022)       No current facility-administered medications for this visit         Medications Discontinued During This Encounter   Medication Reason    glipiZIDE (GLUCOTROL XL) 5 mg 24 hr tablet Dose adjustment    methocarbamol (ROBAXIN) 500 mg tablet Therapy completed    oxyCODONE (ROXICODONE) 5 immediate release tablet Alternate therapy    metoprolol tartrate (LOPRESSOR) 50 mg tablet Reorder       Health Maintenance     Health Maintenance   Topic Date Due    COVID-19 Vaccine (1) Never done    Pneumococcal Vaccine: Pediatrics (0 to 5 Years) and At-Risk Patients (6 to 59 Years) (1 of 2 - PPSV23) Never done    DM Eye Exam  Never done    HIV Screening  Never done    Annual Physical  Never done    Colorectal Cancer Screening  Never done    DTaP,Tdap,and Td Vaccines (2 - Td or Tdap) 01/01/2011    BMI: Followup Plan  06/17/2022    Influenza Vaccine (1) 06/30/2022 (Originally 9/1/2021)    HEMOGLOBIN A1C  07/28/2022    BMI: Adult  11/17/2022    Depression Screening  03/26/2023    Diabetic Foot Exam  03/26/2023    Hepatitis C Screening  Completed    HIB Vaccine  Aged Out    Hepatitis B Vaccine  Aged Out    IPV Vaccine  Aged Out    Hepatitis A Vaccine  Aged Out    Meningococcal ACWY Vaccine  Aged Out    HPV Vaccine  Aged Out       Immunization History   Administered Date(s) Administered    Influenza Quadrivalent Preservative Free 3 years and older IM 08/03/2016    Tdap 01/01/2001       Dominick Luis, MD

## 2022-04-07 ENCOUNTER — TELEPHONE (OUTPATIENT)
Dept: FAMILY MEDICINE CLINIC | Facility: CLINIC | Age: 67
End: 2022-04-07

## 2022-04-07 NOTE — TELEPHONE ENCOUNTER
Desean Payne was waiting for the response from patient's cardiologist, Dr Alfredo Menard    Please follow-up

## 2022-04-07 NOTE — TELEPHONE ENCOUNTER
Dulce from  called regarding patient to find out if he is cleared to have his surgery for 4/14/22 yet?   Please call to advise

## 2022-04-11 NOTE — TELEPHONE ENCOUNTER
Spoke with Michael riley at [facility]- she will have Dr Rushing review EKG and put a Cardiac Clearance letter in pts chart  Dr Rushing out of office today and tomorrow

## 2022-04-13 ENCOUNTER — TELEPHONE (OUTPATIENT)
Dept: CARDIOLOGY CLINIC | Facility: CLINIC | Age: 67
End: 2022-04-13

## 2022-04-13 NOTE — TELEPHONE ENCOUNTER
Called and left a message for pt to call office to schedule an appointment for CC  Spocristóbal to make aware, transfter to scheduling and advise them Dr Yuriy Rodriguez options  Will schedule

## 2022-04-13 NOTE — TELEPHONE ENCOUNTER
Received message re: cardiac clearance for surgery  When patient last seen by me, was having symptoms of shortness of breath  Testing was being contemplated for May when his insurance changed  EKG showed sinus tach recently  Meds adjusted by Dr Alicja Bradford  I would want to see him in the office before clearing him  I can see him at [facility] on 4/27 in the AM (use one of the slots blocked for conference) if he can get there, otherwise overbook on Diag center day 4/28  That will still give enough time to clear for the surgery if he is doing ok

## 2022-04-14 ENCOUNTER — TELEPHONE (OUTPATIENT)
Dept: CARDIOLOGY CLINIC | Facility: CLINIC | Age: 67
End: 2022-04-14

## 2022-04-28 ENCOUNTER — OFFICE VISIT (OUTPATIENT)
Dept: CARDIOLOGY CLINIC | Facility: CLINIC | Age: 67
End: 2022-04-28

## 2022-04-28 VITALS
SYSTOLIC BLOOD PRESSURE: 122 MMHG | HEIGHT: 66 IN | HEART RATE: 85 BPM | BODY MASS INDEX: 40.34 KG/M2 | WEIGHT: 251 LBS | OXYGEN SATURATION: 97 % | DIASTOLIC BLOOD PRESSURE: 80 MMHG

## 2022-04-28 DIAGNOSIS — I10 ESSENTIAL HYPERTENSION: ICD-10-CM

## 2022-04-28 DIAGNOSIS — I48.0 PAROXYSMAL ATRIAL FIBRILLATION (HCC): Primary | ICD-10-CM

## 2022-04-28 PROCEDURE — 99214 OFFICE O/P EST MOD 30 MIN: CPT | Performed by: INTERNAL MEDICINE

## 2022-04-28 PROCEDURE — 93000 ELECTROCARDIOGRAM COMPLETE: CPT | Performed by: INTERNAL MEDICINE

## 2022-04-28 RX ORDER — METOPROLOL TARTRATE 100 MG/1
100 TABLET ORAL EVERY 12 HOURS SCHEDULED
Qty: 180 TABLET | Refills: 3
Start: 2022-04-28 | End: 2022-08-05 | Stop reason: SDUPTHER

## 2022-04-28 RX ORDER — LISINOPRIL 20 MG/1
20 TABLET ORAL DAILY
Qty: 90 TABLET | Refills: 3
Start: 2022-04-28 | End: 2022-08-05 | Stop reason: SDUPTHER

## 2022-04-28 NOTE — PROGRESS NOTES
Cardiology Follow Up    Gersondavid Toth  1955  [id number]  [address]  [facility]  [address]  [phone]  [phone]    4  Paroxysmal atrial fibrillation (HCC)  POCT ECG    metoprolol tartrate (LOPRESSOR) 100 mg tablet   2  Essential hypertension  lisinopril (ZESTRIL) 20 mg tablet       Discussion/Summary:    No contraindication to proceeding with his planned surgery  Would monitor on telemetry post operatively for 24 hours given his h/o afib  He is not on anticoagulation, declined this  Can d/c aspirin 7 days prior to the surgery  Continue other cardiac medications  Previously, we had contemplated testing with a stress test given his symptoms of dyspnea  Currently, these are resolved  No need for testing at this time  Previously, EF was preserved  No need to repeat  BP is controlled  I refilled the metoprolol and lisinopril for him because they were due, and this way he can avoid cutting the tablets and taking two metoprolol  No dose changes were made today  Ok to follow up with me in 1 year  Previous History:  72-year-old gentleman  In the setting of severe pneumonia requiring intubation, he had rapid afib  Treated with metoprolol (8/2020)  He was started on anticoagulation with Eliquis  Insurance issues with Eliquis (thought had improved, but not the case), he had bad experience with warfarin with father so wants to avoid this  Upon discharge, he was still in atrial fibrillation, but on follow up was back in sinus rhythm  HTN, on metoprolol, lisinopril  EF was preserved on echo  He had a very slightly elevated troponin in the setting of his pneumonia  Interval history:  When I last saw him, had c/o shortness of breath  Testing was deferred because of insurance issues  I got a message recently he was going to have back surgery, needed clearance   Given his prior symptoms, and his EKG for preop testing showed some tachycardia, I asked him to return before the surgery  Metoprolol was increased to 100mg BID by Dr Nanci Fisher  Lisinopril was decreased to avoid hypotension  Feeling well  Actually dyspnea has improved (even before his current limitations from the back have him in the wheelchair)  He says legs are weak  No chest pain, sob  No palpitations  Remains in SR   HR is better with higher dose of metoprolol  Problem List     Essential hypertension    Diabetes mellitus type 2 in obese (HCC) (Chronic)      Lab Results   Component Value Date    HGBA1C 7 1 (H) 03/26/2022         Vitamin D deficiency    Morbid obesity ([address] )    Osteoarthritis of left knee    Bladder wall thickening    Status post left knee replacement    Community acquired pneumonia of left lower lobe of lung (Chronic)    Non-MI Troponin Elevation    Rhabdomyolysis    New onset atrial fibrillation ([address] ) (Chronic)    Overview Signed 9/1/2020  7:09 AM by Shi Avila MD     August 2020 during hospitalization for left lower lobe pneumonia  Eliquis and metoprolol  [facility]     ECHO 0/32/3622  Systolic function was normal  Ejection fraction was estimated to be 60 %  There were no regional wall motion abnormalities   LEFT ATRIUM:  The atrium was dilated    MITRAL VALVE:  There was mild regurgitation   AORTIC VALVE:  There was mild regurgitation           LAURENCE (acute kidney injury) ([address] )    Acute respiratory failure with hypoxia and hypercapnia (HCC)        Past Medical History:   Diagnosis Date    Acute respiratory failure with hypoxia and hypercapnia ([address] ) 8/17/2020    LAURENCE (acute kidney [facility]) ([facility] [address] ) 8/16/2020    Arthritis     knees    Community acquired pneumonia of left lower lobe of lung 8/12/2020    Diabetes mellitus ([address] )     Hypertension     Measles, [place] (rubella)     Mumps     Snoring     Loudly     Social History     Tobacco Use    Smoking status: Never Smoker    Smokeless tobacco: Never Used   Vaping Use    Vaping Use: Never used   Substance Use Topics    Alcohol use: Yes     Comment: Occasionally, not in excess - As per [employer]     Drug use: Never      Family History   Problem Relation Age of Onset    Ovarian cancer Mother     Heart disease Mother     Diabetes type II Mother     Colon cancer Father     Heart disease Father     Nephrolithiasis Father      Past Surgical History:   Procedure Laterality Date    CYSTOSCOPY      KNEE ARTHROSCOPY Left     knee    ORIF PELVIC FRACTURE      TOTAL KNEE ARTHROPLASTY Left 6/10/2019    Procedure: ARTHROPLASTY KNEE TOTAL;  Surgeon: Kay Tineo MD;  Location: [facility];  Service: Orthopedics       Current Outpatient Medications:     aspirin (ECOTRIN LOW STRENGTH) 81 mg EC tablet, Take 81 mg by mouth daily, Disp: , Rfl:     atorvastatin (LIPITOR) 40 mg tablet, Take 1 tablet by mouth once daily, Disp: 30 tablet, Rfl: 11    diclofenac (VOLTAREN) 75 mg EC tablet, Take 75 mg by mouth in the morning  , Disp: , Rfl:     Ergocalciferol (VITAMIN D2 PO), Take 2,000 Units by mouth in the morning  , Disp: , Rfl:     glipiZIDE (GLUCOTROL XL) 10 mg 24 hr tablet, Take 1 tablet (10 mg total) by mouth daily, Disp: 30 tablet, Rfl: 5    lisinopril (ZESTRIL) 20 mg tablet, Take 1 tablet (20 mg total) by mouth daily, Disp: 90 tablet, Rfl: 3    metFORMIN (GLUCOPHAGE-XR) 500 mg 24 hr tablet, TAKE 4 TABLETS BY MOUTH ONCE DAILY, Disp: 120 tablet, Rfl: 0    metoprolol tartrate (LOPRESSOR) 100 mg tablet, Take 1 tablet (100 mg total) by mouth every 12 (twelve) hours, Disp: 180 tablet, Rfl: 3    oxyCODONE-acetaminophen (PERCOCET) 5-325 mg per tablet, TAKE 1 TABLET BY MOUTH EVERY 6 HOURS AS NEEDED FOR MODERATE PAIN (PAIN SCORE 4-6) OR SEVERE PAIN (PAIN SCORE 7-10)   MAX DAILY AMOUNT: 4 TABLETS, Disp: , Rfl:     ACCU-CHEK LARRY PLUS test strip, , Disp: , Rfl:     ACCU-CHEK FASTCLIX LANCETS MISC, , Disp: , Rfl:     Fluticasone-Salmeterol,sensor, (AirDuo Digihaler) 113-14 MCG/ACT AEPB, Inhale 1 puff 2 (two) times a day Rinse mouth after use  (Patient not taking: Reported on 10/4/2021), Disp: 1 each, Rfl: 6    gabapentin (NEURONTIN) 300 mg capsule, Take 300 mg by mouth 3 (three) times a day (Patient not taking: Reported on 4/28/2022 ), Disp: , Rfl:     lidocaine (LIDODERM) 5 %, PLACE 1 PATCH ON THE SKIN DAILY  REMOVE AND DISCARD PATCH WITHIN 12 HOURS OR AS DIRECTED BY MD  (Patient not taking: Reported on 3/26/2022), Disp: , Rfl:   No Known Allergies    Vitals:    04/28/22 1009   BP: 122/80   BP Location: Right arm   Patient Position: Sitting   Cuff Size: Standard   Pulse: 85   SpO2: 97%   Weight: 114 kg (251 lb)   Height: 5' 6" (1 676 m)     Vitals:    04/28/22 1009   Weight: 114 kg (251 lb)      Height: 5' 6" (167 6 cm)   Body mass index is 40 51 kg/m²  Physical Exam:  GEN: Jan Rendon appears well, alert and oriented x 3, pleasant and cooperative   HEENT: pupils equal, round, and reactive to light; extraocular muscles intact  NECK: supple, no carotid bruits   HEART: regular rhythm, normal S1 and S2, no murmurs, clicks, gallops or rubs   LUNGS: clear to auscultation bilaterally; no wheezes, rales, or rhonchi   ABDOMEN: normal bowel sounds, soft, no tenderness, no distention  EXTREMITIES: no edema  NEURO: unable to assess  SKIN: normal without suspicious lesions on exposed skin    ROS:  Positive for fatigue, back pain, leg weakness,   Except as noted in HPI, is otherwise reviewed in detail and a 12 point review of systems is negative    ROS reviewed and is unchanged    Labs:  Lab Results   Component Value Date     03/17/2017    K 4 7 03/26/2022     03/26/2022    CREATININE 1 28 03/26/2022    BUN 17 03/26/2022    CO2 29 03/26/2022    ALT 12 03/26/2022    AST 13 03/26/2022    INR 0 98 11/21/2020    GLUF 159 (H) 03/26/2022    HGBA1C 7 1 (H) 03/26/2022    WBC 7 68 03/26/2022    HGB 12 8 03/26/2022    HCT 37 0 03/26/2022     03/26/2022 Lab Results   Component Value Date    CHOL 218 (H) 03/17/2017    CHOL 199 07/27/2016    CHOL 204 (H) 01/12/2016     Lab Results   Component Value Date    LDLCALC 56 03/26/2022    LDLCALC 67 06/11/2021    LDLCALC 123 (H) 10/05/2020     Lab Results   Component Value Date    HDL 33 (L) 03/26/2022    HDL 30 (L) 06/11/2021    HDL 39 (L) 10/05/2020     Lab Results   Component Value Date    TRIG 123 03/26/2022    TRIG 130 06/11/2021    TRIG 152 (H) 10/05/2020     Testing:  Echo 8/14/20:  LEFT VENTRICLE:  Systolic function was normal  Ejection fraction was estimated to be 60 %  There were no regional wall motion abnormalities      LEFT ATRIUM:  The atrium was dilated      MITRAL VALVE:  There was mild regurgitation      AORTIC VALVE:  There was mild regurgitation  EKG:  Sinus rhythm, 68 beats per minute  LVH, LAD  Nonspecific T wave abnormality

## 2022-05-02 ENCOUNTER — TELEPHONE (OUTPATIENT)
Dept: FAMILY MEDICINE CLINIC | Facility: CLINIC | Age: 67
End: 2022-05-02

## 2022-05-02 NOTE — TELEPHONE ENCOUNTER
Patient is acceptable risk for planned surgery  I will be happy to sign a form if they fax it to me      Thank you

## 2022-05-02 NOTE — TELEPHONE ENCOUNTER
Son Velasco from Dr Yudelka Rodriguez office called to find out if patient will be cleared for his surgery this Friday 5/6/22  Please fax forms to [phone]    If he is not we are to call her to advise at [phone]

## 2022-06-09 DIAGNOSIS — E66.9 DIABETES MELLITUS TYPE 2 IN OBESE (HCC): Chronic | ICD-10-CM

## 2022-06-09 DIAGNOSIS — E11.69 DIABETES MELLITUS TYPE 2 IN OBESE (HCC): Chronic | ICD-10-CM

## 2022-06-09 RX ORDER — METFORMIN HYDROCHLORIDE 500 MG/1
TABLET, EXTENDED RELEASE ORAL
Qty: 120 TABLET | Refills: 0 | Status: SHIPPED | OUTPATIENT
Start: 2022-06-09 | End: 2022-07-29

## 2022-06-09 RX ORDER — GLIPIZIDE 10 MG/1
TABLET, FILM COATED, EXTENDED RELEASE ORAL
Qty: 30 TABLET | Refills: 0 | Status: SHIPPED | OUTPATIENT
Start: 2022-06-09 | End: 2022-07-29

## 2022-06-27 RX ORDER — METHOCARBAMOL 500 MG/1
TABLET, FILM COATED ORAL
COMMUNITY
Start: 2022-05-27 | End: 2022-07-05 | Stop reason: ALTCHOICE

## 2022-06-27 RX ORDER — LANOLIN ALCOHOL/MO/W.PET/CERES
3 CREAM (GRAM) TOPICAL DAILY PRN
COMMUNITY
Start: 2022-05-27 | End: 2022-07-05 | Stop reason: ALTCHOICE

## 2022-07-05 ENCOUNTER — OFFICE VISIT (OUTPATIENT)
Dept: FAMILY MEDICINE CLINIC | Facility: CLINIC | Age: 67
End: 2022-07-05
Payer: MEDICARE

## 2022-07-05 VITALS
OXYGEN SATURATION: 99 % | SYSTOLIC BLOOD PRESSURE: 130 MMHG | HEART RATE: 65 BPM | WEIGHT: 233 LBS | TEMPERATURE: 98 F | BODY MASS INDEX: 37.45 KG/M2 | HEIGHT: 66 IN | RESPIRATION RATE: 18 BRPM | DIASTOLIC BLOOD PRESSURE: 80 MMHG

## 2022-07-05 DIAGNOSIS — I48.0 PAROXYSMAL ATRIAL FIBRILLATION (HCC): ICD-10-CM

## 2022-07-05 DIAGNOSIS — M48.07 STENOSIS OF LUMBOSACRAL SPINE: Chronic | ICD-10-CM

## 2022-07-05 DIAGNOSIS — E66.9 DIABETES MELLITUS TYPE 2 IN OBESE (HCC): Primary | ICD-10-CM

## 2022-07-05 DIAGNOSIS — E11.69 DIABETES MELLITUS TYPE 2 IN OBESE (HCC): Primary | ICD-10-CM

## 2022-07-05 DIAGNOSIS — E78.5 DYSLIPIDEMIA: ICD-10-CM

## 2022-07-05 DIAGNOSIS — E66.01 CLASS 2 SEVERE OBESITY DUE TO EXCESS CALORIES WITH SERIOUS COMORBIDITY AND BODY MASS INDEX (BMI) OF 37.0 TO 37.9 IN ADULT (HCC): ICD-10-CM

## 2022-07-05 DIAGNOSIS — I10 ESSENTIAL HYPERTENSION: ICD-10-CM

## 2022-07-05 DIAGNOSIS — D64.9 POSTOPERATIVE ANEMIA: ICD-10-CM

## 2022-07-05 LAB — SL AMB POCT HEMOGLOBIN AIC: 5.9 (ref ?–6.5)

## 2022-07-05 PROCEDURE — 99214 OFFICE O/P EST MOD 30 MIN: CPT | Performed by: FAMILY MEDICINE

## 2022-07-05 PROCEDURE — 83036 HEMOGLOBIN GLYCOSYLATED A1C: CPT | Performed by: FAMILY MEDICINE

## 2022-07-05 RX ORDER — BLOOD SUGAR DIAGNOSTIC
STRIP MISCELLANEOUS
Qty: 100 EACH | Refills: 3 | Status: SHIPPED | OUTPATIENT
Start: 2022-07-05

## 2022-07-05 RX ORDER — BLOOD-GLUCOSE METER
KIT MISCELLANEOUS
Qty: 1 KIT | Refills: 0 | Status: SHIPPED | OUTPATIENT
Start: 2022-07-05

## 2022-07-05 RX ORDER — OXYCODONE HYDROCHLORIDE 5 MG/1
5 TABLET ORAL 2 TIMES DAILY PRN
COMMUNITY
Start: 2022-07-05 | End: 2022-07-15

## 2022-07-05 RX ORDER — LANCETS 33 GAUGE
EACH MISCELLANEOUS
Qty: 100 EACH | Refills: 3 | Status: SHIPPED | OUTPATIENT
Start: 2022-07-05

## 2022-07-05 NOTE — ASSESSMENT & PLAN NOTE
Blood pressure is well controlled    Continue Lisinopril 20 mg once a day, metoprolol 100 mg twice a day

## 2022-07-05 NOTE — ASSESSMENT & PLAN NOTE
Lab Results   Component Value Date    HGBA1C 5 9 07/05/2022   Decrease dose of metformin from 2000 to 1500 mg daily  Continue glipizide ER 10 mg daily, may consider reducing dose to 5 mg daily  Patient offers no complaints of hypoglycemia   Patient should start Accu-Cheks at home, prescription sent      Request clinical pharmacist follow-up

## 2022-07-10 PROBLEM — R79.89 ELEVATED SERUM CREATININE: Status: RESOLVED | Noted: 2022-03-26 | Resolved: 2022-07-10

## 2022-07-10 PROBLEM — M48.07 STENOSIS OF LUMBOSACRAL SPINE: Chronic | Status: ACTIVE | Noted: 2022-03-09

## 2022-07-10 PROBLEM — Z01.818 PRE-OP EXAMINATION: Status: RESOLVED | Noted: 2022-03-26 | Resolved: 2022-07-10

## 2022-07-10 NOTE — ASSESSMENT & PLAN NOTE
MT ARTHDSIS POST/POSTEROLATRL/POSTINTERBODY LUMBAR    L1-5 POSTERIOR LUMBAR INTERBODY FUSION,PEDICLE SCREWS,RODS,CAGE,BONE MORPHOGENIC PROTEIN,ALLOGRAFT      Dr Jennings 5/6/22, [facility]    Ongoing low back soreness, right lower extremity weakness  Patient reports interim resolution of right lower extremity sciatica  He wears bone growth stimulator and remains on physical therapy  Patient uses oxycodone very infrequently as needed      He remains under ongoing care of [facility]

## 2022-07-11 ENCOUNTER — TELEPHONE (OUTPATIENT)
Dept: FAMILY MEDICINE CLINIC | Facility: CLINIC | Age: 67
End: 2022-07-11

## 2022-07-11 NOTE — TELEPHONE ENCOUNTER
[address] [facility]  Ade Soriano, PharmD, BCPS, BCACP     Outreach call placed to patient's pharmacy     Reason for documentation: Patient consulted for clinical pharmacist by Dana Payne MD for disease management  Interventions: Clinical pharmacist called patient's preferred pharmacy  · Confirmed received of prescription for glucometer and testing supplies  · These will need to be billed using Medicare Part B   · Pharmacy needs patient's Medicare Part B information before filling  Pharmacist will contact patient to obtain  Will follow-up  Findings:      Patient with well-controlled T2DM, per A1c  Denies hypoglycemic events  PCP prescribed glucometer and testing supplies       Lab Results   Component Value Date    HGBA1C 5 9 07/05/2022    HGBA1C 7 1 (H) 03/26/2022     Pharmacist Tracking Tool  Reason For Outreach: Embedded Pharmacist  Demographics:  Intervention Method: Phone  Type of Intervention: New  Topics Addressed: Diabetes  Pharmacologic Interventions: Dose or Frequency Adjusted  Non-Pharmacologic Interventions: Care coordination  Time:  Direct Patient Care: 0 mins  Care Coordination: 17 mins  Recommendation Recipient: N/A  Outcome: N/A

## 2022-07-20 NOTE — TELEPHONE ENCOUNTER
[address]  Aydin Sandhu     Reason for Outreach: Patient referred to/consulted for clinical pharmacist by Mariposa De Luna MD for disease management  First outreach attempt to patient to introduce services and schedule an appointment  Patient unavailable at time of call  Left voicemail with contact information for return call  Will follow-up    : Please schedule with clinical pharmacist when patient returns call

## 2022-07-27 ENCOUNTER — TELEPHONE (OUTPATIENT)
Dept: FAMILY MEDICINE CLINIC | Facility: CLINIC | Age: 67
End: 2022-07-27

## 2022-08-01 NOTE — TELEPHONE ENCOUNTER
[address] [facility]  Matthew Welch, PharmD, BCPS, BCACP        Reason for documentation: Patient consulted for clinical pharmacist by Patria Moznon MD for patient education      The following actions were taken today by the Clinical Pharmacist:   1  Reviewed preferred timing of home blood glucose monitoring: fasting + if hypoglycemia symptoms  2  Educated patient on s/sx and appropriate treatment of hypoglycemia    Follow-up:   Follow-up with pharmacist PRN  Next PCP visit: 10/11/2022     Findings:       Patient with well-controlled T2DM, per A1c  Denies hypoglycemic events  PCP prescribed glucometer and testing supplies  Current Medications:  · Metformin XR 500mg 3 tablets daily (decreased from 4 tablets by PCP)  · Glipizide XL 10 mg daily    Lab Results   Component Value Date    HGBA1C 5 9 07/05/2022    HGBA1C 7 1 (H) 03/26/2022     Discussion:  Patient reports home blood glucose testing is performed sporadically  Has been checking at random times throughout the day  Readings range from 86 (fasting) -140s (post-prandial)  Denies any hypoglycemic events  No symptoms of hypoglycemia       Pharmacist Tracking Tool  Reason For Outreach: Embedded Pharmacist  Demographics:  Intervention Method: Phone  Type of Intervention: Follow-Up  Topics Addressed: Diabetes  Pharmacologic Interventions: Prevent or Manage VERÓNICA  Non-Pharmacologic Interventions: Home Monitoring  Time:  Direct Patient Care: 10 mins  Care Coordination: 5 mins  Recommendation Recipient: Patient/Caregiver  Outcome: Accepted

## 2022-08-05 DIAGNOSIS — I48.0 PAROXYSMAL ATRIAL FIBRILLATION (HCC): ICD-10-CM

## 2022-08-05 DIAGNOSIS — I10 ESSENTIAL HYPERTENSION: ICD-10-CM

## 2022-08-05 RX ORDER — LISINOPRIL 20 MG/1
20 TABLET ORAL DAILY
Qty: 90 TABLET | Refills: 3 | Status: SHIPPED | OUTPATIENT
Start: 2022-08-05 | End: 2022-11-03

## 2022-08-05 RX ORDER — METOPROLOL TARTRATE 100 MG/1
100 TABLET ORAL EVERY 12 HOURS SCHEDULED
Qty: 180 TABLET | Refills: 3 | Status: SHIPPED | OUTPATIENT
Start: 2022-08-05

## 2022-10-05 ENCOUNTER — RA CDI HCC (OUTPATIENT)
Dept: OTHER | Facility: HOSPITAL | Age: 67
End: 2022-10-05

## 2022-10-05 NOTE — PROGRESS NOTES
[address]  coding opportunities       Chart reviewed, no opportunity found: CHART REVIEWED, NO OPPORTUNITY FOUND        Patients Insurance     Medicare Insurance: Medicare

## 2022-10-10 RX ORDER — OXYCODONE HYDROCHLORIDE 5 MG/1
5 TABLET ORAL 2 TIMES DAILY
COMMUNITY
Start: 2022-08-03

## 2022-10-11 ENCOUNTER — OFFICE VISIT (OUTPATIENT)
Dept: FAMILY MEDICINE CLINIC | Facility: CLINIC | Age: 67
End: 2022-10-11
Payer: MEDICARE

## 2022-10-11 VITALS
HEIGHT: 66 IN | RESPIRATION RATE: 18 BRPM | BODY MASS INDEX: 38.06 KG/M2 | OXYGEN SATURATION: 99 % | DIASTOLIC BLOOD PRESSURE: 82 MMHG | SYSTOLIC BLOOD PRESSURE: 126 MMHG | HEART RATE: 61 BPM | TEMPERATURE: 98.2 F | WEIGHT: 236.8 LBS

## 2022-10-11 DIAGNOSIS — Z12.5 PROSTATE CANCER SCREENING: ICD-10-CM

## 2022-10-11 DIAGNOSIS — E66.9 DIABETES MELLITUS TYPE 2 IN OBESE (HCC): ICD-10-CM

## 2022-10-11 DIAGNOSIS — E11.69 DIABETES MELLITUS TYPE 2 IN OBESE (HCC): ICD-10-CM

## 2022-10-11 DIAGNOSIS — Z23 NEED FOR PNEUMOCOCCAL VACCINATION: ICD-10-CM

## 2022-10-11 DIAGNOSIS — E78.5 DYSLIPIDEMIA: ICD-10-CM

## 2022-10-11 DIAGNOSIS — Z00.00 MEDICARE ANNUAL WELLNESS VISIT, INITIAL: Primary | ICD-10-CM

## 2022-10-11 DIAGNOSIS — I10 ESSENTIAL HYPERTENSION: ICD-10-CM

## 2022-10-11 DIAGNOSIS — M48.07 STENOSIS OF LUMBOSACRAL SPINE: Chronic | ICD-10-CM

## 2022-10-11 PROCEDURE — 99214 OFFICE O/P EST MOD 30 MIN: CPT | Performed by: FAMILY MEDICINE

## 2022-10-11 PROCEDURE — 90677 PCV20 VACCINE IM: CPT

## 2022-10-11 PROCEDURE — G0402 INITIAL PREVENTIVE EXAM: HCPCS | Performed by: FAMILY MEDICINE

## 2022-10-11 PROCEDURE — G0009 ADMIN PNEUMOCOCCAL VACCINE: HCPCS

## 2022-10-11 NOTE — PATIENT INSTRUCTIONS
Medicare Preventive Visit Patient Instructions  Thank you for completing your Welcome to Medicare Visit or Medicare Annual Wellness Visit today  Your next wellness visit will be due in one year (10/12/2023)  The screening/preventive services that you may require over the next 5-10 years are detailed below  Some tests may not apply to you based off risk factors and/or age  Screening tests ordered at today's visit but not completed yet may show as past due  Also, please note that scanned in results may not display below  Preventive Screenings:  Service Recommendations Previous Testing/Comments   Colorectal Cancer Screening  · Colonoscopy    · Fecal Occult Blood Test (FOBT)/Fecal Immunochemical Test (FIT)  · Fecal DNA/Cologuard Test  · Flexible Sigmoidoscopy Age: 39-70 years old   Colonoscopy: every 10 years (May be performed more frequently if at higher risk)  OR  FOBT/FIT: every 1 year  OR  Cologuard: every 3 years  OR  Sigmoidoscopy: every 5 years  Screening may be recommended earlier than age 39 if at higher risk for colorectal cancer  Also, an individualized decision between you and your healthcare provider will decide whether screening between the ages of 74-80 would be appropriate   Colonoscopy: 04/07/2019  FOBT/FIT: Not on file  Cologuard: Not on file  Sigmoidoscopy: Not on file    Screening Current     Prostate Cancer Screening Individualized decision between patient and health care provider in men between ages of 53-78   Medicare will cover every 12 months beginning on the day after your 50th birthday PSA: 0 3 ng/mL           Hepatitis C Screening Once for adults born between 1945 and 1965  More frequently in patients at high risk for Hepatitis C Hep C Antibody: 03/29/2019    Screening Current   Diabetes Screening 1-2 times per year if you're at risk for diabetes or have pre-diabetes Fasting glucose: 159 mg/dL (3/26/2022)  A1C: 5 9 (7/5/2022)  Screening Not Indicated  History Diabetes   Cholesterol Screening Once every 5 years if you don't have a lipid disorder  May order more often based on risk factors  Lipid panel: 03/26/2022  Screening Current      Other Preventive Screenings Covered by Medicare:  1  Abdominal Aortic Aneurysm (AAA) Screening: covered once if your at risk  You're considered to be at risk if you have a family history of AAA or a male between the age of 73-68 who smoking at least 100 cigarettes in your lifetime  2  Lung Cancer Screening: covers low dose CT scan once per year if you meet all of the following conditions: (1) Age 50-69; (2) No signs or symptoms of lung cancer; (3) Current smoker or have quit smoking within the last 15 years; (4) You have a tobacco smoking history of at least 20 pack years (packs per day x number of years you smoked); (5) You get a written order from a healthcare provider  3  Glaucoma Screening: covered annually if you're considered high risk: (1) You have diabetes OR (2) Family history of glaucoma OR (3)  aged 48 and older OR (3)  American aged 72 and older  3  Osteoporosis Screening: covered every 2 years if you meet one of the following conditions: (1) Have a vertebral abnormality; (2) On glucocorticoid therapy for more than 3 months; (3) Have primary hyperparathyroidism; (4) On osteoporosis medications and need to assess response to drug therapy  5  HIV Screening: covered annually if you're between the age of 12-76  Also covered annually if you are younger than 13 and older than 72 with risk factors for HIV infection  For pregnant patients, it is covered up to 3 times per pregnancy      Immunizations:  Immunization Recommendations   Influenza Vaccine Annual influenza vaccination during flu season is recommended for all persons aged >= 6 months who do not have contraindications   Pneumococcal Vaccine   * Pneumococcal conjugate vaccine = PCV13 (Prevnar 13), PCV15 (Vaxneuvance), PCV20 (Prevnar 20)  * Pneumococcal polysaccharide vaccine = PPSV23 (Pneumovax) Adults 2364 years old: 1-3 doses may be recommended based on certain risk factors  Adults 72 years old: 1-2 doses may be recommended based off what pneumonia vaccine you previously received   Hepatitis B Vaccine 3 dose series if at intermediate or high risk (ex: diabetes, end stage renal disease, liver disease)   Tetanus (Td) Vaccine - COST NOT COVERED BY MEDICARE PART B Following completion of primary series, a booster dose should be given every 10 years to maintain immunity against tetanus  Td may also be given as tetanus wound prophylaxis  Tdap Vaccine - COST NOT COVERED BY MEDICARE PART B Recommended at least once for all adults  For pregnant patients, recommended with each pregnancy  Shingles Vaccine (Shingrix) - COST NOT COVERED BY MEDICARE PART B  2 shot series recommended in those aged 48 and above     Health Maintenance Due:      Topic Date Due   • HIV Screening  Never done   • Colorectal Cancer Screening  Never done   • Hepatitis C Screening  Completed     Immunizations Due:      Topic Date Due   • COVID-19 Vaccine (1) Never done   • Pneumococcal Vaccine: 65+ Years (1 - PCV) Never done   • Influenza Vaccine (1) 09/01/2022     Advance Directives   What are advance directives? Advance directives are legal documents that state your wishes and plans for medical care  These plans are made ahead of time in case you lose your ability to make decisions for yourself  Advance directives can apply to any medical decision, such as the treatments you want, and if you want to donate organs  What are the types of advance directives? There are many types of advance directives, and each state has rules about how to use them  You may choose a combination of any of the following:  · Living will: This is a written record of the treatment you want  You can also choose which treatments you do not want, which to limit, and which to stop at a certain time   This includes surgery, medicine, IV fluid, and tube feedings  · Durable power of  for healthcare [facility]): This is a written record that states who you want to make healthcare choices for you when you are unable to make them for yourself  This person, called a proxy, is usually a family member or a friend  You may choose more than 1 proxy  · Do not resuscitate (DNR) order:  A DNR order is used in case your heart stops beating or you stop breathing  It is a request not to have certain forms of treatment, such as CPR  A DNR order may be included in other types of advance directives  · Medical directive: This covers the care that you want if you are in a coma, near death, or unable to make decisions for yourself  You can list the treatments you want for each condition  Treatment may include pain medicine, surgery, blood transfusions, dialysis, IV or tube feedings, and a ventilator (breathing machine)  · Values history: This document has questions about your views, beliefs, and how you feel and think about life  This information can help others choose the care that you would choose  Why are advance directives important? An advance directive helps you control your care  Although spoken wishes may be used, it is better to have your wishes written down  Spoken wishes can be misunderstood, or not followed  Treatments may be given even if you do not want them  An advance directive may make it easier for your family to make difficult choices about your care  Fall Prevention    Fall prevention  includes ways to make your home and other areas safer  It also includes ways you can move more carefully to prevent a fall  Health conditions that cause changes in your blood pressure, vision, or muscle strength and coordination may increase your risk for falls  Medicines may also increase your risk for falls if they make you dizzy, weak, or sleepy  Fall prevention tips:   · Stand or sit up slowly  · Use assistive devices as directed      · Wear shoes that fit well and have soles that   · Wear a personal alarm  · Stay active  · Manage your medical conditions  Home Safety Tips:  · Add items to prevent falls in the bathroom  · Keep paths clear  · Install bright lights in your home  · Keep items you use often on shelves within reach  · Paint or place reflective tape on the edges of your stairs  Weight Management   Why it is important to manage your weight:  Being overweight increases your risk of health conditions such as heart disease, high blood pressure, type 2 diabetes, and certain types of cancer  It can also increase your risk for osteoarthritis, sleep apnea, and other respiratory problems  Aim for a slow, steady weight loss  Even a small amount of weight loss can lower your risk of health problems  How to lose weight safely:  A safe and healthy way to lose weight is to eat fewer calories and get regular exercise  You can lose up about 1 pound a week by decreasing the number of calories you eat by 500 calories each day  Healthy meal plan for weight management:  A healthy meal plan includes a variety of foods, contains fewer calories, and helps you stay healthy  A healthy meal plan includes the following:  · Eat whole-grain foods more often  A healthy meal plan should contain fiber  Fiber is the part of grains, fruits, and vegetables that is not broken down by your body  Whole-grain foods are healthy and provide extra fiber in your diet  Some examples of whole-grain foods are whole-wheat breads and pastas, oatmeal, brown rice, and bulgur  · Eat a variety of vegetables every day  Include dark, leafy greens such as spinach, kale, adrianne greens, and mustard greens  Eat yellow and orange vegetables such as carrots, sweet potatoes, and winter squash  · Eat a variety of fruits every day  Choose fresh or canned fruit (canned in its own juice or light syrup) instead of juice  Fruit juice has very little or no fiber    · Eat low-fat dairy foods  Drink fat-free (skim) milk or 1% milk  Eat fat-free yogurt and low-fat cottage cheese  Try low-fat cheeses such as mozzarella and other reduced-fat cheeses  · Choose meat and other protein foods that are low in fat  Choose beans or other legumes such as split peas or lentils  Choose fish, skinless poultry (chicken or turkey), or lean cuts of red meat (beef or pork)  Before you cook meat or poultry, cut off any visible fat  · Use less fat and oil  Try baking foods instead of frying them  Add less fat, such as margarine, sour cream, regular salad dressing and mayonnaise to foods  Eat fewer high-fat foods  Some examples of high-fat foods include french fries, doughnuts, ice cream, and cakes  · Eat fewer sweets  Limit foods and drinks that are high in sugar  This includes candy, cookies, regular soda, and sweetened drinks  Exercise:  Exercise at least 30 minutes per day on most days of the week  Some examples of exercise include walking, biking, dancing, and swimming  You can also fit in more physical activity by taking the stairs instead of the elevator or parking farther away from stores  Ask your healthcare provider about the best exercise plan for you  © Copyright [employer] 2018 Information is for End User's use only and may not be sold, redistributed or otherwise used for commercial purposes   All illustrations and images included in CareNotes® are the copyrighted property of [employer]  or [address] [place] [address]

## 2022-10-11 NOTE — PROGRESS NOTES
Assessment and Plan:     Problem List Items Addressed This Visit        Endocrine    Diabetes mellitus type 2 in obese [facility]) (Chronic)       Lab Results   Component Value Date    HGBA1C 5 9 07/05/2022     Patient is due for routine blood work  Continue regimen of metformin and glipizide  Patient will proceed with comprehensive eye exam shortly  Relevant Orders    Hemoglobin A1C    IRIS Diabetic eye exam       Cardiovascular and Mediastinum    Essential hypertension     Blood pressure is well controlled  Continue metoprolol and lisinopril            Other    Stenosis of lumbosacral spine (Chronic)     CT ARTHDSIS POST/POSTEROLATRL/POSTINTERBODY LUMBAR    L1-5 POSTERIOR LUMBAR INTERBODY FUSION,PEDICLE SCREWS,RODS,CAGE,BONE MORPHOGENIC PROTEIN,ALLOGRAFT      Dr Jennings 5/6/22, [facility]    Patient remains in physical therapy  He reports overall improvement after surgery no sciatica  Persistent low back pain  Patient will find out if neurosurgery has any objections to trial of anti-inflammatories   Patient may benefit from further follow-up with pain management  Relevant Orders    Ambulatory referral to Pain Management    Dyslipidemia     Atorvastatin 40 mg daily  Relevant Orders    Lipid Panel with Direct LDL reflex      Other Visit Diagnoses     Medicare annual wellness visit, initial    -  Primary    Prostate cancer screening        Relevant Orders    PSA, Total Screen    Need for pneumococcal vaccination        Relevant Orders    Pneumococcal Conjugate Vaccine 20-valent (Pcv20) (Completed)        Patient declines colorectal screening  He will consider Cologuard in the future and will contact our office if he decides to proceed  Patient will proceed with complete blood work including CBC, CMP, PSA, TSH, lipid panel and hemoglobin A1c    Prevnar 20 was administered today, patient declines flu vaccine  BMI Counseling: Body mass index is 38 22 kg/m²   The BMI is above normal  Nutrition recommendations include encouraging healthy choices of fruits and vegetables, consuming healthier snacks, moderation in carbohydrate intake, reducing intake of saturated and trans fat and reducing intake of cholesterol  Exercise recommendations include exercising 3-5 times per week and strength training exercises  No pharmacotherapy was ordered  Patient referred to PCP  Rationale for BMI follow-up plan is due to patient being overweight or obese  Depression Screening and Follow-up Plan: Patient was screened for depression during today's encounter  They screened negative with a PHQ-2 score of 0  Preventive health issues were discussed with patient, and age appropriate screening tests were ordered as noted in patient's After Visit Summary  Personalized health advice and appropriate referrals for health education or preventive services given if needed, as noted in patient's After Visit Summary  History of Present Illness:     Patient presents for a Medicare Wellness Visit    Follow-up/Medicare wellness  Patient is due for routine blood work  Most recent hemoglobin A1c was performed on July 5th and was 5 9%  Low back pain-  worse with prolonged standing and walking  Left knee - S/p  TKA doing well  New  Patient is under care of PT at [address] [place] [address] [address] , pending OV with Dr Jennings, Neurosurgery  Patient will discuss possible referral to pain management  pain is across the back-  no radiation, takesTylenol    UTD with cardiology - 4/28/22- Dr Rushing, annual follow-up recommended  Normal BMs and urination, sleeps OK  Able to sleep in his bed  BS this am 136 ( after glass of iced tea)   Occ  Daytime sugars in 70s-  No symptoms of hypoglycemia    Patient offers no complaints of chest pain, palpitations, shortness of breath or dizziness  Patient Care Team:  Ludwig Tello MD as PCP - General  Ludwig Tello MD     Review of Systems:     Review of Systems   Constitutional: Negative      HENT: Negative  Eyes: Negative  Respiratory: Negative  Cardiovascular: Negative  Gastrointestinal: Negative  Endocrine: Negative  Genitourinary: Negative  Musculoskeletal: Positive for arthralgias and back pain  Allergic/Immunologic: Negative  Neurological: Negative  Hematological: Negative  Psychiatric/Behavioral: Negative           Problem List:     Patient Active Problem List   Diagnosis   • Essential hypertension   • Diabetes mellitus type 2 in obese (HCC)   • Vitamin D deficiency   • Class 2 severe obesity due to excess calories with serious comorbidity and body mass index (BMI) of 37 0 to 37 9 in [facility])   • Osteoarthritis of left knee   • Bladder wall thickening   • Status post left knee replacement   • Non-MI Troponin Elevation   • Paroxysmal atrial fibrillation (HCC)   • Dyslipidemia   • Skin mole   • Left arm weakness   • Edema of hand   • GONSALEZ (dyspnea on exertion)   • Stenosis of lumbosacral spine      Past Medical and Surgical History:     Past Medical History:   Diagnosis Date   • Acute respiratory failure with hypoxia and hypercapnia ([facility]) 8/17/2020   • LAURENCE (acute kidney injury) ([facility] [address] ) 8/16/2020   • Arthritis     knees   • Community acquired pneumonia of left lower lobe of lung 8/12/2020   • Diabetes mellitus ([facility] [address] )    • Hypertension    • Measles, Divehi (rubella)    • Mumps    • Snoring     Loudly     Past Surgical History:   Procedure Laterality Date   • CYSTOSCOPY     • KNEE ARTHROSCOPY Left     knee   • ORIF PELVIC FRACTURE     • TOTAL KNEE ARTHROPLASTY Left 6/10/2019    Procedure: ARTHROPLASTY KNEE TOTAL;  Surgeon: Ethan Natarajan MD;  Location: [facility];  Service: Orthopedics      Family History:     Family History   Problem Relation Age of Onset   • Ovarian cancer Mother    • Heart disease Mother    • Diabetes type II Mother    • Colon cancer Father    • Heart disease Father    • Nephrolithiasis Father       Social History:     Social History     Socioeconomic History   • Marital status: Single     Spouse name: None   • Number of children: None   • Years of education: None   • Highest education level: None   Occupational History   • None   Tobacco Use   • Smoking status: Never Smoker   • Smokeless tobacco: Never Used   Vaping Use   • Vaping Use: Never used   Substance and Sexual Activity   • Alcohol use: Yes     Comment: Occasionally, not in excess - As per [employer]    • Drug use: Never   • Sexual activity: None   Other Topics Concern   • None   Social History Narrative   • None     Social Determinants of Health     Financial Resource Strain: High Risk   • Difficulty of Paying Living Expenses: Hard   Food Insecurity: Not on file   Transportation Needs: No Transportation Needs   • Lack of Transportation (Medical): No   • Lack of Transportation (Non-Medical): No   Physical Activity: Not on file   Stress: Not on file   Social Connections: Not on file   Intimate Partner Violence: Not on file   Housing Stability: Not on file      Medications and Allergies:     Current Outpatient Medications   Medication Sig Dispense Refill   • ACCU-CHEK LARRY PLUS test strip      • ACCU-CHEK FASTCLIX LANCETS MISC      • aspirin (ECOTRIN LOW STRENGTH) 81 mg EC tablet Take 81 mg by mouth daily     • atorvastatin (LIPITOR) 40 mg tablet Take 1 tablet by mouth once daily 30 tablet 11   • Blood Glucose Monitoring Suppl (OneTouch Verio Reflect) w/Device KIT Check blood sugars once daily  Please substitute with appropriate alternative as covered by patient's insurance  Dx: E11 65 1 kit 0   • glipiZIDE (GLUCOTROL XL) 10 mg 24 hr tablet Take 1 tablet by mouth once daily 30 tablet 0   • glucose blood (OneTouch Verio) test strip Check blood sugars once daily  Please substitute with appropriate alternative as covered by patient's insurance   Dx: E11 65 100 each 3   • lisinopril (ZESTRIL) 20 mg tablet Take 1 tablet (20 mg total) by mouth daily 90 tablet 3   • metFORMIN (GLUCOPHAGE-XR) 500 mg 24 hr tablet TAKE 4 TABLETS BY MOUTH ONCE DAILY 120 tablet 0   • metoprolol tartrate (LOPRESSOR) 100 mg tablet Take 1 tablet (100 mg total) by mouth every 12 (twelve) hours 180 tablet 3   • OneTouch Delica Lancets 13H MISC Check blood sugars once daily  Please substitute with appropriate alternative as covered by patient's insurance  Dx: E11 65 100 each 3   • Ergocalciferol (VITAMIN D2 PO) Take 2,000 Units by mouth in the morning   (Patient not taking: Reported on 10/11/2022)     • Fluticasone-Salmeterol,sensor, (AirDuo Digihaler) 113-14 MCG/ACT AEPB Inhale 1 puff 2 (two) times a day Rinse mouth after use  (Patient not taking: Reported on 10/11/2022) 1 each 6   • oxyCODONE (ROXICODONE) 5 immediate release tablet Take 5 mg by mouth 2 (two) times a day (Patient not taking: Reported on 10/11/2022)       No current facility-administered medications for this visit  No Known Allergies   Immunizations:     Immunization History   Administered Date(s) Administered   • Influenza Quadrivalent Preservative Free 3 years and older IM 08/03/2016   • Pneumococcal Conjugate Vaccine 20-valent (Pcv20), Polysace 10/11/2022   • Tdap 01/01/2001      Health Maintenance:         Topic Date Due   • HIV Screening  Never done   • Colorectal Cancer Screening  Never done   • Hepatitis C Screening  Completed         Topic Date Due   • COVID-19 Vaccine (1) Never done      Medicare Screening Tests and Risk Assessments:     Dank Alfredo is here for his Initial Wellness visit  Health Risk Assessment:   Patient rates overall health as very good  Patient feels that their physical health rating is much better  Patient is satisfied with their life  Eyesight was rated as same  Hearing was rated as same  Patient feels that their emotional and mental health rating is same  Patients states they are never, rarely angry  Patient states they are never, rarely unusually tired/fatigued  Pain experienced in the last 7 days has been a lot   Patient's pain rating has been 6/10  Patient states that he has experienced weight loss or gain in last 6 months  Depression Screening:   PHQ-2 Score: 0      Fall Risk Screening: In the past year, patient has experienced: history of falling in past year    Number of falls: 1  Injured during fall?: No    Feels unsteady when standing or walking?: Yes    Worried about falling?: No      Home Safety:  Patient does not have trouble with stairs inside or outside of their home  Patient has working smoke alarms and has no working carbon monoxide detector  Home safety hazards include: none  Nutrition:   Current diet is Regular  Medications:   Patient is currently taking over-the-counter supplements  OTC medications include: see medication list  Patient is able to manage medications  Activities of Daily Living (ADLs)/Instrumental Activities of Daily Living (IADLs):   Walk and transfer into and out of bed and chair?: Yes  Dress and groom yourself?: Yes    Bathe or shower yourself?: Yes    Feed yourself?  Yes  Do your laundry/housekeeping?: Yes  Manage your money, pay your bills and track your expenses?: Yes  Make your own meals?: Yes    Do your own shopping?: Yes    Previous Hospitalizations:   Any hospitalizations or ED visits within the last 12 months?: Yes    How many hospitalizations have you had in the last year?: 1-2    Advance Care Planning:   Living will: No    Durable POA for healthcare: No    Advanced directive: No    Advanced directive counseling given: Yes    Five wishes given: Yes      Cognitive Screening:   Provider or family/friend/caregiver concerned regarding cognition?: No    PREVENTIVE SCREENINGS      Cardiovascular Screening:    General: Screening Current      Diabetes Screening:     General: Screening Not Indicated and History Diabetes      Colorectal Cancer Screening:     General: Screening Current      Prostate Cancer Screening:    General: Risks and Benefits Discussed    Due for: PSA      Osteoporosis Screening: General: Screening Not Indicated      Abdominal Aortic Aneurysm (AAA) Screening:    Risk factors include: age between 73-67 yo        Lung Cancer Screening:     General: Screening Not Indicated      Hepatitis C Screening:    General: Screening Current    Screening, Brief Intervention, and Referral to Treatment (SBIRT)    Screening    Typical number of drinks in a week: 0    Single Item Drug Screening:  How often have you used an illegal drug (including marijuana) or a prescription medication for non-medical reasons in the past year? never    Single Item Drug Screen Score: 0  Interpretation: Negative screen for possible drug use disorder    Brief Intervention  Alcohol & drug use screenings were reviewed  No concerns regarding substance use disorder identified  No exam data present     Physical Exam:     /82 (BP Location: Left arm, Patient Position: Sitting, Cuff Size: Large)   Pulse 61   Temp 98 2 °F (36 8 °C) (Temporal)   Resp 18   Ht 5' 6" (1 676 m)   Wt 107 kg (236 lb 12 8 oz)   SpO2 99%   BMI 38 22 kg/m²     Physical Exam  Vitals and nursing note reviewed  Constitutional:       General: He is not in acute distress  Appearance: Normal appearance  He is not ill-appearing  HENT:      Head: Normocephalic and atraumatic  Eyes:      General: No scleral icterus  Conjunctiva/sclera: Conjunctivae normal    Neck:      Vascular: No carotid bruit  Cardiovascular:      Rate and Rhythm: Normal rate and regular rhythm  Heart sounds: Normal heart sounds  No murmur heard  Pulmonary:      Effort: Pulmonary effort is normal  No respiratory distress  Breath sounds: Normal breath sounds  Abdominal:      General: Bowel sounds are normal  There is no distension  Palpations: Abdomen is soft  Musculoskeletal:      Cervical back: Neck supple  No rigidity  Right lower leg: No edema  Left lower leg: No edema  Comments: Antalgic gait   Skin:     General: Skin is warm  Findings: No rash  Neurological:      General: No focal deficit present  Mental Status: He is alert and oriented to person, place, and time  Psychiatric:         Mood and Affect: Mood normal          Behavior: Behavior normal          Thought Content:  Thought content normal           Johana Corrales MD

## 2022-10-11 NOTE — ASSESSMENT & PLAN NOTE
DC ARTHDSIS POST/POSTEROLATRL/POSTINTERBODY LUMBAR    L1-5 POSTERIOR LUMBAR INTERBODY FUSION,PEDICLE SCREWS,RODS,CAGE,BONE MORPHOGENIC PROTEIN,ALLOGRAFT      Dr Jennings 5/6/22, [facility]    Patient remains in physical therapy  He reports overall improvement after surgery no sciatica  Persistent low back pain  Patient will find out if neurosurgery has any objections to trial of anti-inflammatories   Patient may benefit from further follow-up with pain management

## 2022-10-14 NOTE — ASSESSMENT & PLAN NOTE
Patient remains in normal sinus rhythm  He is under care of [facility]  Continue beta-blocker and aspirin  Patient offers no complaints of palpitations or angina

## 2022-10-14 NOTE — ASSESSMENT & PLAN NOTE
Lab Results   Component Value Date    HGBA1C 5 9 07/05/2022     Patient is due for routine blood work  Continue regimen of metformin and glipizide  Patient will proceed with comprehensive eye exam shortly

## 2022-10-24 DIAGNOSIS — E11.69 DIABETES MELLITUS TYPE 2 IN OBESE (HCC): Chronic | ICD-10-CM

## 2022-10-24 DIAGNOSIS — E66.9 DIABETES MELLITUS TYPE 2 IN OBESE (HCC): Chronic | ICD-10-CM

## 2022-10-25 RX ORDER — GLIPIZIDE 10 MG/1
TABLET, FILM COATED, EXTENDED RELEASE ORAL
Qty: 30 TABLET | Refills: 0 | Status: SHIPPED | OUTPATIENT
Start: 2022-10-25

## 2022-10-25 RX ORDER — METFORMIN HYDROCHLORIDE 500 MG/1
TABLET, EXTENDED RELEASE ORAL
Qty: 120 TABLET | Refills: 0 | Status: SHIPPED | OUTPATIENT
Start: 2022-10-25

## 2022-12-09 ENCOUNTER — TELEPHONE (OUTPATIENT)
Dept: ADMINISTRATIVE | Facility: OTHER | Age: 67
End: 2022-12-09

## 2022-12-09 NOTE — TELEPHONE ENCOUNTER
----- Message from Eduardo Topete MD sent at 12/8/2022  9:19 PM EST -----  Regarding: Diabetic nephropathy screening  This patient is listed as delinquent on my list for nephropathy screening  He is on lisinopril, ACE inhibitor, please complete this caregiver      Thank you

## 2022-12-09 NOTE — TELEPHONE ENCOUNTER
Upon review of the In Basket request we Please be advised our team does not complete this type of rqueset  Any additional questions or concerns should be emailed to the Practice Liaisons via the appropriate education email address, please do not reply via In Basket      Thank you  Rivera Zambrano MA

## 2022-12-09 NOTE — TELEPHONE ENCOUNTER
----- Message from Jamir Cortez MD sent at 12/8/2022  9:19 PM EST -----  Regarding: Diabetic nephropathy screening  This patient is listed as delinquent on my list for nephropathy screening  He is on lisinopril, ACE inhibitor, please complete this caregiver      Thank you

## 2022-12-09 NOTE — TELEPHONE ENCOUNTER
Upon review of the In Basket request   Please be advised our team does not complete this type of request     Any additional questions or concerns should be emailed to the Practice Liaisons via the appropriate education email address, please do not reply via In Basket      Thank you  Harinder Wilson MA

## 2022-12-12 LAB — HBA1C MFR BLD HPLC: 7.4 %

## 2022-12-27 ENCOUNTER — TRANSITIONAL CARE MANAGEMENT (OUTPATIENT)
Dept: FAMILY MEDICINE CLINIC | Facility: CLINIC | Age: 67
End: 2022-12-27

## 2023-01-03 ENCOUNTER — OFFICE VISIT (OUTPATIENT)
Dept: FAMILY MEDICINE CLINIC | Facility: CLINIC | Age: 68
End: 2023-01-03

## 2023-01-03 VITALS
OXYGEN SATURATION: 97 % | BODY MASS INDEX: 36.64 KG/M2 | SYSTOLIC BLOOD PRESSURE: 112 MMHG | HEIGHT: 66 IN | HEART RATE: 77 BPM | WEIGHT: 228 LBS | TEMPERATURE: 97.5 F | DIASTOLIC BLOOD PRESSURE: 60 MMHG | RESPIRATION RATE: 17 BRPM

## 2023-01-03 DIAGNOSIS — E66.9 DIABETES MELLITUS TYPE 2 IN OBESE (HCC): ICD-10-CM

## 2023-01-03 DIAGNOSIS — E11.69 DIABETES MELLITUS TYPE 2 IN OBESE (HCC): ICD-10-CM

## 2023-01-03 DIAGNOSIS — Z12.5 PROSTATE CANCER SCREENING: ICD-10-CM

## 2023-01-03 DIAGNOSIS — I48.0 PAROXYSMAL ATRIAL FIBRILLATION (HCC): ICD-10-CM

## 2023-01-03 DIAGNOSIS — K68.12 PSOAS ABSCESS (HCC): Primary | ICD-10-CM

## 2023-01-03 DIAGNOSIS — E78.5 DYSLIPIDEMIA: ICD-10-CM

## 2023-01-03 DIAGNOSIS — I10 ESSENTIAL HYPERTENSION: ICD-10-CM

## 2023-01-03 RX ORDER — METOPROLOL TARTRATE 75 MG/1
75 TABLET, FILM COATED ORAL 2 TIMES DAILY
COMMUNITY
End: 2023-01-03 | Stop reason: SDUPTHER

## 2023-01-03 RX ORDER — METOPROLOL TARTRATE 75 MG/1
75 TABLET, FILM COATED ORAL 2 TIMES DAILY
Qty: 60 TABLET | Refills: 5 | Status: SHIPPED | OUTPATIENT
Start: 2023-01-03

## 2023-01-03 RX ORDER — AMLODIPINE BESYLATE 10 MG/1
10 TABLET ORAL DAILY
Qty: 30 TABLET | Refills: 5 | Status: SHIPPED | OUTPATIENT
Start: 2023-01-03

## 2023-01-03 RX ORDER — METFORMIN HYDROCHLORIDE 500 MG/1
2000 TABLET, EXTENDED RELEASE ORAL DAILY
Qty: 120 TABLET | Refills: 5 | Status: SHIPPED | OUTPATIENT
Start: 2023-01-03

## 2023-01-03 RX ORDER — AMLODIPINE BESYLATE 10 MG/1
10 TABLET ORAL DAILY
COMMUNITY
End: 2023-01-03 | Stop reason: SDUPTHER

## 2023-01-03 RX ORDER — WARFARIN SODIUM 5 MG/1
5 TABLET ORAL DAILY
COMMUNITY
Start: 2022-12-24 | End: 2023-01-03 | Stop reason: SDUPTHER

## 2023-01-03 RX ORDER — GLIPIZIDE 10 MG/1
10 TABLET, FILM COATED, EXTENDED RELEASE ORAL DAILY
Qty: 30 TABLET | Refills: 5 | Status: SHIPPED | OUTPATIENT
Start: 2023-01-03

## 2023-01-03 RX ORDER — WARFARIN SODIUM 5 MG/1
5 TABLET ORAL DAILY
Qty: 30 TABLET | Refills: 3 | Status: SHIPPED | OUTPATIENT
Start: 2023-01-03 | End: 2024-01-03

## 2023-01-03 RX ORDER — ACETAMINOPHEN 500 MG
500 TABLET ORAL EVERY 6 HOURS PRN
COMMUNITY

## 2023-01-03 RX ORDER — ATORVASTATIN CALCIUM 40 MG/1
40 TABLET, FILM COATED ORAL DAILY
Qty: 30 TABLET | Refills: 5 | Status: SHIPPED | OUTPATIENT
Start: 2023-01-03

## 2023-01-03 NOTE — PROGRESS NOTES
Assessment & Plan     1  Psoas abscess [facility])  Assessment & Plan:  Due to MRSA  Completed Vancomycin, PICC line removed December 30th  ID f/up tomorrow [facility]  Patient is afebrile, no signs of abscess on exam today  Patient may benefit from nasal MRSA screening and apical preventative therapy, he will discuss with ID tomorrow      2  Diabetes mellitus type 2 in obese [facility])  Assessment & Plan:    Lab Results   Component Value Date    HGBA1C 7 4 (H) 12/12/2022     Fairly good glycemic control  Continue metformin and glipizide  Reassess labs in 3 months  Orders:  -     IRIS Diabetic eye exam  -     metFORMIN (GLUCOPHAGE-XR) 500 mg 24 hr tablet; Take 4 tablets (2,000 mg total) by mouth daily  -     glipiZIDE (GLUCOTROL XL) 10 mg 24 hr tablet; Take 1 tablet (10 mg total) by mouth daily  -     Comprehensive metabolic panel; Future  -     Hemoglobin A1C; Future  -     Microalbumin / creatinine urine ratio; Future    3  Paroxysmal atrial fibrillation [facility])  Assessment & Plan:  Patient was found in A  fib during recent hospitalization in December  Currently on metoprolol 75 mg twice daily and Coumadin 5 mg daily  Most recent INR performed a week ago at 1 6  Patient will proceed with repeat blood work tomorrow  Pending follow-up with cardiology  Patient is asymptomatic but is in A  fib during exam today  Orders:  -     Protime-INR; Future  -     warfarin (COUMADIN) 5 mg tablet; Take 1 tablet (5 mg total) by mouth daily (Patient taking differently: Take 5 mg by mouth daily Alternating between 7 5 mg and 5 mg)  -     Metoprolol Tartrate 75 MG TABS; Take 1 tablet (75 mg total) by mouth 2 (two) times a day    4  Dyslipidemia  -     atorvastatin (LIPITOR) 40 mg tablet; Take 1 tablet (40 mg total) by mouth daily  -     Lipid Panel with Direct LDL reflex; Future  -     TSH, 3rd generation; Future    5  Essential hypertension  -     amLODIPine (NORVASC) 10 mg tablet;  Take 1 tablet (10 mg total) by mouth daily  -     CBC and differential; Future    6  Prostate cancer screening  -     PSA, Total Screen; Future       Patient Instructions   Please check with infectious disease  if nasal swab for MRSA should be done and if you should be using antibiotic ointment as prevention for the next 3-month  Please proceed with blood work to check PT/INR/Coumadin level tomorrow and we will call you  These repeat regular blood work in 3 months  Please do not take lisinopril, blood pressure is on the lower side, 112/60  I will reach out to your cardiologist to let him know about recent hospitalization and A  fib recurrence please schedule follow-up with Dr Viky Santana    Patient presents for follow-up after recent hospitalization for MRSA psoas abscess  He was evaluated by neurosurgery due to recent low back surgery in May 2022, no further neurosurgical follow-up is advised  Patient has pending follow-up with infectious disease at [facility] tomorrow  He is currently nontoxic and afebrile  Patient has completed course of vancomycin  Medication changes acknowledged  Patient is on warfarin, will proceed with PT/INR tomorrow  Ending follow-up with [facility]ology  Blood pressure is on the lower side, continue current dose of metoprolol to 75 mg twice daily along with amlodipine 10 mg daily, lisinopril was listed in discharge meds but due to lower blood pressure patient will continue keeping it on hold  Routine follow-up and blood work in 3 months  I will coordinate PT/INR monitoring and Coumadin therapy with cardiology  Subjective     Transitional Care Management Review:   Veronique Allan is a 74 y o  male here for TCM follow up       During the TCM phone call patient stated:  TCM Call     Date and time call was made  12/27/2022  1:09 PM    Hospital care reviewed  Records reviewed    Patient was hospitialized at  [facility]    Date of Admission  12/12/22    Date of discharge  12/24/22    Diagnosis  Psoas Abscess MRSA infection Disposition  Home    Were the patients medications reviewed and updated  No    Current Symptoms  None    Leg pain, left side, onset  Ongoing      TCM Call     Post hospital issues  Reduced activity    Should patient be enrolled in anticoag monitoring? Yes    Scheduled for follow up? Yes    Not clinically warranted  Pt will f/u with Ortho    Patients specialists  Other (comment)    Other specialists names  Ortho Dr Jona Duron    Did you obtain your prescribed medications  Yes    Do you need help managing your prescriptions or medications  No    Is transportation to your appointment needed  No    I have advised the patient to call PCP with any new or worsening symptoms  Juliana Livings, LPN    Living Arrangements  Spouse or Significiant other    Support System  Family    The type of support provided  Physical; Emotional    Do you have social support  Yes, as much as I need    Are you recieving any outpatient services  No    Are you recieving home care services  No    Types of home care services  Nurse visit; Other (comment)    [address]    Are you using any [employer] resources  No    Current waiver services  No    Have you fallen in the last 12 months  No    Interperter language line needed  No    Counseling  Patient    Counseling topics  instructions for management; Importance of RX compliance    Comments  Appt scheduled for 1/3/23 @ 3pm with Dr Felipa Trejo        Follow-up after recent hospitalization at [facility] due to psoas abscess/  MRSA infection  [facility] records reviewed  Patient was evaluated by neurosurgery and infectious disease  He remains under outpatient care of [facility] ID with pending follow-up tomorrow  Patient was found to be in A  fib while inpatient and was started on warfarin 5 mg daily  Most recent PT/INR was performed a week ago with INR at 1 6  Current dose of Coumadin is 5 mg daily      Patient just completed  Abx- had PICC line - removed Friday, December 30th  Patient is afebrile  Pain has significantly improved  He has residual discomfort at the site of previous drain placement (drains have been removed)    Medications updated  Lisinopril was discontinued during recent hospitalization as per patient, patient was switched to amlodipine 10 mg daily  Dose of metoprolol was reduced from 100 mg twice a day (previously recommended by [facility] cardiology) and lowered to 75 mg twice daily  Patient is on Coumadin 5 mg daily, no recent PT/INR  According to discharge summary from [facility] patient should be using lisinopril    HbA1C was 7 4 % in mid December  GFR  has been normal   Hb upon  D/c was 11 4                Review of Systems   Constitutional: Positive for fatigue  Negative for chills and fever  HENT: Negative  Eyes: Negative  Respiratory: Negative  Cardiovascular: Negative  Gastrointestinal: Negative  Endocrine: Negative  Musculoskeletal: Positive for arthralgias and back pain  Chronic   Skin:        As per HPI   Allergic/Immunologic: Negative  Neurological: Negative  Hematological: Negative  Psychiatric/Behavioral: Negative  Objective     /60 (BP Location: Left arm, Patient Position: Sitting)   Pulse 77   Temp 97 5 °F (36 4 °C)   Resp 17   Ht 5' 6" (1 676 m)   Wt 103 kg (228 lb)   SpO2 97%   BMI 36 80 kg/m²      Physical Exam  Vitals and nursing note reviewed  Constitutional:       General: He is not in acute distress  Appearance: Normal appearance  He is not ill-appearing  HENT:      Head: Normocephalic and atraumatic  Eyes:      General: No scleral icterus  Conjunctiva/sclera: Conjunctivae normal    Neck:      Vascular: No carotid bruit  Cardiovascular:      Rate and Rhythm: Normal rate  Rhythm irregularly irregular  Heart sounds: Normal heart sounds  No murmur heard  Pulmonary:      Effort: Pulmonary effort is normal  No respiratory distress        Breath sounds: Normal breath sounds  Abdominal:      General: Bowel sounds are normal  There is no distension  Palpations: Abdomen is soft  Musculoskeletal:         General: Normal range of motion  Cervical back: Neck supple  No rigidity  Right lower leg: No edema  Left lower leg: No edema  Skin:     General: Skin is warm  Findings: No rash  Comments: Lower back: Well-healed healing areas previous drains placement  No abscess  No erythema, no discoloration  Neurological:      General: No focal deficit present  Mental Status: He is alert and oriented to person, place, and time  Psychiatric:         Mood and Affect: Mood normal          Behavior: Behavior normal          Thought Content:  Thought content normal        Medications have been reviewed by provider in current encounter    Eveline Blankenship MD

## 2023-01-03 NOTE — ASSESSMENT & PLAN NOTE
Due to MRSA  Completed Vancomycin, PICC line removed December 30th  ID f/up tomorrow [facility]  Patient is afebrile, no signs of abscess on exam today  Patient may benefit from nasal MRSA screening and apical preventative therapy, he will discuss with ID tomorrow

## 2023-01-03 NOTE — PATIENT INSTRUCTIONS
Please check with infectious disease  if nasal swab for MRSA should be done and if you should be using antibiotic ointment as prevention for the next 3-month  Please proceed with blood work to check PT/INR/Coumadin level tomorrow and we will call you  These repeat regular blood work in 3 months  Please do not take lisinopril, blood pressure is on the lower side, 112/60  I will reach out to your cardiologist to let him know about recent hospitalization and A  fib recurrence please schedule follow-up with Dr Lim Clock

## 2023-01-04 ENCOUNTER — ANTICOAG VISIT (OUTPATIENT)
Dept: FAMILY MEDICINE CLINIC | Facility: CLINIC | Age: 68
End: 2023-01-04

## 2023-01-04 ENCOUNTER — APPOINTMENT (OUTPATIENT)
Dept: LAB | Facility: CLINIC | Age: 68
End: 2023-01-04

## 2023-01-04 DIAGNOSIS — I48.0 PAROXYSMAL ATRIAL FIBRILLATION (HCC): Primary | ICD-10-CM

## 2023-01-04 DIAGNOSIS — I48.0 PAROXYSMAL ATRIAL FIBRILLATION (HCC): ICD-10-CM

## 2023-01-04 LAB
INR PPP: 1.42 (ref 0.84–1.19)
INR PPP: 1.42 (ref 0.84–1.19)
LEFT EYE DIABETIC RETINOPATHY: NORMAL
LEFT EYE IMAGE QUALITY: NORMAL
LEFT EYE MACULAR EDEMA: NORMAL
LEFT EYE OTHER RETINOPATHY: NORMAL
PROTHROMBIN TIME: 17.6 SECONDS (ref 11.6–14.5)
RIGHT EYE DIABETIC RETINOPATHY: NORMAL
RIGHT EYE IMAGE QUALITY: NORMAL
RIGHT EYE MACULAR EDEMA: NORMAL
RIGHT EYE OTHER RETINOPATHY: NORMAL
SEVERITY (EYE EXAM): NORMAL

## 2023-01-04 NOTE — PROGRESS NOTES
Orion Crystal MD  [address] [facility]  Please contact patient regarding INR results  Please create a new Coumadin chart   Diagnosis A  fib   Goal for INR between 2-3  Current dose of Coumadin is 5 mg daily  Please advise patient to increase dose of Coumadin to 1 5 tablets daily (7 5 mg) on Monday, Wednesday, Friday and Sunday, keep 5 mg for the rest of the week   Patient should repeat blood work in 1 week   Thank you

## 2023-01-05 ENCOUNTER — OFFICE VISIT (OUTPATIENT)
Dept: CARDIOLOGY CLINIC | Facility: CLINIC | Age: 68
End: 2023-01-05

## 2023-01-05 VITALS
BODY MASS INDEX: 36.64 KG/M2 | HEIGHT: 66 IN | SYSTOLIC BLOOD PRESSURE: 122 MMHG | DIASTOLIC BLOOD PRESSURE: 72 MMHG | HEART RATE: 65 BPM | WEIGHT: 228 LBS

## 2023-01-05 DIAGNOSIS — I48.0 PAROXYSMAL ATRIAL FIBRILLATION (HCC): Primary | ICD-10-CM

## 2023-01-05 DIAGNOSIS — E78.5 DYSLIPIDEMIA: ICD-10-CM

## 2023-01-05 DIAGNOSIS — I10 ESSENTIAL HYPERTENSION: ICD-10-CM

## 2023-01-05 NOTE — PROGRESS NOTES
Cardiology Follow Up    Magalis Arango  1955  [id number]  [address]  [facility]  [address]  [phone]    1  Paroxysmal atrial fibrillation (HCC)  POCT ECG    Holter monitor    Echo complete w/ contrast if indicated      2  Essential hypertension  Echo complete w/ contrast if indicated      3  Dyslipidemia            Discussion/Summary:    PAF  During recent hospitalization for psoas abscess, was back in afib  His rates are controlled  HR 60s int he office  This is on a lower dose of metoprolol than before  Check 24H holter monitor  He is on warfarin  Was resistant to this before, but seems agreeable at this time  Check echo, since one has not been done in several years and he c/o shortness of breath, new recurrence of afib, should ensure no change in EF or valve disease  LBBB is present  If his symptoms of dyspnea don't improve with better conditioning, will do a pharm nuclear stress test     Stay off aspirin and lisinopril at this time  Previous History:  82-year-old gentleman  In the setting of severe pneumonia requiring intubation, he had rapid afib  Treated with metoprolol (8/2020)  He was started on anticoagulation with Eliquis  Insurance issues with Eliquis (thought had improved, but not the case), he had bad experience with warfarin with father so wants to avoid this  Upon discharge, he was still in atrial fibrillation, but on follow up was back in sinus rhythm  HTN, on metoprolol, lisinopril  EF was preserved on echo  He had a very slightly elevated troponin in the setting of his pneumonia  Had declined anticoagulation after Eliquis was too expensive  Interval history:    Returns after a hospitalization at [facility] for psoas abscess  Was in afib there  Metoprolol was decreased, lisinopril is held due to lower BPs  He is rate controlled in afib    Started on warfarin, but INR is still not therapeutic  He is not taking aspirin  Feels very weak and deconditioned, but says gradually improving  Initially after leaving the hospital couldn't even walk a very short distance  LBBB/IVCD is seen on the ECG, report from [facility] shows the same  Denies dizziness/LH  Problem List     Essential hypertension    Diabetes mellitus type 2 in obese (HCC) (Chronic)      Lab Results   Component Value Date    HGBA1C 7 4 (H) 12/12/2022         Vitamin D deficiency    Morbid obesity ([facility] )    Osteoarthritis of left knee    Bladder wall thickening    Status post left knee replacement    Community acquired pneumonia of left lower lobe of lung (Chronic)    Non-MI Troponin Elevation    Rhabdomyolysis    New onset atrial fibrillation ([facility] ) (Chronic)    Overview Signed 9/1/2020  7:09 AM by Matthew Bower MD     August 2020 during hospitalization for left lower lobe pneumonia  Eliquis and metoprolol  [facility]     ECHO 3/02/4096  Systolic function was normal  Ejection fraction was estimated to be 60 %  There were no regional wall motion abnormalities   LEFT ATRIUM:  The atrium was dilated    MITRAL VALVE:  There was mild regurgitation   AORTIC VALVE:  There was mild regurgitation           LAURENCE (acute kidney injury) ([facility] )    Acute respiratory failure with hypoxia and hypercapnia (HCC)        Past Medical History:   Diagnosis Date   • Acute respiratory failure with hypoxia and hypercapnia ([facility] ) 8/17/2020   • [facility] 8/16/2020   • Arthritis     knees   • Community acquired pneumonia of left lower lobe of lung 8/12/2020   • Diabetes mellitus ([facility] )    • Hypertension    • Measles, [place] (rubella)    • Mumps    • Snoring     Loudly     Social History     Tobacco Use   • Smoking status: Never   • Smokeless tobacco: Never   Vaping Use   • Vaping Use: Never used   Substance Use Topics   • Alcohol use: Yes     Comment: Occasionally, not in excess - As per [employer]    • Drug use: Never      Family History   Problem Relation Age of Onset   • Ovarian cancer Mother    • Heart disease Mother    • Diabetes type II Mother    • Colon cancer Father    • Heart disease Father    • Nephrolithiasis Father      Past Surgical History:   Procedure Laterality Date   • CYSTOSCOPY     • KNEE ARTHROSCOPY Left     knee   • ORIF PELVIC FRACTURE     • TOTAL KNEE ARTHROPLASTY Left 6/10/2019    Procedure: ARTHROPLASTY KNEE TOTAL;  Surgeon: Fiona Duran MD;  Location: [facility];  Service: Orthopedics       Current Outpatient Medications:   •  ACCU-CHEK LARRY PLUS test strip, , Disp: , Rfl:   •  ACCU-CHEK FASTCLIX LANCETS MISC, , Disp: , Rfl:   •  acetaminophen (TYLENOL) 500 mg tablet, Take 500 mg by mouth every 6 (six) hours as needed, Disp: , Rfl:   •  amLODIPine (NORVASC) 10 mg tablet, Take 1 tablet (10 mg total) by mouth daily, Disp: 30 tablet, Rfl: 5  •  atorvastatin (LIPITOR) 40 mg tablet, Take 1 tablet (40 mg total) by mouth daily, Disp: 30 tablet, Rfl: 5  •  Blood Glucose Monitoring Suppl (OneTouch Verio Reflect) w/Device KIT, Check blood sugars once daily  Please substitute with appropriate alternative as covered by patient's insurance  Dx: E11 65, Disp: 1 kit, Rfl: 0  •  glipiZIDE (GLUCOTROL XL) 10 mg 24 hr tablet, Take 1 tablet (10 mg total) by mouth daily, Disp: 30 tablet, Rfl: 5  •  glucose blood (OneTouch Verio) test strip, Check blood sugars once daily  Please substitute with appropriate alternative as covered by patient's insurance  Dx: E11 65, Disp: 100 each, Rfl: 3  •  metFORMIN (GLUCOPHAGE-XR) 500 mg 24 hr tablet, Take 4 tablets (2,000 mg total) by mouth daily, Disp: 120 tablet, Rfl: 5  •  Metoprolol Tartrate 75 MG TABS, Take 1 tablet (75 mg total) by mouth 2 (two) times a day, Disp: 60 tablet, Rfl: 5  •  OneTouch Delica Lancets 57Y MISC, Check blood sugars once daily  Please substitute with appropriate alternative as covered by patient's insurance   Dx: E11 65, Disp: 100 each, Rfl: 3  •  warfarin (COUMADIN) 5 mg tablet, Take 1 tablet (5 mg total) by mouth daily (Patient taking differently: Take 5 mg by mouth daily Alternating between 7 5 mg and 5 mg), Disp: 30 tablet, Rfl: 3  •  Ergocalciferol (VITAMIN D2 PO), Take 2,000 Units by mouth in the morning   (Patient not taking: Reported on 10/11/2022), Disp: , Rfl:   •  Fluticasone-Salmeterol,sensor, (AirDuo Digihaler) 113-14 MCG/ACT AEPB, Inhale 1 puff 2 (two) times a day Rinse mouth after use  (Patient not taking: Reported on 10/11/2022), Disp: 1 each, Rfl: 6  •  oxyCODONE (ROXICODONE) 5 immediate release tablet, Take 5 mg by mouth 2 (two) times a day (Patient not taking: Reported on 10/11/2022), Disp: , Rfl:   No Known Allergies    Vitals:    01/05/23 0915   BP: 122/72   BP Location: Right arm   Patient Position: Sitting   Cuff Size: Large   Pulse: 65   Weight: 103 kg (228 lb)   Height: 5' 6" (1 676 m)     Vitals:    01/05/23 0915   Weight: 103 kg (228 lb)      Height: 5' 6" (167 6 cm)   Body mass index is 36 8 kg/m²  Physical Exam:  GEN: Nelly Otero appears well, alert and oriented x 3, pleasant and cooperative   HEENT: pupils equal, round, and reactive to light; extraocular muscles intact  NECK: supple, no carotid bruits   HEART: irregularly irregular  LUNGS: clear to auscultation bilaterally; no wheezes, rales, or rhonchi   ABDOMEN: Obese  normal bowel sounds, soft, no tenderness, no distention  EXTREMITIES: peripheral pulses normal; no clubbing, cyanosis, or edema  NEURO: no focal findings    Uses cane  SKIN: normal without suspicious lesions on exposed skin    ROS:  Positive for deconditioning, shortness of breath, weakness  Except as noted in HPI, is otherwise reviewed in detail and a 12 point review of systems is negative    ROS reviewed and is unchanged    Labs:  Lab Results   Component Value Date     03/17/2017    K 4 7 03/26/2022     03/26/2022    CREATININE 1 28 03/26/2022    BUN 17 03/26/2022    CO2 29 03/26/2022    ALT 12 03/26/2022    AST 13 03/26/2022    INR 1 42 (H) 01/04/2023    GLUF 159 (H) 03/26/2022    HGBA1C 7 4 (H) 12/12/2022    WBC 7 68 03/26/2022    HGB 12 8 03/26/2022    HCT 37 0 03/26/2022     03/26/2022       Lab Results   Component Value Date    CHOL 218 (H) 03/17/2017    CHOL 199 07/27/2016    CHOL 204 (H) 01/12/2016     Lab Results   Component Value Date    LDLCALC 56 03/26/2022    LDLCALC 67 06/11/2021    LDLCALC 123 (H) 10/05/2020     Lab Results   Component Value Date    HDL 33 (L) 03/26/2022    HDL 30 (L) 06/11/2021    HDL 39 (L) 10/05/2020     Lab Results   Component Value Date    TRIG 123 03/26/2022    TRIG 130 06/11/2021    TRIG 152 (H) 10/05/2020     Testing:  Echo 8/14/20:  LEFT VENTRICLE:  Systolic function was normal  Ejection fraction was estimated to be 60 %  There were no regional wall motion abnormalities      LEFT ATRIUM:  The atrium was dilated      MITRAL VALVE:  There was mild regurgitation      AORTIC VALVE:  There was mild regurgitation      EKG:  Atrial fibrillation, 65 BPM  LBBB

## 2023-01-06 DIAGNOSIS — I48.0 PAROXYSMAL ATRIAL FIBRILLATION (HCC): Primary | ICD-10-CM

## 2023-01-09 ENCOUNTER — TELEPHONE (OUTPATIENT)
Dept: PAIN MEDICINE | Facility: MEDICAL CENTER | Age: 68
End: 2023-01-09

## 2023-01-09 ENCOUNTER — APPOINTMENT (OUTPATIENT)
Dept: LAB | Facility: CLINIC | Age: 68
End: 2023-01-09

## 2023-01-09 ENCOUNTER — ANTICOAG VISIT (OUTPATIENT)
Dept: CARDIOLOGY CLINIC | Facility: CLINIC | Age: 68
End: 2023-01-09

## 2023-01-09 ENCOUNTER — CONSULT (OUTPATIENT)
Dept: PAIN MEDICINE | Facility: MEDICAL CENTER | Age: 68
End: 2023-01-09

## 2023-01-09 VITALS
WEIGHT: 221 LBS | HEIGHT: 66 IN | SYSTOLIC BLOOD PRESSURE: 144 MMHG | BODY MASS INDEX: 35.52 KG/M2 | OXYGEN SATURATION: 98 % | DIASTOLIC BLOOD PRESSURE: 82 MMHG | HEART RATE: 71 BPM

## 2023-01-09 DIAGNOSIS — Z98.1 HISTORY OF LUMBAR FUSION: ICD-10-CM

## 2023-01-09 DIAGNOSIS — M46.1 BILATERAL SACROILIITIS (HCC): Primary | ICD-10-CM

## 2023-01-09 DIAGNOSIS — M54.50 CHRONIC BILATERAL LOW BACK PAIN WITHOUT SCIATICA: ICD-10-CM

## 2023-01-09 DIAGNOSIS — I48.0 PAROXYSMAL ATRIAL FIBRILLATION (HCC): Primary | ICD-10-CM

## 2023-01-09 DIAGNOSIS — K68.12 PSOAS ABSCESS (HCC): Chronic | ICD-10-CM

## 2023-01-09 DIAGNOSIS — G89.29 CHRONIC BILATERAL LOW BACK PAIN WITHOUT SCIATICA: ICD-10-CM

## 2023-01-09 LAB
INR PPP: 2.08 (ref 0.84–1.19)
PROTHROMBIN TIME: 23.6 SECONDS (ref 11.6–14.5)

## 2023-01-09 PROCEDURE — 36415 COLL VENOUS BLD VENIPUNCTURE: CPT

## 2023-01-09 PROCEDURE — 85610 PROTHROMBIN TIME: CPT

## 2023-01-09 RX ORDER — BUPRENORPHINE 5 UG/H
1 PATCH TRANSDERMAL WEEKLY
Qty: 4 PATCH | Refills: 0 | Status: SHIPPED | OUTPATIENT
Start: 2023-01-09 | End: 2023-02-08

## 2023-01-09 NOTE — TELEPHONE ENCOUNTER
Caller: Yamilet Sow - pharmacist     Doctor: [place] July    Reason for call: Yamilet Sow states pt doesn't have prescription insurance to pay for his Butran pain patches & they are $210  Yamilet Sow wasn't sure if pt had a supplemental insurance of was paying out of pocket      Call back#: [phone] ext [phone]

## 2023-01-09 NOTE — PROGRESS NOTES
Assessment  1  Bilateral sacroiliitis ([facility] [address] )    2  Chronic bilateral low back pain without sciatica    3  History of lumbar fusion    4  Psoas abscess ([facility] [address] )        Plan  1  At this time we will initiate Butrans patch for the patient's chronic pain  This does seem to be localized to the sacroiliac joints as well as potentially related to his lumbar fusion  We did discuss the option of sacroiliac joint injections which he may benefit from in the future but given that he is recently recovered from psoas abscesses requiring drainage and IV antibiotic therapy would like him to be at least 3 months out from treatment (this ended in December 2022) before pursuing any interventional options  I did review the [place] Prescription Drug Monitoring Program website today which was appropriate for the medications being prescribed  My impressions and treatment recommendations were discussed in detail with the patient who verbalized understanding and had no further questions  Discharge instructions were provided  I personally saw and examined the patient and I agree with the above discussed plan of care  No orders of the defined types were placed in this encounter  No orders of the defined types were placed in this encounter  History of Present Illness    Caitlin Smith is a 74 y o  male seen in consultation at the request of Dr Kay Benítez regarding chronic lower back pain  Patient was initially referred in October and did undergo L1-5 laminectomies with spinal fusion by Dr Corina Farris back in May 2022  He was subsequently diagnosed with psoas and presacral abscesses  This required hospitalization and cutaneous drainage and IV antibiotic therapy  He states he was in the hospital for 2 weeks  He recently was cleared by infectious disease at [facility] to no longer require antibiotics  He is currently describing moderate to severe intensity pain rated as a 3-5/10    This is nearly constant without any typical pattern described as a dull aching sensation  He is localizing this across his lower back without radiation down the legs and does use a cane for ambulation  Aggravating factors include standing bending walking and exercise  Alleviating factors include lying down and sitting  Imaging studies are from [facility]  I do not have the pictures to review but the reports have been reviewed  He did note significant improvement in the radiating leg pain after his spinal surgery however continued with his chronic axial lower back pain complaints  Social history negative for tobacco marijuana and alcohol use  Currently on blood thinners including Coumadin  Does have history of diabetes as well  Currently using acetaminophen for pain relief which does provide some mild benefit but is hopeful for further relief  I have personally reviewed and/or updated the patient's past medical history, past surgical history, family history, social history, current medications, allergies, and vital signs today  Review of Systems   Constitutional: Negative for fever and unexpected weight change  HENT: Negative for trouble swallowing  Eyes: Negative for visual disturbance  Respiratory: Negative for shortness of breath and wheezing  Cardiovascular: Negative for chest pain and palpitations  Gastrointestinal: Negative for constipation, diarrhea, nausea and vomiting  Endocrine: Negative for cold intolerance, heat intolerance and polydipsia  Genitourinary: Negative for difficulty urinating and frequency  Musculoskeletal: Positive for back pain, gait problem and myalgias  Negative for arthralgias and joint swelling  Skin: Negative for rash  Neurological: Negative for dizziness, seizures, syncope, weakness and headaches  Hematological: Does not bruise/bleed easily  Psychiatric/Behavioral: Negative for dysphoric mood  All other systems reviewed and are negative        Patient Active Problem List   Diagnosis   • Essential hypertension   • Diabetes mellitus type 2 in obese (HCC)   • Vitamin D deficiency   • Class 2 severe obesity due to excess calories with serious comorbidity and body mass index (BMI) of 37 0 to 37 9 in adult [facility])   • Osteoarthritis of left knee   • Bladder wall thickening   • Status post left knee replacement   • Non-MI Troponin Elevation   • Paroxysmal atrial fibrillation (HCC)   • Dyslipidemia   • Skin mole   • Left arm weakness   • Edema of hand   • GONSALEZ (dyspnea on exertion)   • Stenosis of lumbosacral spine   • Psoas abscess [facility])       Past Medical History:   Diagnosis Date   • Acute respiratory failure with hypoxia and hypercapnia ([facility] [address] ) 8/17/2020   • LAURENCE (acute kidney injury) ([facility] [address] ) 8/16/2020   • Arthritis     knees   • Community acquired pneumonia of left lower lobe of lung 8/12/2020   • Diabetes mellitus ([facility] [address] )    • Hypertension    • Measles, [place] (rubella)    • Mumps    • Snoring     Loudly       Past Surgical History:   Procedure Laterality Date   • CYSTOSCOPY     • JOINT REPLACEMENT  07/2018   • KNEE ARTHROSCOPY Left     knee   • ORIF PELVIC FRACTURE     • SPINAL FUSION  05/06/2022   • TOTAL KNEE ARTHROPLASTY Left 06/10/2019    Procedure: ARTHROPLASTY KNEE TOTAL;  Surgeon: Bri May MD;  Location: [facility] OR;  Service: Orthopedics       Family History   Problem Relation Age of Onset   • Ovarian cancer Mother    • Heart disease Mother    • Diabetes type II Mother    • Colon cancer Father    • Heart disease Father    • Nephrolithiasis Father        Social History     Occupational History   • Not on file   Tobacco Use   • Smoking status: Never   • Smokeless tobacco: Never   Vaping Use   • Vaping Use: Never used   Substance and Sexual Activity   • Alcohol use: Not Currently     Alcohol/week: 1 0 standard drink     Types: 1 Standard drinks or equivalent per week     Comment: Occasionally, not in excess - As per [employer]    • Drug use: Never   • Sexual activity: Not Currently       Current Outpatient Medications on File Prior to Visit   Medication Sig   • acetaminophen (TYLENOL) 500 mg tablet Take 500 mg by mouth every 6 (six) hours as needed   • amLODIPine (NORVASC) 10 mg tablet Take 1 tablet (10 mg total) by mouth daily   • atorvastatin (LIPITOR) 40 mg tablet Take 1 tablet (40 mg total) by mouth daily   • glipiZIDE (GLUCOTROL XL) 10 mg 24 hr tablet Take 1 tablet (10 mg total) by mouth daily   • metFORMIN (GLUCOPHAGE-XR) 500 mg 24 hr tablet Take 4 tablets (2,000 mg total) by mouth daily   • Metoprolol Tartrate 75 MG TABS Take 1 tablet (75 mg total) by mouth 2 (two) times a day   • warfarin (COUMADIN) 5 mg tablet Take 1 tablet (5 mg total) by mouth daily (Patient taking differently: Take 5 mg by mouth daily Alternating between 7 5 mg and 5 mg)   • ACCU-CHEK LARRY PLUS test strip  (Patient not taking: Reported on 1/9/2023)   • ACCU-CHEK FASTCLIX LANCETS MISC  (Patient not taking: Reported on 1/9/2023)   • Blood Glucose Monitoring Suppl (OneTouch Verio Reflect) w/Device KIT Check blood sugars once daily  Please substitute with appropriate alternative as covered by patient's insurance  Dx: E11 65 (Patient not taking: Reported on 1/9/2023)   • Ergocalciferol (VITAMIN D2 PO) Take 2,000 Units by mouth in the morning   (Patient not taking: Reported on 10/11/2022)   • Fluticasone-Salmeterol,sensor, (AirDuo Digihaler) 113-14 MCG/ACT AEPB Inhale 1 puff 2 (two) times a day Rinse mouth after use  (Patient not taking: Reported on 10/11/2022)   • glucose blood (OneTouch Verio) test strip Check blood sugars once daily  Please substitute with appropriate alternative as covered by patient's insurance  Dx: E11 65 (Patient not taking: Reported on 1/9/2023)   • OneTouch Delica Lancets 45S MISC Check blood sugars once daily  Please substitute with appropriate alternative as covered by patient's insurance   Dx: E11 65 (Patient not taking: Reported on 1/9/2023)   • [DISCONTINUED] oxyCODONE (ROXICODONE) 5 immediate release tablet Take 5 mg by mouth 2 (two) times a day (Patient not taking: Reported on 10/11/2022)     No current facility-administered medications on file prior to visit  No Known Allergies    Physical Exam    /82   Pulse 71   Ht 5' 6" (1 676 m)   Wt 100 kg (221 lb)   SpO2 98%   BMI 35 67 kg/m²     Constitutional: normal, well developed, well nourished, alert, in no distress and non-toxic and no overt pain behavior  Eyes: anicteric  HEENT: grossly intact  Neck:  symmetric, trachea midline and no masses   Pulmonary:even and unlabored  Cardiovascular:No edema or pitting edema present  Skin:Normal without rashes or lesions and well hydrated, well-healed midline lumbar incision consistent with his surgical history  Psychiatric:Mood and affect appropriate  Neurologic:Cranial Nerves II-XII grossly intact  Musculoskeletal:normal, except for tenderness to palpation over the sacroiliac joints bilaterally    Imaging    Impression    Impression:     Extensive postsurgical changes related to L1-L5 fusion  Partially visualized prevertebral soft tissue prominence, may represent   resolving hematoma/seroma from surgery, infection or neoplasm  No fracture or subluxation  CT examination performed with dose lowering protocol in accordance with Outcome Referrals  Workstation:[id number]  Narrative    History: Lower back pain     Technique: Noncontrast CT examination of the lumbar spine performed following   standard protocol   Sagittal and coronal reformations were rendered  Comparison: 4/11/2022     Findings: Redemonstration of left sacroiliac screw  In the interim there has   been extensive lumbar fusion and decompression  There are bilateral L1-L5   pedicle screws with vertical fixation rods  There are L1-2 through L4-5   interbody cages  There has been L1-L5 laminectomies  There is extensive streak   artifact from surgical hardware obscuring images   There is lucency surrounding   L1 pedicle screws suggesting loosening  No acute fracture or subluxation   identified  There is partially visualized presacral soft tissue prominence, may   represent resolving hematoma/stroma from surgery, infection or neoplasm  Procedure Note    Evonne Cowan MD - 12/12/2022   Formatting of this note might be different from the original    History: Lower back pain     Technique: Noncontrast CT examination of the lumbar spine performed following   standard protocol   Sagittal and coronal reformations were rendered  Comparison: 4/11/2022     Findings: Redemonstration of left sacroiliac screw  In the interim there has   been extensive lumbar fusion and decompression  There are bilateral L1-L5   pedicle screws with vertical fixation rods  There are L1-2 through L4-5   interbody cages  There has been L1-L5 laminectomies  There is extensive streak   artifact from surgical hardware obscuring images  There is lucency surrounding   L1 pedicle screws suggesting loosening  No acute fracture or subluxation   identified  There is partially visualized presacral soft tissue prominence, may   represent resolving hematoma/stroma from surgery, infection or neoplasm  IMPRESSION:   Impression:     Extensive postsurgical changes related to L1-L5 fusion  Partially visualized prevertebral soft tissue prominence, may represent   resolving hematoma/seroma from surgery, infection or neoplasm  No fracture or subluxation  CT examination performed with dose lowering protocol in accordance with [employer]  Workstation:[id number]  Exam End: 12/12/22  4:55 AM    Specimen Collected: 12/12/22  5:07 AM Last Resulted: 12/12/22  5:54 AM   Received From: [address] [place] [address]  Result Received: 01/03/23  7:37 AM        Narrative    History: Low back pain     Procedure: MRI of the lumbar spine was obtained with the following sequences:   Sagittal T1, sagittal T2, sagittal STIR and axial T2 weighted images  Comparison: None available     Findings: For the purposes of this dictation, the lumbar vertebrae are labeled   from a caudal to cranial direction, the first vertebra with lumbar morphology is   labeled as L5  The lumbar lordosis is preserved  Vertebral body heights are maintained  Degenerative endplate changes are noted at L2-L3 and L3-L4  There is   susceptibility artifact in the left sacrum related to hardware through the left   sacroiliac joint  The conus terminate at the L1 level and appears grossly normal   in signal on the sagittal sequences  L1-L2: Minimal retrolisthesis of L1 over L2 with disc space narrowing, disc   bulge and a superimposed right paracentral disc protrusion  There is mild to   moderate narrowing of the thecal sac  There is mild left and mild-to-moderate   right neural foraminal narrowing  L2-L3 : Approximately 4 mm retrolisthesis of L2 over L3  There is disc space   narrowing with disc bulge, facet arthrosis and thickening of the ligamentum   flavum as well as the dorsal epidural fat, resulting in mild to moderate   narrowing of the thecal sac  There is a broad-based right paracentral disc   protrusion with inferior migration, impinging the right L3 nerve root within the   lateral recess  There is moderate bilateral neural foraminal narrowing, worse on   the left, with abutment on the left greater than right exiting L2 nerve roots  L3-L4: Disc bulge with superimposed central disc protrusion and bilateral facet   arthrosis  The spinal canal is minimally narrowed  There is mild left and severe   right neural foraminal narrowing, with impingement on the exiting right L3 nerve   root  L4-L5: Disc bulge and bilateral facet arthrosis, without spinal canal narrowing  There is moderate bilateral neural foraminal narrowing, with abutment on the   bilateral exiting L4 nerve roots  L5-S1: Disc space narrowing with a circumferential disc osteophyte complex     There is no spinal canal narrowing  There is mild bilateral neural foraminal   narrowing  Bilateral renal cysts  Procedure Note    Lucy Jamison MD - 01/31/2022   Formatting of this note might be different from the original    History: Low back pain     Procedure: MRI of the lumbar spine was obtained with the following sequences:   Sagittal T1, sagittal T2, sagittal STIR and axial T2 weighted images  Comparison: None available     Findings: For the purposes of this dictation, the lumbar vertebrae are labeled   from a caudal to cranial direction, the first vertebra with lumbar morphology is   labeled as L5  The lumbar lordosis is preserved  Vertebral body heights are maintained  Degenerative endplate changes are noted at L2-L3 and L3-L4  There is   susceptibility artifact in the left sacrum related to hardware through the left   sacroiliac joint  The conus terminate at the L1 level and appears grossly normal   in signal on the sagittal sequences  L1-L2: Minimal retrolisthesis of L1 over L2 with disc space narrowing, disc   bulge and a superimposed right paracentral disc protrusion  There is mild to   moderate narrowing of the thecal sac  There is mild left and mild-to-moderate   right neural foraminal narrowing  L2-L3 : Approximately 4 mm retrolisthesis of L2 over L3  There is disc space   narrowing with disc bulge, facet arthrosis and thickening of the ligamentum   flavum as well as the dorsal epidural fat, resulting in mild to moderate   narrowing of the thecal sac  There is a broad-based right paracentral disc   protrusion with inferior migration, impinging the right L3 nerve root within the   lateral recess  There is moderate bilateral neural foraminal narrowing, worse on   the left, with abutment on the left greater than right exiting L2 nerve roots  L3-L4: Disc bulge with superimposed central disc protrusion and bilateral facet   arthrosis  The spinal canal is minimally narrowed   There is mild left and severe   right neural foraminal narrowing, with impingement on the exiting right L3 nerve   root  L4-L5: Disc bulge and bilateral facet arthrosis, without spinal canal narrowing  There is moderate bilateral neural foraminal narrowing, with abutment on the   bilateral exiting L4 nerve roots  L5-S1: Disc space narrowing with a circumferential disc osteophyte complex  There is no spinal canal narrowing  There is mild bilateral neural foraminal   narrowing  Bilateral renal cysts  IMPRESSION:   Impression:   1  Multilevel degenerative change in the lumbar spine  There is mild to moderate   narrowing of the thecal sac at L1-L2 and L2-L3    2  Inferiorly migrating right paracentral disc protrusion from L2-L3, impinging   the traversing right L3 nerve root  3  Neural foraminal narrowing at multiple levels, with nerve root   abutment/impingement as detailed above                     Workstation:[id number]  Exam End: 01/31/22  2:52 PM    Specimen Collected: 01/31/22  3:20 PM Last Resulted: 01/31/22  3:29 PM   Received From: [facility]  Result Received: 03/11/22  9:51 AM

## 2023-01-09 NOTE — PATIENT INSTRUCTIONS
Sacroiliitis   WHAT YOU NEED TO KNOW:   Sacroiliitis is a painful swelling of one or both of your sacroiliac joints that lasts at least 3 months  The sacroiliac joint connects your pelvis to the base of your spine  DISCHARGE INSTRUCTIONS:   Medicines:  Ask for more information about these and other medicines you may need to treat sacroiliitis:  Pain medicine: You may be given medicine to take away or decrease pain  Do not wait until the pain is severe before you take your medicine  You may be given the medicine as a pill to swallow or as a lotion that you put on the painful area  NSAIDs  help decrease swelling and pain or fever  This medicine is available with or without a doctor's order  NSAIDs can cause stomach bleeding or kidney problems in certain people  If you take blood thinner medicine, always ask your healthcare provider if NSAIDs are safe for you  Always read the medicine label and follow directions  Muscle relaxers  help decrease pain and muscle spasms  Take your medicine as directed  Contact your healthcare provider if you think your medicine is not helping or if you have side effects  Tell him of her if you are allergic to any medicine  Keep a list of the medicines, vitamins, and herbs you take  Include the amounts, and when and why you take them  Bring the list or the pill bottles to follow-up visits  Carry your medicine list with you in case of an emergency  Physical therapy:  Your healthcare provider may suggest physical therapy  Your physical therapist may teach you exercises to improve posture (the way you stand and sit), flexibility, and strength in your lower back  He may also teach you how to remain safely active and avoid further injury  Follow the exercise plan given to you by your physical therapist   Rest:  Follow your healthcare provider's instructions about how much rest you should get  Avoid activity that worsens your pain    Ice or heat packs:  Use ice or heat packs on the sore area of your body to decrease the pain and swelling  Put ice in a plastic bag covered with a towel on your low back  Cover heated items with a towel to avoid burns  Use ice and heat as directed  Follow up with your healthcare provider or spine specialist within 1 to 2 weeks:  Write down your questions so you remember to ask them during your visits  Contact your healthcare provider or spine specialist if:   Your pain makes it hard for you to do your daily activities, such as work or school  Your pain does not go away after treatment  You feel depressed or anxious  You have questions about your condition or care  Return to the emergency department if:   You have a fever  Your pain is worse than before  Your pain prevents you from sleeping  © Copyright [employer] 2022 Information is for End User's use only and may not be sold, redistributed or otherwise used for commercial purposes  All illustrations and images included in CareNotes® are the copyrighted property of A D A M , Inc  or [employer]   The above information is an  only  It is not intended as medical advice for individual conditions or treatments  Talk to your doctor, nurse or pharmacist before following any medical regimen to see if it is safe and effective for you

## 2023-01-10 DIAGNOSIS — G89.29 CHRONIC BILATERAL LOW BACK PAIN WITHOUT SCIATICA: Primary | ICD-10-CM

## 2023-01-10 DIAGNOSIS — M54.50 CHRONIC BILATERAL LOW BACK PAIN WITHOUT SCIATICA: Primary | ICD-10-CM

## 2023-01-10 RX ORDER — HYDROCODONE BITARTRATE AND ACETAMINOPHEN 5; 325 MG/1; MG/1
1 TABLET ORAL EVERY 8 HOURS PRN
Qty: 90 TABLET | Refills: 0 | Status: SHIPPED | OUTPATIENT
Start: 2023-01-10 | End: 2023-02-06 | Stop reason: DRUGHIGH

## 2023-01-10 NOTE — TELEPHONE ENCOUNTER
RN s/w pt regarding previous  Per pt he does not have supplemental insurance at this time he is trying to get it back  Would it be possible to start the tramadol until he gets the insurance in place to pay for it      --please advise thank you--

## 2023-01-10 NOTE — ASSESSMENT & PLAN NOTE
Lab Results   Component Value Date    HGBA1C 7 4 (H) 12/12/2022     Fairly good glycemic control  Continue metformin and glipizide  Reassess labs in 3 months

## 2023-01-10 NOTE — ASSESSMENT & PLAN NOTE
Patient was found in A  fib during recent hospitalization in December  Currently on metoprolol 75 mg twice daily and Coumadin 5 mg daily  Most recent INR performed a week ago at 1 6  Patient will proceed with repeat blood work tomorrow  Pending follow-up with cardiology  Patient is asymptomatic but is in A  fib during exam today

## 2023-01-11 NOTE — TELEPHONE ENCOUNTER
Caller: patient    Doctor: Jacob Mckenna    Reason for call: confirming [facility]    Call back#:

## 2023-01-12 ENCOUNTER — APPOINTMENT (OUTPATIENT)
Dept: LAB | Facility: CLINIC | Age: 68
End: 2023-01-12

## 2023-01-12 ENCOUNTER — ANTICOAG VISIT (OUTPATIENT)
Dept: CARDIOLOGY CLINIC | Facility: CLINIC | Age: 68
End: 2023-01-12

## 2023-01-12 DIAGNOSIS — I48.0 PAROXYSMAL ATRIAL FIBRILLATION (HCC): ICD-10-CM

## 2023-01-12 DIAGNOSIS — I48.0 PAROXYSMAL ATRIAL FIBRILLATION (HCC): Primary | ICD-10-CM

## 2023-01-12 LAB
INR PPP: 2.75 (ref 0.84–1.19)
PROTHROMBIN TIME: 29.3 SECONDS (ref 11.6–14.5)

## 2023-01-16 ENCOUNTER — ANTICOAG VISIT (OUTPATIENT)
Dept: CARDIOLOGY CLINIC | Facility: CLINIC | Age: 68
End: 2023-01-16

## 2023-01-16 ENCOUNTER — APPOINTMENT (OUTPATIENT)
Dept: LAB | Facility: CLINIC | Age: 68
End: 2023-01-16

## 2023-01-16 DIAGNOSIS — I48.0 PAROXYSMAL ATRIAL FIBRILLATION (HCC): ICD-10-CM

## 2023-01-16 DIAGNOSIS — I48.0 PAROXYSMAL ATRIAL FIBRILLATION (HCC): Primary | ICD-10-CM

## 2023-01-16 LAB
INR PPP: 2.58 (ref 0.84–1.19)
PROTHROMBIN TIME: 27.9 SECONDS (ref 11.6–14.5)

## 2023-01-23 ENCOUNTER — ANTICOAG VISIT (OUTPATIENT)
Dept: CARDIOLOGY CLINIC | Facility: CLINIC | Age: 68
End: 2023-01-23

## 2023-01-23 ENCOUNTER — APPOINTMENT (OUTPATIENT)
Dept: LAB | Facility: CLINIC | Age: 68
End: 2023-01-23

## 2023-01-23 DIAGNOSIS — I48.0 PAROXYSMAL ATRIAL FIBRILLATION (HCC): Primary | ICD-10-CM

## 2023-01-23 DIAGNOSIS — I48.0 PAROXYSMAL ATRIAL FIBRILLATION (HCC): ICD-10-CM

## 2023-01-23 LAB
INR PPP: 2.87 (ref 0.84–1.19)
PROTHROMBIN TIME: 30.4 SECONDS (ref 11.6–14.5)

## 2023-01-24 ENCOUNTER — HOSPITAL ENCOUNTER (OUTPATIENT)
Dept: NON INVASIVE DIAGNOSTICS | Facility: CLINIC | Age: 68
Discharge: HOME | End: 2023-01-24

## 2023-01-24 VITALS
HEART RATE: 80 BPM | HEIGHT: 66 IN | WEIGHT: 221 LBS | BODY MASS INDEX: 35.52 KG/M2 | DIASTOLIC BLOOD PRESSURE: 82 MMHG | SYSTOLIC BLOOD PRESSURE: 144 MMHG

## 2023-01-24 DIAGNOSIS — I10 ESSENTIAL HYPERTENSION: ICD-10-CM

## 2023-01-24 DIAGNOSIS — I48.0 PAROXYSMAL ATRIAL FIBRILLATION (HCC): ICD-10-CM

## 2023-01-24 LAB
AORTIC ROOT: 3.5 CM
APICAL FOUR CHAMBER EJECTION FRACTION: 45 %
AV REGURGITATION PRESSURE HALF TIME: 873 MS
FRACTIONAL SHORTENING: 28 % (ref 28–44)
INTERVENTRICULAR SEPTUM IN DIASTOLE (PARASTERNAL SHORT AXIS VIEW): 1.2 CM
INTERVENTRICULAR SEPTUM: 1.2 CM (ref 0.6–1.1)
LAAS-AP2: 14.4 CM2
LAAS-AP4: 20 CM2
LEFT ATRIUM AREA SYSTOLE SINGLE PLANE A4C: 20.3 CM2
LEFT ATRIUM SIZE: 6.1 CM
LEFT INTERNAL DIMENSION IN SYSTOLE: 3.1 CM (ref 2.1–4)
LEFT VENTRICULAR INTERNAL DIMENSION IN DIASTOLE: 4.3 CM (ref 3.5–6)
LEFT VENTRICULAR POSTERIOR WALL IN END DIASTOLE: 1.2 CM
LEFT VENTRICULAR STROKE VOLUME: 48 ML
LVSV (TEICH): 48 ML
MV E'TISSUE VEL-SEP: 7 CM/S
RIGHT ATRIUM AREA SYSTOLE A4C: 16.9 CM2
RIGHT VENTRICLE ID DIMENSION: 4 CM
SL CV AV DECELERATION TIME RETROGRADE: 3009 MS
SL CV AV PEAK GRADIENT RETROGRADE: 72 MMHG
SL CV LEFT ATRIUM LENGTH A2C: 4.5 CM
SL CV LV EF: 55
SL CV PED ECHO LEFT VENTRICLE DIASTOLIC VOLUME (MOD BIPLANE) 2D: 85 ML
SL CV PED ECHO LEFT VENTRICLE SYSTOLIC VOLUME (MOD BIPLANE) 2D: 37 ML
TR MAX PG: 20 MMHG
TR PEAK VELOCITY: 2.3 M/S
TRICUSPID ANNULAR PLANE SYSTOLIC EXCURSION: 1.8 CM
TRICUSPID VALVE PEAK REGURGITATION VELOCITY: 2.25 M/S

## 2023-01-30 ENCOUNTER — APPOINTMENT (OUTPATIENT)
Dept: LAB | Facility: CLINIC | Age: 68
End: 2023-01-30

## 2023-01-30 ENCOUNTER — ANTICOAG VISIT (OUTPATIENT)
Dept: CARDIOLOGY CLINIC | Facility: CLINIC | Age: 68
End: 2023-01-30

## 2023-01-30 DIAGNOSIS — I48.0 PAROXYSMAL ATRIAL FIBRILLATION (HCC): ICD-10-CM

## 2023-01-30 DIAGNOSIS — I48.0 PAROXYSMAL ATRIAL FIBRILLATION (HCC): Primary | ICD-10-CM

## 2023-01-30 LAB
INR PPP: 1.29 (ref 0.84–1.19)
PROTHROMBIN TIME: 16.4 SECONDS (ref 11.6–14.5)

## 2023-02-06 ENCOUNTER — ANTICOAG VISIT (OUTPATIENT)
Dept: CARDIOLOGY CLINIC | Facility: CLINIC | Age: 68
End: 2023-02-06

## 2023-02-06 ENCOUNTER — APPOINTMENT (OUTPATIENT)
Dept: LAB | Facility: CLINIC | Age: 68
End: 2023-02-06

## 2023-02-06 ENCOUNTER — OFFICE VISIT (OUTPATIENT)
Dept: PAIN MEDICINE | Facility: MEDICAL CENTER | Age: 68
End: 2023-02-06

## 2023-02-06 VITALS
HEART RATE: 66 BPM | SYSTOLIC BLOOD PRESSURE: 113 MMHG | WEIGHT: 238 LBS | HEIGHT: 66 IN | BODY MASS INDEX: 38.25 KG/M2 | DIASTOLIC BLOOD PRESSURE: 75 MMHG

## 2023-02-06 DIAGNOSIS — G89.4 CHRONIC PAIN SYNDROME: Primary | ICD-10-CM

## 2023-02-06 DIAGNOSIS — G89.29 CHRONIC BILATERAL LOW BACK PAIN WITHOUT SCIATICA: ICD-10-CM

## 2023-02-06 DIAGNOSIS — I48.0 PAROXYSMAL ATRIAL FIBRILLATION (HCC): Primary | ICD-10-CM

## 2023-02-06 DIAGNOSIS — M96.1 LUMBAR POSTLAMINECTOMY SYNDROME: ICD-10-CM

## 2023-02-06 DIAGNOSIS — Z98.1 HISTORY OF LUMBAR SPINAL FUSION: ICD-10-CM

## 2023-02-06 DIAGNOSIS — I48.0 PAROXYSMAL ATRIAL FIBRILLATION (HCC): ICD-10-CM

## 2023-02-06 DIAGNOSIS — K68.12 PSOAS ABSCESS (HCC): Chronic | ICD-10-CM

## 2023-02-06 DIAGNOSIS — Z79.891 LONG-TERM CURRENT USE OF OPIATE ANALGESIC: ICD-10-CM

## 2023-02-06 DIAGNOSIS — F11.20 UNCOMPLICATED OPIOID DEPENDENCE (HCC): ICD-10-CM

## 2023-02-06 DIAGNOSIS — M54.50 CHRONIC BILATERAL LOW BACK PAIN WITHOUT SCIATICA: ICD-10-CM

## 2023-02-06 LAB
INR PPP: 1.94 (ref 0.84–1.19)
PROTHROMBIN TIME: 22.4 SECONDS (ref 11.6–14.5)

## 2023-02-06 RX ORDER — HYDROCODONE BITARTRATE AND ACETAMINOPHEN 7.5; 325 MG/1; MG/1
1 TABLET ORAL EVERY 8 HOURS PRN
Qty: 90 TABLET | Refills: 0 | Status: SHIPPED | OUTPATIENT
Start: 2023-02-06 | End: 2023-02-10 | Stop reason: SDUPTHER

## 2023-02-06 RX ORDER — HYDROCODONE BITARTRATE AND ACETAMINOPHEN 7.5; 325 MG/1; MG/1
1 TABLET ORAL EVERY 8 HOURS PRN
Qty: 90 TABLET | Refills: 0 | Status: SHIPPED | OUTPATIENT
Start: 2023-02-06 | End: 2023-02-06 | Stop reason: SDUPTHER

## 2023-02-06 NOTE — PROGRESS NOTES
Assessment:  1  Chronic pain syndrome    2  Long-term current use of opiate analgesic    3  Uncomplicated opioid dependence ([address] )    4  Chronic bilateral low back pain without sciatica    5  History of lumbar spinal fusion    6  Lumbar postlaminectomy syndrome    7  Psoas abscess ([address] )        Plan:  While the patient was in the office today, I did have a thorough conversation regarding their chronic pain syndrome, medication management, and treatment plan options  After discussing options, due to the persistent and severe low back pain, we will increase the Norco from 5/325 to7 5/325 every 8 hours as needed moderate to severe pain  PDMP reviewed and was appropriate with no red flags  We will see the patient back in 2 months or sooner if needed if the pain changes or worsens, at which point we may be able to discuss sacroiliac joint injections as he would then be off of antibiotics for an adequate period of time  [place] Prescription Drug Monitoring Program report was reviewed and was appropriate     My impressions and treatment recommendations were discussed in detail with the patient who verbalized understanding and had no further questions  Discharge instructions were provided  I personally saw and examined the patient and I agree with the above discussed plan of care  Orders Placed This Encounter   Procedures   • MM ALL_Prescribed Meds and Special Instructions     Order Specific Question:   [place] Is ATORVASTATIN prescribed? Answer:   Yes     Order Specific Question:   [place] Is BUPRENORPHINE Prescribed? Answer:   Yes     Order Specific Question:   [place] Is HYDROCODONE/APAP prescribed? Answer:   Yes     Order Specific Question:   [place] Is METFORMIN prescribed? Answer:   Yes     Order Specific Question:   [place] Is METOPROLOL prescribed? Answer:    Yes   • MM DT_Alprazolam Definitive Test   • MM DT_Amphetamine Definitive Test   • MM DT_Aripiprazole Definitive Test   • MM DT_Buprenorphine Definitive Test   • MM DT_Butalbital Definitive Test   • MM DT_Clonazepam Definitive Test   • MM DT_Clozapine Definitive Test   • MM DT_Cocaine Definitive Test   • MM DT_Codeine Definitive Test   • MM DT_Dextromethorphan Definitive Test   • MM Diazepam Definitive Test   • MM DT_Ethyl Glucuronide/Ethyl Sulfate Definitive Test   • MM DT_Fentanyl Definitive Test   • MM DT_Heroin Definitive Test   • MM DT_Hydrocodone Definitive Test   • MM DT_Hydromorphone Definitive Test   • MM DT_Kratom Definitive Test   • MM DT_Levorphanol Definitive Test   • MM Lorazepam Definitive Test   • MM DT_Olanzapine Definitive Test   • MM DT_MDMA Definitive Test   • MM DT_Meperidine Definitive Test   • MM DT_Methadone Definitive Test   • MM DT_Methamphetamine Definitive Test   • MM DT_Methylphenidate Definitive Test   • MM DT_Morphine Definitive Test   • MM DT_Oxazepam Definitive Test   • MM DT_Oxycodone Definitive Test   • MM DT_Oxymorphone Definitive Test   • MM DT_Phentermine Definitive Test   • MM DT_Pregablin Definitive   • MM DT_Quetiapine Definitive Test   • MM DT_Risperidone Definitive Test   • MM DT_Tapentadol Definitive Test   • MM DT_Temazapam Definitive Test   • MM DT_THC Definitive Test   • MM DT_Tramadol Definitive Test   • MM DT_Validity Creatinine   • MM DT_Validity Oxidant   • MM DT_Validity pH   • MM DT_Validity Specific     New Medications Ordered This Visit   Medications   • HYDROcodone-acetaminophen (Norco) 7 5-325 mg per tablet     Sig: Take 1 tablet by mouth every 8 (eight) hours as needed for pain For ongoing therapy Max Daily Amount: 3 tablets     Dispense:  90 tablet     Refill:  0     Do not fill before 03/07/2023       History of Present Illness:  Tyson Leyva is a 74 y o  male who presents for a follow up office visit in regards to Back Pain     The patient’s current symptoms include chronic low back pain that he presently rates a 5 out of 10 on the pain scale and describes it as a constant sharp pain which is now more localized to the left lumbosacral region  He denies any lower extremity radicular pain  He was prescribed Butrans patch on his last visit however he lacks prescription coverage and was unable to afford this  He was then placed on [employer] which she is taking 3 times a day with only mild improvement at this point in time  He denies any side effects from the medication  He is now off of antibiotics for the psoas abscesses  Pain Contract Signed: 02/06/2023     Urine drug screen: 02/06/2023    I have personally reviewed and/or updated the patient's past medical history, past surgical history, family history, social history, current medications, allergies, and vital signs today  Review of Systems   Constitutional: Negative for chills and fever  HENT: Negative for ear pain and sore throat  Eyes: Negative for pain and visual disturbance  Respiratory: Positive for shortness of breath  Negative for cough  Cardiovascular: Negative for chest pain and palpitations  Gastrointestinal: Negative for abdominal pain and vomiting  Genitourinary: Negative for dysuria and hematuria  Musculoskeletal: Positive for back pain and gait problem  Negative for arthralgias  Skin: Negative for color change and rash  Neurological: Negative for seizures and syncope  All other systems reviewed and are negative        Patient Active Problem List   Diagnosis   • Essential hypertension   • Diabetes mellitus type 2 in obese ([address] )   • Vitamin D deficiency   • Class 2 severe obesity due to excess calories with serious comorbidity and body mass index (BMI) of 37 0 to 37 9 in [facility])   • Osteoarthritis of left knee   • Bladder wall thickening   • Status post left knee replacement   • Non-MI Troponin Elevation   • Paroxysmal atrial fibrillation (HCC)   • Dyslipidemia   • Skin mole   • Left arm weakness   • Edema of hand   • GONSALEZ (dyspnea on exertion)   • Stenosis of lumbosacral spine   • Psoas abscess [facility])       Past Medical History:   Diagnosis Date   • Acute respiratory failure with hypoxia and hypercapnia ([facility] ) 8/17/2020   • LA[facility]) [facility] ) 8/16/2020   • Arthritis     knees   • Community acquired pneumonia of left lower lobe of lung 8/12/2020   • Diabetes mellitus ([facility] [address] )    • Hypertension    • Measles, Irish (rubella)    • Mumps    • Snoring     Loudly       Past Surgical History:   Procedure Laterality Date   • CYSTOSCOPY     • JOINT REPLACEMENT  07/2018   • KNEE ARTHROSCOPY Left     knee   • ORIF PELVIC FRACTURE     • SPINAL FUSION  05/06/2022   • TOTAL KNEE ARTHROPLASTY Left 06/10/2019    Procedure: ARTHROPLASTY KNEE TOTAL;  Surgeon: Cathy Mckeon MD;  Location: [facility];  Service: Orthopedics       Family History   Problem Relation Age of Onset   • Ovarian cancer Mother    • Heart disease Mother    • Diabetes type II Mother    • Colon cancer Father    • Heart disease Father    • Nephrolithiasis Father        Social History     Occupational History   • Not on file   Tobacco Use   • Smoking status: Never   • Smokeless tobacco: Never   Vaping Use   • Vaping Use: Never used   Substance and Sexual Activity   • Alcohol use: Not Currently     Alcohol/week: 1 0 standard drink     Types: 1 Standard drinks or equivalent per week     Comment: Occasionally, not in excess - As per [employer]    • Drug use: Never   • Sexual activity: Not Currently       Current Outpatient Medications on File Prior to Visit   Medication Sig   • amLODIPine (NORVASC) 10 mg tablet Take 1 tablet (10 mg total) by mouth daily   • atorvastatin (LIPITOR) 40 mg tablet Take 1 tablet (40 mg total) by mouth daily   • glipiZIDE (GLUCOTROL XL) 10 mg 24 hr tablet Take 1 tablet (10 mg total) by mouth daily   • metFORMIN (GLUCOPHAGE-XR) 500 mg 24 hr tablet Take 4 tablets (2,000 mg total) by mouth daily   • Metoprolol Tartrate 75 MG TABS Take 1 tablet (75 mg total) by mouth 2 (two) times a day • [DISCONTINUED] HYDROcodone-acetaminophen (NORCO) 5-325 mg per tablet Take 1 tablet by mouth every 8 (eight) hours as needed for pain Max Daily Amount: 3 tablets   • ACCU-CHEK LARRY PLUS test strip  (Patient not taking: Reported on 1/9/2023)   • ACCU-CHEK FASTCLIX LANCETS MISC  (Patient not taking: Reported on 1/9/2023)   • acetaminophen (TYLENOL) 500 mg tablet Take 500 mg by mouth every 6 (six) hours as needed   • Blood Glucose Monitoring Suppl (OneTouch Verio Reflect) w/Device KIT Check blood sugars once daily  Please substitute with appropriate alternative as covered by patient's insurance  Dx: E11 65 (Patient not taking: Reported on 1/9/2023)   • Ergocalciferol (VITAMIN D2 PO) Take 2,000 Units by mouth in the morning   (Patient not taking: Reported on 10/11/2022)   • Fluticasone-Salmeterol,sensor, (AirDuo Digihaler) 113-14 MCG/ACT AEPB Inhale 1 puff 2 (two) times a day Rinse mouth after use  (Patient not taking: Reported on 10/11/2022)   • glucose blood (OneTouch Verio) test strip Check blood sugars once daily  Please substitute with appropriate alternative as covered by patient's insurance  Dx: E11 65 (Patient not taking: Reported on 1/9/2023)   • OneTouch Delica Lancets 58X MISC Check blood sugars once daily  Please substitute with appropriate alternative as covered by patient's insurance  Dx: E11 65 (Patient not taking: Reported on 1/9/2023)   • warfarin (COUMADIN) 5 mg tablet Take 1 tablet (5 mg total) by mouth daily (Patient not taking: Reported on 2/6/2023)   • [DISCONTINUED] transdermal buprenorphine (Butrans) 5 mcg/hr TD patch Place 1 patch on the skin once a week     No current facility-administered medications on file prior to visit         No Known Allergies    Physical Exam:    /75   Pulse 66   Ht 5' 6" (1 676 m)   Wt 108 kg (238 lb)   BMI 38 41 kg/m²     Constitutional:normal, well developed, well nourished, alert, in no distress and non-toxic and no overt pain behavior  Eyes:anicteric  HEENT:grossly intact  Neck:supple, symmetric, trachea midline and no masses   Pulmonary:even and unlabored  Cardiovascular:No edema or pitting edema present  Skin:Normal without rashes or lesions and well hydrated  Psychiatric:Mood and affect appropriate  Neurologic:Cranial Nerves II-XII grossly intact  Musculoskeletal: Well-healed lumbar surgical scar, tenderness to palpation over the left paraspinal muscles of the lumbar spine and left sacroiliac joint      Imaging

## 2023-02-08 LAB
6MAM UR QL CFM: NEGATIVE NG/ML
7AMINOCLONAZEPAM UR QL CFM: NEGATIVE NG/ML
A-OH ALPRAZ UR QL CFM: NEGATIVE NG/ML
ACCEPTABLE CREAT UR QL: NORMAL MG/DL
ACCEPTIBLE SP GR UR QL: NORMAL
AMPHET UR QL CFM: NEGATIVE NG/ML
AMPHET UR QL CFM: NEGATIVE NG/ML
BUPRENORPHINE UR QL CFM: NEGATIVE NG/ML
BUTALBITAL UR QL CFM: NEGATIVE NG/ML
BZE UR QL CFM: NEGATIVE NG/ML
CODEINE UR QL CFM: NEGATIVE NG/ML
EDDP UR QL CFM: NEGATIVE NG/ML
ETHYL GLUCURONIDE UR QL CFM: NEGATIVE NG/ML
ETHYL SULFATE UR QL SCN: NEGATIVE NG/ML
FENTANYL UR QL CFM: NEGATIVE NG/ML
GLIADIN IGG SER IA-ACNC: NEGATIVE NG/ML
GLUCOSE 30M P 50 G LAC PO SERPL-MCNC: NEGATIVE NG/ML
HYDROCODONE UR QL CFM: ABNORMAL NG/ML
HYDROCODONE UR QL CFM: ABNORMAL NG/ML
HYDROMORPHONE UR QL CFM: ABNORMAL NG/ML
LORAZEPAM UR QL CFM: NEGATIVE NG/ML
MDMA UR QL CFM: NEGATIVE NG/ML
ME-PHENIDATE UR QL CFM: NEGATIVE NG/ML
MEPERIDINE UR QL CFM: NEGATIVE NG/ML
METHADONE UR QL CFM: NEGATIVE NG/ML
METHAMPHET UR QL CFM: NEGATIVE NG/ML
MORPHINE UR QL CFM: NEGATIVE NG/ML
MORPHINE UR QL CFM: NEGATIVE NG/ML
NITRITE UR QL: NORMAL UG/ML
NORBUPRENORPHINE UR QL CFM: NEGATIVE NG/ML
NORDIAZEPAM UR QL CFM: NEGATIVE NG/ML
NORFENTANYL UR QL CFM: NEGATIVE NG/ML
NORHYDROCODONE UR QL CFM: ABNORMAL NG/ML
NORHYDROCODONE UR QL CFM: ABNORMAL NG/ML
NORMEPERIDINE UR QL CFM: NEGATIVE NG/ML
NOROXYCODONE UR QL CFM: NEGATIVE NG/ML
OLANZAPINE QUANTIFICATION: NEGATIVE NG/ML
OPC-3373 QUANTIFICATION: NEGATIVE
OXAZEPAM UR QL CFM: NEGATIVE NG/ML
OXYCODONE UR QL CFM: NEGATIVE NG/ML
OXYMORPHONE UR QL CFM: NEGATIVE NG/ML
OXYMORPHONE UR QL CFM: NEGATIVE NG/ML
PARA-FLUOROFENTANYL QUANTIFICATION: NORMAL NG/ML
SL AMB 4-ANPP QUANTIFICATION: NORMAL NG/ML
SL AMB 7-OH-MITRAGYNINE (KRATOM ALKALOID) QUANTIFICATION: NEGATIVE NG/ML
SL AMB ACETYL FENTANYL QUANTIFICATION: NORMAL NG/ML
SL AMB ACETYL NORFENTANYL QUANTIFICATION: NORMAL NG/ML
SL AMB ACRYL FENTANYL QUANTIFICATION: NORMAL NG/ML
SL AMB CARFENTANIL QUANTIFICATION: NORMAL NG/ML
SL AMB CLOZAPINE QUANTIFICATION: NEGATIVE NG/ML
SL AMB CTHC (MARIJUANA METABOLITE) QUANTIFICATION: NEGATIVE NG/ML
SL AMB DEXTROMETHORPHAN QUANTIFICATION: NEGATIVE NG/ML
SL AMB DEXTRORPHAN (DEXTROMETHORPHAN METABOLITE) QUANT: NEGATIVE NG/ML
SL AMB DEXTRORPHAN (DEXTROMETHORPHAN METABOLITE) QUANT: NEGATIVE NG/ML
SL AMB HYDROXYRISPERIDONE QUANTIFICATION: NEGATIVE NG/ML
SL AMB N-DESMETHYL-TRAMADOL QUANTIFICATION: NEGATIVE NG/ML
SL AMB N-DESMETHYLCLOZAPINE QUANTIFICATION: NEGATIVE NG/ML
SL AMB NORQUETIAPINE QUANTIFICATION: NEGATIVE NG/ML
SL AMB PHENTERMINE QUANTIFICATION: NEGATIVE NG/ML
SL AMB PREGABALIN QUANTIFICATION: NEGATIVE
SL AMB QUETIAPINE QUANTIFICATION: NEGATIVE NG/ML
SL AMB RISPERIDONE QUANTIFICATION: NEGATIVE NG/ML
SL AMB RITALINIC ACID QUANTIFICATION: NEGATIVE NG/ML
SPECIMEN PH ACCEPTABLE UR: NORMAL
TAPENTADOL UR QL CFM: NEGATIVE NG/ML
TEMAZEPAM UR QL CFM: NEGATIVE NG/ML
TEMAZEPAM UR QL CFM: NEGATIVE NG/ML
TRAMADOL UR QL CFM: NEGATIVE NG/ML
URATE/CREAT 24H UR: NEGATIVE NG/ML

## 2023-02-10 ENCOUNTER — TELEPHONE (OUTPATIENT)
Dept: PAIN MEDICINE | Facility: MEDICAL CENTER | Age: 68
End: 2023-02-10

## 2023-02-10 DIAGNOSIS — G89.4 CHRONIC PAIN SYNDROME: ICD-10-CM

## 2023-02-10 DIAGNOSIS — G89.29 CHRONIC BILATERAL LOW BACK PAIN WITHOUT SCIATICA: ICD-10-CM

## 2023-02-10 DIAGNOSIS — M54.50 CHRONIC BILATERAL LOW BACK PAIN WITHOUT SCIATICA: ICD-10-CM

## 2023-02-10 RX ORDER — HYDROCODONE BITARTRATE AND ACETAMINOPHEN 7.5; 325 MG/1; MG/1
1 TABLET ORAL EVERY 8 HOURS PRN
Qty: 90 TABLET | Refills: 0 | Status: SHIPPED | OUTPATIENT
Start: 2023-02-10 | End: 2023-02-10 | Stop reason: SDUPTHER

## 2023-02-10 RX ORDER — HYDROCODONE BITARTRATE AND ACETAMINOPHEN 7.5; 325 MG/1; MG/1
1 TABLET ORAL EVERY 8 HOURS PRN
Qty: 90 TABLET | Refills: 0 | Status: SHIPPED | OUTPATIENT
Start: 2023-02-10 | End: 2023-03-16 | Stop reason: SDUPTHER

## 2023-02-10 NOTE — TELEPHONE ENCOUNTER
In Epic says d/c'd by [facility] and the reason was reorder  Was it the way the 2 scripts were entered?

## 2023-02-10 NOTE — TELEPHONE ENCOUNTER
Caller: Vineet    Doctor: Stephanie Anderson    Reason for call: patient states he will run out of medication he needs to straighten it out with MG I offered to move up appt but he states 2 month f/u for 4/11 is correct he would like to speak with SALONI     Call back#: [phone]

## 2023-02-10 NOTE — TELEPHONE ENCOUNTER
Script has Note to Pharmacy: Do not fill before 03/07/2023  Pt needs script for February  Please advise

## 2023-02-10 NOTE — TELEPHONE ENCOUNTER
S/W pt  Pt stated the pharmacy only has the March 7th script for [address] pt will call the pharmacy  S/W Susy Kearns at the pharmacy  He stated they have the script for 3/7 for the Norco and the February script was cancelled  Please advise

## 2023-02-13 ENCOUNTER — ANTICOAG VISIT (OUTPATIENT)
Dept: CARDIOLOGY CLINIC | Facility: CLINIC | Age: 68
End: 2023-02-13

## 2023-02-13 ENCOUNTER — APPOINTMENT (OUTPATIENT)
Dept: LAB | Facility: CLINIC | Age: 68
End: 2023-02-13

## 2023-02-13 DIAGNOSIS — I48.0 PAROXYSMAL ATRIAL FIBRILLATION (HCC): ICD-10-CM

## 2023-02-13 DIAGNOSIS — I48.0 PAROXYSMAL ATRIAL FIBRILLATION (HCC): Primary | ICD-10-CM

## 2023-02-13 LAB
INR PPP: 2.05 (ref 0.84–1.19)
PROTHROMBIN TIME: 23.4 SECONDS (ref 11.6–14.5)

## 2023-02-27 ENCOUNTER — ANTICOAG VISIT (OUTPATIENT)
Dept: CARDIOLOGY CLINIC | Facility: CLINIC | Age: 68
End: 2023-02-27

## 2023-02-27 ENCOUNTER — APPOINTMENT (OUTPATIENT)
Dept: LAB | Facility: CLINIC | Age: 68
End: 2023-02-27

## 2023-02-27 DIAGNOSIS — I48.0 PAROXYSMAL ATRIAL FIBRILLATION (HCC): Primary | ICD-10-CM

## 2023-03-15 ENCOUNTER — TELEPHONE (OUTPATIENT)
Dept: PAIN MEDICINE | Facility: MEDICAL CENTER | Age: 68
End: 2023-03-15

## 2023-03-15 NOTE — TELEPHONE ENCOUNTER
RN s/w Bhakti Marks the pharmacist from [facility]  Per Bhakti Marks, the last script that was filled was from 2/10 and picked up on 2/13  All other scripts were electronically cx  RN asked about a script that should have been a DNF date of 3/7? Per Bhakti Marks it was cx and they need a new script  Pt will see [facility] on 4/11      Has a few tabs left as of today 3/15    Please send new script to get pt to his next appt with [facility]

## 2023-03-15 NOTE — TELEPHONE ENCOUNTER
Pt contacted Call Center requested refill of their medication          Medication Name: Norco       Dosage of Med: 7 5 mg -325 mg       Frequency of Med: 1 tab every 8 hours as needed       Remaining Medication:  Few days left

## 2023-03-16 DIAGNOSIS — G89.4 CHRONIC PAIN SYNDROME: ICD-10-CM

## 2023-03-16 DIAGNOSIS — M54.50 CHRONIC BILATERAL LOW BACK PAIN WITHOUT SCIATICA: ICD-10-CM

## 2023-03-16 DIAGNOSIS — G89.29 CHRONIC BILATERAL LOW BACK PAIN WITHOUT SCIATICA: ICD-10-CM

## 2023-03-16 RX ORDER — HYDROCODONE BITARTRATE AND ACETAMINOPHEN 7.5; 325 MG/1; MG/1
1 TABLET ORAL EVERY 8 HOURS PRN
Qty: 90 TABLET | Refills: 0 | Status: SHIPPED | OUTPATIENT
Start: 2023-03-16

## 2023-03-16 NOTE — TELEPHONE ENCOUNTER
RN spoke with Pt  Notified Pt that prescription refill request was granted and sent to preferred pharmacy

## 2023-03-20 ENCOUNTER — ANTICOAG VISIT (OUTPATIENT)
Dept: CARDIOLOGY CLINIC | Facility: CLINIC | Age: 68
End: 2023-03-20

## 2023-03-20 ENCOUNTER — APPOINTMENT (OUTPATIENT)
Dept: LAB | Facility: CLINIC | Age: 68
End: 2023-03-20

## 2023-03-20 DIAGNOSIS — I48.0 PAROXYSMAL ATRIAL FIBRILLATION (HCC): Primary | ICD-10-CM

## 2023-04-05 ENCOUNTER — RA CDI HCC (OUTPATIENT)
Dept: OTHER | Facility: HOSPITAL | Age: 68
End: 2023-04-05

## 2023-04-13 PROBLEM — K68.12 PSOAS ABSCESS (HCC): Chronic | Status: RESOLVED | Noted: 2023-01-03 | Resolved: 2023-04-13

## 2023-04-13 PROBLEM — R79.89 ELEVATED TROPONIN: Status: RESOLVED | Noted: 2020-08-12 | Resolved: 2023-04-13

## 2023-04-13 PROBLEM — R29.898 LEFT ARM WEAKNESS: Status: RESOLVED | Noted: 2020-11-21 | Resolved: 2023-04-13

## 2023-04-13 PROBLEM — R77.8 ELEVATED TROPONIN: Status: RESOLVED | Noted: 2020-08-12 | Resolved: 2023-04-13

## 2023-05-10 ENCOUNTER — APPOINTMENT (OUTPATIENT)
Dept: LAB | Facility: CLINIC | Age: 68
End: 2023-05-10

## 2023-05-11 ENCOUNTER — ANTICOAG VISIT (OUTPATIENT)
Dept: CARDIOLOGY CLINIC | Facility: CLINIC | Age: 68
End: 2023-05-11

## 2023-05-11 DIAGNOSIS — I48.0 PAROXYSMAL ATRIAL FIBRILLATION (HCC): Primary | ICD-10-CM

## 2023-05-24 ENCOUNTER — APPOINTMENT (OUTPATIENT)
Dept: LAB | Facility: CLINIC | Age: 68
End: 2023-05-24

## 2023-05-25 ENCOUNTER — ANTICOAG VISIT (OUTPATIENT)
Dept: CARDIOLOGY CLINIC | Facility: CLINIC | Age: 68
End: 2023-05-25

## 2023-05-25 DIAGNOSIS — I48.0 PAROXYSMAL ATRIAL FIBRILLATION (HCC): Primary | ICD-10-CM

## 2023-06-08 ENCOUNTER — APPOINTMENT (OUTPATIENT)
Dept: LAB | Facility: CLINIC | Age: 68
End: 2023-06-08
Payer: MEDICARE

## 2023-06-08 ENCOUNTER — OFFICE VISIT (OUTPATIENT)
Dept: PAIN MEDICINE | Facility: MEDICAL CENTER | Age: 68
End: 2023-06-08
Payer: MEDICARE

## 2023-06-08 VITALS — WEIGHT: 234 LBS | HEIGHT: 66 IN | BODY MASS INDEX: 37.61 KG/M2

## 2023-06-08 DIAGNOSIS — M46.1 SACROILIITIS (HCC): Primary | ICD-10-CM

## 2023-06-08 DIAGNOSIS — M47.816 LUMBAR SPONDYLOSIS: ICD-10-CM

## 2023-06-08 DIAGNOSIS — Z79.01 WARFARIN ANTICOAGULATION: ICD-10-CM

## 2023-06-08 DIAGNOSIS — M96.1 POSTLAMINECTOMY SYNDROME OF LUMBAR REGION: ICD-10-CM

## 2023-06-08 DIAGNOSIS — M46.1 SACROILIITIS (HCC): ICD-10-CM

## 2023-06-08 DIAGNOSIS — Z98.1 HISTORY OF LUMBAR FUSION: ICD-10-CM

## 2023-06-08 DIAGNOSIS — F11.20 UNCOMPLICATED OPIOID DEPENDENCE (HCC): ICD-10-CM

## 2023-06-08 DIAGNOSIS — M54.50 CHRONIC BILATERAL LOW BACK PAIN WITHOUT SCIATICA: ICD-10-CM

## 2023-06-08 DIAGNOSIS — G89.4 CHRONIC PAIN SYNDROME: Primary | ICD-10-CM

## 2023-06-08 DIAGNOSIS — G89.29 CHRONIC BILATERAL LOW BACK PAIN WITHOUT SCIATICA: ICD-10-CM

## 2023-06-08 DIAGNOSIS — Z79.891 LONG-TERM CURRENT USE OF OPIATE ANALGESIC: ICD-10-CM

## 2023-06-08 PROCEDURE — 99214 OFFICE O/P EST MOD 30 MIN: CPT | Performed by: PHYSICIAN ASSISTANT

## 2023-06-08 RX ORDER — BACLOFEN 10 MG/1
10 TABLET ORAL 2 TIMES DAILY PRN
Qty: 60 TABLET | Refills: 0 | Status: SHIPPED | OUTPATIENT
Start: 2023-06-08

## 2023-06-08 RX ORDER — HYDROCODONE BITARTRATE AND ACETAMINOPHEN 7.5; 325 MG/1; MG/1
1 TABLET ORAL EVERY 6 HOURS PRN
Qty: 120 TABLET | Refills: 0 | Status: SHIPPED | OUTPATIENT
Start: 2023-06-08

## 2023-06-08 NOTE — PATIENT INSTRUCTIONS
Sacroiliac Joint Injection   WHAT YOU NEED TO KNOW:   What do I need to know about a sacroiliac joint injection? A sacroiliac (SI) joint injection is done to diagnose or treat pain from sacroiliac joint syndrome  The pain caused by this syndrome may be felt in your lower back, buttocks, groin, and your thigh  How do I prepare for an SI injection? Your healthcare provider will ask you to not take any pain medicine the day of the injection  Ask him or her if there are any other medicines you should not take  You will need to find someone to drive you home after your procedure  What will happen during the SI injection? You will be awake for your injection  You may be given calming medicine if you are anxious  You will lie on your stomach with a pillow under your abdomen  Your healthcare provider will give you an injection of medicine to numb the area  He or she may use an x-ray, ultrasound, or CT scan to find the area to inject  You may also be given an injection of contrast material to help your SI joint show up better  Then, your healthcare provider will inject local anesthesia, antiinflammatory medicine, or both into your SI joint  Healthcare providers will watch you closely for any problems for up to 30 minutes after your injection  Your healthcare provider will check to see if your pain decreases after the injection  What are the risks of an SI injection? You may have some weakness for a short time after your injection  The SI injection can cause bleeding, infection, and pain  It can also cause temporary weakness in your leg and problems urinating  You may have an allergic reaction to the medicine that is injected into your SI joint  Your pain may return and you may need more treatment  CARE AGREEMENT:   You have the right to help plan your care  Learn about your health condition and how it may be treated   Discuss treatment options with your healthcare providers to decide what care you want to receive  You always have the right to refuse treatment  The above information is an  only  It is not intended as medical advice for individual conditions or treatments  Talk to your doctor, nurse or pharmacist before following any medical regimen to see if it is safe and effective for you  © Copyright [employer] 2022 Information is for End User's use only and may not be sold, redistributed or otherwise used for commercial purposes

## 2023-06-08 NOTE — PROGRESS NOTES
Assessment:  1  Chronic pain syndrome    2  Uncomplicated opioid dependence ([address] )    3  Long-term current use of opiate analgesic    4  Sacroiliitis ([address] )    5  Chronic bilateral low back pain without sciatica    6  Lumbar spondylosis    7  Postlaminectomy syndrome of lumbar region    8  History of lumbar fusion        Plan:  While the patient was in the office today, I did have a thorough conversation regarding their chronic pain syndrome, medication management, and treatment plan options  Based on patient report and physical exam, the patient's symptomatology does seem to be consistent with sacroiliac mediated pain from sacroiliitis  We will schedule the patient for a left SIJ injection to decrease any inflammatory component of the patient's pain symptoms  The patient remains clinically stable on the current medication regimen of Norco 7 5/325 4 times daily as needed pain  I have provided refills with fill dates of today 6/8/2023 and a do not fill date of 7/6/2023  He is not noticing much relief from the methocarbamol therefore I have discontinued that and place an order for baclofen 10 mg twice daily as needed  Follow-up 4 weeks after the procedure and 2 months for medication follow-up  There are risks associated with opioid medications, including dependence, addiction and tolerance  The patient understands and agrees to use these medications only as prescribed  Potential side effects of the medications include, but are not limited to, constipation, drowsiness, addiction, impaired judgment and risk of fatal overdose if not taken as prescribed  The patient was warned against driving while taking sedation medications  Sharing medications is a felony  At this point in time, the patient is showing no signs of addiction, abuse, diversion or suicidal ideation  A urine drug screen was collected at today's office visit as part of our medication management protocol   The point of care testing results were appropriate for what was being prescribed  The specimen will be sent for confirmatory testing  The drug screen is medically necessary because the patient is either dependent on opioid medication or is being considered for opioid medication therapy and the results could impact ongoing or future treatment  The drug screen is to evaluate for the presences or absence of prescribed, non-prescribed, and/or illicit drugs/substances  [place] Prescription Drug Monitoring Program report was reviewed and was appropriate     My impressions and treatment recommendations were discussed in detail with the patient who verbalized understanding and had no further questions  Discharge instructions were provided  I personally saw and examined the patient and I agree with the above discussed plan of care  Orders Placed This Encounter   Procedures   • FL spine and pain procedure     Standing Status:   Future     Standing Expiration Date:   6/8/2027     Order Specific Question:   Reason for Exam:     Answer:   LT SIJ     Order Specific Question:   Anticoagulant hold needed? Answer:   no   • MM ALL_Prescribed Meds and Special Instructions     Order Specific Question:   [place] Is ATORVASTATIN prescribed? Answer:   Yes     Order Specific Question:   [place] Is HYDROCODONE/APAP prescribed? Answer:   Yes     Order Specific Question:   [place] Is METHOCARBAMOL prescribed? Answer:   Yes     Order Specific Question:   [place] Is NALOXONE Prescribed? Answer:    Yes   • MM DT_Alprazolam Definitive Test   • MM DT_Amphetamine Definitive Test   • MM DT_Aripiprazole Definitive Test   • MM DT_Bath Salts Definitive Test   • MM DT_Buprenorphine Definitive Test   • MM DT_Butalbital Definitive Test   • MM DT_Clonazepam Definitive Test   • MM DT_Clozapine Definitive Test   • MM DT_Cocaine Definitive Test   • MM DT_Codeine Definitive Test   • MM DT_Desipramine Definitive Test   • MM DT_Dextromethorphan Definitive Test   • MM Diazepam Definitive Test   • MM DT_Ethyl Glucuronide/Ethyl Sulfate Definitive Test   • MM DT_Fentanyl Definitive Test   • MM DT_Haloperidol Definitive Test   • MM DT_Heroin Definitive Test   • MM DT_Hydrocodone Definitive Test   • MM DT_Hydromorphone Definitive Test   • MM DT_Imipramine Definitive Test   • MM DT_Kratom Definitive Test   • MM Lorazepam Definitive Test   • MM DT_Levorphanol Definitive Test   • MM DT_MDMA Definitive Test   • MM DT_Meperidine Definitive Test   • MM DT_Methadone Definitive Test   • MM DT_Methamphetamine Definitive Test   • MM DT_Morphine Definitive Test   • MM DT_Olanzapine Definitive Test   • MM DT_Oxazepam Definitive Test   • MM DT_Oxycodone Definitive Test   • MM DT_Oxymorphone Definitive Test   • MM DT_Phencyclidine Definitive Test   • MM DT_Phentermine Definitive Test   • MM DT_Quetiapine Definitive Test   • MM DT_Risperidone Definitive Test   • MM DT_Secobarbital Definitive Test   • MM DT_Spice Definitive Test   • MM DT_Tapentadol Definitive Test   • MM DT_Temazapam Definitive Test   • MM DT_THC Definitive Test   • MM DT_Tramadol Definitive Test   • MM DT_Validity Creatinine   • MM DT_Validity Oxidant   • MM DT_Validity pH   • MM DT_Validity Specific     New Medications Ordered This Visit   Medications   • baclofen 10 mg tablet     Sig: Take 1 tablet (10 mg total) by mouth 2 (two) times a day as needed for muscle spasms     Dispense:  60 tablet     Refill:  0   • HYDROcodone-acetaminophen (Norco) 7 5-325 mg per tablet     Sig: Take 1 tablet by mouth every 6 (six) hours as needed for pain For ongoing therapy Max Daily Amount: 4 tablets     Dispense:  120 tablet     Refill:  0   • HYDROcodone-acetaminophen (Norco) 7 5-325 mg per tablet     Sig: Take 1 tablet by mouth every 6 (six) hours as needed for pain For ongoing therapy Max Daily Amount: 4 tablets     Dispense:  120 tablet     Refill:  0     Do not fill before 07/06/2023       History of Present Illness:  Augusto Acevedo Alissa Royal is a 79 y o  male who presents for a follow up office visit in regards to chronic back pain  The patient’s current symptoms include chronic low back pain that he presently rates a 5 out of 10 on the pain scale and describes it as a constant sharp pain primarily in the left side  He occasionally has pain that radiates into the left anterior thigh but the majority of it remains in the back  He has significant difficulty with prolonged standing, bending and walking  He finds relief with sitting and lying down  He is presently maintained on Norco 7 5/325 4 times daily as needed it is providing moderate relief without side effects  He is unable to report any improvement with the methocarbamol  Opioid contract date 2/6/2023    Last UDS Date 06/08/2023    I have personally reviewed and/or updated the patient's past medical history, past surgical history, family history, social history, current medications, allergies, and vital signs today  Review of Systems   Constitutional: Negative for chills and fever  HENT: Negative for ear pain and sore throat  Eyes: Negative for pain and visual disturbance  Respiratory: Negative for cough and shortness of breath  Cardiovascular: Negative for chest pain and palpitations  Gastrointestinal: Negative for abdominal pain and vomiting  Genitourinary: Negative for dysuria and hematuria  Musculoskeletal: Positive for arthralgias, back pain, gait problem and myalgias  Skin: Negative for color change and rash  Neurological: Negative for seizures and syncope  All other systems reviewed and are negative        Patient Active Problem List   Diagnosis   • Essential hypertension   • Diabetes mellitus type 2 in obese ([facility] )   • Vitamin D deficiency   • Class 2 severe obesity due to excess calories with serious comorbidity and body mass index (BMI) of 37 0 to 37 9 in adult [facility])   • Osteoarthritis of left knee   • Bladder wall thickening   • Status post left knee replacement   • Paroxysmal atrial fibrillation (HCC)   • Dyslipidemia   • Skin mole   • GONSALEZ (dyspnea on exertion)   • Stenosis of lumbosacral spine       Past Medical History:   Diagnosis Date   • Acute respiratory failure with hypoxia and hypercapnia ([place] [address] ) 08/17/2020   • LAURENCE (acute kidney injury) ([place] [address] ) 08/16/2020   • Arthritis     knees   • Community acquired pneumonia of left lower lobe of lung 08/12/2020   • Diabetes mellitus ([place] [address] )    • Hypertension    • Measles, Indonesian (rubella)    • Mumps    • Obesity    • Pneumonia 8/2020   • Psoas abscess ([place] [address] ) 1/3/2023   • Snoring     Loudly       Past Surgical History:   Procedure Laterality Date   • CYSTOSCOPY     • JOINT REPLACEMENT  07/2018   • KNEE ARTHROSCOPY Left     knee   • ORIF PELVIC FRACTURE     • SPINAL FUSION  05/06/2022   • TOTAL KNEE ARTHROPLASTY Left 06/10/2019    Procedure: ARTHROPLASTY KNEE TOTAL;  Surgeon: Ethel Reid MD;  Location: [facility] OR;  Service: Orthopedics       Family History   Problem Relation Age of Onset   • Ovarian cancer Mother    • Heart disease Mother    • Diabetes type II Mother    • Colon cancer Father    • Heart disease Father    • Nephrolithiasis Father    • Cancer Father        Social History     Occupational History   • Not on file   Tobacco Use   • Smoking status: Never   • Smokeless tobacco: Never   Vaping Use   • Vaping Use: Never used   Substance and Sexual Activity   • Alcohol use: Not Currently     Alcohol/week: 1 0 standard drink of alcohol     Types: 1 Standard drinks or equivalent per week     Comment: Occasionally, not in excess - As per [employer]    • Drug use: Never   • Sexual activity: Not Currently       Current Outpatient Medications on File Prior to Visit   Medication Sig   • ACCU-CHEK LARRY PLUS test strip    • ACCU-CHEK FASTCLIX LANCETS MISC    • amLODIPine (NORVASC) 10 mg tablet Take 1 tablet (10 mg total) by mouth daily   • atorvastatin (LIPITOR) 20 mg tablet Take 1 tablet (20 mg total) by mouth daily "  • Blood Glucose Monitoring Suppl (OneTouch Verio Reflect) w/Device KIT Check blood sugars once daily  Please substitute with appropriate alternative as covered by patient's insurance  Dx: E11 65   • glipiZIDE (GLUCOTROL XL) 5 mg 24 hr tablet Take 1 tablet (5 mg total) by mouth daily   • lisinopril (ZESTRIL) 10 mg tablet Take 1 tablet (10 mg total) by mouth daily   • metFORMIN (GLUCOPHAGE-XR) 500 mg 24 hr tablet Take 4 tablets (2,000 mg total) by mouth daily   • Metoprolol Tartrate 75 MG TABS Take 1 tablet (75 mg total) by mouth 2 (two) times a day   • naloxone (NARCAN) 4 mg/0 1 mL nasal spray Administer 1 spray into a nostril  If no response after 2-3 minutes, give another dose in the other nostril using a new spray  • warfarin (COUMADIN) 5 mg tablet Take 1 tablet (5 mg total) by mouth daily   • [DISCONTINUED] HYDROcodone-acetaminophen (Norco) 7 5-325 mg per tablet Take 1 tablet by mouth every 6 (six) hours as needed for pain For ongoing therapy Max Daily Amount: 4 tablets   • [DISCONTINUED] HYDROcodone-acetaminophen (Norco) 7 5-325 mg per tablet Take 1 tablet by mouth every 6 (six) hours as needed for pain For ongoing therapy Max Daily Amount: 4 tablets   • [DISCONTINUED] methocarbamol (Robaxin-750) 750 mg tablet Take 1 tablet (750 mg total) by mouth every 8 (eight) hours as needed for muscle spasms     No current facility-administered medications on file prior to visit  No Known Allergies    Physical Exam:    Ht 5' 6\" (1 676 m)   Wt 106 kg (234 lb)   BMI 37 77 kg/m²     Constitutional:normal, well developed, well nourished, alert, in no distress and non-toxic and no overt pain behavior    Eyes:anicteric  HEENT:grossly intact  Neck:supple, symmetric, trachea midline and no masses   Pulmonary:even and unlabored  Cardiovascular:No edema or pitting edema present  Skin:Normal without rashes or lesions and well hydrated  Psychiatric:Mood and affect appropriate  Neurologic:Cranial Nerves II-XII grossly " intact  Musculoskeletal: Gait is stable but slightly forward leaning  Lumbar scar from prior fusion surgery  Tender left sacroiliac joint  Left + Brianna finger sign + Patricks test +Gaenslen's test+ Posterior pelvic pain provocation, limited range of motion of the lumbar spine with extension      Imaging

## 2023-06-09 ENCOUNTER — ANTICOAG VISIT (OUTPATIENT)
Dept: CARDIOLOGY CLINIC | Facility: CLINIC | Age: 68
End: 2023-06-09

## 2023-06-09 DIAGNOSIS — I48.0 PAROXYSMAL ATRIAL FIBRILLATION (HCC): Primary | ICD-10-CM

## 2023-06-10 LAB
6MAM UR QL CFM: NEGATIVE NG/ML
7AMINOCLONAZEPAM UR QL CFM: NEGATIVE NG/ML
A-OH ALPRAZ UR QL CFM: NEGATIVE NG/ML
ACCEPTABLE CREAT UR QL: NORMAL MG/DL
ACCEPTIBLE SP GR UR QL: NORMAL
AMPHET UR QL CFM: NEGATIVE NG/ML
AMPHET UR QL CFM: NEGATIVE NG/ML
BUPRENORPHINE UR QL CFM: NEGATIVE NG/ML
BUTALBITAL UR QL CFM: NEGATIVE NG/ML
BZE UR QL CFM: NEGATIVE NG/ML
CODEINE UR QL CFM: NEGATIVE NG/ML
DESIPRAMINE UR QL CFM: NEGATIVE NG/ML
DESIPRAMINE UR QL CFM: NEGATIVE NG/ML
EDDP UR QL CFM: NEGATIVE NG/ML
ETHYL GLUCURONIDE UR QL CFM: NEGATIVE NG/ML
ETHYL SULFATE UR QL SCN: NEGATIVE NG/ML
EUTYLONE UR QL: NEGATIVE NG/ML
FENTANYL UR QL CFM: NEGATIVE NG/ML
GLIADIN IGG SER IA-ACNC: NEGATIVE NG/ML
GLUCOSE 30M P 50 G LAC PO SERPL-MCNC: NEGATIVE NG/ML
HYDROCODONE UR QL CFM: NORMAL NG/ML
HYDROCODONE UR QL CFM: NORMAL NG/ML
HYDROMORPHONE UR QL CFM: NORMAL NG/ML
IMIPRAMINE UR QL CFM: NEGATIVE NG/ML
LORAZEPAM UR QL CFM: NEGATIVE NG/ML
MDMA UR QL CFM: NEGATIVE NG/ML
MEPERIDINE UR QL CFM: NEGATIVE NG/ML
METHADONE UR QL CFM: NEGATIVE NG/ML
METHAMPHET UR QL CFM: NEGATIVE NG/ML
MORPHINE UR QL CFM: NEGATIVE NG/ML
MORPHINE UR QL CFM: NEGATIVE NG/ML
NITRITE UR QL: NORMAL UG/ML
NORBUPRENORPHINE UR QL CFM: NEGATIVE NG/ML
NORDIAZEPAM UR QL CFM: NEGATIVE NG/ML
NORFENTANYL UR QL CFM: NEGATIVE NG/ML
NORHYDROCODONE UR QL CFM: NORMAL NG/ML
NORHYDROCODONE UR QL CFM: NORMAL NG/ML
NORMEPERIDINE UR QL CFM: NEGATIVE NG/ML
NOROXYCODONE UR QL CFM: NEGATIVE NG/ML
OLANZAPINE QUANTIFICATION: NEGATIVE NG/ML
OPC-3373 QUANTIFICATION: NEGATIVE
OXAZEPAM UR QL CFM: NEGATIVE NG/ML
OXYCODONE UR QL CFM: NEGATIVE NG/ML
OXYMORPHONE UR QL CFM: NEGATIVE NG/ML
OXYMORPHONE UR QL CFM: NEGATIVE NG/ML
PARA-FLUOROFENTANYL QUANTIFICATION: NORMAL NG/ML
PCP UR QL CFM: NEGATIVE NG/ML
RESULT ALL_PRESCRIBED MEDS AND SPECIAL INSTRUCTIONS: NORMAL
SECOBARBITAL UR QL CFM: NEGATIVE NG/ML
SL AMB 4-ANPP QUANTIFICATION: NORMAL NG/ML
SL AMB 5F-ADB-M7 METABOLITE QUANTIFICATION: NEGATIVE NG/ML
SL AMB 7-OH-MITRAGYNINE (KRATOM ALKALOID) QUANTIFICATION: NEGATIVE NG/ML
SL AMB AB-FUBINACA-M3 METABOLITE QUANTIFICATION: NEGATIVE NG/ML
SL AMB ACETYL FENTANYL QUANTIFICATION: NORMAL NG/ML
SL AMB ACETYL NORFENTANYL QUANTIFICATION: NORMAL NG/ML
SL AMB ACRYL FENTANYL QUANTIFICATION: NORMAL NG/ML
SL AMB CARFENTANIL QUANTIFICATION: NORMAL NG/ML
SL AMB CLOZAPINE QUANTIFICATION: NEGATIVE NG/ML
SL AMB CTHC (MARIJUANA METABOLITE) QUANTIFICATION: NEGATIVE NG/ML
SL AMB DEXTROMETHORPHAN QUANTIFICATION: NEGATIVE NG/ML
SL AMB DEXTRORPHAN (DEXTROMETHORPHAN METABOLITE) QUANT: NEGATIVE NG/ML
SL AMB DEXTRORPHAN (DEXTROMETHORPHAN METABOLITE) QUANT: NEGATIVE NG/ML
SL AMB HALOPERIDOL  QUANTIFICATION: NEGATIVE NG/ML
SL AMB HALOPERIDOL METABOLITE QUANTIFICATION: NEGATIVE NG/ML
SL AMB HYDROXYRISPERIDONE QUANTIFICATION: NEGATIVE NG/ML
SL AMB JWH018 METABOLITE QUANTIFICATION: NEGATIVE NG/ML
SL AMB JWH073 METABOLITE QUANTIFICATION: NEGATIVE NG/ML
SL AMB MDMB-FUBINACA-M1 METABOLITE QUANTIFICATION: NEGATIVE NG/ML
SL AMB METHYLONE QUANTIFICATION: NEGATIVE NG/ML
SL AMB N-DESMETHYL-TRAMADOL QUANTIFICATION: NEGATIVE NG/ML
SL AMB N-DESMETHYLCLOZAPINE QUANTIFICATION: NEGATIVE NG/ML
SL AMB NORQUETIAPINE QUANTIFICATION: NEGATIVE NG/ML
SL AMB PHENTERMINE QUANTIFICATION: NEGATIVE NG/ML
SL AMB QUETIAPINE QUANTIFICATION: NEGATIVE NG/ML
SL AMB RCS4 METABOLITE QUANTIFICATION: NEGATIVE NG/ML
SL AMB RISPERIDONE QUANTIFICATION: NEGATIVE NG/ML
SPECIMEN DRAWN SERPL: NEGATIVE NG/ML
SPECIMEN PH ACCEPTABLE UR: NORMAL
TAPENTADOL UR QL CFM: NEGATIVE NG/ML
TEMAZEPAM UR QL CFM: NEGATIVE NG/ML
TEMAZEPAM UR QL CFM: NEGATIVE NG/ML
TRAMADOL UR QL CFM: NEGATIVE NG/ML
URATE/CREAT 24H UR: NEGATIVE NG/ML

## 2023-06-26 ENCOUNTER — ANTICOAG VISIT (OUTPATIENT)
Dept: CARDIOLOGY CLINIC | Facility: CLINIC | Age: 68
End: 2023-06-26

## 2023-06-26 ENCOUNTER — APPOINTMENT (OUTPATIENT)
Dept: LAB | Facility: CLINIC | Age: 68
End: 2023-06-26
Payer: MEDICARE

## 2023-06-26 DIAGNOSIS — M46.1 SACROILIITIS (HCC): ICD-10-CM

## 2023-06-26 DIAGNOSIS — I48.0 PAROXYSMAL ATRIAL FIBRILLATION (HCC): Primary | ICD-10-CM

## 2023-06-26 DIAGNOSIS — Z79.01 WARFARIN ANTICOAGULATION: ICD-10-CM

## 2023-06-27 ENCOUNTER — HOSPITAL ENCOUNTER (OUTPATIENT)
Dept: RADIOLOGY | Facility: MEDICAL CENTER | Age: 68
Discharge: HOME | End: 2023-06-27
Payer: MEDICARE

## 2023-06-27 VITALS
HEART RATE: 60 BPM | DIASTOLIC BLOOD PRESSURE: 92 MMHG | RESPIRATION RATE: 16 BRPM | OXYGEN SATURATION: 95 % | TEMPERATURE: 99 F | SYSTOLIC BLOOD PRESSURE: 131 MMHG

## 2023-06-27 DIAGNOSIS — M46.1 SACROILIITIS (HCC): ICD-10-CM

## 2023-06-27 PROCEDURE — 27096 INJECT SACROILIAC JOINT: CPT | Performed by: PHYSICAL MEDICINE & REHABILITATION

## 2023-06-27 RX ORDER — METHYLPREDNISOLONE ACETATE 40 MG/ML
40 INJECTION, SUSPENSION INTRA-ARTICULAR; INTRALESIONAL; INTRAMUSCULAR; PARENTERAL; SOFT TISSUE ONCE
Status: COMPLETED | OUTPATIENT
Start: 2023-06-27 | End: 2023-06-27

## 2023-06-27 RX ORDER — BUPIVACAINE HCL/PF 2.5 MG/ML
1.5 VIAL (ML) INJECTION ONCE
Status: COMPLETED | OUTPATIENT
Start: 2023-06-27 | End: 2023-06-27

## 2023-06-27 RX ADMIN — Medication 1.5 ML: at 14:45

## 2023-06-27 RX ADMIN — METHYLPREDNISOLONE ACETATE 40 MG: 40 INJECTION, SUSPENSION INTRA-ARTICULAR; INTRALESIONAL; INTRAMUSCULAR; PARENTERAL; SOFT TISSUE at 14:45

## 2023-06-27 RX ADMIN — IOHEXOL 0.5 ML: 300 INJECTION, SOLUTION INTRAVENOUS at 14:45

## 2023-06-27 NOTE — H&P
History of Present Illness: The patient is a 79 y o  male who presents with complaints of left lower back pain    Past Medical History:   Diagnosis Date   • Acute respiratory failure with hypoxia and hypercapnia ([facility] [address] ) 08/17/2020   • LA[facility]ENCE [facility]acute kidney injury) [facility] ) 08/16/2020   • Arthritis     knees   • Community acquired pneumonia of left lower lobe of lung 08/12/2020   • Diabetes mellitus ([facility] [address] )    • Hypertension    • Measles, Uzbek (rubella)    • Mumps    • Obesity    • Pneumonia 8/2020   • Psoas abscess ([facility] [address] ) 1/3/2023   • Snoring     Loudly       Past Surgical History:   Procedure Laterality Date   • CYSTOSCOPY     • JOINT REPLACEMENT  07/2018   • KNEE ARTHROSCOPY Left     knee   • ORIF PELVIC FRACTURE     • SPINAL FUSION  05/06/2022   • TOTAL KNEE ARTHROPLASTY Left 06/10/2019    Procedure: ARTHROPLASTY KNEE TOTAL;  Surgeon: Kathy Alford MD;  Location: [facility] OR;  Service: Orthopedics         Current Outpatient Medications:   •  ACCU-CHEK LARRY PLUS test strip, , Disp: , Rfl:   •  ACCU-CHEK FASTCLIX LANCETS MISC, , Disp: , Rfl:   •  amLODIPine (NORVASC) 10 mg tablet, Take 1 tablet (10 mg total) by mouth daily, Disp: 30 tablet, Rfl: 5  •  atorvastatin (LIPITOR) 20 mg tablet, Take 1 tablet (20 mg total) by mouth daily, Disp: 30 tablet, Rfl: 5  •  baclofen 10 mg tablet, Take 1 tablet (10 mg total) by mouth 2 (two) times a day as needed for muscle spasms, Disp: 60 tablet, Rfl: 0  •  Blood Glucose Monitoring Suppl (OneTouch Verio Reflect) w/Device KIT, Check blood sugars once daily  Please substitute with appropriate alternative as covered by patient's insurance   Dx: E11 65, Disp: 1 kit, Rfl: 0  •  glipiZIDE (GLUCOTROL XL) 5 mg 24 hr tablet, Take 1 tablet (5 mg total) by mouth daily, Disp: 30 tablet, Rfl: 5  •  HYDROcodone-acetaminophen (Norco) 7 5-325 mg per tablet, Take 1 tablet by mouth every 6 (six) hours as needed for pain For ongoing therapy Max Daily Amount: 4 tablets, Disp: 120 tablet, Rfl: 0  • HYDROcodone-acetaminophen (Norco) 7 5-325 mg per tablet, Take 1 tablet by mouth every 6 (six) hours as needed for pain For ongoing therapy Max Daily Amount: 4 tablets, Disp: 120 tablet, Rfl: 0  •  lisinopril (ZESTRIL) 10 mg tablet, Take 1 tablet (10 mg total) by mouth daily, Disp: 30 tablet, Rfl: 5  •  metFORMIN (GLUCOPHAGE-XR) 500 mg 24 hr tablet, Take 4 tablets (2,000 mg total) by mouth daily, Disp: 120 tablet, Rfl: 5  •  Metoprolol Tartrate 75 MG TABS, Take 1 tablet (75 mg total) by mouth 2 (two) times a day, Disp: 60 tablet, Rfl: 5  •  naloxone (NARCAN) 4 mg/0 1 mL nasal spray, Administer 1 spray into a nostril  If no response after 2-3 minutes, give another dose in the other nostril using a new spray , Disp: 1 each, Rfl: 1  •  warfarin (COUMADIN) 5 mg tablet, Take 1 tablet (5 mg total) by mouth daily, Disp: 30 tablet, Rfl: 3    No Known Allergies    Physical Exam:   Vitals:    06/27/23 1424   BP: 150/96   Pulse: 71   Resp: 16   Temp: 99 °F (37 2 °C)   SpO2: 95%     General: Awake, Alert, Oriented x 3, Mood and affect appropriate  Respiratory: Respirations even and unlabored  Cardiovascular: Peripheral pulses intact; no edema  Musculoskeletal Exam: left sacroiliac pain    ASA Score: 3    Patient/Chart Verification  Patient ID Verified: Verbal  ID Band Applied: No  Consents Confirmed: Procedural, To be obtained in the Pre-Procedure area  H&P( within 30 days) Verified: To be obtained in the Pre-Procedure area  Allergies Reviewed: Yes  Anticoag/NSAID held?: No (INR 3 8 - JE aware)  Currently on antibiotics?: No    Assessment:   1   Sacroiliitis ([facility] [address] )        Plan: LT FERNANDEZ

## 2023-06-27 NOTE — DISCHARGE INSTR - LAB
Steroid Joint Injection   WHAT YOU NEED TO KNOW:   A steroid joint injection is a procedure to inject steroid medicine into a joint  Steroid medicine decreases pain and inflammation  The injection may also contain an anesthetic (numbing medicine) to decrease pain  It may be done to treat conditions such as arthritis, gout, or carpal tunnel syndrome  The injections may be given in your knee, ankle, shoulder, elbow, wrist, ankle or sacroiliac joint  Do not apply heat to any area that is numb  If you have discomfort or soreness at the injection site, you may apply ice today, 20 minutes on and 20 minutes off  Tomorrow you may use ice or warm, moist heat  Do not apply ice or heat directly to the skin  You may have an increase or change in the discomfort for 36-48 hours after your treatment  Apply ice and continue with any pain medicine you have been prescribed  Do not do anything strenuous today  You may shower, but no tub baths or hot tubs today  You may resume your normal activities tomorrow, but do not “overdo it”  Resume normal activities slowly when you are feeling better  If you experience redness, drainage or swelling at the injection site, or if you develop a fever above 100 degrees, please call [facility] at [phone] or go to the Emergency Room  Continue to take all routine medicines prescribed by your primary care physician unless otherwise instructed by our staff  Most blood thinners should be started again according to your regularly scheduled dosing  If you have any questions, please give our office a call  As no general anesthesia was used in today's procedure, you should not experience any side effects related to anesthesia  If you are diabetic, the steroids used in today's injection may temporarily increase your blood sugar levels after the first few days after your injection   Please keep a close eye on your sugars and alert the doctor who manages your diabetes if your sugars are significantly high from your baseline or you are symptomatic  If you have a problem specifically related to your procedure, please call our office at [phone]  Problems not related to your procedure should be directed to your primary care physician

## 2023-07-05 ENCOUNTER — TELEPHONE (OUTPATIENT)
Dept: PAIN MEDICINE | Facility: CLINIC | Age: 68
End: 2023-07-05

## 2023-07-10 DIAGNOSIS — I48.0 PAROXYSMAL ATRIAL FIBRILLATION (HCC): ICD-10-CM

## 2023-07-10 DIAGNOSIS — E66.9 DIABETES MELLITUS TYPE 2 IN OBESE (HCC): ICD-10-CM

## 2023-07-10 DIAGNOSIS — I10 ESSENTIAL HYPERTENSION: ICD-10-CM

## 2023-07-10 DIAGNOSIS — E11.69 DIABETES MELLITUS TYPE 2 IN OBESE (HCC): ICD-10-CM

## 2023-07-10 DIAGNOSIS — E78.5 DYSLIPIDEMIA: ICD-10-CM

## 2023-07-10 DIAGNOSIS — E11.21 DIABETIC NEPHROPATHY ASSOCIATED WITH TYPE 2 DIABETES MELLITUS (HCC): ICD-10-CM

## 2023-07-10 RX ORDER — GLIPIZIDE 5 MG/1
5 TABLET, FILM COATED, EXTENDED RELEASE ORAL DAILY
Qty: 30 TABLET | Refills: 5 | Status: SHIPPED | OUTPATIENT
Start: 2023-07-10

## 2023-07-10 RX ORDER — WARFARIN SODIUM 5 MG/1
5 TABLET ORAL DAILY
Qty: 30 TABLET | Refills: 3 | Status: SHIPPED | OUTPATIENT
Start: 2023-07-10 | End: 2024-07-09

## 2023-07-10 RX ORDER — METOPROLOL TARTRATE 75 MG/1
75 TABLET, FILM COATED ORAL 2 TIMES DAILY
Qty: 60 TABLET | Refills: 5 | Status: SHIPPED | OUTPATIENT
Start: 2023-07-10

## 2023-07-10 RX ORDER — METFORMIN HYDROCHLORIDE 500 MG/1
2000 TABLET, EXTENDED RELEASE ORAL DAILY
Qty: 120 TABLET | Refills: 5 | Status: SHIPPED | OUTPATIENT
Start: 2023-07-10

## 2023-07-10 RX ORDER — ATORVASTATIN CALCIUM 20 MG/1
20 TABLET, FILM COATED ORAL DAILY
Qty: 30 TABLET | Refills: 5 | Status: SHIPPED | OUTPATIENT
Start: 2023-07-10

## 2023-07-10 RX ORDER — LISINOPRIL 10 MG/1
10 TABLET ORAL DAILY
Qty: 30 TABLET | Refills: 5 | Status: SHIPPED | OUTPATIENT
Start: 2023-07-10

## 2023-07-10 RX ORDER — AMLODIPINE BESYLATE 10 MG/1
10 TABLET ORAL DAILY
Qty: 30 TABLET | Refills: 5 | Status: SHIPPED | OUTPATIENT
Start: 2023-07-10

## 2023-07-28 ENCOUNTER — ANTICOAG VISIT (OUTPATIENT)
Dept: CARDIOLOGY CLINIC | Facility: CLINIC | Age: 68
End: 2023-07-28

## 2023-07-28 ENCOUNTER — APPOINTMENT (OUTPATIENT)
Dept: LAB | Facility: CLINIC | Age: 68
End: 2023-07-28
Payer: MEDICARE

## 2023-07-28 ENCOUNTER — OFFICE VISIT (OUTPATIENT)
Dept: PAIN MEDICINE | Facility: MEDICAL CENTER | Age: 68
End: 2023-07-28

## 2023-07-28 VITALS
DIASTOLIC BLOOD PRESSURE: 59 MMHG | WEIGHT: 220 LBS | HEIGHT: 66 IN | OXYGEN SATURATION: 97 % | HEART RATE: 60 BPM | BODY MASS INDEX: 35.36 KG/M2 | SYSTOLIC BLOOD PRESSURE: 97 MMHG

## 2023-07-28 DIAGNOSIS — I48.0 PAROXYSMAL ATRIAL FIBRILLATION (HCC): Primary | ICD-10-CM

## 2023-07-28 DIAGNOSIS — M47.816 LUMBAR SPONDYLOSIS: ICD-10-CM

## 2023-07-28 DIAGNOSIS — M96.1 LUMBAR POSTLAMINECTOMY SYNDROME: ICD-10-CM

## 2023-07-28 DIAGNOSIS — M79.18 MYOFASCIAL PAIN SYNDROME: ICD-10-CM

## 2023-07-28 DIAGNOSIS — G89.29 CHRONIC BILATERAL LOW BACK PAIN WITHOUT SCIATICA: ICD-10-CM

## 2023-07-28 DIAGNOSIS — M54.50 CHRONIC BILATERAL LOW BACK PAIN WITHOUT SCIATICA: ICD-10-CM

## 2023-07-28 DIAGNOSIS — G89.4 CHRONIC PAIN SYNDROME: Primary | ICD-10-CM

## 2023-07-28 RX ORDER — METHOCARBAMOL 750 MG/1
750 TABLET, FILM COATED ORAL 3 TIMES DAILY PRN
Qty: 90 TABLET | Refills: 2 | Status: SHIPPED | OUTPATIENT
Start: 2023-07-28

## 2023-07-28 RX ORDER — HYDROCODONE BITARTRATE AND ACETAMINOPHEN 7.5; 325 MG/1; MG/1
1 TABLET ORAL EVERY 6 HOURS PRN
Qty: 120 TABLET | Refills: 0 | Status: SHIPPED | OUTPATIENT
Start: 2023-07-28

## 2023-07-28 NOTE — PROGRESS NOTES
Assessment:  1. Chronic pain syndrome    2. Lumbar postlaminectomy syndrome    3. Chronic bilateral low back pain without sciatica    4. Lumbar spondylosis    5. Myofascial pain syndrome        Plan:  While the patient was in the office today, I did have a thorough conversation regarding their chronic pain syndrome, medication management, and treatment plan options. The patient remains clinically stable and well-controlled on the current medication regimen. He is taking his medication as prescribed with no side effects. On today's visit I have electronically sent his Norco 7.5/325 every 6 hours as needed to his pharmacy. The patient has not yet picked up his July prescription therefore I will cancel the previously sent prescription and send a new one with a fill date of today 7/28/2023 and then a do not fill date of 8/26/23. I have also discontinued his baclofen due to him feeling too sleepy with that and placed him back on the methocarbamol 750 mg 3 times daily as needed spasm. The patient reports near complete resolution of the sacroiliac mediated pain following the SI joint injection that was done 1 month ago. He is aware that this injection can be repeated if the pain would return. There are risks associated with opioid medications, including dependence, addiction and tolerance. The patient understands and agrees to use these medications only as prescribed. Potential side effects of the medications include, but are not limited to, constipation, drowsiness, addiction, impaired judgment and risk of fatal overdose if not taken as prescribed. The patient was warned against driving while taking sedation medications. Sharing medications is a felony. At this point in time, the patient is showing no signs of addiction, abuse, diversion or suicidal ideation.     [address] Prescription Drug Monitoring Program report was reviewed and was appropriate     My impressions and treatment recommendations were discussed in detail with the patient who verbalized understanding and had no further questions. Discharge instructions were provided. I personally saw and examined the patient and I agree with the above discussed plan of care. No orders of the defined types were placed in this encounter. New Medications Ordered This Visit   Medications   • methocarbamol (Robaxin-750) 750 mg tablet     Sig: Take 1 tablet (750 mg total) by mouth 3 (three) times a day as needed for muscle spasms     Dispense:  90 tablet     Refill:  2   • HYDROcodone-acetaminophen (Norco) 7.5-325 mg per tablet     Sig: Take 1 tablet by mouth every 6 (six) hours as needed for pain For ongoing therapy Max Daily Amount: 4 tablets     Dispense:  120 tablet     Refill:  0       History of Present Illness:  Briana Barker is a 79 y.o. male who presents for a follow up office visit in regards to back pain. The patient’s current symptoms include chronic low back pain that he presently rates a 5 out of 10 on the pain scale describes it as an intermittent dull ache in the lumbar spine. Last month he underwent sacroiliac joint injections with near complete resolution of the pain that he was experiencing in that region. His residual pain is manageable and controlled on the current medication regimen of Norco 7.5/325 every 6 hours as needed severe pain. The patient denies any new symptoms and has no acute issues on today's visit. Opioid contract date 2/06/23    Last UDS Date 6/08/23     Hydrocodone last taken on 7/28 AM    I have personally reviewed and/or updated the patient's past medical history, past surgical history, family history, social history, current medications, allergies, and vital signs today. Review of Systems   Respiratory: Negative for shortness of breath. Cardiovascular: Negative for chest pain. Gastrointestinal: Negative for constipation, diarrhea, nausea and vomiting. Musculoskeletal: Positive for back pain.  Negative for arthralgias, gait problem, joint swelling and myalgias. Skin: Negative for rash. Neurological: Negative for dizziness, seizures and weakness. All other systems reviewed and are negative.       Patient Active Problem List   Diagnosis   • Essential hypertension   • Diabetes mellitus type 2 in obese ([address])   • Vitamin D deficiency   • Class 2 severe obesity due to excess calories with serious comorbidity and body mass index (BMI) of 37.0 to 37.9 in adult [facility])   • Osteoarthritis of left knee   • Bladder wall thickening   • Status post left knee replacement   • Paroxysmal atrial fibrillation (HCC)   • Dyslipidemia   • Skin mole   • GONSALEZ (dyspnea on exertion)   • Stenosis of lumbosacral spine   • Sacroiliitis (HCC)       Past Medical History:   Diagnosis Date   • Acute respiratory failure with hypoxia and hypercapnia ([address]) 08/17/2020   • LAURENCE (acute kidney injury) ([address]) 08/16/2020   • Arthritis     knees   • Community acquired pneumonia of left lower lobe of lung 08/12/2020   • Diabetes mellitus ([address])    • Hypertension    • Measles, Baham[place] (rubella)    • Mumps    • Obesity    • Pneumonia 8/2020   • Psoas abscess ([address]) 1/3/2023   • Snoring     Loudly       Past Surgical History:   Procedure Laterality Date   • CYSTOSCOPY     • JOINT REPLACEMENT  07/2018   • KNEE ARTHROSCOPY Left     knee   • ORIF PELVIC FRACTURE     • SPINAL FUSION  05/06/2022   • TOTAL KNEE ARTHROPLASTY Left 06/10/2019    Procedure: ARTHROPLASTY KNEE TOTAL;  Surgeon: Serjio Gaitan MD;  Location: [facility];  Service: Orthopedics       Family History   Problem Relation Age of Onset   • Ovarian cancer Mother    • Heart disease Mother    • Diabetes type II Mother    • Colon cancer Father    • Heart disease Father    • Nephrolithiasis Father    • Cancer Father        Social History     Occupational History   • Not on file   Tobacco Use   • Smoking status: Never   • Smokeless tobacco: Never   Vaping Use   • Vaping Use: Never used   Substance and Sexual Activity   • Alcohol use: Not Currently     Alcohol/week: 1.0 standard drink of alcohol     Types: 1 Standard drinks or equivalent per week     Comment: Occasionally, not in excess - As per [employer]    • Drug use: Never   • Sexual activity: Not Currently       Current Outpatient Medications on File Prior to Visit   Medication Sig   • amLODIPine (NORVASC) 10 mg tablet Take 1 tablet (10 mg total) by mouth daily   • atorvastatin (LIPITOR) 20 mg tablet Take 1 tablet (20 mg total) by mouth daily   • glipiZIDE (GLUCOTROL XL) 5 mg 24 hr tablet Take 1 tablet (5 mg total) by mouth daily   • lisinopril (ZESTRIL) 10 mg tablet Take 1 tablet (10 mg total) by mouth daily   • metFORMIN (GLUCOPHAGE-XR) 500 mg 24 hr tablet Take 4 tablets (2,000 mg total) by mouth daily   • Metoprolol Tartrate 75 MG TABS Take 1 tablet (75 mg total) by mouth 2 (two) times a day   • warfarin (COUMADIN) 5 mg tablet Take 1 tablet (5 mg total) by mouth daily   • [DISCONTINUED] baclofen 10 mg tablet Take 1 tablet (10 mg total) by mouth 2 (two) times a day as needed for muscle spasms   • [DISCONTINUED] HYDROcodone-acetaminophen (Norco) 7.5-325 mg per tablet Take 1 tablet by mouth every 6 (six) hours as needed for pain For ongoing therapy Max Daily Amount: 4 tablets   • ACCU-CHEK LARRY PLUS test strip  (Patient not taking: Reported on 7/28/2023)   • ACCU-CHEK FASTCLIX LANCETS MISC  (Patient not taking: Reported on 7/28/2023)   • Blood Glucose Monitoring Suppl (OneTouch Verio Reflect) w/Device KIT Check blood sugars once daily. Please substitute with appropriate alternative as covered by patient's insurance. Dx: E11.65 (Patient not taking: Reported on 7/28/2023)   • HYDROcodone-acetaminophen (Norco) 7.5-325 mg per tablet Take 1 tablet by mouth every 6 (six) hours as needed for pain For ongoing therapy Max Daily Amount: 4 tablets   • naloxone (NARCAN) 4 mg/0.1 mL nasal spray Administer 1 spray into a nostril.  If no response after 2-3 minutes, give another dose in the other nostril using a new spray. No current facility-administered medications on file prior to visit. No Known Allergies    Physical Exam:    BP 97/59   Pulse 60   Ht 5' 6" (1.676 m)   Wt 99.8 kg (220 lb)   SpO2 97%   BMI 35.51 kg/m²     Constitutional:normal, well developed, well nourished, alert, in no distress and non-toxic and no overt pain behavior.   Eyes:anicteric  HEENT:grossly intact  Neck:supple, symmetric, trachea midline and no masses   Pulmonary:even and unlabored  Cardiovascular:No edema or pitting edema present  Skin:Normal without rashes or lesions and well hydrated  Psychiatric:Mood and affect appropriate  Neurologic:Cranial Nerves II-XII grossly intact  Musculoskeletal: Lumbar scar from prior surgery    Imaging

## 2023-08-09 ENCOUNTER — RA CDI HCC (OUTPATIENT)
Dept: OTHER | Facility: HOSPITAL | Age: 68
End: 2023-08-09

## 2023-08-10 ENCOUNTER — APPOINTMENT (OUTPATIENT)
Dept: LAB | Facility: CLINIC | Age: 68
End: 2023-08-10
Payer: MEDICARE

## 2023-08-10 DIAGNOSIS — E11.69 DIABETES MELLITUS TYPE 2 IN OBESE (HCC): ICD-10-CM

## 2023-08-10 DIAGNOSIS — E78.5 DYSLIPIDEMIA: ICD-10-CM

## 2023-08-10 DIAGNOSIS — E11.21 DIABETIC NEPHROPATHY ASSOCIATED WITH TYPE 2 DIABETES MELLITUS (HCC): ICD-10-CM

## 2023-08-10 DIAGNOSIS — E66.9 DIABETES MELLITUS TYPE 2 IN OBESE (HCC): ICD-10-CM

## 2023-08-10 LAB
ALBUMIN SERPL BCP-MCNC: 3.7 G/DL (ref 3.5–5)
ALP SERPL-CCNC: 95 U/L (ref 46–116)
ALT SERPL W P-5'-P-CCNC: 13 U/L (ref 12–78)
ANION GAP SERPL CALCULATED.3IONS-SCNC: 6 MMOL/L
AST SERPL W P-5'-P-CCNC: 10 U/L (ref 5–45)
BILIRUB SERPL-MCNC: 1.01 MG/DL (ref 0.2–1)
BUN SERPL-MCNC: 18 MG/DL (ref 5–25)
CALCIUM SERPL-MCNC: 9.5 MG/DL (ref 8.3–10.1)
CHLORIDE SERPL-SCNC: 107 MMOL/L (ref 96–108)
CHOLEST SERPL-MCNC: 104 MG/DL
CO2 SERPL-SCNC: 25 MMOL/L (ref 21–32)
CREAT SERPL-MCNC: 1.25 MG/DL (ref 0.6–1.3)
GFR SERPL CREATININE-BSD FRML MDRD: 59 ML/MIN/1.73SQ M
GLUCOSE P FAST SERPL-MCNC: 101 MG/DL (ref 65–99)
HDLC SERPL-MCNC: 46 MG/DL
LDLC SERPL CALC-MCNC: 40 MG/DL (ref 0–100)
POTASSIUM SERPL-SCNC: 4.8 MMOL/L (ref 3.5–5.3)
PROT SERPL-MCNC: 7.7 G/DL (ref 6.4–8.4)
SODIUM SERPL-SCNC: 138 MMOL/L (ref 135–147)
TRIGL SERPL-MCNC: 88 MG/DL

## 2023-08-10 PROCEDURE — 80061 LIPID PANEL: CPT

## 2023-08-10 PROCEDURE — 83036 HEMOGLOBIN GLYCOSYLATED A1C: CPT

## 2023-08-10 PROCEDURE — 80053 COMPREHEN METABOLIC PANEL: CPT

## 2023-08-11 ENCOUNTER — ANTICOAG VISIT (OUTPATIENT)
Dept: CARDIOLOGY CLINIC | Facility: CLINIC | Age: 68
End: 2023-08-11

## 2023-08-11 DIAGNOSIS — I48.0 PAROXYSMAL ATRIAL FIBRILLATION (HCC): Primary | ICD-10-CM

## 2023-08-11 LAB
EST. AVERAGE GLUCOSE BLD GHB EST-MCNC: 126 MG/DL
HBA1C MFR BLD: 6 %

## 2023-08-15 ENCOUNTER — OFFICE VISIT (OUTPATIENT)
Dept: FAMILY MEDICINE CLINIC | Facility: CLINIC | Age: 68
End: 2023-08-15
Payer: MEDICARE

## 2023-08-15 VITALS
BODY MASS INDEX: 34.87 KG/M2 | WEIGHT: 217 LBS | HEART RATE: 54 BPM | TEMPERATURE: 97.5 F | RESPIRATION RATE: 16 BRPM | DIASTOLIC BLOOD PRESSURE: 60 MMHG | OXYGEN SATURATION: 99 % | SYSTOLIC BLOOD PRESSURE: 90 MMHG | HEIGHT: 66 IN

## 2023-08-15 DIAGNOSIS — I48.0 PAROXYSMAL ATRIAL FIBRILLATION (HCC): ICD-10-CM

## 2023-08-15 DIAGNOSIS — E66.9 DIABETES MELLITUS TYPE 2 IN OBESE (HCC): Primary | Chronic | ICD-10-CM

## 2023-08-15 DIAGNOSIS — E78.5 DYSLIPIDEMIA: ICD-10-CM

## 2023-08-15 DIAGNOSIS — M46.1 SACROILIITIS (HCC): ICD-10-CM

## 2023-08-15 DIAGNOSIS — I10 ESSENTIAL HYPERTENSION: Chronic | ICD-10-CM

## 2023-08-15 DIAGNOSIS — E11.69 DIABETES MELLITUS TYPE 2 IN OBESE (HCC): Primary | Chronic | ICD-10-CM

## 2023-08-15 PROCEDURE — 99214 OFFICE O/P EST MOD 30 MIN: CPT | Performed by: FAMILY MEDICINE

## 2023-08-15 RX ORDER — AMLODIPINE BESYLATE 5 MG/1
5 TABLET ORAL DAILY
Qty: 30 TABLET | Refills: 5 | Status: SHIPPED | OUTPATIENT
Start: 2023-08-15

## 2023-08-15 NOTE — ASSESSMENT & PLAN NOTE
Lab Results   Component Value Date    HGBA1C 6.0 (H) 08/10/2023     No hypoglycemia. Continue glipizide ER 5 mg daily.   Reduce dose of metformin from 3 tablets to 2 tablets once a day

## 2023-08-15 NOTE — PATIENT INSTRUCTIONS
Please decrease dose of metformin down to 2 tablets daily. Please continue on the same dose of glipizide. If you develop any blood sugars below 80 or symptomatic hypoglycemia-please call me right away  Blood pressure is on the lower side today, 90/60. Please decrease dose of amlodipine from 10 mg to 5 mg daily. You can cut your current pills in half I will send new prescription for amlodipine 5 mg tablet  I will ask your cardiologist to follow-up regarding blood pressure  Please ask cardiology regarding home PT/INR checks so you can do your blood work via fingerstick.   Please research possibility of Eliquis or Xarelto being covered by you new prescription plan

## 2023-08-15 NOTE — PROGRESS NOTES
Name: Abrahan Tyson      : 1955      MRN: [id number]  Encounter Provider: Mickey Campos MD  Encounter Date: 8/15/2023   Encounter department: [address] [place] [address]     1. Diabetes mellitus type 2 in obese [facility])  Assessment & Plan:    Lab Results   Component Value Date    HGBA1C 6.0 (H) 08/10/2023     No hypoglycemia. Continue glipizide ER 5 mg daily. Reduce dose of metformin from 3 tablets to 2 tablets once a day    Orders:  -     CBC and differential; Future  -     Comprehensive metabolic panel; Future  -     Hemoglobin A1C; Future    2. Paroxysmal atrial fibrillation (HCC)  Assessment & Plan:  Rate controlled on metoprolol. Anticoagulated on Coumadin. Patient will discuss PT/INR Accu-Chek versus Xarelto/Eliquis with cardiology. 3. Essential hypertension  Assessment & Plan:  Blood pressure is low. Reduce dose of amlodipine from 10 to 5 mg daily. Continue lisinopril 10 mg daily    Orders:  -     amLODIPine (NORVASC) 5 mg tablet; Take 1 tablet (5 mg total) by mouth daily    4. Dyslipidemia  Assessment & Plan:  Atorvastatin 20 mg daily    Orders:  -     Lipid Panel with Direct LDL reflex; Future  -     TSH, 3rd generation; Future    5. Sacroiliitis [facility])  Assessment & Plan:  Patient is under care of [facility] spine and pain center            Patient Instructions   Please decrease dose of metformin down to 2 tablets daily. Please continue on the same dose of glipizide. If you develop any blood sugars below 80 or symptomatic hypoglycemia-please call me right away  Blood pressure is on the lower side today, 90/60. Please decrease dose of amlodipine from 10 mg to 5 mg daily. You can cut your current pills in half I will send new prescription for amlodipine 5 mg tablet  I will ask your cardiologist to follow-up regarding blood pressure  Please ask cardiology regarding home PT/INR checks so you can do your blood work via fingerstick.   Please research possibility of Eliquis or Xarelto being covered by you new prescription plan      Return in 4 months (on 12/15/2023) for Medicare Annual Wellness, follow up. Subjective     Follow-up chronic medical conditions. Patient feels well. No complaints today. Back pain has improved. He is under care of [facility] spine and pain center. Injection has helped. Currently patient is on regimen of PRN Robaxin and Norco.  Arthritic knee pain has been well controlled as well. Patient lost 17 pounds since April 2023 as he is arthritic pain has improved and he is able to be more active. He is moving around much better. No orthostasis or hypoglycemia. Pending follow-up with cardiology within next few weeks. Patient remains on Coumadin. He would love to use Eliquis or Xarelto but unfortunately both medications were not covered by his insurance and he was not able to pay full cost out-of-pocket.       Review of Systems    Past Medical History:   Diagnosis Date   • Acute respiratory failure with hypoxia and hypercapnia ([address]) 08/17/2020   • LAURENCE (acute kidney injury) ([address]) 08/16/2020   • Arthritis     knees   • Community acquired pneumonia of left lower lobe of lung 08/12/2020   • Diabetes mellitus ([address])    • Hypertension    • Measles, Sinhala (rubella)    • Mumps    • Obesity    • Pneumonia 8/2020   • Psoas abscess ([address]) 1/3/2023   • Snoring     Loudly     Past Surgical History:   Procedure Laterality Date   • CYSTOSCOPY     • JOINT REPLACEMENT  07/2018   • KNEE ARTHROSCOPY Left     knee   • ORIF PELVIC FRACTURE     • SPINAL FUSION  05/06/2022   • TOTAL KNEE ARTHROPLASTY Left 06/10/2019    Procedure: ARTHROPLASTY KNEE TOTAL;  Surgeon: Sudhakar Brasher MD;  Location: [facility] OR;  Service: Orthopedics     Family History   Problem Relation Age of Onset   • Ovarian cancer Mother    • Heart disease Mother    • Diabetes type II Mother    • Colon cancer Father    • Heart disease Father    • Nephrolithiasis Father    • Cancer Father Social History     Socioeconomic History   • Marital status: Single     Spouse name: None   • Number of children: None   • Years of education: None   • Highest education level: None   Occupational History   • None   Tobacco Use   • Smoking status: Never   • Smokeless tobacco: Never   Vaping Use   • Vaping Use: Never used   Substance and Sexual Activity   • Alcohol use: Not Currently     Alcohol/week: 1.0 standard drink of alcohol     Types: 1 Standard drinks or equivalent per week     Comment: Occasionally, not in excess - As per [employer]    • Drug use: Never   • Sexual activity: Not Currently   Other Topics Concern   • None   Social History Narrative   • None     Social Determinants of Health     Financial Resource Strain: High Risk (10/11/2022)    Overall Financial Resource Strain (CARDIA)    • Difficulty of Paying Living Expenses: Hard   Food Insecurity: Not on file   Transportation Needs: No Transportation Needs (10/11/2022)    PRAPARE - Transportation    • Lack of Transportation (Medical): No    • Lack of Transportation (Non-Medical):  No   Physical Activity: Not on file   Stress: Not on file   Social Connections: Not on file   Intimate Partner Violence: Not on file   Housing Stability: Not on file     Current Outpatient Medications on File Prior to Visit   Medication Sig   • atorvastatin (LIPITOR) 20 mg tablet Take 1 tablet (20 mg total) by mouth daily   • glipiZIDE (GLUCOTROL XL) 5 mg 24 hr tablet Take 1 tablet (5 mg total) by mouth daily   • HYDROcodone-acetaminophen (Norco) 7.5-325 mg per tablet Take 1 tablet by mouth every 6 (six) hours as needed for pain For ongoing therapy Max Daily Amount: 4 tablets   • lisinopril (ZESTRIL) 10 mg tablet Take 1 tablet (10 mg total) by mouth daily   • metFORMIN (GLUCOPHAGE-XR) 500 mg 24 hr tablet Take 4 tablets (2,000 mg total) by mouth daily   • methocarbamol (Robaxin-750) 750 mg tablet Take 1 tablet (750 mg total) by mouth 3 (three) times a day as needed for muscle spasms   • Metoprolol Tartrate 75 MG TABS Take 1 tablet (75 mg total) by mouth 2 (two) times a day   • naloxone (NARCAN) 4 mg/0.1 mL nasal spray Administer 1 spray into a nostril. If no response after 2-3 minutes, give another dose in the other nostril using a new spray. • warfarin (COUMADIN) 5 mg tablet Take 1 tablet (5 mg total) by mouth daily   • ACCU-CHEK LARRY PLUS test strip  (Patient not taking: Reported on 7/28/2023)   • ACCU-CHEK FASTCLIX LANCETS MISC  (Patient not taking: Reported on 7/28/2023)   • Blood Glucose Monitoring Suppl (OneTouch Verio Reflect) w/Device KIT Check blood sugars once daily. Please substitute with appropriate alternative as covered by patient's insurance. Dx: E11.65 (Patient not taking: Reported on 8/15/2023)     No Known Allergies  Immunization History   Administered Date(s) Administered   • COVID-19 Pfizer Vac BIVALENT Conor-sucrose 12 Yr+ IM (BOOSTER ONLY) 12/15/2022   • INFLUENZA 08/03/2016   • Influenza Quadrivalent Preservative Free 3 years and older IM 08/03/2016   • Pneumococcal Conjugate Vaccine 20-valent (Pcv20), Polysace 10/11/2022   • Tdap 01/01/2001       Objective     BP 90/60   Pulse (!) 54   Temp 97.5 °F (36.4 °C) (Temporal)   Resp 16   Ht 5' 6" (1.676 m)   Wt 98.4 kg (217 lb)   SpO2 99%   BMI 35.02 kg/m²     Physical Exam  Vitals and nursing note reviewed. Constitutional:       General: He is not in acute distress. Appearance: Normal appearance. He is well-developed. He is not ill-appearing. HENT:      Head: Normocephalic and atraumatic. Eyes:      Conjunctiva/sclera: Conjunctivae normal.   Neck:      Vascular: No carotid bruit. Cardiovascular:      Rate and Rhythm: Normal rate and regular rhythm. Heart sounds: Normal heart sounds. No murmur heard. Pulmonary:      Effort: Pulmonary effort is normal. No respiratory distress. Breath sounds: Normal breath sounds. No wheezing or rales.    Abdominal:      General: Bowel sounds are normal. There is no distension or abdominal bruit. Musculoskeletal:         General: Normal range of motion. Cervical back: Neck supple. Skin:     General: Skin is warm. Neurological:      Mental Status: He is alert and oriented to person, place, and time. Cranial Nerves: No cranial nerve deficit.    Psychiatric:         Mood and Affect: Mood normal.         Behavior: Behavior normal.       Washington Long MD

## 2023-08-20 PROBLEM — N32.89 BLADDER WALL THICKENING: Status: RESOLVED | Noted: 2019-05-07 | Resolved: 2023-08-20

## 2023-08-21 NOTE — ASSESSMENT & PLAN NOTE
Blood pressure is low. Reduce dose of amlodipine from 10 to 5 mg daily.   Continue lisinopril 10 mg daily

## 2023-08-21 NOTE — ASSESSMENT & PLAN NOTE
Rate controlled on metoprolol. Anticoagulated on Coumadin. Patient will discuss PT/INR Accu-Chek versus Xarelto/Eliquis with cardiology.

## 2023-09-06 ENCOUNTER — APPOINTMENT (OUTPATIENT)
Dept: LAB | Facility: CLINIC | Age: 68
End: 2023-09-06
Payer: MEDICARE

## 2023-09-07 ENCOUNTER — ANTICOAG VISIT (OUTPATIENT)
Dept: CARDIOLOGY CLINIC | Facility: CLINIC | Age: 68
End: 2023-09-07

## 2023-09-07 DIAGNOSIS — I48.0 PAROXYSMAL ATRIAL FIBRILLATION (HCC): Primary | ICD-10-CM

## 2023-09-26 ENCOUNTER — OFFICE VISIT (OUTPATIENT)
Dept: CARDIOLOGY CLINIC | Facility: CLINIC | Age: 68
End: 2023-09-26
Payer: MEDICARE

## 2023-09-26 VITALS
WEIGHT: 213 LBS | OXYGEN SATURATION: 99 % | SYSTOLIC BLOOD PRESSURE: 116 MMHG | DIASTOLIC BLOOD PRESSURE: 74 MMHG | BODY MASS INDEX: 34.23 KG/M2 | HEIGHT: 66 IN | HEART RATE: 52 BPM

## 2023-09-26 DIAGNOSIS — R06.09 DOE (DYSPNEA ON EXERTION): ICD-10-CM

## 2023-09-26 DIAGNOSIS — E78.5 DYSLIPIDEMIA: ICD-10-CM

## 2023-09-26 DIAGNOSIS — I10 ESSENTIAL HYPERTENSION: Chronic | ICD-10-CM

## 2023-09-26 DIAGNOSIS — I48.0 PAROXYSMAL ATRIAL FIBRILLATION (HCC): Primary | ICD-10-CM

## 2023-09-26 PROCEDURE — 99214 OFFICE O/P EST MOD 30 MIN: CPT | Performed by: INTERNAL MEDICINE

## 2023-09-26 PROCEDURE — 93000 ELECTROCARDIOGRAM COMPLETE: CPT | Performed by: INTERNAL MEDICINE

## 2023-09-26 RX ORDER — METOPROLOL SUCCINATE 50 MG/1
75 TABLET, EXTENDED RELEASE ORAL DAILY
Qty: 135 TABLET | Refills: 1 | Status: SHIPPED | OUTPATIENT
Start: 2023-09-26

## 2023-09-26 NOTE — PROGRESS NOTES
Cardiology Follow Up    Quyen Rodriguez  1955  [id number]  [address] [facility]  [address] [facility] [address] [phone]    4. Paroxysmal atrial fibrillation (HCC)  POCT ECG    metoprolol succinate (TOPROL-XL) 50 mg 24 hr tablet      2. Essential hypertension        3. GONSALEZ (dyspnea on exertion)        4. Dyslipidemia            Discussion/Summary:    Persistent/permanent atrial fibrillation. I am not sure that he is symptomatic with A-fib, per se, but more so with possibly faster heart rates in the morning. I confirmed that he is taking just 75 mg of metoprolol tartrate once a day in the morning. I will change him to XL at an equivalent dose. I think if he takes 75 twice daily, his rates will be too low. We will continue with warfarin at this time, but will continue to let me know if Eliquis, Pradaxa, or Xarelto becomes more affordable on his prescription plan      Previous History:  72-year-old gentleman. In the setting of severe pneumonia requiring intubation, he had rapid afib. Treated with metoprolol (8/2020)  He was started on anticoagulation with Eliquis. Insurance issues with Eliquis so now changed to warfarin. Initially paroxysmal, but now more persistent. EF was preserved on echo. He had a very slightly elevated troponin in the setting of his pneumonia. Interval history:    Returns for follow-up. He has lost a lot of weight, about 100 pounds since his peak. He feels short of breath but this tends to come only in the morning. Otherwise, he feels okay. He is questioning whether this is secondary to the atrial fibrillation or to something else. We did a Holter monitor in January. I reviewed with him today, and he is only taking metoprolol tartrate just once a day. He takes in the morning.  It is possible that this is wearing off and by the next morning, he is feeling more symptomatic with rapid rates in atrial fibrillation as was seen on the monitor. However, in the office his heart rate is on the slower side in A-fib with the current dose of metoprolol that he took this morning. Still knows DOAC too expensive, but interested in switching back to this if possible      Problem List     Essential hypertension    Diabetes mellitus type 2 in obese ([address]) (Chronic)      Lab Results   Component Value Date    HGBA1C 6.0 (H) 08/10/2023         Vitamin D deficiency    Morbid obesity ([address])    Osteoarthritis of left knee    Bladder wall thickening    Status post left knee replacement    Community acquired pneumonia of left lower lobe of lung (Chronic)    Non-MI Troponin Elevation    Rhabdomyolysis    New onset atrial fibrillation ([address]) (Chronic)    Overview Signed 9/1/2020  7:09 AM by Sharon Tony MD     August 2020 during hospitalization for left lower lobe pneumonia  Eliquis and metoprolol  [facility]     ECHO 4/96/9881  Systolic function was normal. Ejection fraction was estimated to be 60 %. There were no regional wall motion abnormalities.  LEFT ATRIUM:  The atrium was dilated.   MITRAL VALVE:  There was mild regurgitation.  AORTIC VALVE:  There was mild regurgitation.          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   • Snoring     Loudly     Social History     Tobacco Use   • Smoking status: Never   • Smokeless tobacco: Never   Vaping Use   • Vaping Use: Never used   Substance Use Topics   • Alcohol use: Not Currently     Alcohol/week: 1.0 standard drink of alcohol     Types: 1 Standard drinks or equivalent per week     Comment: Occasionally, not in excess - As per [employer]    • Drug use: Never      Family History   Problem Relation Age of Onset   • Ovarian cancer Mother    • Heart disease Mother    • Diabetes type II Mother    • Colon cancer Father    • Heart disease Father    • Nephrolithiasis Father    • Cancer Father      Past Surgical History:   Procedure Laterality Date   • CYSTOSCOPY     • JOINT REPLACEMENT  07/2018   • KNEE ARTHROSCOPY Left     knee   • ORIF PELVIC FRACTURE     • SPINAL FUSION  05/06/2022   • TOTAL KNEE ARTHROPLASTY Left 06/10/2019    Procedure: ARTHROPLASTY KNEE TOTAL;  Surgeon: Bonnie Banuelos MD;  Location: [facility];  Service: Orthopedics       Current Outpatient Medications:   •  amLODIPine (NORVASC) 5 mg tablet, Take 1 tablet (5 mg total) by mouth daily, Disp: 30 tablet, Rfl: 5  •  atorvastatin (LIPITOR) 20 mg tablet, Take 1 tablet (20 mg total) by mouth daily, Disp: 30 tablet, Rfl: 5  •  glipiZIDE (GLUCOTROL XL) 5 mg 24 hr tablet, Take 1 tablet (5 mg total) by mouth daily, Disp: 30 tablet, Rfl: 5  •  HYDROcodone-acetaminophen (Norco) 7.5-325 mg per tablet, Take 1 tablet by mouth every 6 (six) hours as needed for pain For ongoing therapy Max Daily Amount: 4 tablets, Disp: 120 tablet, Rfl: 0  •  lisinopril (ZESTRIL) 10 mg tablet, Take 1 tablet (10 mg total) by mouth daily, Disp: 30 tablet, Rfl: 5  •  metFORMIN (GLUCOPHAGE-XR) 500 mg 24 hr tablet, Take 4 tablets (2,000 mg total) by mouth daily (Patient taking differently: Take 1,000 mg by mouth daily Taking 2 tablets daily), Disp: 120 tablet, Rfl: 5  •  methocarbamol (Robaxin-750) 750 mg tablet, Take 1 tablet (750 mg total) by mouth 3 (three) times a day as needed for muscle spasms, Disp: 90 tablet, Rfl: 2  •  metoprolol succinate (TOPROL-XL) 50 mg 24 hr tablet, Take 1.5 tablets (75 mg total) by mouth daily, Disp: 135 tablet, Rfl: 1  • naloxone (NARCAN) 4 mg/0.1 mL nasal spray, Administer 1 spray into a nostril. If no response after 2-3 minutes, give another dose in the other nostril using a new spray., Disp: 1 each, Rfl: 1  •  warfarin (COUMADIN) 5 mg tablet, Take 1 tablet (5 mg total) by mouth daily, Disp: 30 tablet, Rfl: 3  •  ACCU-CHEK LARRY PLUS test strip, , Disp: , Rfl:   •  ACCU-CHEK FASTCLIX LANCETS MISC, , Disp: , Rfl:   •  Blood Glucose Monitoring Suppl (OneTouch Verio Reflect) w/Device KIT, Check blood sugars once daily. Please substitute with appropriate alternative as covered by patient's insurance. Dx: E11.65 (Patient not taking: Reported on 8/15/2023), Disp: 1 kit, Rfl: 0  No Known Allergies    Vitals:    09/26/23 1419   BP: 116/74   BP Location: Right arm   Patient Position: Sitting   Cuff Size: Large   Pulse: (!) 52   SpO2: 99%   Weight: 96.6 kg (213 lb)   Height: 5' 6" (1.676 m)     Vitals:    09/26/23 1419   Weight: 96.6 kg (213 lb)      Height: 5' 6" (167.6 cm)   Body mass index is 34.38 kg/m². Physical Exam:  GEN: [place] appears well, alert and oriented x 3, pleasant and cooperative   HEENT: pupils equal, round, and reactive to light; extraocular muscles intact  NECK: supple, no carotid bruits   HEART: irregularly irregular. Bradycardic. No murmurs. LUNGS: clear to auscultation bilaterally; no wheezes, rales, or rhonchi   ABDOMEN: normal bowel sounds, soft, no tenderness, no distention  EXTREMITIES: peripheral pulses normal; no clubbing, cyanosis, or edema  NEURO: no focal findings   SKIN: normal without suspicious lesions on exposed skin    ROS:  Positive for intentional weight loss, shortness of breath, weakness. Except as noted in HPI, is otherwise reviewed in detail and a 12 point review of systems is negative.   ROS reviewed and is unchanged    Labs:  Lab Results   Component Value Date     03/17/2017    K 4.8 08/10/2023     08/10/2023    CREATININE 1.25 08/10/2023    BUN 18 08/10/2023    CO2 25 08/10/2023    ALT 13 08/10/2023    AST 10 08/10/2023    INR 2.87 (H) 09/06/2023    GLUF 101 (H) 08/10/2023    HGBA1C 6.0 (H) 08/10/2023    WBC 7.83 04/10/2023    HGB 13.8 04/10/2023    HCT 41.0 04/10/2023     04/10/2023       Lab Results   Component Value Date    CHOL 218 (H) 03/17/2017    CHOL 199 07/27/2016    CHOL 204 (H) 01/12/2016     Lab Results   Component Value Date    LDLCALC 40 08/10/2023    LDLCALC 30 04/10/2023    LDLCALC 56 03/26/2022     Lab Results   Component Value Date    HDL 46 08/10/2023    HDL 40 04/10/2023    HDL 33 (L) 03/26/2022     Lab Results   Component Value Date    TRIG 88 08/10/2023    TRIG 89 04/10/2023    TRIG 123 03/26/2022     Testing:  holter 1/24/24: Findings  The patient was in atrial fibrillation for the duration of the study. The ventricular rate ranged from 32 bpm to 152 bpm, with an average rate of 65 bpm. The fastest rates occurred during the first few hours of the study, after which the patient was predominantly rate-controlled. There were rare, isolated, monomorphic wide complex beats which are likely consistent with Abdiaziz beats (0.6% of total beats). There were no other atrial arrhythmias. The longest R-R interval was 2.2 seconds. No significant pauses. No diary returned for clinical correlation.     Impression/Summary  Patient was exclusively in atrial fibrillation with mostly controlled ventricular rates. High ventricular rates mostly occurred during the first 2-3 hours of monitoring up to 150 bpm.   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 Echo 1/24/23:Left Ventricle: Left ventricular cavity size is normal. Wall thickness is increased. There is mild concentric hypertrophy. Wall motion is normal. Left atrial filling pressure cannot be estimated. Minor Melissa atrial fibrillation  •  Left Atrium: The atrium is mildly dilated. •  Aortic Valve: There is mild regurgitation. •  Mitral Valve:  There is mild regurgitation. Echo 8/14/20:  LEFT VENTRICLE:  Systolic function was normal. Ejection fraction was estimated to be 60 %. There were no regional wall motion abnormalities.     LEFT ATRIUM:  The atrium was dilated.     MITRAL VALVE:  There was mild regurgitation.     AORTIC VALVE:  There was mild regurgitation. EKG:  Atrial fibrillation. 52 bpm. Incomplete left bundle branch block.

## 2023-09-28 ENCOUNTER — OFFICE VISIT (OUTPATIENT)
Dept: PAIN MEDICINE | Facility: MEDICAL CENTER | Age: 68
End: 2023-09-28
Payer: MEDICARE

## 2023-09-28 VITALS
BODY MASS INDEX: 34.23 KG/M2 | HEART RATE: 62 BPM | SYSTOLIC BLOOD PRESSURE: 122 MMHG | WEIGHT: 213 LBS | HEIGHT: 66 IN | DIASTOLIC BLOOD PRESSURE: 79 MMHG

## 2023-09-28 DIAGNOSIS — G89.4 CHRONIC PAIN SYNDROME: Primary | ICD-10-CM

## 2023-09-28 DIAGNOSIS — M96.1 LUMBAR POSTLAMINECTOMY SYNDROME: ICD-10-CM

## 2023-09-28 DIAGNOSIS — G89.29 CHRONIC BILATERAL LOW BACK PAIN WITHOUT SCIATICA: ICD-10-CM

## 2023-09-28 DIAGNOSIS — M47.816 LUMBAR SPONDYLOSIS: ICD-10-CM

## 2023-09-28 DIAGNOSIS — Z79.891 LONG-TERM CURRENT USE OF OPIATE ANALGESIC: ICD-10-CM

## 2023-09-28 DIAGNOSIS — F11.20 UNCOMPLICATED OPIOID DEPENDENCE (HCC): ICD-10-CM

## 2023-09-28 DIAGNOSIS — M54.50 CHRONIC BILATERAL LOW BACK PAIN WITHOUT SCIATICA: ICD-10-CM

## 2023-09-28 DIAGNOSIS — M46.1 SACROILIITIS (HCC): ICD-10-CM

## 2023-09-28 PROCEDURE — 99214 OFFICE O/P EST MOD 30 MIN: CPT | Performed by: PHYSICIAN ASSISTANT

## 2023-09-28 RX ORDER — HYDROCODONE BITARTRATE AND ACETAMINOPHEN 7.5; 325 MG/1; MG/1
1 TABLET ORAL EVERY 6 HOURS PRN
Qty: 120 TABLET | Refills: 0 | Status: SHIPPED | OUTPATIENT
Start: 2023-09-28

## 2023-09-28 NOTE — PATIENT INSTRUCTIONS
Sacroiliac Joint Injection   WHAT YOU NEED TO KNOW:   What do I need to know about a sacroiliac joint injection? A sacroiliac (SI) joint injection is done to diagnose or treat pain from sacroiliac joint syndrome. The pain caused by this syndrome may be felt in your lower back, buttocks, groin, and your thigh. How do I prepare for an SI injection? Your healthcare provider will ask you to not take any pain medicine the day of the injection. Ask him or her if there are any other medicines you should not take. You will need to find someone to drive you home after your procedure. What will happen during the SI injection? You will be awake for your injection. You may be given calming medicine if you are anxious. You will lie on your stomach with a pillow under your abdomen. Your healthcare provider will give you an injection of medicine to numb the area. He or she may use an x-ray, ultrasound, or CT scan to find the area to inject. You may also be given an injection of contrast material to help your SI joint show up better. Then, your healthcare provider will inject local anesthesia, antiinflammatory medicine, or both into your SI joint. Healthcare providers will watch you closely for any problems for up to 30 minutes after your injection. Your healthcare provider will check to see if your pain decreases after the injection. What are the risks of an SI injection? You may have some weakness for a short time after your injection. The SI injection can cause bleeding, infection, and pain. It can also cause temporary weakness in your leg and problems urinating. You may have an allergic reaction to the medicine that is injected into your SI joint. Your pain may return and you may need more treatment. CARE AGREEMENT:   You have the right to help plan your care. Learn about your health condition and how it may be treated.  Discuss treatment options with your healthcare providers to decide what care you want to receive. You always have the right to refuse treatment. The above information is an  only. It is not intended as medical advice for individual conditions or treatments. Talk to your doctor, nurse or pharmacist before following any medical regimen to see if it is safe and effective for you. © Copyright Sharmila Li 2023 Information is for End User's use only and may not be sold, redistributed or otherwise used for commercial purposes.

## 2023-09-28 NOTE — PROGRESS NOTES
Assessment:  1. Chronic pain syndrome    2. Uncomplicated opioid dependence ([address])    3. Long-term current use of opiate analgesic    4. Sacroiliitis ([address])    5. Chronic bilateral low back pain without sciatica    6. Lumbar spondylosis    7. Lumbar postlaminectomy syndrome        Plan:  While the patient was in the office today, I did have a thorough conversation regarding their chronic pain syndrome, medication management, and treatment plan options. Based on patient report and physical exam, the patient's symptomatology does seem to be consistent with sacroiliac mediated pain from sacroiliitis. We will schedule the patient for a left SIJ injection to decrease any inflammatory component of the patient's pain symptoms. The patient remains clinically stable on the current medication regimen without side effects or issues. I have electronically sent his [place] to his pharmacy with a fill date of today 9/28/2023 and I do not fill date of 10/26/2023. He does not require refill on the methocarbamol. He will follow-up in 2 months for medication refills/follow-up. There are risks associated with opioid medications, including dependence, addiction and tolerance. The patient understands and agrees to use these medications only as prescribed. Potential side effects of the medications include, but are not limited to, constipation, drowsiness, addiction, impaired judgment and risk of fatal overdose if not taken as prescribed. The patient was warned against driving while taking sedation medications. Sharing medications is a felony. At this point in time, the patient is showing no signs of addiction, abuse, diversion or suicidal ideation. A urine drug screen was collected at today's office visit as part of our medication management protocol. The point of care testing results were appropriate for what was being prescribed. The specimen will be sent for confirmatory testing.  The drug screen is medically necessary because the patient is either dependent on opioid medication or is being considered for opioid medication therapy and the results could impact ongoing or future treatment. The drug screen is to evaluate for the presences or absence of prescribed, non-prescribed, and/or illicit drugs/substances. [place] Prescription Drug Monitoring Program report was reviewed and was appropriate     My impressions and treatment recommendations were discussed in detail with the patient who verbalized understanding and had no further questions. Discharge instructions were provided. I personally saw and examined the patient and I agree with the above discussed plan of care. Orders Placed This Encounter   Procedures   • FL spine and pain procedure     Standing Status:   Future     Standing Expiration Date:   9/28/2027     Order Specific Question:   Reason for Exam:     Answer:   LT SIJ     Order Specific Question:   Anticoagulant hold needed? Answer:   no   • MM ALL_Prescribed Meds and Special Instructions     Order Specific Question:   [place] Is ATORVASTATIN prescribed? Answer:   Yes     Order Specific Question:   [place] Is HYDROCODONE/APAP prescribed? Answer:   Yes     Order Specific Question:   [place] Is METHOCARBAMOL prescribed? Answer:   Yes     Order Specific Question:   [place] Is NALOXONE Prescribed? Answer:    Yes   • MM DT_Alprazolam Definitive Test   • MM DT_Amphetamine Definitive Test   • MM DT_Aripiprazole Definitive Test   • MM DT_Bath Salts Definitive Test   • MM DT_Buprenorphine Definitive Test   • MM DT_Butalbital Definitive Test   • MM DT_Clonazepam Definitive Test   • MM DT_Clozapine Definitive Test   • MM DT_Cocaine Definitive Test   • MM DT_Codeine Definitive Test   • MM DT_Desipramine Definitive Test   • MM DT_Dextromethorphan Definitive Test   • MM Diazepam Definitive Test   • MM DT_Ethyl Glucuronide/Ethyl Sulfate Definitive Test   • MM DT_Fentanyl Definitive Test   • MM DT_Haloperidol Definitive Test   • MM DT_Heroin Definitive Test   • MM DT_Hydrocodone Definitive Test   • MM DT_Hydromorphone Definitive Test   • MM DT_Imipramine Definitive Test   • MM DT_Kratom Definitive Test   • MM Lorazepam Definitive Test   • MM DT_Levorphanol Definitive Test   • MM DT_MDMA Definitive Test   • MM DT_Meperidine Definitive Test   • MM DT_Methadone Definitive Test   • MM DT_Methamphetamine Definitive Test   • MM DT_Morphine Definitive Test   • MM DT_Olanzapine Definitive Test   • MM DT_Oxazepam Definitive Test   • MM DT_Oxycodone Definitive Test   • MM DT_Oxymorphone Definitive Test   • MM DT_Phencyclidine Definitive Test   • MM DT_Phentermine Definitive Test   • MM DT_Quetiapine Definitive Test   • MM DT_Risperidone Definitive Test   • MM DT_Secobarbital Definitive Test   • MM DT_Spice Definitive Test   • MM DT_Tapentadol Definitive Test   • MM DT_Temazapam Definitive Test   • MM DT_THC Definitive Test   • MM DT_Tramadol Definitive Test   • MM DT_Validity Creatinine   • MM DT_Validity Oxidant   • MM DT_Validity pH   • MM DT_Validity Specific     New Medications Ordered This Visit   Medications   • HYDROcodone-acetaminophen (Norco) 7.5-325 mg per tablet     Sig: Take 1 tablet by mouth every 6 (six) hours as needed for pain For ongoing therapy Max Daily Amount: 4 tablets     Dispense:  120 tablet     Refill:  0   • HYDROcodone-acetaminophen (NORCO) 7.5-325 mg per tablet     Sig: Take 1 tablet by mouth every 6 (six) hours as needed for pain For ongoing therapy DO NOT FILL BEFORE 10/26/23 Max Daily Amount: 4 tablets     Dispense:  120 tablet     Refill:  0       History of Present Illness:  Lisandro Benson is a 79 y.o. male who presents for a follow up office visit in regards to Back Pain. The patient’s current symptoms include chronic low back pain that he presently rates a 5 out of 10 on the scale and describes it as a constant dull and aching pain.   The patient is reporting a significant increase in left low back pain with referred pain patterns into the hip area. The pain is made worse with prolonged standing, sitting and bending. On 6/27/2023 he underwent a sacroiliac joint injection for the same pain he is experiencing now and he was able to report 60% reduction in pain for 3 months followed by a sudden return. Current pain medications includes:  Norco 7.5/325 4 times daily as needed, methocarbamol 7 to 50 mg 3 times daily as needed. The patient reports that this regimen is providing 60% pain relief. The patient is reporting no side effects from this pain medication regimen. Opioid contract date    Last UDS Date                      last taken on        I have personally reviewed and/or updated the patient's past medical history, past surgical history, family history, social history, current medications, allergies, and vital signs today. Review of Systems   Constitutional: Negative for chills and fever. HENT: Negative for ear pain and sore throat. Eyes: Negative for pain and visual disturbance. Respiratory: Positive for shortness of breath. Negative for cough. Cardiovascular: Negative for chest pain and palpitations. Gastrointestinal: Negative for abdominal pain and vomiting. Genitourinary: Negative for dysuria and hematuria. Musculoskeletal: Positive for back pain and gait problem. Negative for arthralgias. Skin: Negative for color change and rash. Neurological: Positive for weakness. Negative for seizures and syncope. All other systems reviewed and are negative.       Patient Active Problem List   Diagnosis   • Essential hypertension   • Diabetes mellitus type 2 in obese ([address])   • Vitamin D deficiency   • Class 2 severe obesity due to excess calories with serious comorbidity and body mass index (BMI) of 37.0 to 37.9 in adult [facility])   • Osteoarthritis of left knee   • Status post left knee replacement   • Paroxysmal atrial fibrillation (HCC)   • Dyslipidemia • Skin mole   • GONSALEZ (dyspnea on exertion)   • Stenosis of lumbosacral spine   • Sacroiliitis [facility])       Past Medical History:   Diagnosis Date   • Acute respiratory failure with hypoxia and hypercapnia ([address]) 08/17/2020   • LAURENCE (acute kidney injury) ([address]) 08/16/2020   • Arthritis     knees   • Community acquired pneumonia of left lower lobe of lung 08/12/2020   • Diabetes mellitus ([address])    • Hypertension    • Measles, Macedonian (rubella)    • Mumps    • Obesity    • Pneumonia 8/2020   • Psoas abscess ([address]) 1/3/2023   • Snoring     Loudly       Past Surgical History:   Procedure Laterality Date   • CYSTOSCOPY     • JOINT REPLACEMENT  07/2018   • KNEE ARTHROSCOPY Left     knee   • ORIF PELVIC FRACTURE     • SPINAL FUSION  05/06/2022   • TOTAL KNEE ARTHROPLASTY Left 06/10/2019    Procedure: ARTHROPLASTY KNEE TOTAL;  Surgeon: Jerrell Figueroa MD;  Location: [facility] OR;  Service: Orthopedics       Family History   Problem Relation Age of Onset   • Ovarian cancer Mother    • Heart disease Mother    • Diabetes type II Mother    • Colon cancer Father    • Heart disease Father    • Nephrolithiasis Father    • Cancer Father        Social History     Occupational History   • Not on file   Tobacco Use   • Smoking status: Never   • Smokeless tobacco: Never   Vaping Use   • Vaping Use: Never used   Substance and Sexual Activity   • Alcohol use: Not Currently     Alcohol/week: 1.0 standard drink of alcohol     Types: 1 Standard drinks or equivalent per week     Comment: Occasionally, not in excess - As per Allscripts    • Drug use: Never   • Sexual activity: Not Currently       Current Outpatient Medications on File Prior to Visit   Medication Sig   • amLODIPine (NORVASC) 5 mg tablet Take 1 tablet (5 mg total) by mouth daily   • atorvastatin (LIPITOR) 20 mg tablet Take 1 tablet (20 mg total) by mouth daily   • glipiZIDE (GLUCOTROL XL) 5 mg 24 hr tablet Take 1 tablet (5 mg total) by mouth daily   • lisinopril (ZESTRIL) 10 mg tablet Take 1 tablet (10 mg total) by mouth daily   • metFORMIN (GLUCOPHAGE-XR) 500 mg 24 hr tablet Take 4 tablets (2,000 mg total) by mouth daily (Patient taking differently: Take 1,000 mg by mouth daily Taking 2 tablets daily)   • methocarbamol (Robaxin-750) 750 mg tablet Take 1 tablet (750 mg total) by mouth 3 (three) times a day as needed for muscle spasms   • metoprolol succinate (TOPROL-XL) 50 mg 24 hr tablet Take 1.5 tablets (75 mg total) by mouth daily   • naloxone (NARCAN) 4 mg/0.1 mL nasal spray Administer 1 spray into a nostril. If no response after 2-3 minutes, give another dose in the other nostril using a new spray. • warfarin (COUMADIN) 5 mg tablet Take 1 tablet (5 mg total) by mouth daily   • [DISCONTINUED] HYDROcodone-acetaminophen (Norco) 7.5-325 mg per tablet Take 1 tablet by mouth every 6 (six) hours as needed for pain For ongoing therapy Max Daily Amount: 4 tablets   • ACCU-CHEK LARRY PLUS test strip  (Patient not taking: Reported on 7/28/2023)   • ACCU-CHEK FASTCLIX LANCETS MISC  (Patient not taking: Reported on 7/28/2023)   • Blood Glucose Monitoring Suppl (OneTouch Verio Reflect) w/Device KIT Check blood sugars once daily. Please substitute with appropriate alternative as covered by patient's insurance. Dx: E11.65 (Patient not taking: Reported on 8/15/2023)     No current facility-administered medications on file prior to visit. No Known Allergies    Physical Exam:    /79   Pulse 62   Ht 5' 6" (1.676 m)   Wt 96.6 kg (213 lb)   BMI 34.38 kg/m²     Constitutional:normal, well developed, well nourished, alert, in no distress and non-toxic and no overt pain behavior.   Eyes:anicteric  HEENT:grossly intact  Neck:supple, symmetric, trachea midline and no masses   Pulmonary:even and unlabored  Cardiovascular:No edema or pitting edema present  Skin:Normal without rashes or lesions and well hydrated  Psychiatric:Mood and affect appropriate  Neurologic:Cranial Nerves II-XII grossly intact  Musculoskeletal: Lumbar scar from prior surgery well-healed, tender left sacroiliac joint, + Brianna finger sign + Patricks test +Gaenslen's test+ Posterior pelvic pain provocation    Imaging

## 2023-09-29 ENCOUNTER — TELEPHONE (OUTPATIENT)
Dept: FAMILY MEDICINE CLINIC | Facility: CLINIC | Age: 68
End: 2023-09-29

## 2023-09-29 ENCOUNTER — APPOINTMENT (OUTPATIENT)
Dept: LAB | Facility: CLINIC | Age: 68
End: 2023-09-29
Payer: MEDICARE

## 2023-09-29 ENCOUNTER — OFFICE VISIT (OUTPATIENT)
Dept: FAMILY MEDICINE CLINIC | Facility: CLINIC | Age: 68
End: 2023-09-29
Payer: MEDICARE

## 2023-09-29 ENCOUNTER — HOSPITAL ENCOUNTER (OUTPATIENT)
Dept: CT IMAGING | Facility: HOSPITAL | Age: 68
Discharge: HOME | End: 2023-09-29
Payer: MEDICARE

## 2023-09-29 ENCOUNTER — ANTICOAG VISIT (OUTPATIENT)
Dept: CARDIOLOGY CLINIC | Facility: CLINIC | Age: 68
End: 2023-09-29

## 2023-09-29 VITALS
OXYGEN SATURATION: 97 % | HEART RATE: 68 BPM | HEIGHT: 66 IN | RESPIRATION RATE: 16 BRPM | DIASTOLIC BLOOD PRESSURE: 78 MMHG | BODY MASS INDEX: 34.55 KG/M2 | TEMPERATURE: 97.6 F | WEIGHT: 215 LBS | SYSTOLIC BLOOD PRESSURE: 122 MMHG

## 2023-09-29 DIAGNOSIS — R19.09 ABDOMINAL MASS OF OTHER SITE: Primary | ICD-10-CM

## 2023-09-29 DIAGNOSIS — M48.07 STENOSIS OF LUMBOSACRAL SPINE: Primary | Chronic | ICD-10-CM

## 2023-09-29 DIAGNOSIS — R19.09 ABDOMINAL MASS OF OTHER SITE: ICD-10-CM

## 2023-09-29 DIAGNOSIS — I48.0 PAROXYSMAL ATRIAL FIBRILLATION (HCC): Primary | ICD-10-CM

## 2023-09-29 DIAGNOSIS — E11.69 DIABETES MELLITUS TYPE 2 IN OBESE: Chronic | ICD-10-CM

## 2023-09-29 DIAGNOSIS — E66.9 DIABETES MELLITUS TYPE 2 IN OBESE: Chronic | ICD-10-CM

## 2023-09-29 DIAGNOSIS — E78.5 DYSLIPIDEMIA: ICD-10-CM

## 2023-09-29 PROBLEM — R19.00 ABDOMINAL LUMP: Status: ACTIVE | Noted: 2023-09-29

## 2023-09-29 PROCEDURE — G1004 CDSM NDSC: HCPCS

## 2023-09-29 PROCEDURE — 99214 OFFICE O/P EST MOD 30 MIN: CPT | Performed by: FAMILY MEDICINE

## 2023-09-29 PROCEDURE — 74177 CT ABD & PELVIS W/CONTRAST: CPT

## 2023-09-29 RX ORDER — CEPHALEXIN 500 MG/1
500 CAPSULE ORAL 4 TIMES DAILY
Qty: 40 CAPSULE | Refills: 0 | Status: SHIPPED | OUTPATIENT
Start: 2023-09-29 | End: 2023-10-09

## 2023-09-29 RX ADMIN — IOHEXOL 50 ML: 240 INJECTION, SOLUTION INTRATHECAL; INTRAVASCULAR; INTRAVENOUS; ORAL at 15:49

## 2023-09-29 RX ADMIN — IOHEXOL 100 ML: 350 INJECTION, SOLUTION INTRAVENOUS at 15:49

## 2023-09-29 NOTE — TELEPHONE ENCOUNTER
Discussed results of CT abdomen and pelvis with [facility] radiology, Dr. Robin Orozco and spoke with patient again regarding results. Patient will proceed with x-ray of lumbar spine as recommended. Radiologist describes enhancing pelvic wall collection extending from the pubic symphysis to the base of the penis with a tract opening to the skin surface. Possibility of abscess is not excluded and clinical correlation was recommended. Patient feels well, he is afebrile. No pain, he denies symptoms of chills or fatigue. We discussed option of close monitoring with outpatient surgical consult which is currently scheduled on October 4 versus ER visit today. Patient prefers to wait for his follow-up with Dr. Shonda gonzalez. I reviewed red flags. If patient develops symptoms of fever, chills, pain in the lower abdomen, unusual drainage that she will proceed to the ED immediately. Patient will start Keflex tonight. He will stop by at [facility] for x-ray of lumbar spine is recommended. Patient understands instructions and agrees.

## 2023-09-29 NOTE — PROGRESS NOTES
Name: Tello Stern      : 1955      MRN: [id number]  Encounter Provider: Lauren Bryson MD  Encounter Date: 2023   Encounter department: [address] [place] [address]     1. Abdominal mass of other site  Comments:  mid lower abdomen  Orders:  -     Ambulatory referral to General Surgery; Future  -     cephalexin (KEFLEX) 500 mg capsule; Take 1 capsule (500 mg total) by mouth 4 (four) times a day for 10 days  -     CT abdomen pelvis w contrast; Future; Expected date: 2023  -     BUN; Future  -     Creatinine, serum; Future    Given patient's history of type 2 diabetes, previous MRSA, obesity I recommend to start empiric Keflex 500 mg twice daily. Patient has been afebrile and denies symptoms of pain. He has noticed appearance of lower abdominal lump with external draining nodule over a week ago. Etiology of his symptoms is not entirely clear. I suspect it may be related to remote pelvic surgery over 20 years ago. We will proceed with stat CT abdomen and pelvis. Referral to general surgery. I will follow-up with patient pending results. Subjective     Patient presents for evaluation of painless lump in his lower abdomen. Patient reports history of remote pelvic surgery from umbilicus down to groin due to injury/blunt trauma. Patient stated that he has noticed small lump, less than an inch that has been draining over a week ago. Drainage has improved but did not resolve completely. Patient stated that he also noticed a big lump underneath the surface of his abdomen that has been painless as well. No fever, rash or skin discoloration  Normal appetite, he has been feeling generally well  Normal blood sugars. Review of Systems   Constitutional: Negative. HENT: Negative. Respiratory: Negative. Cardiovascular: Negative. Gastrointestinal: Negative for abdominal pain, blood in stool, diarrhea and nausea.         As per HPI   Genitourinary: Negative. Musculoskeletal: Negative. Hematological: Negative. Psychiatric/Behavioral: Negative.         Past Medical History:   Diagnosis Date   • Acute respiratory failure with hypoxia and hypercapnia ([address]) 08/17/2020   • LAURENCE (acute kidney injury) ([address]) 08/16/2020   • Arthritis     knees   • Community acquired pneumonia of left lower lobe of lung 08/12/2020   • Diabetes mellitus ([address])    • Hypertension    • Measles, Montenegr[place] (rubella)    • Mumps    • Obesity    • Pneumonia 8/2020   • Psoas abscess ([address]) 1/3/2023   • Snoring     Loudly     Past Surgical History:   Procedure Laterality Date   • CYSTOSCOPY     • JOINT REPLACEMENT  07/2018   • KNEE ARTHROSCOPY Left     knee   • ORIF PELVIC FRACTURE     • SPINAL FUSION  05/06/2022   • TOTAL KNEE ARTHROPLASTY Left 06/10/2019    Procedure: ARTHROPLASTY KNEE TOTAL;  Surgeon: Jw Winters MD;  Location: [facility];  Service: Orthopedics     Family History   Problem Relation Age of Onset   • Ovarian cancer Mother    • Heart disease Mother    • Diabetes type II Mother    • Colon cancer Father    • Heart disease Father    • Nephrolithiasis Father    • Cancer Father      Social History     Socioeconomic History   • Marital status: Single     Spouse name: None   • Number of children: None   • Years of education: None   • Highest education level: None   Occupational History   • None   Tobacco Use   • Smoking status: Never   • Smokeless tobacco: Never   Vaping Use   • Vaping Use: Never used   Substance and Sexual Activity   • Alcohol use: Not Currently     Alcohol/week: 1.0 standard drink of alcohol     Types: 1 Standard drinks or equivalent per week     Comment: Occasionally, not in excess - As per Allscripts    • Drug use: Never   • Sexual activity: Not Currently   Other Topics Concern   • None   Social History Narrative   • None     Social Determinants of Health     Financial Resource Strain: High Risk (10/11/2022)    Overall Financial Resource Strain (CARDIA)    • Difficulty of Paying Living Expenses: Hard   Food Insecurity: Not on file   Transportation Needs: No Transportation Needs (10/11/2022)    PRAPARE - Transportation    • Lack of Transportation (Medical): No    • Lack of Transportation (Non-Medical): No   Physical Activity: Not on file   Stress: Not on file   Social Connections: Not on file   Intimate Partner Violence: Not on file   Housing Stability: Not on file     Current Outpatient Medications on File Prior to Visit   Medication Sig   • ACCU-CHEK LARRY PLUS test strip    • ACCU-CHEK FASTCLIX LANCETS MISC    • amLODIPine (NORVASC) 5 mg tablet Take 1 tablet (5 mg total) by mouth daily   • atorvastatin (LIPITOR) 20 mg tablet Take 1 tablet (20 mg total) by mouth daily   • Blood Glucose Monitoring Suppl (OneTouch Verio Reflect) w/Device KIT Check blood sugars once daily. Please substitute with appropriate alternative as covered by patient's insurance.  Dx: E11.65   • glipiZIDE (GLUCOTROL XL) 5 mg 24 hr tablet Take 1 tablet (5 mg total) by mouth daily   • HYDROcodone-acetaminophen (Norco) 7.5-325 mg per tablet Take 1 tablet by mouth every 6 (six) hours as needed for pain For ongoing therapy Max Daily Amount: 4 tablets   • HYDROcodone-acetaminophen (NORCO) 7.5-325 mg per tablet Take 1 tablet by mouth every 6 (six) hours as needed for pain For ongoing therapy DO NOT FILL BEFORE 10/26/23 Max Daily Amount: 4 tablets   • lisinopril (ZESTRIL) 10 mg tablet Take 1 tablet (10 mg total) by mouth daily   • metFORMIN (GLUCOPHAGE-XR) 500 mg 24 hr tablet Take 4 tablets (2,000 mg total) by mouth daily (Patient taking differently: Take 1,000 mg by mouth daily Taking 2 tablets daily)   • methocarbamol (Robaxin-750) 750 mg tablet Take 1 tablet (750 mg total) by mouth 3 (three) times a day as needed for muscle spasms   • metoprolol succinate (TOPROL-XL) 50 mg 24 hr tablet Take 1.5 tablets (75 mg total) by mouth daily   • naloxone (NARCAN) 4 mg/0.1 mL nasal spray Administer 1 spray into a nostril. If no response after 2-3 minutes, give another dose in the other nostril using a new spray. • warfarin (COUMADIN) 5 mg tablet Take 1 tablet (5 mg total) by mouth daily     No Known Allergies  Immunization History   Administered Date(s) Administered   • COVID-19 Pfizer Vac BIVALENT Conor-sucrose 12 Yr+ IM 12/15/2022   • INFLUENZA 08/03/2016   • Influenza Quadrivalent Preservative Free 3 years and older IM 08/03/2016   • Pneumococcal Conjugate Vaccine 20-valent (Pcv20), Polysace 10/11/2022   • Tdap 01/01/2001       Objective     /78 (BP Location: Left arm, Patient Position: Sitting, Cuff Size: Large)   Pulse 68   Temp 97.6 °F (36.4 °C) (Temporal)   Resp 16   Ht 5' 6" (1.676 m)   Wt 97.5 kg (215 lb)   SpO2 97%   BMI 34.70 kg/m²     Physical Exam  Vitals reviewed. Constitutional:       General: He is not in acute distress. Appearance: Normal appearance. He is not ill-appearing. Abdominal:      Palpations: Abdomen is soft. There is mass. Tenderness: There is no abdominal tenderness. Comments: Mobile subcutaneous mass mid lower abdomen 4 inches over 3 inches, painless. Abdomen is otherwise soft, nontender and nondistended. No discoloration, no warmth, no induration. In the center of the subcutaneous mass there is hyperplastic skin growth of approximately 1.5 cm, no surrounding edema or erythema. Neurological:      General: No focal deficit present. Mental Status: He is alert and oriented to person, place, and time.    Psychiatric:         Mood and Affect: Mood normal.         Behavior: Behavior normal.       Marianela Hopson MD

## 2023-10-03 ENCOUNTER — TELEPHONE (OUTPATIENT)
Age: 68
End: 2023-10-03

## 2023-10-03 NOTE — TELEPHONE ENCOUNTER
Keflex started 9/29    Called patient and scheduled:     Left SIJ injection on 10/11    Reviewed instructions: , NPO 1 hour prior, loose-fitting/comfortable pants, if ill/fever/infx/abx to call and reschedule. No immunizations or vaccinations 2w prior/after steroid injections. Patient stated verbal understanding.

## 2023-10-03 NOTE — TELEPHONE ENCOUNTER
Caller: Umesh Mata PT    Doctor:  Darian Morris     Reason for call: Pt called in regard to procedure and medication   HYDROcodone-acetaminophen (Norco) 7.5-325 mg per tablet     The price of the medication   Call back#: [phone]

## 2023-10-03 NOTE — TELEPHONE ENCOUNTER
S/W pt. Pt stated he is waiting on a call from the  to schedule the procedure from the 9/28 appt. Advised will have the  call him to schedule. Pt stated his norco used to be $88 and now it doubled last time so he only took a 1 month supply and wanted to let [facility] know.

## 2023-10-04 ENCOUNTER — CONSULT (OUTPATIENT)
Dept: SURGERY | Facility: CLINIC | Age: 68
End: 2023-10-04
Payer: MEDICARE

## 2023-10-04 ENCOUNTER — HOSPITAL ENCOUNTER (OUTPATIENT)
Dept: RADIOLOGY | Facility: HOSPITAL | Age: 68
Discharge: HOME | End: 2023-10-04
Payer: MEDICARE

## 2023-10-04 ENCOUNTER — TELEPHONE (OUTPATIENT)
Age: 68
End: 2023-10-04

## 2023-10-04 VITALS — HEIGHT: 66 IN | WEIGHT: 215 LBS | BODY MASS INDEX: 34.55 KG/M2

## 2023-10-04 DIAGNOSIS — T84.7XXA HARDWARE COMPLICATING WOUND INFECTION (HCC): Primary | ICD-10-CM

## 2023-10-04 DIAGNOSIS — R19.09 ABDOMINAL MASS OF OTHER SITE: ICD-10-CM

## 2023-10-04 DIAGNOSIS — M48.07 STENOSIS OF LUMBOSACRAL SPINE: Chronic | ICD-10-CM

## 2023-10-04 PROCEDURE — 72110 X-RAY EXAM L-2 SPINE 4/>VWS: CPT

## 2023-10-04 PROCEDURE — 99202 OFFICE O/P NEW SF 15 MIN: CPT | Performed by: SURGERY
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
## 2023-10-06 ENCOUNTER — HOSPITAL ENCOUNTER (OUTPATIENT)
Dept: RADIOLOGY | Facility: HOSPITAL | Age: 68
Discharge: HOME | End: 2023-10-06
Attending: ORTHOPAEDIC SURGERY
Payer: MEDICARE

## 2023-10-06 ENCOUNTER — HOSPITAL ENCOUNTER (OUTPATIENT)
Facility: HOSPITAL | Age: 68
Setting detail: OUTPATIENT SURGERY
End: 2023-10-06
Attending: ORTHOPAEDIC SURGERY | Admitting: ORTHOPAEDIC SURGERY
Payer: MEDICARE

## 2023-10-06 ENCOUNTER — OFFICE VISIT (OUTPATIENT)
Dept: OBGYN CLINIC | Facility: HOSPITAL | Age: 68
End: 2023-10-06
Payer: MEDICARE

## 2023-10-06 ENCOUNTER — TELEPHONE (OUTPATIENT)
Age: 68
End: 2023-10-06

## 2023-10-06 VITALS
BODY MASS INDEX: 33.59 KG/M2 | HEIGHT: 66 IN | SYSTOLIC BLOOD PRESSURE: 113 MMHG | DIASTOLIC BLOOD PRESSURE: 75 MMHG | HEART RATE: 68 BPM | WEIGHT: 209 LBS

## 2023-10-06 DIAGNOSIS — T84.7XXA HARDWARE COMPLICATING WOUND INFECTION (HCC): ICD-10-CM

## 2023-10-06 DIAGNOSIS — M86.9 OSTEITIS OF SYMPHYSIS PUBIS (HCC): Primary | ICD-10-CM

## 2023-10-06 PROCEDURE — 72190 X-RAY EXAM OF PELVIS: CPT

## 2023-10-06 PROCEDURE — 99213 OFFICE O/P EST LOW 20 MIN: CPT | Performed by: ORTHOPAEDIC SURGERY

## 2023-10-06 RX ORDER — CEFAZOLIN SODIUM 2 G/50ML
2000 SOLUTION INTRAVENOUS
OUTPATIENT
Start: 2023-10-11 | End: 2023-10-12

## 2023-10-06 NOTE — TELEPHONE ENCOUNTER
Caller: Adolfo    Doctor: Marquita Lawrence    Reason for call: Pt is getting surgery on the 11th of oct and needs to cancel his procedure he will give us a call when he can reschedule.          Call back#: [phone]

## 2023-10-06 NOTE — TELEPHONE ENCOUNTER
Spoke with patient. He will call me once he is cleared from surgery to have right SIJ injection rescheduled.

## 2023-10-08 NOTE — ASSESSMENT & PLAN NOTE
I discussed this with him at length. I think that considering he does have an infection in this area were going to take him to surgery for irrigation debridement and removal of hardware. Risks and benefits associated with this procedure were discussed with the patient prior to surgery and his questions were answered.

## 2023-10-08 NOTE — PROGRESS NOTES
Assessment/Plan:    No problem-specific Assessment & Plan notes found for this encounter. 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 male. This is a 81-year-old fellow who is status post open reduction internal fixation of the symphysis with posterior ring fixation many years ago. He states that he started developing increasing pain and a bubble in this area over the wound. He states that he has now had intermittent bouts of drainage. He now follows up. He states that the pain waxes and wanes. The following portions of the patient's history were reviewed and updated as appropriate: allergies, current medications, past family history, past medical history, past social history, past surgical history, and problem list.    Review of Systems      Objective:      /75   Pulse 68   Ht 5' 6" (1.676 m)   Wt 94.8 kg (209 lb)   BMI 33.73 kg/m²          Physical Exam      On physical examination he does have a granulation area at the level of the vertical laparotomy incision. His alignment looks good. His good rotation of his hips. There is no sign of other problems.

## 2023-10-09 ENCOUNTER — TELEPHONE (OUTPATIENT)
Dept: PAIN MEDICINE | Facility: MEDICAL CENTER | Age: 68
End: 2023-10-09

## 2023-10-09 ENCOUNTER — TELEPHONE (OUTPATIENT)
Dept: CARDIOLOGY CLINIC | Facility: CLINIC | Age: 68
End: 2023-10-09

## 2023-10-09 NOTE — TELEPHONE ENCOUNTER
Called and spoke w/pt and relayed Dr Yohana Anton to pt. His last INR was 2.66 on 10/29/23. He was due for a repeat on 10/6/23 but did not get that done. He will [facility] to see when he should stop it. His surgery is 10/11/23 Debridement Lower extremity (wash out) of pubic symphysis  with Dr Johnette Cranker.
Caller: Patient    Doctor: Henri Head    Reason for call:     Patient returning call with surgery questions, is he suppose to continue or stop taking his Warfarin before his surgery,   Surgery date is 10/11/23. Please advise the patient.     Call back#: [phone]
To clarify, the pt is allowed to take coumadin before sx and days leading up to sx as long as INR is 2 or less? Thank you.
[de-identified] : Patient has a sprain of the left ankle.  Diagnosis and x-ray images were discussed with patient and her mother.  Today, patient was given an Aircast to wear at all times while weightbearing.  She can wear this with a shoe that is well supportive and well fitting.  Patient should elevate the ankle at rest, alternate with ice and warm water and Epsom salt soaks, and take over-the-counter anti-inflammatories as needed for the next few days especially.  Patient was encouraged to modify her activity based on pain and to try not to stand on the ankle for too long.  It may take 4 to 6 weeks for this injury to fully heal and the swelling may take just as long to dissipate.  She will follow-up in 3 to 4 weeks if pain is still recurring.  Patient and mother agree to above plan all questions were answered today.

## 2023-10-09 NOTE — TELEPHONE ENCOUNTER
Clearance letter for started for surgery on Wednesday. However, pt did not hold coumadin, and INR needs to be <2.0. Not sure if holding for one day will make a difference.    Pt will draw an INR tomorrow morning to determine the next step

## 2023-10-09 NOTE — PRE-PROCEDURE INSTRUCTIONS
Pre-Surgery Instructions:   Medication Instructions   • amLODIPine (NORVASC) 5 mg tablet Take day of surgery. • atorvastatin (LIPITOR) 20 mg tablet Take day of surgery. • cephalexin (KEFLEX) 500 mg capsule Take day of surgery. • glipiZIDE (GLUCOTROL XL) 5 mg 24 hr tablet Hold day of surgery. • HYDROcodone-acetaminophen (NORCO) 7.5-325 mg per tablet Uses PRN- DO NOT take day of surgery   • lisinopril (ZESTRIL) 10 mg tablet Hold day of surgery. • metFORMIN (GLUCOPHAGE-XR) 500 mg 24 hr tablet Hold day of surgery. • methocarbamol (Robaxin-750) 750 mg tablet PRN   • metoprolol succinate (TOPROL-XL) 50 mg 24 hr tablet Take day of surgery. • naloxone (NARCAN) 4 mg/0.1 mL nasal spray Uses PRN- OK to take day of surgery   • warfarin (COUMADIN) 5 mg tablet Instructions provided by MD      Medication instructions for day surgery reviewed. Please use only a sip of water to take your instructed medications. Avoid all over the counter vitamins, supplements and NSAIDS for one week prior to surgery per anesthesia guidelines. Tylenol is ok to take as needed. You will receive a call one business day prior to surgery with an arrival time and hospital directions. If your surgery is scheduled on a Monday, the hospital will be calling you on the Friday prior to your surgery. If you have not heard from anyone by 8pm, please call the hospital supervisor through the hospital  at [phone]. Valeriy Grady [phone]). Do not eat or drink anything after midnight the night before your surgery, including candy, mints, lifesavers, or chewing gum. Do not drink alcohol 24hrs before your surgery. Try not to smoke at least 24hrs before your surgery. Follow the pre surgery showering instructions as listed in the [facility] Surgical Experience Booklet” or otherwise provided by your surgeon's office. Do not shave the surgical area 24 hours before surgery.  Do not apply any lotions, creams, including makeup, cologne, deodorant, or perfumes after showering on the day of your surgery. No contact lenses, eye make-up, or artificial eyelashes. Remove nail polish, including gel polish, and any artificial, gel, or acrylic nails if possible. Remove all jewelry including rings and body piercing jewelry. Wear causal clothing that is easy to take on and off. Consider your type of surgery. Keep any valuables, jewelry, piercings at home. Please bring any specially ordered equipment (sling, braces) if indicated. Arrange for a responsible person to drive you to and from the hospital on the day of your surgery. Visitor Guidelines discussed. Call the surgeon's office with any new illnesses, exposures, or additional questions prior to surgery. Please reference your [facility] Surgical Experience Booklet” for additional information to prepare for your upcoming surgery.

## 2023-10-09 NOTE — TELEPHONE ENCOUNTER
FYI:  Spoke with patient to confirm procedure 10/11/23, patient said this appt was cancelled a week ago and stated he spoke to someone in our office. I didn't see a task regarding cancel proc appt due to up coming surgery. Thank you.

## 2023-10-10 ENCOUNTER — APPOINTMENT (OUTPATIENT)
Dept: LAB | Facility: CLINIC | Age: 68
End: 2023-10-10
Payer: MEDICARE

## 2023-10-10 ENCOUNTER — ANTICOAG VISIT (OUTPATIENT)
Dept: CARDIOLOGY CLINIC | Facility: CLINIC | Age: 68
End: 2023-10-10

## 2023-10-10 DIAGNOSIS — I48.0 PAROXYSMAL ATRIAL FIBRILLATION (HCC): Primary | ICD-10-CM

## 2023-10-10 DIAGNOSIS — E66.9 OBESITY, UNSPECIFIED CLASSIFICATION, UNSPECIFIED OBESITY TYPE, UNSPECIFIED WHETHER SERIOUS COMORBIDITY PRESENT: ICD-10-CM

## 2023-10-10 DIAGNOSIS — E11.69 DIABETES MELLITUS ASSOCIATED WITH HORMONAL ETIOLOGY (HCC): Primary | ICD-10-CM

## 2023-10-11 ENCOUNTER — TELEPHONE (OUTPATIENT)
Dept: UROLOGY | Facility: CLINIC | Age: 68
End: 2023-10-11

## 2023-10-11 ENCOUNTER — TELEPHONE (OUTPATIENT)
Age: 68
End: 2023-10-11

## 2023-10-11 NOTE — TELEPHONE ENCOUNTER
----- Message from Susie Perez RN sent at 10/11/2023  9:04 AM EDT -----  Please help to schedule patient per DV   ----- Message -----  From: Pooja Wood MD  Sent: 10/11/2023   7:34 AM EDT  To: Susie Perez RN    Next available  ----- Message -----  From: Susie Perez RN  Sent: 10/11/2023   6:48 AM EDT  To: Pooja Wood MD    How soon are we talking?  ----- Message -----  From: Pooja Wood MD  Sent: 10/10/2023   5:24 PM EDT  To: Sree Kahn MD; #    Does not look like it on films but I'm happy to scope him to be certain if you like. Team, can you get this gentleman in for cystoscopy? DV  ----- Message -----  From: Sree Kahn MD  Sent: 10/6/2023   8:43 AM EDT  To: Pooja Wood MD    Hey bud - can you look at his CT, and tell me if you think that his symphyseal plate is inside his bladder?  Thx

## 2023-10-11 NOTE — TELEPHONE ENCOUNTER
Caller Patient     Doctor: Kathie Banks     Reason for call: Patient was suppose to have surgery today, Patient would like to reschedule it, I called over to the surgery coordinator but no answer     Call back#: [phone]

## 2023-10-12 NOTE — TELEPHONE ENCOUNTER
Caller: Glenda Mejia    Doctor: Johnette Cranker     Reason for call: Calling back regarding his sx date    Call back#: [phone]

## 2023-10-16 ENCOUNTER — TELEPHONE (OUTPATIENT)
Dept: UROLOGY | Facility: CLINIC | Age: 68
End: 2023-10-16

## 2023-10-16 NOTE — TELEPHONE ENCOUNTER
----- Message from Emma Wolff MD sent at 10/12/2023  1:11 PM EDT -----  Regarding: RE: Urology appointment  Team,    Can we get this man in for cystoscopy with the first available urologist asap please? DV  ----- Message -----  From: Venu Arce PA-C  Sent: 10/12/2023  12:52 PM EDT  To: Emma Wolff MD; Iglesia Wright MD  Subject: Urology appointment                              Good afternoon Dr. Nakita Spencer,    My name is Valeria Bryson and I am Dr. Krzysztof ROMERO. I am reaching out regarding Mr. Lisa Rapp. Upon chart review, his visit with you will not be until 11/30. However, Dr. Beverley Galeano wishes to perform the patient's hardware removal surgery and I&D before this due to the infection. Is there any chance the the patient's urology appointment and possible cystoscopy could be moved up at all?     Thank you,  Valeria Bryson

## 2023-10-20 ENCOUNTER — PROCEDURE VISIT (OUTPATIENT)
Dept: UROLOGY | Facility: CLINIC | Age: 68
End: 2023-10-20
Payer: MEDICARE

## 2023-10-20 VITALS
SYSTOLIC BLOOD PRESSURE: 148 MMHG | OXYGEN SATURATION: 98 % | DIASTOLIC BLOOD PRESSURE: 86 MMHG | WEIGHT: 214.8 LBS | HEART RATE: 68 BPM | BODY MASS INDEX: 34.52 KG/M2 | HEIGHT: 66 IN

## 2023-10-20 DIAGNOSIS — R93.89 ABNORMAL CT SCAN: Primary | ICD-10-CM

## 2023-10-20 DIAGNOSIS — M86.9 OSTEITIS OF SYMPHYSIS PUBIS (HCC): ICD-10-CM

## 2023-10-20 LAB
SL AMB  POCT GLUCOSE, UA: NORMAL
SL AMB LEUKOCYTE ESTERASE,UA: NORMAL
SL AMB POCT BILIRUBIN,UA: NORMAL
SL AMB POCT BLOOD,UA: NORMAL
SL AMB POCT CLARITY,UA: CLEAR
SL AMB POCT COLOR,UA: YELLOW
SL AMB POCT KETONES,UA: NORMAL
SL AMB POCT NITRITE,UA: NORMAL
SL AMB POCT PH,UA: 5
SL AMB POCT SPECIFIC GRAVITY,UA: 1.02
SL AMB POCT URINE PROTEIN: NORMAL
SL AMB POCT UROBILINOGEN: 0.2

## 2023-10-20 PROCEDURE — 52000 CYSTOURETHROSCOPY: CPT | Performed by: UROLOGY

## 2023-10-20 PROCEDURE — 81002 URINALYSIS NONAUTO W/O SCOPE: CPT | Performed by: UROLOGY

## 2023-10-20 NOTE — Clinical Note
I do not see any hardware within the bladder.   I also did not appreciate a tract from pubic symphysis to base of penis (although this could be present but subtle and asymptomatic)

## 2023-10-20 NOTE — ASSESSMENT & PLAN NOTE
The patient's hardware is not seen within the bladder on cystoscopy today. Is possible is abutting externally based on CT scan.       Interestingly although CT scan suggests there is a tract from pubic symphysis to the base of the penis there is no obvious exam evidence of this
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
## 2023-10-24 ENCOUNTER — TELEPHONE (OUTPATIENT)
Age: 68
End: 2023-10-24

## 2023-10-24 NOTE — TELEPHONE ENCOUNTER
Caller: Patient    Doctor/Office: Jaciel Paige    Call regarding :  requesting to speak with surgery coordinator     Call was transferred to: surgery coordinator

## 2023-10-25 ENCOUNTER — ANESTHESIA EVENT (OUTPATIENT)
Dept: PERIOP | Facility: HOSPITAL | Age: 68
End: 2023-10-25
Payer: MEDICARE

## 2023-10-25 NOTE — PRE-PROCEDURE INSTRUCTIONS
Pre-Surgery Instructions:   Medication Instructions    amLODIPine (NORVASC) 5 mg tablet Take day of surgery. atorvastatin (LIPITOR) 20 mg tablet Take night before surgery    glipiZIDE (GLUCOTROL XL) 5 mg 24 hr tablet Hold day of surgery. HYDROcodone-acetaminophen (Norco) 7.5-325 mg per tablet Uses PRN- OK to take day of surgery    lisinopril (ZESTRIL) 10 mg tablet Hold day of surgery. metFORMIN (GLUCOPHAGE-XR) 500 mg 24 hr tablet Hold day of surgery. methocarbamol (Robaxin-750) 750 mg tablet Uses PRN- OK to take day of surgery    metoprolol succinate (TOPROL-XL) 50 mg 24 hr tablet Take day of surgery. warfarin (COUMADIN) 5 mg tablet Instructions provided by MD    Last dose of coumadin is 10/29/23. Pt instructed to stop nsaids and supplements one week prior to surgery. Pt verbalized understanding of shower and med instructions.

## 2023-10-27 DIAGNOSIS — I48.0 PAROXYSMAL ATRIAL FIBRILLATION (HCC): ICD-10-CM

## 2023-10-27 RX ORDER — WARFARIN SODIUM 5 MG/1
5 TABLET ORAL DAILY
Qty: 30 TABLET | Refills: 0 | Status: SHIPPED | OUTPATIENT
Start: 2023-10-27

## 2023-11-02 ENCOUNTER — APPOINTMENT (OUTPATIENT)
Dept: LAB | Facility: CLINIC | Age: 68
End: 2023-11-02
Payer: MEDICARE

## 2023-11-03 ENCOUNTER — HOSPITAL ENCOUNTER (OUTPATIENT)
Facility: HOSPITAL | Age: 68
Setting detail: OUTPATIENT SURGERY
Discharge: HOME | End: 2023-11-04
Attending: ORTHOPAEDIC SURGERY | Admitting: ORTHOPAEDIC SURGERY
Payer: MEDICARE

## 2023-11-03 ENCOUNTER — ANESTHESIA (OUTPATIENT)
Dept: PERIOP | Facility: HOSPITAL | Age: 68
End: 2023-11-03
Payer: MEDICARE

## 2023-11-03 ENCOUNTER — HOSPITAL ENCOUNTER (OUTPATIENT)
Dept: RADIOLOGY | Facility: HOSPITAL | Age: 68
Setting detail: OUTPATIENT SURGERY
Discharge: HOME | End: 2023-11-03
Payer: MEDICARE

## 2023-11-03 ENCOUNTER — ANTICOAG VISIT (OUTPATIENT)
Dept: CARDIOLOGY CLINIC | Facility: CLINIC | Age: 68
End: 2023-11-03

## 2023-11-03 DIAGNOSIS — R31.9 HEMATURIA, UNSPECIFIED TYPE: ICD-10-CM

## 2023-11-03 DIAGNOSIS — B99.9 INFECTION: Primary | ICD-10-CM

## 2023-11-03 DIAGNOSIS — T84.7XXA HARDWARE COMPLICATING WOUND INFECTION, INITIAL ENCOUNTER (HCC): ICD-10-CM

## 2023-11-03 DIAGNOSIS — I48.0 PAROXYSMAL ATRIAL FIBRILLATION (HCC): Primary | ICD-10-CM

## 2023-11-03 DIAGNOSIS — T84.7XXA HARDWARE COMPLICATING WOUND INFECTION (HCC): ICD-10-CM

## 2023-11-03 DIAGNOSIS — M86.9 OSTEITIS OF SYMPHYSIS PUBIS (HCC): ICD-10-CM

## 2023-11-03 LAB
GLUCOSE SERPL-MCNC: 113 MG/DL (ref 65–140)
GLUCOSE SERPL-MCNC: 121 MG/DL (ref 65–140)

## 2023-11-03 PROCEDURE — 87075 CULTR BACTERIA EXCEPT BLOOD: CPT | Performed by: ORTHOPAEDIC SURGERY

## 2023-11-03 PROCEDURE — 20680 REMOVAL OF IMPLANT DEEP: CPT | Performed by: ORTHOPAEDIC SURGERY

## 2023-11-03 PROCEDURE — 99024 POSTOP FOLLOW-UP VISIT: CPT

## 2023-11-03 PROCEDURE — 87205 SMEAR GRAM STAIN: CPT | Performed by: ORTHOPAEDIC SURGERY

## 2023-11-03 PROCEDURE — 87102 FUNGUS ISOLATION CULTURE: CPT | Performed by: ORTHOPAEDIC SURGERY

## 2023-11-03 PROCEDURE — C1713 ANCHOR/SCREW BN/BN,TIS/BN: HCPCS | Performed by: ORTHOPAEDIC SURGERY

## 2023-11-03 PROCEDURE — 87070 CULTURE OTHR SPECIMN AEROBIC: CPT | Performed by: ORTHOPAEDIC SURGERY

## 2023-11-03 PROCEDURE — 72170 X-RAY EXAM OF PELVIS: CPT

## 2023-11-03 PROCEDURE — 87116 MYCOBACTERIA CULTURE: CPT | Performed by: ORTHOPAEDIC SURGERY

## 2023-11-03 PROCEDURE — 87147 CULTURE TYPE IMMUNOLOGIC: CPT | Performed by: ORTHOPAEDIC SURGERY

## 2023-11-03 PROCEDURE — 82948 REAGENT STRIP/BLOOD GLUCOSE: CPT

## 2023-11-03 PROCEDURE — 87186 SC STD MICRODIL/AGAR DIL: CPT | Performed by: ORTHOPAEDIC SURGERY

## 2023-11-03 PROCEDURE — 87077 CULTURE AEROBIC IDENTIFY: CPT | Performed by: ORTHOPAEDIC SURGERY

## 2023-11-03 PROCEDURE — 87206 SMEAR FLUORESCENT/ACID STAI: CPT | Performed by: ORTHOPAEDIC SURGERY

## 2023-11-03 DEVICE — STIMULAN® RAPID CURE PROVIDED STERILE FOR SINGLE PATIENT USE. STIMULAN® RAPID CURE CONTAINS CALCIUM SULFATE POWDER AND MIXING SOLUTION IN PRE-MEASURED QUANTITIES SO THAT WHEN MIXED TOGETHER IN A STERILE MIXING BOWL, THE RESULTANT PASTE IS TO BE DIGITALLY PACKED INTO OPEN BONE VOID/GAP TO SET INSITU OR PLACED INTO THE MOULD PROVIDED, THE MIXTURE SETS TO FORM BEADS. THE BIODEGRADABLE, RADIOPAQUE BEADS ARE RESORBED IN APPROXIMATELY 30 – 60 DAYS WHEN USED IN ACCORDANCE WITH THE DEVICE LABELLING. STIMULAN® RAPID CURE IS MANUFACTURED FROM SYNTHETIC IMPLANT GRADE CALCIUM SULFATE DIHYDRATE(CASO4.2H2O) THAT RESORBS AND IS REPLACED WITH BONE DURING THE HEALING PROCESS. ALSO, AS THE BONE VOID FILLER BEADS ARE BIODEGRADABLE AND BIOCOMPATIBLE, THEY MAY BE USED AT AN INFECTED SITE.
Type: IMPLANTABLE DEVICE | Site: PELVIS | Status: FUNCTIONAL
Brand: STIMULAN® RAPID CURE

## 2023-11-03 RX ORDER — HYDROMORPHONE HCL/PF 1 MG/ML
0.5 SYRINGE (ML) INJECTION
Status: DISCONTINUED | OUTPATIENT
Start: 2023-11-03 | End: 2023-11-03 | Stop reason: HOSPADM

## 2023-11-03 RX ORDER — FENTANYL CITRATE 50 UG/ML
INJECTION, SOLUTION INTRAMUSCULAR; INTRAVENOUS AS NEEDED
Status: DISCONTINUED | OUTPATIENT
Start: 2023-11-03 | End: 2023-11-03

## 2023-11-03 RX ORDER — LISINOPRIL 10 MG/1
10 TABLET ORAL DAILY
Status: DISCONTINUED | OUTPATIENT
Start: 2023-11-04 | End: 2023-11-04 | Stop reason: HOSPADM

## 2023-11-03 RX ORDER — CEFAZOLIN SODIUM 2 G/50ML
2000 SOLUTION INTRAVENOUS EVERY 8 HOURS
Status: COMPLETED | OUTPATIENT
Start: 2023-11-03 | End: 2023-11-04

## 2023-11-03 RX ORDER — CHLORHEXIDINE GLUCONATE ORAL RINSE 1.2 MG/ML
15 SOLUTION DENTAL ONCE
Status: DISCONTINUED | OUTPATIENT
Start: 2023-11-03 | End: 2023-11-03

## 2023-11-03 RX ORDER — METFORMIN HYDROCHLORIDE 500 MG/1
2000 TABLET, EXTENDED RELEASE ORAL DAILY
Status: DISCONTINUED | OUTPATIENT
Start: 2023-11-04 | End: 2023-11-04 | Stop reason: HOSPADM

## 2023-11-03 RX ORDER — EPHEDRINE SULFATE 50 MG/ML
INJECTION INTRAVENOUS AS NEEDED
Status: DISCONTINUED | OUTPATIENT
Start: 2023-11-03 | End: 2023-11-03

## 2023-11-03 RX ORDER — ONDANSETRON 2 MG/ML
4 INJECTION INTRAMUSCULAR; INTRAVENOUS EVERY 6 HOURS PRN
Status: DISCONTINUED | OUTPATIENT
Start: 2023-11-03 | End: 2023-11-04 | Stop reason: HOSPADM

## 2023-11-03 RX ORDER — METHOCARBAMOL 750 MG/1
750 TABLET, FILM COATED ORAL 3 TIMES DAILY PRN
Status: DISCONTINUED | OUTPATIENT
Start: 2023-11-03 | End: 2023-11-04 | Stop reason: HOSPADM

## 2023-11-03 RX ORDER — SODIUM CHLORIDE, SODIUM LACTATE, POTASSIUM CHLORIDE, CALCIUM CHLORIDE 600; 310; 30; 20 MG/100ML; MG/100ML; MG/100ML; MG/100ML
125 INJECTION, SOLUTION INTRAVENOUS CONTINUOUS
Status: DISCONTINUED | OUTPATIENT
Start: 2023-11-03 | End: 2023-11-04 | Stop reason: HOSPADM

## 2023-11-03 RX ORDER — SODIUM CHLORIDE, SODIUM LACTATE, POTASSIUM CHLORIDE, CALCIUM CHLORIDE 600; 310; 30; 20 MG/100ML; MG/100ML; MG/100ML; MG/100ML
INJECTION, SOLUTION INTRAVENOUS CONTINUOUS PRN
Status: DISCONTINUED | OUTPATIENT
Start: 2023-11-03 | End: 2023-11-03

## 2023-11-03 RX ORDER — ROCURONIUM BROMIDE 10 MG/ML
INJECTION, SOLUTION INTRAVENOUS AS NEEDED
Status: DISCONTINUED | OUTPATIENT
Start: 2023-11-03 | End: 2023-11-03

## 2023-11-03 RX ORDER — ACETAMINOPHEN 325 MG/1
650 TABLET ORAL EVERY 6 HOURS PRN
Status: DISCONTINUED | OUTPATIENT
Start: 2023-11-03 | End: 2023-11-04 | Stop reason: HOSPADM

## 2023-11-03 RX ORDER — OXYCODONE HYDROCHLORIDE 5 MG/1
5 TABLET ORAL EVERY 4 HOURS PRN
Status: DISCONTINUED | OUTPATIENT
Start: 2023-11-03 | End: 2023-11-04 | Stop reason: HOSPADM

## 2023-11-03 RX ORDER — OXYCODONE HYDROCHLORIDE 10 MG/1
10 TABLET ORAL EVERY 4 HOURS PRN
Status: DISCONTINUED | OUTPATIENT
Start: 2023-11-03 | End: 2023-11-04 | Stop reason: HOSPADM

## 2023-11-03 RX ORDER — AMLODIPINE BESYLATE 5 MG/1
5 TABLET ORAL DAILY
Status: DISCONTINUED | OUTPATIENT
Start: 2023-11-04 | End: 2023-11-04 | Stop reason: HOSPADM

## 2023-11-03 RX ORDER — SULFAMETHOXAZOLE AND TRIMETHOPRIM 800; 160 MG/1; MG/1
1 TABLET ORAL EVERY 12 HOURS SCHEDULED
Qty: 28 TABLET | Refills: 0 | Status: SHIPPED | OUTPATIENT
Start: 2023-11-03 | End: 2023-11-04 | Stop reason: SDUPTHER

## 2023-11-03 RX ORDER — PROPOFOL 10 MG/ML
INJECTION, EMULSION INTRAVENOUS AS NEEDED
Status: DISCONTINUED | OUTPATIENT
Start: 2023-11-03 | End: 2023-11-03

## 2023-11-03 RX ORDER — GLIPIZIDE 2.5 MG/1
5 TABLET, EXTENDED RELEASE ORAL
Status: DISCONTINUED | OUTPATIENT
Start: 2023-11-04 | End: 2023-11-04 | Stop reason: HOSPADM

## 2023-11-03 RX ORDER — SODIUM CHLORIDE, SODIUM LACTATE, POTASSIUM CHLORIDE, CALCIUM CHLORIDE 600; 310; 30; 20 MG/100ML; MG/100ML; MG/100ML; MG/100ML
100 INJECTION, SOLUTION INTRAVENOUS CONTINUOUS
Status: DISCONTINUED | OUTPATIENT
Start: 2023-11-03 | End: 2023-11-04 | Stop reason: HOSPADM

## 2023-11-03 RX ORDER — CEFAZOLIN SODIUM 2 G/50ML
2000 SOLUTION INTRAVENOUS ONCE
Status: DISCONTINUED | OUTPATIENT
Start: 2023-11-03 | End: 2023-11-04 | Stop reason: HOSPADM

## 2023-11-03 RX ORDER — WARFARIN SODIUM 5 MG/1
5 TABLET ORAL DAILY
Status: DISCONTINUED | OUTPATIENT
Start: 2023-11-04 | End: 2023-11-04 | Stop reason: HOSPADM

## 2023-11-03 RX ORDER — HYDROMORPHONE HCL/PF 1 MG/ML
0.5 SYRINGE (ML) INJECTION EVERY 4 HOURS PRN
Status: DISCONTINUED | OUTPATIENT
Start: 2023-11-03 | End: 2023-11-04 | Stop reason: HOSPADM

## 2023-11-03 RX ORDER — MAGNESIUM HYDROXIDE 1200 MG/15ML
LIQUID ORAL AS NEEDED
Status: DISCONTINUED | OUTPATIENT
Start: 2023-11-03 | End: 2023-11-03 | Stop reason: HOSPADM

## 2023-11-03 RX ORDER — ONDANSETRON 2 MG/ML
4 INJECTION INTRAMUSCULAR; INTRAVENOUS ONCE AS NEEDED
Status: DISCONTINUED | OUTPATIENT
Start: 2023-11-03 | End: 2023-11-03 | Stop reason: HOSPADM

## 2023-11-03 RX ORDER — ATORVASTATIN CALCIUM 20 MG/1
20 TABLET, FILM COATED ORAL
Status: DISCONTINUED | OUTPATIENT
Start: 2023-11-03 | End: 2023-11-04 | Stop reason: HOSPADM

## 2023-11-03 RX ORDER — CEFAZOLIN SODIUM 1 G/3ML
INJECTION, POWDER, FOR SOLUTION INTRAMUSCULAR; INTRAVENOUS AS NEEDED
Status: DISCONTINUED | OUTPATIENT
Start: 2023-11-03 | End: 2023-11-03

## 2023-11-03 RX ORDER — VANCOMYCIN HYDROCHLORIDE 1 G/20ML
INJECTION, POWDER, LYOPHILIZED, FOR SOLUTION INTRAVENOUS AS NEEDED
Status: DISCONTINUED | OUTPATIENT
Start: 2023-11-03 | End: 2023-11-03 | Stop reason: HOSPADM

## 2023-11-03 RX ORDER — ONDANSETRON 2 MG/ML
INJECTION INTRAMUSCULAR; INTRAVENOUS AS NEEDED
Status: DISCONTINUED | OUTPATIENT
Start: 2023-11-03 | End: 2023-11-03

## 2023-11-03 RX ORDER — LIDOCAINE HYDROCHLORIDE 10 MG/ML
INJECTION, SOLUTION EPIDURAL; INFILTRATION; INTRACAUDAL; PERINEURAL AS NEEDED
Status: DISCONTINUED | OUTPATIENT
Start: 2023-11-03 | End: 2023-11-03

## 2023-11-03 RX ORDER — FENTANYL CITRATE/PF 50 MCG/ML
25 SYRINGE (ML) INJECTION
Status: DISCONTINUED | OUTPATIENT
Start: 2023-11-03 | End: 2023-11-03 | Stop reason: HOSPADM

## 2023-11-03 RX ADMIN — CEFAZOLIN SODIUM 2000 MG: 2 SOLUTION INTRAVENOUS at 21:20

## 2023-11-03 RX ADMIN — CEFAZOLIN 2000 MG: 1 INJECTION, POWDER, FOR SOLUTION INTRAMUSCULAR; INTRAVENOUS at 13:25

## 2023-11-03 RX ADMIN — SODIUM CHLORIDE, SODIUM LACTATE, POTASSIUM CHLORIDE, AND CALCIUM CHLORIDE 125 ML/HR: .6; .31; .03; .02 INJECTION, SOLUTION INTRAVENOUS at 19:42

## 2023-11-03 RX ADMIN — SUGAMMADEX 200 MG: 100 INJECTION, SOLUTION INTRAVENOUS at 14:20

## 2023-11-03 RX ADMIN — SODIUM CHLORIDE, SODIUM LACTATE, POTASSIUM CHLORIDE, AND CALCIUM CHLORIDE 125 ML/HR: .6; .31; .03; .02 INJECTION, SOLUTION INTRAVENOUS at 17:58

## 2023-11-03 RX ADMIN — EPHEDRINE SULFATE 10 MG: 50 INJECTION INTRAVENOUS at 15:04

## 2023-11-03 RX ADMIN — LIDOCAINE HYDROCHLORIDE 50 MG: 10 INJECTION, SOLUTION EPIDURAL; INFILTRATION; INTRACAUDAL; PERINEURAL at 13:16

## 2023-11-03 RX ADMIN — ATORVASTATIN CALCIUM 20 MG: 20 TABLET, FILM COATED ORAL at 19:41

## 2023-11-03 RX ADMIN — EPHEDRINE SULFATE 5 MG: 50 INJECTION INTRAVENOUS at 14:02

## 2023-11-03 RX ADMIN — OXYCODONE HYDROCHLORIDE 10 MG: 10 TABLET ORAL at 21:24

## 2023-11-03 RX ADMIN — ROCURONIUM BROMIDE 10 MG: 10 INJECTION, SOLUTION INTRAVENOUS at 13:46

## 2023-11-03 RX ADMIN — METHOCARBAMOL 750 MG: 750 TABLET ORAL at 19:41

## 2023-11-03 RX ADMIN — FENTANYL CITRATE 50 MCG: 50 INJECTION, SOLUTION INTRAMUSCULAR; INTRAVENOUS at 13:46

## 2023-11-03 RX ADMIN — ONDANSETRON 4 MG: 2 INJECTION INTRAMUSCULAR; INTRAVENOUS at 13:36

## 2023-11-03 RX ADMIN — SODIUM CHLORIDE, SODIUM LACTATE, POTASSIUM CHLORIDE, AND CALCIUM CHLORIDE 125 ML/HR: .6; .31; .03; .02 INJECTION, SOLUTION INTRAVENOUS at 11:11

## 2023-11-03 RX ADMIN — EPHEDRINE SULFATE 10 MG: 50 INJECTION INTRAVENOUS at 13:59

## 2023-11-03 RX ADMIN — FENTANYL CITRATE 25 MCG: 50 INJECTION INTRAMUSCULAR; INTRAVENOUS at 15:26

## 2023-11-03 RX ADMIN — PROPOFOL 200 MG: 10 INJECTION, EMULSION INTRAVENOUS at 13:16

## 2023-11-03 RX ADMIN — SODIUM CHLORIDE, SODIUM LACTATE, POTASSIUM CHLORIDE, AND CALCIUM CHLORIDE: .6; .31; .03; .02 INJECTION, SOLUTION INTRAVENOUS at 14:22

## 2023-11-03 RX ADMIN — HYDROMORPHONE HYDROCHLORIDE 0.5 MG: 1 INJECTION, SOLUTION INTRAMUSCULAR; INTRAVENOUS; SUBCUTANEOUS at 19:42

## 2023-11-03 RX ADMIN — ROCURONIUM BROMIDE 40 MG: 10 INJECTION, SOLUTION INTRAVENOUS at 13:17

## 2023-11-03 RX ADMIN — FENTANYL CITRATE 50 MCG: 50 INJECTION, SOLUTION INTRAMUSCULAR; INTRAVENOUS at 13:16

## 2023-11-03 RX ADMIN — PHENYLEPHRINE HYDROCHLORIDE 50 MCG/MIN: 10 INJECTION INTRAVENOUS at 13:55

## 2023-11-03 RX ADMIN — SODIUM CHLORIDE, SODIUM LACTATE, POTASSIUM CHLORIDE, AND CALCIUM CHLORIDE: .6; .31; .03; .02 INJECTION, SOLUTION INTRAVENOUS at 11:49

## 2023-11-03 RX ADMIN — OXYCODONE HYDROCHLORIDE 5 MG: 5 TABLET ORAL at 16:42

## 2023-11-03 RX ADMIN — ONDANSETRON 4 MG: 2 INJECTION INTRAMUSCULAR; INTRAVENOUS at 19:41

## 2023-11-03 RX ADMIN — FENTANYL CITRATE 25 MCG: 50 INJECTION INTRAMUSCULAR; INTRAVENOUS at 15:35

## 2023-11-03 NOTE — H&P
H&P Exam - Orthopedics   Jenifer Aaron 79 y.o. male MRN: [id number]  Unit/Bed#: OR [place] Encounter: [id number]    Assessment/Plan     Assessment:  Osteitis of symphysis pubis ([address])  Hardware complicating wound infection ([address])    Plan: We will plan to take the patient to the OR today for hardware removal and washout. Risks, including but not limited to bleeding, blood clot, recurrent infection, and the need for future surgery, and benefits have been discussed with the patient. His questions have been addressed to his satisfaction. He feels ready to proceed today. He signed the surgical consent form in the office and the form is on file. He was marked in preparation for surgery today. History of Present Illness   HPI:  Jenifer Aaron is a 79 y.o. male who presents for hardware removal and washout status post open reduction internal fixation of the symphysis with posterior ring fixation many years ago. He states that he started developing increasing pain and a bubble in this area over the wound. He states that he has now had intermittent bouts of drainage. He states that the pain waxes and wanes. Review of Systems   Constitutional:  Negative for chills and fever. HENT:  Negative for ear pain and sore throat. Eyes:  Negative for pain and visual disturbance. Respiratory:  Negative for cough and shortness of breath. Cardiovascular:  Negative for chest pain and palpitations. Gastrointestinal:  Negative for abdominal pain and vomiting. Musculoskeletal:  Positive for arthralgias (pelvic pain). Skin:  Negative for color change and rash. Neurological:  Negative for seizures and syncope. All other systems reviewed and are negative.       Historical Information   Past Medical History:   Diagnosis Date    A-fib ([address])     Acute respiratory failure with hypoxia and hypercapnia ([address]) 08/17/2020    LAURENCE (acute kidney injury) ([address]) 08/16/2020    Arthritis     knees    Community acquired pneumonia of left lower lobe of lung 08/12/2020    Diabetes mellitus ([address])     Hypertension     Measles, [place] (rubella)     Mumps     Obesity     Pneumonia 08/2020    Psoas abscess ([address]) 01/03/2023    Snoring     Loudly    Substance abuse ([address])      Past Surgical History:   Procedure Laterality Date    CYSTOSCOPY      Pt denies    JOINT REPLACEMENT  07/2018    KNEE ARTHROSCOPY Left     knee    ORIF PELVIC FRACTURE      SPINAL FUSION  05/06/2022    TOTAL KNEE ARTHROPLASTY Left 06/10/2019    Procedure: ARTHROPLASTY KNEE TOTAL;  Surgeon: Pricilla Barth MD;  Location: [facility];  Service: Orthopedics     Social History   Social History     Substance and Sexual Activity   Alcohol Use Yes    Alcohol/week: 1.0 standard drink of alcohol    Types: 1 Standard drinks or equivalent per week     Social History     Substance and Sexual Activity   Drug Use Never     Social History     Tobacco Use   Smoking Status Never   Smokeless Tobacco Never     Family History:   Family History   Problem Relation Age of Onset    Ovarian cancer Mother     Heart disease Mother     Diabetes type II Mother     Colon cancer Father     Heart disease Father     Nephrolithiasis Father     Cancer Father        Meds/Allergies   PTA meds:   Prior to Admission Medications   Prescriptions Last Dose Informant Patient Reported? Taking? Blood Glucose Monitoring Suppl (OneTouch Verio Reflect) w/Device KIT Past Week Self No Yes   Sig: Check blood sugars once daily. Please substitute with appropriate alternative as covered by patient's insurance.  Dx: E11.65   HYDROcodone-acetaminophen (NORCO) 7.5-325 mg per tablet  Self No No   Sig: Take 1 tablet by mouth every 6 (six) hours as needed for pain For ongoing therapy DO NOT FILL BEFORE 10/26/23 Max Daily Amount: 4 tablets   HYDROcodone-acetaminophen (Norco) 7.5-325 mg per tablet 11/2/2023 at afternoon Self No Yes   Sig: Take 1 tablet by mouth every 6 (six) hours as needed for pain For ongoing therapy Max Daily Amount: 4 tablets   amLODIPine (NORVASC) 5 mg tablet 11/3/2023 at 0600 Self No Yes   Sig: Take 1 tablet (5 mg total) by mouth daily   atorvastatin (LIPITOR) 20 mg tablet 11/2/2023 at am Self No Yes   Sig: Take 1 tablet (20 mg total) by mouth daily   glipiZIDE (GLUCOTROL XL) 5 mg 24 hr tablet 11/3/2023 at am Self No Yes   Sig: Take 1 tablet (5 mg total) by mouth daily   lisinopril (ZESTRIL) 10 mg tablet 11/2/2023 at am Self No Yes   Sig: Take 1 tablet (10 mg total) by mouth daily   metFORMIN (GLUCOPHAGE-XR) 500 mg 24 hr tablet 11/2/2023 at am Self No Yes   Sig: Take 4 tablets (2,000 mg total) by mouth daily   Patient taking differently: Take 1,000 mg by mouth daily Taking 2 tablets daily   methocarbamol (Robaxin-750) 750 mg tablet 11/2/2023 at am Self No Yes   Sig: Take 1 tablet (750 mg total) by mouth 3 (three) times a day as needed for muscle spasms   metoprolol succinate (TOPROL-XL) 50 mg 24 hr tablet 11/3/2023 at 0600 Self No Yes   Sig: Take 1.5 tablets (75 mg total) by mouth daily   naloxone (NARCAN) 4 mg/0.1 mL nasal spray  Self No No   Sig: Administer 1 spray into a nostril. If no response after 2-3 minutes, give another dose in the other nostril using a new spray. warfarin (COUMADIN) 5 mg tablet 10/29/2023 at am  No No   Sig: Take 1 tablet by mouth once daily      Facility-Administered Medications: None     No Known Allergies    Objective   Vitals: Pulse 83, temperature 97.9 °F (36.6 °C), temperature source Temporal, resp. rate 18, height 5' 6" (1.676 m), weight 95.3 kg (210 lb), SpO2 99 %. ,Body mass index is 33.89 kg/m². No intake or output data in the 24 hours ending 11/03/23 1131    No intake/output data recorded. Invasive Devices       Peripheral Intravenous Line  Duration             Peripheral IV 11/03/23 Dorsal (posterior); Right Hand <1 day                    Physical Exam  Vitals and nursing note reviewed. Constitutional:       General: He is not in acute distress.      Appearance: He is well-developed. HENT:      Head: Normocephalic and atraumatic. Eyes:      Conjunctiva/sclera: Conjunctivae normal.   Cardiovascular:      Comments: No discernible arrhthymias. Pulmonary:      Effort: Pulmonary effort is normal. No respiratory distress. Breath sounds: Normal breath sounds. Abdominal:      Palpations: Abdomen is soft. Tenderness: There is no abdominal tenderness. Musculoskeletal:         General: No swelling. Cervical back: Neck supple. Skin:     General: Skin is warm and dry. Capillary Refill: Capillary refill takes less than 2 seconds. Neurological:      Mental Status: He is alert. Psychiatric:         Mood and Affect: Mood normal.       Ortho Exam  On physical examination he does have a granulation area at the level of the vertical laparotomy incision. His alignment looks good. His good rotation of his hips. There is no sign of other problems. Lab Results: I have personally reviewed pertinent lab results. Imaging: I have personally reviewed pertinent films in PACS  EKG, Pathology, and Other Studies: I have personally reviewed pertinent reports. Code Status: Prior  Advance Directive and Living Will:      Power of :    POLST:      Counseling / Coordination of Care  Total floor / unit time spent today 10 minutes. Greater than 50% of total time was spent with the patient and / or family counseling and / or coordination of care. A description of the counseling / coordination of care: discussing upcoming surgery.

## 2023-11-03 NOTE — ANESTHESIA POSTPROCEDURE EVALUATION
Post-Op Assessment Note    CV Status:  Stable    Pain management: adequate     Mental Status:  Alert and awake   Hydration Status:  Stable   PONV Controlled:  Controlled   Airway Patency:  Patent      Post Op Vitals Reviewed: Yes      Staff: CRNA         There were no known notable events for this encounter.     BP   85/44   Temp   97.1   Pulse  47   Resp   18   SpO2   97 RA

## 2023-11-03 NOTE — ANESTHESIA PREPROCEDURE EVALUATION
Procedure:  REMOVAL HARDWARE pubic symphysis (Pelvis)  DEBRIDEMENT WOUND [facility] OUT) (Pelvis)    ECHO (01/24/23): Interpretation Summary  •  Left Ventricle: Left ventricular cavity size is normal. Wall thickness is increased. There is mild concentric hypertrophy. Wall motion is normal. Left atrial filling pressure cannot be estimated. Olivia Girt atrial fibrillation  •  Left Atrium: The atrium is mildly dilated. •  Aortic Valve: There is mild regurgitation. •  Mitral Valve: There is mild regurgitation. HOLTER (01/24/23): Impression/Summary   - Patient was exclusively in atrial fibrillation with mostly controlled ventricular rates. - High ventricular rates mostly occurred during the first 2-3 hours of monitoring up to 150 bpm.    - Rare wide complex beats likely representing Abdiaziz phenomenon and no significant pauses.      Relevant Problems   ANESTHESIA (within normal limits)      CARDIO   (+) GONSALEZ (dyspnea on exertion)   (+) Essential hypertension   (+) Paroxysmal atrial fibrillation (HCC)      ENDO   (+) Diabetes mellitus type 2 in obese       GI/HEPATIC (within normal limits)      /RENAL (within normal limits)      GYN (within normal limits)      HEMATOLOGY (within normal limits)      MUSCULOSKELETAL   (+) Osteitis of symphysis pubis (HCC)   (+) Osteoarthritis of left knee   (+) Sacroiliitis (HCC)      NEURO/PSYCH (within normal limits)      PULMONARY   (+) GONSALEZ (dyspnea on exertion)      Other   (+) Stenosis of lumbosacral spine      Lab Results   Component Value Date    WBC 7.83 04/10/2023    HGB 13.8 04/10/2023    HCT 41.0 04/10/2023    MCV 88 04/10/2023     04/10/2023     Lab Results   Component Value Date     03/17/2017    SODIUM 138 08/10/2023    K 4.8 08/10/2023     08/10/2023    CO2 25 08/10/2023    AGAP 6 08/10/2023    BUN 18 08/10/2023    CREATININE 1.25 08/10/2023    GLUC 158 (H) 11/21/2020    GLUF 101 (H) 08/10/2023    CALCIUM 9.5 08/10/2023    AST 10 08/10/2023    ALT 13 08/10/2023 ALKPHOS 95 08/10/2023    PROT 7.1 03/17/2017    TP 7.7 08/10/2023    BILITOT 0.8 03/17/2017    TBILI 1.01 (H) 08/10/2023    EGFR 59 08/10/2023     Lab Results   Component Value Date    PTT 27 11/21/2020     Lab Results   Component Value Date    INR 1.30 (H) 11/02/2023    INR 2.76 (H) 10/10/2023    INR 2.66 (H) 09/29/2023    PROTIME 16.1 (H) 11/02/2023    PROTIME 28.6 (H) 10/10/2023    PROTIME 28.6 (H) 09/29/2023         Physical Exam    Airway    Mallampati score: II  TM Distance: >3 FB  Neck ROM: full     Dental   No notable dental hx     Cardiovascular  Rhythm: irregular, Rate: normal    Pulmonary  Pulmonary exam normal     Other Findings      Anesthesia Plan  ASA Score- 3     Anesthesia Type- general with ASA Monitors. Additional Monitors:     Airway Plan: ETT. Plan Factors-Exercise tolerance (METS): >4 METS. Chart reviewed. EKG reviewed. Imaging results reviewed. Existing labs reviewed. Patient summary reviewed. Patient is not a current smoker. Patient did not smoke on day of surgery. Obstructive sleep apnea risk education given perioperatively. Induction- intravenous. Postoperative Plan- Plan for postoperative opioid use. Informed Consent- Anesthetic plan and risks discussed with patient. I personally reviewed this patient with the CRNA. Discussed and agreed on the Anesthesia Plan with the CRNA. Ad Falcon

## 2023-11-03 NOTE — DISCHARGE INSTR - AVS FIRST PAGE
Discharge Instructions - [address] [facility] [place],[address] 79 y.o. male MRN: [id number]  Unit/Bed#: PACU 10    Weight Bearing Status: You may bear weight as tolerated on both legs. DVT prophylaxis:  Continue your usual dosing of warfarin starting 11/4/2023. Pain:  Continue baseline pain medication as directed    Dressing Instructions:   Please keep clean, dry and intact until follow up. Appt Instructions: If you do not have your appointment, please call the clinic at [phone]. Follow-up should be with Dr. Roya Hammer in 2 weeks. Otherwise follow up as scheduled. Contact the office sooner if you experience any increased numbness/tingling in the extremities. Miscellaneous: If you continue to have trouble urinating, please follow-up with your urologist.  Take the Bactrim to completion. You will take this medication twice a day for two weeks.

## 2023-11-03 NOTE — OP NOTE
OPERATIVE REPORT  PATIENT NAME: Chas Winston    :  1955  MRN: [id number]  Pt Location: [facility] OR ROOM 17    SURGERY DATE: 11/3/2023    Surgeon(s) and Role:     * Payton Taylor MD - Primary     * Qian Ohara PA-C - Assisting     * Eve Torres MD - Assisting    Preop Diagnosis:  Hardware complicating wound infection ([address]) [I19. 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 7XXA]     * Osteitis of symphysis pubis (HCC) [M86.9]    Procedure(s):  REMOVAL HARDWARE pubic symphysis  DEBRIDEMENT WOUND ([address] [place] [address] OUT)    Specimen(s):  ID Type Source Tests Collected by Time Destination   A : pubic symphysis Tissue Other ANAEROBIC CULTURE AND GRAM STAIN, FUNGAL CULTURE, CULTURE, TISSUE AND GRAM STAIN, AFB CULTURE WITH STAIN Payton Taylor MD 11/3/2023 1352        Estimated Blood Loss:   100 mL    Drains:  [REMOVED] Urethral Catheter Latex 16 Fr. (Removed)   Number of days: 0       Anesthesia Type:   General w/ Regional    Operative Indications:  Hardware complicating wound infection ([address]) [place]. 7XXA]  Osteitis of symphysis pubis (HCC) [M86.9]    Operative Findings:  Infected hardware in pubic symphysis    Complications:   None    Procedure and Technique:  Removal of hardware from pubic symphysis with irrigation debridement of the anterior pelvis    After informed surgical consent been obtained patient was brought to the operative room and transferred to the operating table in the supine position. General anesthesia was obtained. Pelvic area was prepped and draped normal sterile fashion. Using the previous midline incision dissection was taken down through the skin and subcutaneous tissues to the level of the fascia over the rectus. The rectus was divided. This was extensively scarred. This was divided down to the level of the bladder which was carefully protected and helped out of the way.   A Elizondo catheter had been placed prior to surgery. The previous hardware was very carefully approach. When we did get down through the scar to the level of the plate, a small rush of pus was evident which was sent for Gram stain culture and sensitivity. The plate and screws were subsequently removed. This was sharply debrided in an excisional debridement with the Carney curette and then was then well irrigated with the normal saline. Stimulan augmented with vancomycin was placed in the wound. The wound was then closed with #1 Vicryl 2-0 Vicryl and staples. Sterile dressings were applied. The Elizondo catheter was removed. There was no blood in the Elizondo bag but there was also no urine either. Sterile dressings were applied. Patient was then transferred to recovery in stable condition having tolerated procedure well. I was present for the entire procedure, and a physician assistant was required during the procedure for retraction, tissue handling, dissection and suturing.     Patient Disposition:  PACU         SIGNATURE: Daxa Rea MD  DATE: November 3, 2023  TIME: 2:46 PM

## 2023-11-04 VITALS
HEIGHT: 66 IN | OXYGEN SATURATION: 94 % | TEMPERATURE: 98.5 F | WEIGHT: 210 LBS | DIASTOLIC BLOOD PRESSURE: 91 MMHG | HEART RATE: 98 BPM | SYSTOLIC BLOOD PRESSURE: 152 MMHG | RESPIRATION RATE: 18 BRPM | BODY MASS INDEX: 33.75 KG/M2

## 2023-11-04 LAB
ANION GAP SERPL CALCULATED.3IONS-SCNC: 11 MMOL/L
ANISOCYTOSIS BLD QL SMEAR: PRESENT
BACTERIA SPEC ANAEROBE CULT: NORMAL
BASOPHILS # BLD MANUAL: 0 THOUSAND/UL (ref 0–0.1)
BASOPHILS NFR MAR MANUAL: 0 % (ref 0–1)
BUN SERPL-MCNC: 13 MG/DL (ref 5–25)
CALCIUM SERPL-MCNC: 9.2 MG/DL (ref 8.4–10.2)
CHLORIDE SERPL-SCNC: 99 MMOL/L (ref 96–108)
CO2 SERPL-SCNC: 26 MMOL/L (ref 21–32)
CREAT SERPL-MCNC: 1.09 MG/DL (ref 0.6–1.3)
EOSINOPHIL # BLD MANUAL: 0 THOUSAND/UL (ref 0–0.4)
EOSINOPHIL NFR BLD MANUAL: 0 % (ref 0–6)
ERYTHROCYTE [DISTWIDTH] IN BLOOD BY AUTOMATED COUNT: 14.8 % (ref 11.6–15.1)
GFR SERPL CREATININE-BSD FRML MDRD: 70 ML/MIN/1.73SQ M
GLUCOSE P FAST SERPL-MCNC: 200 MG/DL (ref 65–99)
GLUCOSE SERPL-MCNC: 200 MG/DL (ref 65–140)
HCT VFR BLD AUTO: 42.3 % (ref 36.5–49.3)
HGB BLD-MCNC: 13.8 G/DL (ref 12–17)
LYMPHOCYTES # BLD AUTO: 0.3 THOUSAND/UL (ref 0.6–4.47)
LYMPHOCYTES # BLD AUTO: 2 % (ref 14–44)
MCH RBC QN AUTO: 28.1 PG (ref 26.8–34.3)
MCHC RBC AUTO-ENTMCNC: 32.6 G/DL (ref 31.4–37.4)
MCV RBC AUTO: 86 FL (ref 82–98)
MONOCYTES # BLD AUTO: 0.15 THOUSAND/UL (ref 0–1.22)
MONOCYTES NFR BLD: 1 % (ref 4–12)
NEUTROPHILS # BLD MANUAL: 14.39 THOUSAND/UL (ref 1.85–7.62)
NEUTS BAND NFR BLD MANUAL: 3 % (ref 0–8)
NEUTS SEG NFR BLD AUTO: 94 % (ref 43–75)
PLATELET # BLD AUTO: 275 THOUSANDS/UL (ref 149–390)
PLATELET BLD QL SMEAR: ADEQUATE
PMV BLD AUTO: 9.1 FL (ref 8.9–12.7)
POIKILOCYTOSIS BLD QL SMEAR: PRESENT
POTASSIUM SERPL-SCNC: 4.3 MMOL/L (ref 3.5–5.3)
RBC # BLD AUTO: 4.91 MILLION/UL (ref 3.88–5.62)
RHODAMINE-AURAMINE STN SPEC: NORMAL
SODIUM SERPL-SCNC: 136 MMOL/L (ref 135–147)
WBC # BLD AUTO: 14.84 THOUSAND/UL (ref 4.31–10.16)

## 2023-11-04 PROCEDURE — 97166 OT EVAL MOD COMPLEX 45 MIN: CPT

## 2023-11-04 PROCEDURE — 97163 PT EVAL HIGH COMPLEX 45 MIN: CPT

## 2023-11-04 PROCEDURE — 85027 COMPLETE CBC AUTOMATED: CPT

## 2023-11-04 PROCEDURE — NC001 PR NO CHARGE: Performed by: ORTHOPAEDIC SURGERY

## 2023-11-04 PROCEDURE — 85007 BL SMEAR W/DIFF WBC COUNT: CPT

## 2023-11-04 PROCEDURE — 99214 OFFICE O/P EST MOD 30 MIN: CPT | Performed by: UROLOGY

## 2023-11-04 PROCEDURE — 80048 BASIC METABOLIC PNL TOTAL CA: CPT

## 2023-11-04 RX ORDER — CLINDAMYCIN HYDROCHLORIDE 300 MG/1
300 CAPSULE ORAL 4 TIMES DAILY
Qty: 56 CAPSULE | Refills: 0 | Status: SHIPPED | OUTPATIENT
Start: 2023-11-04 | End: 2023-11-18

## 2023-11-04 RX ORDER — CALCIUM CARBONATE 500 MG/1
500 TABLET, CHEWABLE ORAL DAILY PRN
Status: DISCONTINUED | OUTPATIENT
Start: 2023-11-04 | End: 2023-11-04 | Stop reason: HOSPADM

## 2023-11-04 RX ORDER — SULFAMETHOXAZOLE AND TRIMETHOPRIM 800; 160 MG/1; MG/1
1 TABLET ORAL EVERY 12 HOURS SCHEDULED
Qty: 28 TABLET | Refills: 0 | Status: SHIPPED | OUTPATIENT
Start: 2023-11-04 | End: 2023-11-18

## 2023-11-04 RX ADMIN — HYDROMORPHONE HYDROCHLORIDE 0.5 MG: 1 INJECTION, SOLUTION INTRAMUSCULAR; INTRAVENOUS; SUBCUTANEOUS at 00:48

## 2023-11-04 RX ADMIN — WARFARIN SODIUM 5 MG: 5 TABLET ORAL at 08:18

## 2023-11-04 RX ADMIN — OXYCODONE HYDROCHLORIDE 10 MG: 10 TABLET ORAL at 08:19

## 2023-11-04 RX ADMIN — AMLODIPINE BESYLATE 5 MG: 5 TABLET ORAL at 08:18

## 2023-11-04 RX ADMIN — OXYCODONE HYDROCHLORIDE 10 MG: 10 TABLET ORAL at 14:47

## 2023-11-04 RX ADMIN — METOPROLOL SUCCINATE 75 MG: 50 TABLET, EXTENDED RELEASE ORAL at 08:17

## 2023-11-04 RX ADMIN — GLIPIZIDE 5 MG: 2.5 TABLET, FILM COATED, EXTENDED RELEASE ORAL at 08:19

## 2023-11-04 RX ADMIN — METFORMIN HYDROCHLORIDE 2000 MG: 500 TABLET, EXTENDED RELEASE ORAL at 08:19

## 2023-11-04 RX ADMIN — SODIUM CHLORIDE, SODIUM LACTATE, POTASSIUM CHLORIDE, AND CALCIUM CHLORIDE 125 ML/HR: .6; .31; .03; .02 INJECTION, SOLUTION INTRAVENOUS at 03:01

## 2023-11-04 RX ADMIN — OXYCODONE HYDROCHLORIDE 10 MG: 10 TABLET ORAL at 04:08

## 2023-11-04 RX ADMIN — CEFAZOLIN SODIUM 2000 MG: 2 SOLUTION INTRAVENOUS at 04:34

## 2023-11-04 RX ADMIN — LISINOPRIL 10 MG: 10 TABLET ORAL at 08:18
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
## 2023-11-04 NOTE — PROGRESS NOTES
Progress Note - Orthopedics   Jori Koyanagi 79 y.o. male MRN: [id number]      Subjective:  No acute distress. Has been able to urinate. Noted large clot overnight, but urine now yellow. Objective:  A 10 point ROS was performed; negative except as noted above. Labs:  Lab Results   Component Value Date/Time    WBC 7.83 04/10/2023 10:41 AM    WBC 7.9 03/17/2017 12:21 PM    HGB 13.8 04/10/2023 10:41 AM    HGB 15.4 03/17/2017 12:21 PM       Physical:  Vitals:    11/03/23 2215   BP: 145/93   Pulse: 86   Resp: 18   Temp: 99.2 °F (37.3 °C)   SpO2: 98%     Musculoskeletal: Lower abdomen; bilateral Lower Extremity  Dressing C/D/I  SILT solange/saph/sp/dp/tib nerve distributions   +FHL/EHL, +ankle DF/PF  Toes WWP w BCR    Assessment:    79 y.o. male post op day #1 from anterior pelvis CONRAD; complicated by blood in sosa and urinary retention    Plan:  [facility]  Appreciate Urology consult and recommendations  PT/OT  Pain control  DVT ppx  Follow intra-op cultures  Likely DC home today      Courtney Simons MD    Please contact role [facility] for any questions or concerns.
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
## 2023-11-04 NOTE — OCCUPATIONAL THERAPY NOTE
Occupational Therapy Evaluation     Patient Name: Anne Marie Goodman  Today's Date: 11/4/2023  Problem List  Principal Problem:    Osteitis of symphysis pubis [facility])    Past Medical History  Past Medical History:   Diagnosis Date    A-fib ([address])     Acute respiratory failure with hypoxia and hypercapnia ([address]) 08/17/2020    LAURENCE (acute kidney injury) ([address]) 08/16/2020    Arthritis     knees    Community acquired pneumonia of left lower lobe of lung 08/12/2020    Diabetes mellitus ([address])     Hypertension     Measles, Belgian (rubella)     Mumps     Obesity     Pneumonia 08/2020    Psoas abscess ([address]) 01/03/2023    Snoring     Loudly    Substance abuse ([address])      Past Surgical History  Past Surgical History:   Procedure Laterality Date    CYSTOSCOPY      Pt denies    JOINT REPLACEMENT  07/2018    KNEE ARTHROSCOPY Left     knee    ORIF PELVIC FRACTURE      SPINAL FUSION  05/06/2022    TOTAL KNEE ARTHROPLASTY Left 06/10/2019    Procedure: ARTHROPLASTY KNEE TOTAL;  Surgeon: Jw Winters MD;  Location: [facility];  Service: Orthopedics         11/04/23 1030   OT Last Visit   OT Visit Date 11/04/23   Note Type   Note type Evaluation   Pain Assessment   Pain Assessment Tool 0-10   Pain Score 8   Restrictions/Precautions   Weight Bearing Precautions Per Order Yes   Home Living   Type of [facility]  Two level;1/2 bath on main level;Bed/bath upstairs;Stairs to enter with rails  (pt lives in [facility] with a1/2 bathroom on first and 2 DEVI)   Bathroom Shower/Tub Tub/shower unit   Bathroom Toilet Standard  (standard on first, raised on 2nd.)   [address] chair   [address] Walker;Cane (s)   Prior Function   Level of [place] Independent with ADLs; Independent with IADLS   Lives With Family  (sister, Hope Pelletier -sister works during the day, pt provides [employer] and assist with higher level IADL's.)   Receives Help From Family   IADLs Independent with driving; Independent with meal prep; Independent with medication management   Vocational Retired   Lifestyle   Autonomy I with ALD's/IADL's, no AD with functional mobility   Reciprocal Relationships family   Service to Others retired   Intrinsic Gratification repairing things around the house   General   Family/Caregiver Present No   ADL   Eating Assistance 7  [address] 5  [address] 5  Supervision/Setup   [address] 5  [address]  5  Supervision/Setup   Bed Mobility   Supine to Sit 5  Supervision   Additional items Assist x 1   Transfers   Sit to Stand 5  Supervision   Additional items Assist x 1   Stand to Sit 5  Supervision   Additional items Assist x 1   Additional Comments +RW   Functional Mobility   Functional Mobility 5  Supervision   Additional Comments S with RW household distance functional mobility into hallway, good endurance/activity tolerance, no LOB   Additional items Rolling walker   Balance   Static Sitting Good   Dynamic Sitting Fair +   Static Standing Fair   Dynamic Standing Fair -   Ambulatory Fair -   Activity Tolerance   Activity Tolerance Patient tolerated treatment well   Medical Staff Made Aware  [address]  due to the patient's co-morbidities, clinically unstable presentation, and present impairments which are a regression from the patient's baseline. Nurse Made Aware RN cleared pt for therapy   RUE Assessment   RUE Assessment WFL   LUE Assessment   LUE Assessment WFL   Hand Function   Gross Motor Coordination Functional   Fine Motor Coordination Functional   Sensation   Light Touch No apparent deficits   Cognition   Overall Cognitive Status WFL   Arousal/Participation Alert; Responsive; Cooperative   Attention Within functional limits   Orientation Level Oriented X4   Memory Within functional limits   Following Commands Follows all commands and directions without difficulty   Comments pt pleasant and motivated, good memory, direction following and overall safety awareness with evaluation. Assessment   Assessment Pt is a 79 y.o. male who was admitted to [address] [place] [address] on 11/3/2023 with Osteitis of symphysis pubis ([address]) s/p hardware removal and debridement/washout on 11/3 and now B/l LE WBAT . Patient  has a past medical history of A-fib ([address]), Acute respiratory failure with hypoxia and hypercapnia ([address]) (08/17/2020), LAURENCE (acute kidney injury) ([address]) (08/16/2020), Arthritis, Community acquired pneumonia of left lower lobe of lung (08/12/2020), Diabetes mellitus ([address]), Hypertension, Measles, [place] (rubella), Mumps, Obesity, Pneumonia (08/2020), Psoas abscess ([address]) (01/03/2023), Snoring, and Substance abuse ([address]). has a past medical history of A-fib ([address]), Acute respiratory failure with hypoxia and hypercapnia ([address]) (08/17/2020), LAURENCE (acute kidney injury) ([address]) (08/16/2020), Arthritis, Community acquired pneumonia of left lower lobe of lung (08/12/2020), Diabetes mellitus ([address]), Hypertension, Measles, [place] (rubella), Mumps, Obesity, Pneumonia (08/2020), Psoas abscess ([address]) (01/03/2023), Snoring, and Substance abuse ([address]). At baseline pt was completing I with ADL's/IaDL's, no AD with functional mobility, +drives . Pt lives with sister in [place] in a [address] [place] [address] with 2STE  . Currently pt requires  for overall ADLS and  for functional mobility/transfers. Pt currently presents with impairments in the following categories -steps to enter environment and difficulty performing IADLS  activity tolerance.  These impairments, as well as pt's fatigue  limit pt's ability to safely engage in all baseline areas of occupation, includingfunctional mobility/transfers, community mobility, laundry , driving, house maintenance, medication management, meal prep, cleaning, social participation , and leisure activities  From OT standpoint, recommend home with increased family support and use of shower chair has at baseline upon D/C. The patient's raw score on the AM-PAC Daily Activity Inpatient Short Form is 21. A raw score of less than 19 suggests the patient may benefit from discharge to home. Please refer to the recommendation of the Occupational Therapist for safe discharge planning. No further acute OT needs indicated at this time - Anticipate d/c home with family support when medically cleared - d/c from caseload   Goals   Patient Goals go home   Discharge Recommendation   Rehab Resource Intensity Level, OT No post-acute rehabilitation needs   Equipment Recommended (pt declined need for commode)   AM-PAC Daily Activity Inpatient   Lower Body Dressing 3   Bathing 3   Toileting 3   Upper Body Dressing 4   Grooming 4   Eating 4   Daily Activity Raw Score 21   Daily Activity Standardized Score (Calc for Raw Score >=11) 44.27   AM-PAC Applied Cognition Inpatient   Following a Speech/Presentation 4   Understanding Ordinary Conversation 4   Taking Medications 4   Remembering Where Things Are Placed or Put Away 4   Remembering List of 4-5 Errands 4   Taking Care of Complicated Tasks 4   Applied Cognition Raw Score 24   Applied Cognition Standardized Score 62.21   End of Consult   Education Provided Yes  (DME education including recommendation to use Shower chair for bathing pt has at baseline and home safety/energy conservation techniques.)   Patient Position at End of Consult All needs within reach;Bed/Chair alarm activated; Bedside chair   Nurse Communication Nurse aware of consult     Michele OLIVER OTR/L

## 2023-11-04 NOTE — PLAN OF CARE
Problem: Prexisting or High Potential for Compromised Skin Integrity  Goal: Skin integrity is maintained or improved  Description: INTERVENTIONS:  - Identify patients at risk for skin breakdown  - Assess and monitor skin integrity  - Assess and monitor nutrition and hydration status  - Monitor labs   - Assess for incontinence   - Turn and reposition patient  - Assist with mobility/ambulation  - Relieve pressure over bony prominences  - Avoid friction and shearing  - Provide appropriate hygiene as needed including keeping skin clean and dry  - Evaluate need for skin moisturizer/barrier cream  - Collaborate with interdisciplinary team   - Patient/family teaching  - Consider wound care consult   Outcome: Progressing     Problem: MOBILITY - ADULT  Goal: Maintain or return to baseline ADL function  Description: INTERVENTIONS:  -  Assess patient's ability to carry out ADLs; assess patient's baseline for ADL function and identify physical deficits which impact ability to perform ADLs (bathing, care of mouth/teeth, toileting, grooming, dressing, etc.)  - Assess/evaluate cause of self-care deficits   - Assess range of motion  - Assess patient's mobility; develop plan if impaired  - Assess patient's need for assistive devices and provide as appropriate  - Encourage maximum independence but intervene and supervise when necessary  - Involve family in performance of ADLs  - Assess for home care needs following discharge   - Consider OT consult to assist with ADL evaluation and planning for discharge  - Provide patient education as appropriate  Outcome: Progressing  Goal: Maintains/Returns to pre admission functional level  Description: INTERVENTIONS:  - Perform BMAT or MOVE assessment daily.   - Set and communicate daily mobility goal to care team and patient/family/caregiver. - Collaborate with rehabilitation services on mobility goals if consulted  - Perform Range of Motion  times a day.   - Reposition patient every hours.  - Dangle patient times a day  - Stand patient times a day  - Ambulate patient  times a day  - Out of bed to chair times a day   - Out of bed for meals times a day  - Out of bed for toileting  - Record patient progress and toleration of activity level   Outcome: Progressing     Problem: PAIN - ADULT  Goal: Verbalizes/displays adequate comfort level or baseline comfort level  Description: Interventions:  - Encourage patient to monitor pain and request assistance  - Assess pain using appropriate pain scale  - Administer analgesics based on type and severity of pain and evaluate response  - Implement non-pharmacological measures as appropriate and evaluate response  - Consider cultural and social influences on pain and pain management  - Notify physician/advanced practitioner if interventions unsuccessful or patient reports new pain  Outcome: Progressing     Problem: SAFETY ADULT  Goal: Maintain or return to baseline ADL function  Description: INTERVENTIONS:  -  Assess patient's ability to carry out ADLs; assess patient's baseline for ADL function and identify physical deficits which impact ability to perform ADLs (bathing, care of mouth/teeth, toileting, grooming, dressing, etc.)  - Assess/evaluate cause of self-care deficits   - Assess range of motion  - Assess patient's mobility; develop plan if impaired  - Assess patient's need for assistive devices and provide as appropriate  - Encourage maximum independence but intervene and supervise when necessary  - Involve family in performance of ADLs  - Assess for home care needs following discharge   - Consider OT consult to assist with ADL evaluation and planning for discharge  - Provide patient education as appropriate  Outcome: Progressing  Goal: Maintains/Returns to pre admission functional level  Description: INTERVENTIONS:  - Perform BMAT or MOVE assessment daily.   - Set and communicate daily mobility goal to care team and patient/family/caregiver.    - Collaborate with rehabilitation services on mobility goals if consulted  - Perform Range of [place] times a day. - Reposition patient every hours.   - Dangle patient  times a day  - Stand patient  times a day  - Ambulate patient  times a day  - Out of bed to chair  times a day   - Out of bed for meals times a day  - Out of bed for toileting  - Record patient progress and toleration of activity level   Outcome: Progressing  Goal: Patient will remain free of falls  Description: INTERVENTIONS:  - Educate patient/family on patient safety including physical limitations  - Instruct patient to call for assistance with activity   - Consult OT/PT to assist with strengthening/mobility   - Keep Call bell within reach  - Keep bed low and locked with side rails adjusted as appropriate  - Keep care items and personal belongings within reach  - Initiate and maintain comfort rounds  - Make Fall Risk Sign visible to staff  - Offer Toileting every  Hours, in advance of need  - Initiate/Maintain alarm  - Obtain necessary fall risk management equipment:   - Apply yellow socks and bracelet for high fall risk patients  - Consider moving patient to room near nurses station  Outcome: Progressing     Problem: SKIN/TISSUE INTEGRITY - ADULT  Goal: Skin Integrity remains intact(Skin Breakdown Prevention)  Description: Assess:  -Perform Flex assessment every  -Clean and moisturize skin every   -Inspect skin when repositioning, toileting, and assisting with ADLS  -Assess under medical devices such as  every   -Assess extremities for adequate circulation and sensation     Bed Management:  -Have minimal linens on bed & keep smooth, unwrinkled  -Change linens as needed when moist or perspiring  -Avoid sitting or lying in one position for more than  hours while in bed  -Keep HOB atdegrees     Toileting:  -Offer bedside commode  -Assess for incontinence every   -Use incontinent care products after each incontinent episode such as Activity:  -Mobilize patient  times a day  -Encourage activity and walks on unit  -Encourage or provide ROM exercises   -Turn and reposition patient every  Hours  -Use appropriate equipment to lift or move patient in bed  -Instruct/ Assist with weight shifting every when out of bed in chair  -Consider limitation of chair time  hour intervals    Skin Care:  -Avoid use of baby powder, tape, friction and shearing, hot water or constrictive clothing  -Relieve pressure over bony prominences using   -Do not massage red bony areas    Next Steps:  -Teach patient strategies to minimize risks such as    -Consider consults to  interdisciplinary teams such as  Outcome: Progressing  Goal: Incision(s), wounds(s) or drain site(s) healing without S/S of infection  Description: INTERVENTIONS  - Assess and document dressing, incision, wound bed, drain sites and surrounding tissue  - Provide patient and family education  - Perform skin care/dressing changes every   Outcome: Progressing  Goal: Pressure injury heals and does not worsen  Description: Interventions:  - Implement low air loss mattress or specialty surface (Criteria met)  - Apply silicone foam dressing  - Instruct/assist with weight shifting every  minutes when in chair   - Limit chair time to  hour intervals  - Use special pressure reducing interventions such as  when in chair   - Apply fecal or urinary incontinence containment device   - Perform passive or active ROM every  - Turn and reposition patient & offload bony prominences every  hours   - Utilize friction reducing device or surface for transfers   - Consider consults to  interdisciplinary teams such as   - Use incontinent care products after each incontinent episode such as   - Consider nutrition services referral as needed  Outcome: Progressing     Problem: MUSCULOSKELETAL - ADULT  Goal: Maintain or return mobility to safest level of function  Description: INTERVENTIONS:  - Assess patient's ability to carry out ADLs; assess patient's baseline for ADL function and identify physical deficits which impact ability to perform ADLs (bathing, care of mouth/teeth, toileting, grooming, dressing, etc.)  - Assess/evaluate cause of self-care deficits   - Assess range of motion  - Assess patient's mobility  - Assess patient's need for assistive devices and provide as appropriate  - Encourage maximum independence but intervene and supervise when necessary  - Involve family in performance of ADLs  - Assess for home care needs following discharge   - Consider OT consult to assist with ADL evaluation and planning for discharge  - Provide patient education as appropriate  Outcome: Progressing  Goal: Maintain proper alignment of affected body part  Description: INTERVENTIONS:  - Support, maintain and protect limb and body alignment  - Provide patient/ family with appropriate education  Outcome: Progressing

## 2023-11-05 LAB
BACTERIA TISS AEROBE CULT: ABNORMAL
GRAM STN SPEC: ABNORMAL

## 2023-11-06 DIAGNOSIS — T84.7XXD HARDWARE COMPLICATING WOUND INFECTION, SUBSEQUENT ENCOUNTER: Primary | ICD-10-CM

## 2023-11-06 DIAGNOSIS — M86.9 OSTEITIS OF SYMPHYSIS PUBIS (HCC): ICD-10-CM

## 2023-11-06 LAB
BACTERIA SPEC ANAEROBE CULT: NORMAL
BACTERIA TISS AEROBE CULT: ABNORMAL
FUNGUS SPEC CULT: NORMAL
GRAM STN SPEC: ABNORMAL

## 2023-11-07 ENCOUNTER — TELEPHONE (OUTPATIENT)
Dept: INFECTIOUS DISEASES | Facility: CLINIC | Age: 68
End: 2023-11-07

## 2023-11-07 LAB
MYCOBACTERIUM SPEC CULT: NORMAL
RHODAMINE-AURAMINE STN SPEC: NORMAL

## 2023-11-07 NOTE — TELEPHONE ENCOUNTER
Contacted patient to schedule for appointment as practice coordinator from ortho reached out to our manager. Per manager, schedule next available. Patient has questions about abx,. However I did let him know we wouldn't advise on treatment plan until formal consult. Patient scheduled with us on 11/15.
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
## 2023-11-10 ENCOUNTER — OFFICE VISIT (OUTPATIENT)
Dept: PAIN MEDICINE | Facility: MEDICAL CENTER | Age: 68
End: 2023-11-10
Payer: MEDICARE

## 2023-11-10 VITALS
WEIGHT: 210 LBS | BODY MASS INDEX: 33.75 KG/M2 | HEART RATE: 86 BPM | SYSTOLIC BLOOD PRESSURE: 136 MMHG | DIASTOLIC BLOOD PRESSURE: 88 MMHG | HEIGHT: 66 IN

## 2023-11-10 DIAGNOSIS — G89.29 CHRONIC BILATERAL LOW BACK PAIN WITHOUT SCIATICA: ICD-10-CM

## 2023-11-10 DIAGNOSIS — M54.50 CHRONIC BILATERAL LOW BACK PAIN WITHOUT SCIATICA: ICD-10-CM

## 2023-11-10 DIAGNOSIS — M79.18 MYOFASCIAL PAIN SYNDROME: ICD-10-CM

## 2023-11-10 DIAGNOSIS — G89.4 CHRONIC PAIN SYNDROME: ICD-10-CM

## 2023-11-10 PROCEDURE — 99214 OFFICE O/P EST MOD 30 MIN: CPT | Performed by: NURSE PRACTITIONER

## 2023-11-10 RX ORDER — METHOCARBAMOL 750 MG/1
750 TABLET, FILM COATED ORAL 3 TIMES DAILY PRN
Qty: 90 TABLET | Refills: 2 | Status: SHIPPED | OUTPATIENT
Start: 2023-11-10

## 2023-11-10 RX ORDER — HYDROCODONE BITARTRATE AND ACETAMINOPHEN 7.5; 325 MG/1; MG/1
1 TABLET ORAL EVERY 6 HOURS PRN
Qty: 120 TABLET | Refills: 0 | Status: SHIPPED | OUTPATIENT
Start: 2023-11-10

## 2023-11-10 NOTE — PROGRESS NOTES
Assessment:  1. Chronic pain syndrome    2. Chronic bilateral low back pain without sciatica    3. Myofascial pain syndrome        Plan:  While the patient was in the office today, I did have a thorough conversation regarding their chronic pain syndrome, medication management, and treatment plan options. Patient is being seen for a follow-up visit. Overall, he reports that his pain is reasonably controlled with the use of Norco 7.5/325 every 6 hours as needed for pain. Norco reduces his pain by about 40%. He denies significant side effects from it. Renewed hydrocodone 7.5/325 every 6 hours as needed for pain. The patient's opioid scripts were sent to their pharmacy electronically and was given a 2 month supply of prescriptions with a Do Not Fill date(s) of 11/10/2023, 12/8/2023    There are risks associated with opioid medications, including dependence, addiction and tolerance. The patient understands and agrees to use these medications only as prescribed. Potential side effects of the medications include, but are not limited to, constipation, drowsiness, addiction, impaired judgment and risk of fatal overdose if not taken as prescribed. The patient was warned against driving while taking sedation medications. Sharing medications is a felony. At this point in time, the patient is showing no signs of addiction, abuse, diversion or suicidal ideation. [place] Prescription Drug Monitoring Program report was reviewed and was appropriate     The patient will follow-up in 2 months for medication prescription refill and reevaluation. The patient was advised to contact the office should their symptoms worsen in the interim. The patient was agreeable and verbalized an understanding. My impressions and treatment recommendations were discussed in detail with the patient who verbalized understanding and had no further questions. Discharge instructions were provided.  I personally saw and examined the patient and I agree with the above discussed plan of care. No orders of the defined types were placed in this encounter. New Medications Ordered This Visit   Medications    HYDROcodone-acetaminophen (NORCO) 7.5-325 mg per tablet     Sig: Take 1 tablet by mouth every 6 (six) hours as needed for pain For ongoing therapy DO NOT FILL BEFORE 12/8/23 Max Daily Amount: 4 tablets     Dispense:  120 tablet     Refill:  0    HYDROcodone-acetaminophen (Norco) 7.5-325 mg per tablet     Sig: Take 1 tablet by mouth every 6 (six) hours as needed for pain For ongoing therapy Max Daily Amount: 4 tablets     Dispense:  120 tablet     Refill:  0    methocarbamol (Robaxin-750) 750 mg tablet     Sig: Take 1 tablet (750 mg total) by mouth 3 (three) times a day as needed for muscle spasms     Dispense:  90 tablet     Refill:  2       History of Present Illness:  Jenifer Aaron is a 79 y.o. male who presents for a follow up office visit in regards to Back Pain. The patient’s current symptoms include complaints of low back pain. Current pain level is a 6/10. Quality pain is described as dull, aching. Current pain level is a 6/10. Current medications for pain include hydrocodone 7.5/325 every 6 hours as needed for pain. Medication reduces his pain by about 40%. He denies side effects from hydrocodone. I have personally reviewed and/or updated the patient's past medical history, past surgical history, family history, social history, current medications, allergies, and vital signs today. Review of Systems   Respiratory:  Negative for shortness of breath. Cardiovascular:  Negative for chest pain. Gastrointestinal:  Negative for constipation, diarrhea, nausea and vomiting. Musculoskeletal:  Positive for arthralgias, back pain and gait problem. Negative for joint swelling and myalgias. Skin:  Negative for rash. Neurological:  Negative for dizziness, seizures and weakness.    All other systems reviewed and are negative. Patient Active Problem List   Diagnosis    Essential hypertension    Diabetes mellitus type 2 in obese     Vitamin D deficiency    Class 2 severe obesity due to excess calories with serious comorbidity and body mass index (BMI) of 37.0 to 37.9 in adult     Osteoarthritis of left knee    Status post left knee replacement    Paroxysmal atrial fibrillation (HCC)    Dyslipidemia    Skin mole    GONSALEZ (dyspnea on exertion)    Stenosis of lumbosacral spine    Sacroiliitis (HCC)    Abdominal lump    Osteitis of symphysis pubis (HCC)    Abnormal CT scan       Past Medical History:   Diagnosis Date    A-fib ([address])     Acute respiratory failure with hypoxia and hypercapnia ([address]) 08/17/2020    LAURENCE (acute kidney injury) ([address]) 08/16/2020    Arthritis     knees    Community acquired pneumonia of left lower lobe of lung 08/12/2020    Diabetes mellitus ([address])     Hypertension     Measles, [place] (rubella)     Mumps     Obesity     Pneumonia 08/2020    Psoas abscess ([address]) 01/03/2023    Snoring     Loudly    Substance abuse ([address])        Past Surgical History:   Procedure Laterality Date    CYSTOSCOPY      Pt denies    JOINT REPLACEMENT  07/2018    KNEE ARTHROSCOPY Left     knee    ORIF PELVIC FRACTURE      OR DBRDMT SUBCUTANEOUS TISSUE EA ADDL 20 SQ CM N/A 11/3/2023    Procedure: DEBRIDEMENT WOUND [facility] OUT); Surgeon: Jessica Major MD;  [facility];  Service: Orthopedics    OR REMOVAL IMPLANT DEEP N/A 11/3/2023    Procedure: REMOVAL HARDWARE pubic symphysis;   Surgeon: Jessica Major MD;  [facility];  Service: Orthopedics    SPINAL FUSION  05/06/2022    TOTAL KNEE ARTHROPLASTY Left 06/10/2019    Procedure: ARTHROPLASTY KNEE TOTAL;  Surgeon: Oanh Gonsalez MD;  [facility];  Service: Orthopedics       Family History   Problem Relation Age of Onset    Ovarian cancer Mother     Heart disease Mother     Diabetes type II Mother     Colon cancer Father     Heart disease Father     Nephrolithiasis Father     Cancer Father        Social History     Occupational History    Not on file   Tobacco Use    Smoking status: Never    Smokeless tobacco: Never   Vaping Use    Vaping Use: Never used   Substance and Sexual Activity    Alcohol use: Yes     Alcohol/week: 1.0 standard drink of alcohol     Types: 1 Standard drinks or equivalent per week    Drug use: Never    Sexual activity: Not Currently     Birth control/protection: None       Current Outpatient Medications on File Prior to Visit   Medication Sig    amLODIPine (NORVASC) 5 mg tablet Take 1 tablet (5 mg total) by mouth daily    atorvastatin (LIPITOR) 20 mg tablet Take 1 tablet (20 mg total) by mouth daily    Blood Glucose Monitoring Suppl (OneTouch Verio Reflect) w/Device KIT Check blood sugars once daily. Please substitute with appropriate alternative as covered by patient's insurance. Dx: E11.65    clindamycin (CLEOCIN) 300 MG capsule Take 1 capsule (300 mg total) by mouth 4 (four) times a day for 14 days    glipiZIDE (GLUCOTROL XL) 5 mg 24 hr tablet Take 1 tablet (5 mg total) by mouth daily    lisinopril (ZESTRIL) 10 mg tablet Take 1 tablet (10 mg total) by mouth daily    metFORMIN (GLUCOPHAGE-XR) 500 mg 24 hr tablet Take 4 tablets (2,000 mg total) by mouth daily (Patient taking differently: Take 1,000 mg by mouth daily Taking 2 tablets daily)    metoprolol succinate (TOPROL-XL) 50 mg 24 hr tablet Take 1.5 tablets (75 mg total) by mouth daily    naloxone (NARCAN) 4 mg/0.1 mL nasal spray Administer 1 spray into a nostril. If no response after 2-3 minutes, give another dose in the other nostril using a new spray.     sulfamethoxazole-trimethoprim (BACTRIM DS) 800-160 mg per tablet Take 1 tablet by mouth every 12 (twelve) hours for 14 days    warfarin (COUMADIN) 5 mg tablet Take 1 tablet by mouth once daily    [DISCONTINUED] HYDROcodone-acetaminophen (Norco) 7.5-325 mg per tablet Take 1 tablet by mouth every 6 (six) hours as needed for pain For ongoing therapy Max Daily Amount: 4 tablets    [DISCONTINUED] HYDROcodone-acetaminophen (NORCO) 7.5-325 mg per tablet Take 1 tablet by mouth every 6 (six) hours as needed for pain For ongoing therapy DO NOT FILL BEFORE 10/26/23 Max Daily Amount: 4 tablets    [DISCONTINUED] methocarbamol (Robaxin-750) 750 mg tablet Take 1 tablet (750 mg total) by mouth 3 (three) times a day as needed for muscle spasms     No current facility-administered medications on file prior to visit. No Known Allergies    Physical Exam:    /88   Pulse 86   Ht 5' 6" (1.676 m)   Wt 95.3 kg (210 lb)   BMI 33.89 kg/m²     Constitutional:normal, well developed, well nourished, alert, in no distress and non-toxic and no overt pain behavior.   Eyes:anicteric  HEENT:grossly intact  Neck:supple, symmetric, trachea midline and no masses   Pulmonary:even and unlabored  Cardiovascular:No edema or pitting edema present  Skin:Normal without rashes or lesions and well hydrated  Psychiatric:Mood and affect appropriate  Neurologic:Cranial Nerves II-XII grossly intact  Musculoskeletal:normal    Imaging

## 2023-11-10 NOTE — PATIENT INSTRUCTIONS
Opioid Safety   WHAT YOU NEED TO KNOW:   An opioid medicine is used to treat pain. Examples are oxycodone, morphine, fentanyl, or codeine. Pain control and management may help you rest, heal, and return to your daily activities. You and your family will receive information about how to manage your pain at home. The instructions will include what to do if you have side effects as your pain is managed. You will get information on how to handle opioid medicine safely. You will also get suggestions on how to control pain without opioids. It is important to follow all instructions so your pain is managed effectively. DISCHARGE INSTRUCTIONS:   Call your local emergency number (911 in the ), or have someone else call if:   You have a seizure. You cannot be woken. You have trouble staying awake and your breathing is slow or shallow. Your speech is slurred, or you are confused. You are dizzy or stumble when you walk. Call your doctor, or have someone close to you call if:   You are extremely drowsy, or you have trouble staying awake or speaking. You have pale or clammy skin. You have blue fingernails or lips. Your heartbeat is slower than normal.    You cannot stop vomiting. You have questions or concerns about your condition or care. Use opioids safely:   Take prescribed opioids exactly as directed. Opioids come with directions based on the kind and how it is given. Talk to your healthcare provider or a pharmacist if you have any questions. Do not take more than the recommended amount. Too much can cause a life-threatening overdose. Do not continue to take it after your pain stops. You may develop tolerance. This means you keep needing higher doses to get the same effect. You may also develop opioid use disorder. This means you are not able to control your opioid use. Do not give opioids to others or take opioids that belong to someone else.   The kind or amount one person takes may not be right for another. The person you share them with may also be taking medicines that do not mix with opioids. The person may drink alcohol or use other drugs that can cause life-threatening problems when mixed with opioids. Do not mix opioids with other medicines or alcohol. The combination can cause an overdose, or cause you to stop breathing. Alcohol, sleeping pills, and medicines such as antihistamines can make you sleepy. A combination with opioids can lead to a coma. Do not drive or operate heavy machinery after you use an opioid. You may feel drowsy or have trouble concentrating. You can injure yourself or others if you drive or use heavy machinery when you are not alert. Your provider or pharmacist can tell you how long to wait after a dose before you do these activities. Talk to your healthcare provider if you have any side effects. Side effects include nausea, sleepiness, itching, and trouble thinking clearly. Your provider may need to make changes to the kind or amount of opioid you are taking. Your provider can also help you find ways to prevent or relieve side effects. Manage constipation:  Constipation is the most common side effect of opioid medicine. Constipation is when you have hard, dry bowel movements, or you go longer than usual between bowel movements. Tell your healthcare provider about all changes in your bowel movements while you are taking opioids. Your provider may recommend laxative medicine to help you have a bowel movement. Your provider may also change the kind of opioid you are taking, or change when you take it. The following are more ways you can prevent or relieve constipation:  Drink liquids as directed. You may need to drink extra liquids to help soften and move your bowels. Ask how much liquid to drink each day and which liquids are best for you. Eat high-fiber foods. This may help decrease constipation by adding bulk to your bowel movements.  High-fiber foods include fruits, vegetables, whole-grain breads and cereals, and beans. Your healthcare provider or dietitian can help you create a high-fiber meal plan. Your provider may also recommend a fiber supplement if you cannot get enough fiber from food. Exercise regularly. Regular physical activity can help stimulate your intestines. Walking is a good exercise to prevent or relieve constipation. Ask which exercises are best for you. Schedule a time each day to have a bowel movement. This may help train your body to have regular bowel movements. Bend forward while you are on the toilet to help move the bowel movement out. Sit on the toilet for at least 10 minutes, even if you do not have a bowel movement. Store opioids safely:   Store opioids where others cannot easily get them. Keep them in a locked cabinet or secure area. Do not  keep them in a purse or other bag you carry with you. A person may be looking for something else and find the opioids. Make sure opioids are stored out of the reach of children. A child can easily overdose on opioids. Opioids may look like candy to a small child. The best way to dispose of opioids: The laws vary by country and area. In the [facility], the best way is to return the opioids through a take-back program. This program is offered by the [employer]). The following are options for using the program:  Take the opioids to a ARAM collection site. The site is often a law enforcement center. Call your local law enforcement center for scheduled take-back days in your area. You will be given information on where to go if the collection site is in a different location. Take the opioids to an approved pharmacy or hospital.  A pharmacy or hospital may be set up as a collection site. You will need to ask if it is a ARAM collection site if you were not directed there.  A pharmacy or doctor's office may not be able to take back opioids unless it is a ARAM site. Use a mail-back system. This means you are given containers to put the opioids into. You will then mail them in the containers. Use a take-back drop box. This is a place to leave the opioids at any time. People and animals will not be able to get into the box. Your local law enforcement agency can tell you where to find a drop box in your area. Other safe ways to dispose of opioids: The medicine may come with disposal instructions. The instructions may vary depending on the brand of medicine you are using. Instructions may come in a Medication Guide, but not every medicine has one. You may instead get instructions from your pharmacy or doctor. Follow instructions carefully. The following are general guidelines to follow:  Find out if you can flush the opioid. Some opioids can be flushed down the toilet or poured into the sink. You will need to contact authorities in your area to see if this is an option for you. The FDA also offers a list of medicines that are safe to flush down the toilet. You can check the list if you cannot get the information for your local area. Ask your waste management company about rules for putting opioids in the trash. The company will be able to give you specific directions. Scratch out personal information on the original medicine label so it cannot be read. Then put it in the trash. Do not label the trash or put any information on it about the opioids. It should look like regular household trash so no one is tempted to look for the opioids. Keep the trash out of the reach of children and animals. Always make sure trash is secure. Talk to officials if you live in a facility. If you live in a nursing home or assisted living center, talk to an official. The person will know the rules for your area. Other ways to manage pain:   Ask your healthcare provider about non-opioid medicines to control pain.   Some medicines may even work better than opioids, depending on the cause of your pain. Nonprescription medicines include NSAIDs (such as ibuprofen) and acetaminophen. Prescription medicines include muscle relaxers, antidepressants, and steroids. Pain may be managed without any medicines. Some ways to relieve pain include massage, aromatherapy, or meditation. Physical or occupational therapy may also help. Follow up with your doctor or pain specialist as directed: You may need to have your dose adjusted. Your doctor or pain specialist can also help you find ways to manage pain without opioids. Write down your questions so you remember to ask them during your visits. For more information:   Drug Enforcement Administration  [address] [facility] , [address]  Phone: 5- 177 - [phone]  Web Address: [employer] [place].gov/drug_disposal/    [address] [facility] [address] and Drug Administration  [address] [facility] , [address]  Phone: 3- 581 - [phone]  Web Address: http://[employer]/  © Copyright Martina Cummings 2023 Information is for End User's use only and may not be sold, redistributed or otherwise used for commercial purposes. The above information is an  only. It is not intended as medical advice for individual conditions or treatments. Talk to your doctor, nurse or pharmacist before following any medical regimen to see if it is safe and effective for you.

## 2023-11-14 ENCOUNTER — HOSPITAL ENCOUNTER (OUTPATIENT)
Dept: RADIOLOGY | Facility: HOSPITAL | Age: 68
Discharge: HOME | End: 2023-11-14
Attending: ORTHOPAEDIC SURGERY
Payer: MEDICARE

## 2023-11-14 ENCOUNTER — OFFICE VISIT (OUTPATIENT)
Dept: OBGYN CLINIC | Facility: HOSPITAL | Age: 68
End: 2023-11-14

## 2023-11-14 VITALS
BODY MASS INDEX: 33.75 KG/M2 | HEIGHT: 66 IN | HEART RATE: 88 BPM | DIASTOLIC BLOOD PRESSURE: 90 MMHG | SYSTOLIC BLOOD PRESSURE: 138 MMHG | WEIGHT: 210 LBS

## 2023-11-14 DIAGNOSIS — T84.7XXD HARDWARE COMPLICATING WOUND INFECTION, SUBSEQUENT ENCOUNTER: Primary | ICD-10-CM

## 2023-11-14 DIAGNOSIS — T84.7XXD HARDWARE COMPLICATING WOUND INFECTION, SUBSEQUENT ENCOUNTER: ICD-10-CM

## 2023-11-14 LAB
MYCOBACTERIUM SPEC CULT: NORMAL
RHODAMINE-AURAMINE STN SPEC: NORMAL

## 2023-11-14 PROCEDURE — 99024 POSTOP FOLLOW-UP VISIT: CPT | Performed by: ORTHOPAEDIC SURGERY

## 2023-11-14 PROCEDURE — 72170 X-RAY EXAM OF PELVIS: CPT

## 2023-11-14 NOTE — ASSESSMENT & PLAN NOTE
Were going to see him back. He is got follow-up appointment with infectious disease to make sure he is on the right antibiotics since everything I wanted to give him interfered with his current medications. He will let us know if there is any change or any problems.

## 2023-11-14 NOTE — PROGRESS NOTES
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 He is got follow-up appointment with infectious disease to make sure he is on the right antibiotics since everything I wanted to give him interfered with his current medications. He will let us know if there is any change or any problems. Diagnoses and all orders for this visit:    Hardware complicating wound infection, subsequent encounter  -     XR pelvis ap only 1 or 2 vw; Future          Subjective:      Patient ID: Alycia Still is a 79 y.o. male. This is a 62-year-old fellow who is status post removal of hardware and irrigation debridement of his symphysis pubis on 11 September 2023. He now follows up. He is doing quite well. He is not having any complaints. His wound is healed. The following portions of the patient's history were reviewed and updated as appropriate: allergies, current medications, past family history, past medical history, past social history, past surgical history, and problem list.    Review of Systems      Objective:      /90   Pulse 88   Ht 5' 6" (1.676 m)   Wt 95.3 kg (210 lb)   BMI 33.89 kg/m²          Physical Exam      Physical examination his wound is healed. He has no erythema induration or fluctuance. His alignment looks good everything looks good throughout.

## 2023-11-15 ENCOUNTER — CONSULT (OUTPATIENT)
Dept: INFECTIOUS DISEASES | Facility: CLINIC | Age: 68
End: 2023-11-15
Payer: MEDICARE

## 2023-11-15 ENCOUNTER — TELEPHONE (OUTPATIENT)
Dept: INFECTIOUS DISEASES | Facility: CLINIC | Age: 68
End: 2023-11-15

## 2023-11-15 VITALS
RESPIRATION RATE: 17 BRPM | HEART RATE: 93 BPM | TEMPERATURE: 97.3 F | OXYGEN SATURATION: 97 % | BODY MASS INDEX: 33.89 KG/M2 | DIASTOLIC BLOOD PRESSURE: 88 MMHG | SYSTOLIC BLOOD PRESSURE: 132 MMHG | WEIGHT: 210 LBS

## 2023-11-15 DIAGNOSIS — T84.7XXD HARDWARE COMPLICATING WOUND INFECTION, SUBSEQUENT ENCOUNTER: ICD-10-CM

## 2023-11-15 DIAGNOSIS — M86.9 OSTEITIS OF SYMPHYSIS PUBIS (HCC): Primary | ICD-10-CM

## 2023-11-15 DIAGNOSIS — R93.89 ABNORMAL CT SCAN: ICD-10-CM

## 2023-11-15 PROBLEM — T84.7XXA HARDWARE COMPLICATING WOUND INFECTION (HCC): Status: ACTIVE | Noted: 2023-11-15

## 2023-11-15 PROCEDURE — 99205 OFFICE O/P NEW HI 60 MIN: CPT | Performed by: INTERNAL MEDICINE

## 2023-11-15 RX ORDER — DEXTROSE MONOHYDRATE 50 MG/ML
20 INJECTION, SOLUTION INTRAVENOUS ONCE
Status: CANCELLED | OUTPATIENT
Start: 2023-11-22

## 2023-11-15 RX ORDER — LINEZOLID 600 MG/1
600 TABLET, FILM COATED ORAL EVERY 12 HOURS SCHEDULED
Qty: 28 TABLET | Refills: 0 | Status: SHIPPED | OUTPATIENT
Start: 2023-11-15 | End: 2023-11-29

## 2023-11-15 NOTE — PATIENT INSTRUCTIONS
At this time, we will follow up with you and your pharmacy for the medication we prescribed today. We are going to get you set up with two infusions of antibiotics at the infusion center at [place] 1 weeks apart. I will contact you either later today, or tomorrow to discuss next steps, appointments for infusions, labs, and follow up. Please reach out to your neurosurgeon to set up an appointment ASAP.

## 2023-11-15 NOTE — TELEPHONE ENCOUNTER
Patient scheduled for infusion center 11/22, 11/29 at 2:30 for dalvance. Patient will receive one week worth of linezolid from [facility] which is on order for tomorrow delivery. I provided a discount card d/t lack of insurance coverage from this drug. The cost for the week worth will be 39.11. Patient notified and is comfortable with the plan.

## 2023-11-16 NOTE — TELEPHONE ENCOUNTER
Spoke with Nadia at [facility] who states that it hasn't come in yet. Expected 11/17. Patient will await confirmation from pharmacy to pick this up.

## 2023-11-17 NOTE — TELEPHONE ENCOUNTER
Samuel Silva today to inquire as to if patient's linezolid was ready for pickup. Patient actually picked this up. LM for pt to CB to check if he took an AM dose today. Also, to r/s his appointment to week after as the Trellis Carrier will remain in his system until Jan 2nd so doctor recommends patient f/u with us 2nd to last week of December.

## 2023-11-17 NOTE — PROGRESS NOTES
[facility] - PROCEDURE RESPONSE NOTE    Based on the query that was sent to you, please document below any procedures that were performed. His debridement area was 8 cm x 4 cm x 8 cm.

## 2023-11-20 LAB — FUNGUS SPEC CULT: NORMAL

## 2023-11-21 LAB
MYCOBACTERIUM SPEC CULT: NORMAL
RHODAMINE-AURAMINE STN SPEC: NORMAL

## 2023-11-22 ENCOUNTER — HOSPITAL ENCOUNTER (OUTPATIENT)
Dept: INFUSION CENTER | Facility: HOSPITAL | Age: 68
Discharge: HOME | End: 2023-11-22
Attending: INTERNAL MEDICINE
Payer: MEDICARE

## 2023-11-22 VITALS
HEART RATE: 73 BPM | SYSTOLIC BLOOD PRESSURE: 157 MMHG | TEMPERATURE: 97.9 F | DIASTOLIC BLOOD PRESSURE: 98 MMHG | RESPIRATION RATE: 18 BRPM

## 2023-11-22 DIAGNOSIS — M86.9 OSTEITIS OF SYMPHYSIS PUBIS (HCC): Primary | ICD-10-CM

## 2023-11-22 DIAGNOSIS — T84.7XXD HARDWARE COMPLICATING WOUND INFECTION, SUBSEQUENT ENCOUNTER: ICD-10-CM

## 2023-11-22 LAB
BASOPHILS # BLD AUTO: 0.02 THOUSANDS/ÂΜL (ref 0–0.1)
BASOPHILS NFR BLD AUTO: 0 % (ref 0–1)
EOSINOPHIL # BLD AUTO: 0.11 THOUSAND/ÂΜL (ref 0–0.61)
EOSINOPHIL NFR BLD AUTO: 2 % (ref 0–6)
ERYTHROCYTE [DISTWIDTH] IN BLOOD BY AUTOMATED COUNT: 15.9 % (ref 11.6–15.1)
HCT VFR BLD AUTO: 40.3 % (ref 36.5–49.3)
HGB BLD-MCNC: 13.5 G/DL (ref 12–17)
IMM GRANULOCYTES # BLD AUTO: 0.01 THOUSAND/UL (ref 0–0.2)
IMM GRANULOCYTES NFR BLD AUTO: 0 % (ref 0–2)
LYMPHOCYTES # BLD AUTO: 1.05 THOUSANDS/ÂΜL (ref 0.6–4.47)
LYMPHOCYTES NFR BLD AUTO: 20 % (ref 14–44)
MCH RBC QN AUTO: 28.2 PG (ref 26.8–34.3)
MCHC RBC AUTO-ENTMCNC: 33.5 G/DL (ref 31.4–37.4)
MCV RBC AUTO: 84 FL (ref 82–98)
MONOCYTES # BLD AUTO: 0.43 THOUSAND/ÂΜL (ref 0.17–1.22)
MONOCYTES NFR BLD AUTO: 8 % (ref 4–12)
NEUTROPHILS # BLD AUTO: 3.52 THOUSANDS/ÂΜL (ref 1.85–7.62)
NEUTS SEG NFR BLD AUTO: 70 % (ref 43–75)
NRBC BLD AUTO-RTO: 0 /100 WBCS
PLATELET # BLD AUTO: 256 THOUSANDS/UL (ref 149–390)
PMV BLD AUTO: 10.7 FL (ref 8.9–12.7)
RBC # BLD AUTO: 4.79 MILLION/UL (ref 3.88–5.62)
WBC # BLD AUTO: 5.14 THOUSAND/UL (ref 4.31–10.16)

## 2023-11-22 PROCEDURE — 85025 COMPLETE CBC W/AUTO DIFF WBC: CPT | Performed by: INTERNAL MEDICINE

## 2023-11-22 RX ORDER — DEXTROSE MONOHYDRATE 50 MG/ML
20 INJECTION, SOLUTION INTRAVENOUS ONCE
Status: COMPLETED | OUTPATIENT
Start: 2023-11-22 | End: 2023-11-22

## 2023-11-22 RX ORDER — DEXTROSE MONOHYDRATE 50 MG/ML
20 INJECTION, SOLUTION INTRAVENOUS ONCE
Status: CANCELLED | OUTPATIENT
Start: 2023-11-29

## 2023-11-22 RX ADMIN — DALBAVANCIN 1500 MG: 500 INJECTION, POWDER, FOR SOLUTION INTRAVENOUS at 13:27

## 2023-11-22 RX ADMIN — DEXTROSE 20 ML/HR: 5 SOLUTION INTRAVENOUS at 13:32

## 2023-11-27 DIAGNOSIS — I48.0 PAROXYSMAL ATRIAL FIBRILLATION (HCC): ICD-10-CM

## 2023-11-27 LAB — FUNGUS SPEC CULT: NORMAL

## 2023-11-27 RX ORDER — WARFARIN SODIUM 5 MG/1
5 TABLET ORAL DAILY
Qty: 30 TABLET | Refills: 0 | Status: SHIPPED | OUTPATIENT
Start: 2023-11-27

## 2023-11-28 LAB
MYCOBACTERIUM SPEC CULT: NORMAL
RHODAMINE-AURAMINE STN SPEC: NORMAL

## 2023-11-29 ENCOUNTER — ANTICOAG VISIT (OUTPATIENT)
Dept: CARDIOLOGY CLINIC | Facility: CLINIC | Age: 68
End: 2023-11-29

## 2023-11-29 ENCOUNTER — HOSPITAL ENCOUNTER (OUTPATIENT)
Dept: INFUSION CENTER | Facility: HOSPITAL | Age: 68
Discharge: HOME | End: 2023-11-29
Attending: INTERNAL MEDICINE
Payer: MEDICARE

## 2023-11-29 VITALS
SYSTOLIC BLOOD PRESSURE: 140 MMHG | HEART RATE: 96 BPM | TEMPERATURE: 97.2 F | DIASTOLIC BLOOD PRESSURE: 86 MMHG | RESPIRATION RATE: 18 BRPM

## 2023-11-29 DIAGNOSIS — I48.0 PAROXYSMAL ATRIAL FIBRILLATION (HCC): ICD-10-CM

## 2023-11-29 DIAGNOSIS — T84.7XXD HARDWARE COMPLICATING WOUND INFECTION, SUBSEQUENT ENCOUNTER: ICD-10-CM

## 2023-11-29 DIAGNOSIS — I48.0 PAROXYSMAL ATRIAL FIBRILLATION (HCC): Primary | ICD-10-CM

## 2023-11-29 DIAGNOSIS — M86.9 OSTEITIS OF SYMPHYSIS PUBIS (HCC): Primary | ICD-10-CM

## 2023-11-29 LAB
ALBUMIN SERPL BCP-MCNC: 4.1 G/DL (ref 3.5–5)
ALP SERPL-CCNC: 75 U/L (ref 34–104)
ALT SERPL W P-5'-P-CCNC: 11 U/L (ref 7–52)
ANION GAP SERPL CALCULATED.3IONS-SCNC: 10 MMOL/L
AST SERPL W P-5'-P-CCNC: 13 U/L (ref 13–39)
BASOPHILS # BLD AUTO: 0.02 THOUSANDS/ÂΜL (ref 0–0.1)
BASOPHILS NFR BLD AUTO: 0 % (ref 0–1)
BILIRUB SERPL-MCNC: 0.71 MG/DL (ref 0.2–1)
BUN SERPL-MCNC: 16 MG/DL (ref 5–25)
CALCIUM SERPL-MCNC: 9.1 MG/DL (ref 8.4–10.2)
CHLORIDE SERPL-SCNC: 105 MMOL/L (ref 96–108)
CO2 SERPL-SCNC: 22 MMOL/L (ref 21–32)
CREAT SERPL-MCNC: 1.21 MG/DL (ref 0.6–1.3)
EOSINOPHIL # BLD AUTO: 0.13 THOUSAND/ÂΜL (ref 0–0.61)
EOSINOPHIL NFR BLD AUTO: 2 % (ref 0–6)
ERYTHROCYTE [DISTWIDTH] IN BLOOD BY AUTOMATED COUNT: 15.2 % (ref 11.6–15.1)
GFR SERPL CREATININE-BSD FRML MDRD: 62 ML/MIN/1.73SQ M
GLUCOSE SERPL-MCNC: 122 MG/DL (ref 65–140)
HCT VFR BLD AUTO: 41.8 % (ref 36.5–49.3)
HGB BLD-MCNC: 14 G/DL (ref 12–17)
IMM GRANULOCYTES # BLD AUTO: 0.03 THOUSAND/UL (ref 0–0.2)
IMM GRANULOCYTES NFR BLD AUTO: 0 % (ref 0–2)
INR PPP: 2.15 (ref 0.84–1.19)
LYMPHOCYTES # BLD AUTO: 1.7 THOUSANDS/ÂΜL (ref 0.6–4.47)
LYMPHOCYTES NFR BLD AUTO: 24 % (ref 14–44)
MCH RBC QN AUTO: 28.1 PG (ref 26.8–34.3)
MCHC RBC AUTO-ENTMCNC: 33.5 G/DL (ref 31.4–37.4)
MCV RBC AUTO: 84 FL (ref 82–98)
MONOCYTES # BLD AUTO: 0.65 THOUSAND/ÂΜL (ref 0.17–1.22)
MONOCYTES NFR BLD AUTO: 9 % (ref 4–12)
NEUTROPHILS # BLD AUTO: 4.59 THOUSANDS/ÂΜL (ref 1.85–7.62)
NEUTS SEG NFR BLD AUTO: 65 % (ref 43–75)
NRBC BLD AUTO-RTO: 0 /100 WBCS
PLATELET # BLD AUTO: 170 THOUSANDS/UL (ref 149–390)
PMV BLD AUTO: 9.7 FL (ref 8.9–12.7)
POTASSIUM SERPL-SCNC: 4.1 MMOL/L (ref 3.5–5.3)
PROT SERPL-MCNC: 7.2 G/DL (ref 6.4–8.4)
PROTHROMBIN TIME: 23.6 SECONDS (ref 11.6–14.5)
RBC # BLD AUTO: 4.98 MILLION/UL (ref 3.88–5.62)
SODIUM SERPL-SCNC: 137 MMOL/L (ref 135–147)
WBC # BLD AUTO: 7.12 THOUSAND/UL (ref 4.31–10.16)

## 2023-11-29 PROCEDURE — 96365 THER/PROPH/DIAG IV INF INIT: CPT

## 2023-11-29 PROCEDURE — 85610 PROTHROMBIN TIME: CPT | Performed by: INTERNAL MEDICINE

## 2023-11-29 PROCEDURE — 85025 COMPLETE CBC W/AUTO DIFF WBC: CPT | Performed by: INTERNAL MEDICINE

## 2023-11-29 PROCEDURE — 80053 COMPREHEN METABOLIC PANEL: CPT | Performed by: INTERNAL MEDICINE

## 2023-11-29 RX ORDER — DEXTROSE MONOHYDRATE 50 MG/ML
20 INJECTION, SOLUTION INTRAVENOUS ONCE
Status: CANCELLED | OUTPATIENT
Start: 2023-11-29

## 2023-11-29 RX ORDER — DEXTROSE MONOHYDRATE 50 MG/ML
20 INJECTION, SOLUTION INTRAVENOUS ONCE
Status: COMPLETED | OUTPATIENT
Start: 2023-11-29 | End: 2023-11-29

## 2023-11-29 RX ADMIN — DALBAVANCIN 1500 MG: 500 INJECTION, POWDER, FOR SOLUTION INTRAVENOUS at 14:12

## 2023-11-29 RX ADMIN — DEXTROSE 20 ML/HR: 5 SOLUTION INTRAVENOUS at 14:11

## 2023-12-04 LAB — FUNGUS SPEC CULT: NORMAL

## 2023-12-05 LAB
MYCOBACTERIUM SPEC CULT: NORMAL
RHODAMINE-AURAMINE STN SPEC: NORMAL

## 2023-12-06 ENCOUNTER — APPOINTMENT (OUTPATIENT)
Dept: LAB | Facility: CLINIC | Age: 68
End: 2023-12-06
Payer: MEDICARE

## 2023-12-06 ENCOUNTER — ANTICOAG VISIT (OUTPATIENT)
Dept: CARDIOLOGY CLINIC | Facility: CLINIC | Age: 68
End: 2023-12-06

## 2023-12-06 DIAGNOSIS — R19.09 ABDOMINAL MASS OF OTHER SITE: ICD-10-CM

## 2023-12-06 DIAGNOSIS — I48.0 PAROXYSMAL ATRIAL FIBRILLATION (HCC): Primary | ICD-10-CM

## 2023-12-06 LAB
ALBUMIN SERPL BCP-MCNC: 3.8 G/DL (ref 3.5–5)
ALP SERPL-CCNC: 70 U/L (ref 34–104)
ALT SERPL W P-5'-P-CCNC: 6 U/L (ref 7–52)
ANION GAP SERPL CALCULATED.3IONS-SCNC: 8 MMOL/L
AST SERPL W P-5'-P-CCNC: 14 U/L (ref 13–39)
BASOPHILS # BLD AUTO: 0.01 THOUSANDS/ÂΜL (ref 0–0.1)
BASOPHILS NFR BLD AUTO: 0 % (ref 0–1)
BILIRUB SERPL-MCNC: 0.82 MG/DL (ref 0.2–1)
BUN SERPL-MCNC: 17 MG/DL (ref 5–25)
CALCIUM SERPL-MCNC: 9.3 MG/DL (ref 8.4–10.2)
CHLORIDE SERPL-SCNC: 105 MMOL/L (ref 96–108)
CHOLEST SERPL-MCNC: 150 MG/DL
CO2 SERPL-SCNC: 25 MMOL/L (ref 21–32)
CREAT SERPL-MCNC: 1.17 MG/DL (ref 0.6–1.3)
EOSINOPHIL # BLD AUTO: 0.2 THOUSAND/ÂΜL (ref 0–0.61)
EOSINOPHIL NFR BLD AUTO: 3 % (ref 0–6)
ERYTHROCYTE [DISTWIDTH] IN BLOOD BY AUTOMATED COUNT: 15.8 % (ref 11.6–15.1)
EST. AVERAGE GLUCOSE BLD GHB EST-MCNC: 128 MG/DL
GFR SERPL CREATININE-BSD FRML MDRD: 64 ML/MIN/1.73SQ M
GLUCOSE P FAST SERPL-MCNC: 101 MG/DL (ref 65–99)
HBA1C MFR BLD: 6.1 %
HCT VFR BLD AUTO: 40.6 % (ref 36.5–49.3)
HDLC SERPL-MCNC: 36 MG/DL
HGB BLD-MCNC: 13.4 G/DL (ref 12–17)
IMM GRANULOCYTES # BLD AUTO: 0.02 THOUSAND/UL (ref 0–0.2)
IMM GRANULOCYTES NFR BLD AUTO: 0 % (ref 0–2)
LDLC SERPL CALC-MCNC: 95 MG/DL (ref 0–100)
LYMPHOCYTES # BLD AUTO: 1.17 THOUSANDS/ÂΜL (ref 0.6–4.47)
LYMPHOCYTES NFR BLD AUTO: 17 % (ref 14–44)
MCH RBC QN AUTO: 28.2 PG (ref 26.8–34.3)
MCHC RBC AUTO-ENTMCNC: 33 G/DL (ref 31.4–37.4)
MCV RBC AUTO: 86 FL (ref 82–98)
MONOCYTES # BLD AUTO: 0.45 THOUSAND/ÂΜL (ref 0.17–1.22)
MONOCYTES NFR BLD AUTO: 7 % (ref 4–12)
NEUTROPHILS # BLD AUTO: 4.91 THOUSANDS/ÂΜL (ref 1.85–7.62)
NEUTS SEG NFR BLD AUTO: 73 % (ref 43–75)
NRBC BLD AUTO-RTO: 0 /100 WBCS
PLATELET # BLD AUTO: 229 THOUSANDS/UL (ref 149–390)
PMV BLD AUTO: 10.4 FL (ref 8.9–12.7)
POTASSIUM SERPL-SCNC: 4.8 MMOL/L (ref 3.5–5.3)
PROT SERPL-MCNC: 6.6 G/DL (ref 6.4–8.4)
RBC # BLD AUTO: 4.75 MILLION/UL (ref 3.88–5.62)
SODIUM SERPL-SCNC: 138 MMOL/L (ref 135–147)
TRIGL SERPL-MCNC: 93 MG/DL
TSH SERPL DL<=0.05 MIU/L-ACNC: 0.39 UIU/ML (ref 0.45–4.5)
WBC # BLD AUTO: 6.76 THOUSAND/UL (ref 4.31–10.16)

## 2023-12-12 LAB
MYCOBACTERIUM SPEC CULT: NORMAL
RHODAMINE-AURAMINE STN SPEC: NORMAL

## 2023-12-13 ENCOUNTER — RA CDI HCC (OUTPATIENT)
Dept: OTHER | Facility: HOSPITAL | Age: 68
End: 2023-12-13

## 2023-12-13 DIAGNOSIS — I48.0 PAROXYSMAL ATRIAL FIBRILLATION (HCC): ICD-10-CM

## 2023-12-13 RX ORDER — WARFARIN SODIUM 5 MG/1
5 TABLET ORAL DAILY
Qty: 30 TABLET | Refills: 0 | Status: SHIPPED | OUTPATIENT
Start: 2023-12-13 | End: 2023-12-19 | Stop reason: SDUPTHER

## 2023-12-13 NOTE — PROGRESS NOTES
[address] [facility] coding opportunities       Chart reviewed, no opportunity found: CHART REVIEWED, NO OPPORTUNITY FOUND        Patients Insurance     Medicare Insurance: Medicare

## 2023-12-15 DIAGNOSIS — M86.9 OSTEITIS OF SYMPHYSIS PUBIS (HCC): Primary | ICD-10-CM

## 2023-12-19 ENCOUNTER — OFFICE VISIT (OUTPATIENT)
Dept: FAMILY MEDICINE CLINIC | Facility: CLINIC | Age: 68
End: 2023-12-19
Payer: MEDICARE

## 2023-12-19 VITALS
TEMPERATURE: 97.6 F | SYSTOLIC BLOOD PRESSURE: 120 MMHG | HEIGHT: 66 IN | OXYGEN SATURATION: 97 % | DIASTOLIC BLOOD PRESSURE: 72 MMHG | WEIGHT: 210 LBS | BODY MASS INDEX: 33.75 KG/M2 | RESPIRATION RATE: 16 BRPM | HEART RATE: 50 BPM

## 2023-12-19 DIAGNOSIS — E11.21 DIABETIC NEPHROPATHY ASSOCIATED WITH TYPE 2 DIABETES MELLITUS (HCC): ICD-10-CM

## 2023-12-19 DIAGNOSIS — E11.69 DIABETES MELLITUS TYPE 2 IN OBESE: Chronic | ICD-10-CM

## 2023-12-19 DIAGNOSIS — I10 ESSENTIAL HYPERTENSION: Chronic | ICD-10-CM

## 2023-12-19 DIAGNOSIS — I48.0 PAROXYSMAL ATRIAL FIBRILLATION (HCC): ICD-10-CM

## 2023-12-19 DIAGNOSIS — M86.9 OSTEITIS OF SYMPHYSIS PUBIS (HCC): ICD-10-CM

## 2023-12-19 DIAGNOSIS — E66.9 DIABETES MELLITUS TYPE 2 IN OBESE: Chronic | ICD-10-CM

## 2023-12-19 DIAGNOSIS — T84.7XXD HARDWARE COMPLICATING WOUND INFECTION, SUBSEQUENT ENCOUNTER: ICD-10-CM

## 2023-12-19 DIAGNOSIS — Z00.00 MEDICARE ANNUAL WELLNESS VISIT, SUBSEQUENT: Primary | ICD-10-CM

## 2023-12-19 DIAGNOSIS — E78.5 DYSLIPIDEMIA: ICD-10-CM

## 2023-12-19 PROBLEM — R19.00 ABDOMINAL LUMP: Status: RESOLVED | Noted: 2023-09-29 | Resolved: 2023-12-19

## 2023-12-19 PROBLEM — R06.09 DOE (DYSPNEA ON EXERTION): Status: RESOLVED | Noted: 2021-06-17 | Resolved: 2023-12-19

## 2023-12-19 LAB
MYCOBACTERIUM SPEC CULT: NORMAL
RHODAMINE-AURAMINE STN SPEC: NORMAL

## 2023-12-19 PROCEDURE — 99214 OFFICE O/P EST MOD 30 MIN: CPT | Performed by: FAMILY MEDICINE

## 2023-12-19 PROCEDURE — G0439 PPPS, SUBSEQ VISIT: HCPCS | Performed by: FAMILY MEDICINE

## 2023-12-19 RX ORDER — METOPROLOL SUCCINATE 50 MG/1
75 TABLET, EXTENDED RELEASE ORAL DAILY
Qty: 135 TABLET | Refills: 3 | Status: SHIPPED | OUTPATIENT
Start: 2023-12-19

## 2023-12-19 RX ORDER — ATORVASTATIN CALCIUM 20 MG/1
20 TABLET, FILM COATED ORAL DAILY
Qty: 90 TABLET | Refills: 3 | Status: SHIPPED | OUTPATIENT
Start: 2023-12-19

## 2023-12-19 RX ORDER — LISINOPRIL 10 MG/1
10 TABLET ORAL DAILY
Qty: 90 TABLET | Refills: 3 | Status: SHIPPED | OUTPATIENT
Start: 2023-12-19

## 2023-12-19 RX ORDER — GLIPIZIDE 5 MG/1
5 TABLET, FILM COATED, EXTENDED RELEASE ORAL DAILY
Qty: 90 TABLET | Refills: 3 | Status: SHIPPED | OUTPATIENT
Start: 2023-12-19

## 2023-12-19 RX ORDER — METFORMIN HYDROCHLORIDE 500 MG/1
1000 TABLET, EXTENDED RELEASE ORAL DAILY
Qty: 180 TABLET | Refills: 3 | Status: SHIPPED | OUTPATIENT
Start: 2023-12-19

## 2023-12-19 RX ORDER — WARFARIN SODIUM 5 MG/1
5 TABLET ORAL DAILY
Qty: 90 TABLET | Refills: 3 | Status: SHIPPED | OUTPATIENT
Start: 2023-12-19

## 2023-12-19 RX ORDER — AMLODIPINE BESYLATE 5 MG/1
5 TABLET ORAL DAILY
Qty: 90 TABLET | Refills: 3 | Status: SHIPPED | OUTPATIENT
Start: 2023-12-19

## 2023-12-19 NOTE — PATIENT INSTRUCTIONS
Medicare Preventive Visit Patient Instructions  Thank you for completing your Welcome to Medicare Visit or Medicare Annual Wellness Visit today. Your next wellness visit will be due in one year (12/19/2024).  The screening/preventive services that you may require over the next 5-10 years are detailed below. Some tests may not apply to you based off risk factors and/or age. Screening tests ordered at today's visit but not completed yet may show as past due. Also, please note that scanned in results may not display below.  Preventive Screenings:  Service Recommendations Previous Testing/Comments   Colorectal Cancer Screening  Colonoscopy    Fecal Occult Blood Test (FOBT)/Fecal Immunochemical Test (FIT)  Fecal DNA/Cologuard Test  Flexible Sigmoidoscopy Age: 45-75 years old   Colonoscopy: every 10 years (May be performed more frequently if at higher risk)  OR  FOBT/FIT: every 1 year  OR  Cologuard: every 3 years  OR  Sigmoidoscopy: every 5 years  Screening may be recommended earlier than age 45 if at higher risk for colorectal cancer. Also, an individualized decision between you and your healthcare provider will decide whether screening between the ages of 76-85 would be appropriate. Colonoscopy: 04/07/2019  FOBT/FIT: Not on file  Cologuard: Not on file  Sigmoidoscopy: Not on file    Screening Current     Prostate Cancer Screening Individualized decision between patient and health care provider in men between ages of 55-69   Medicare will cover every 12 months beginning on the day after your 50th birthday PSA: 0.3 ng/mL     Screening Current     Hepatitis C Screening Once for adults born between 1945 and 1965  More frequently in patients at high risk for Hepatitis C Hep C Antibody: 03/29/2019    Screening Current   Diabetes Screening 1-2 times per year if you're at risk for diabetes or have pre-diabetes Fasting glucose: 101 mg/dL (12/6/2023)  A1C: 6.1 % (12/6/2023)  Screening Not Indicated  History Diabetes    Cholesterol Screening Once every 5 years if you don't have a lipid disorder. May order more often based on risk factors. Lipid panel: 12/06/2023  Screening Current      Other Preventive Screenings Covered by Medicare:  Abdominal Aortic Aneurysm (AAA) Screening: covered once if your at risk. You're considered to be at risk if you have a family history of AAA or a male between the age of 65-75 who smoking at least 100 cigarettes in your lifetime.  Lung Cancer Screening: covers low dose CT scan once per year if you meet all of the following conditions: (1) Age 55-77; (2) No signs or symptoms of lung cancer; (3) Current smoker or have quit smoking within the last 15 years; (4) You have a tobacco smoking history of at least 20 pack years (packs per day x number of years you smoked); (5) You get a written order from a healthcare provider.  Glaucoma Screening: covered annually if you're considered high risk: (1) You have diabetes OR (2) Family history of glaucoma OR (3)  aged 50 and older OR (4)  American aged 65 and older  Osteoporosis Screening: covered every 2 years if you meet one of the following conditions: (1) Have a vertebral abnormality; (2) On glucocorticoid therapy for more than 3 months; (3) Have primary hyperparathyroidism; (4) On osteoporosis medications and need to assess response to drug therapy.  HIV Screening: covered annually if you're between the age of 15-65. Also covered annually if you are younger than 15 and older than 65 with risk factors for HIV infection. For pregnant patients, it is covered up to 3 times per pregnancy.    Immunizations:  Immunization Recommendations   Influenza Vaccine Annual influenza vaccination during flu season is recommended for all persons aged >= 6 months who do not have contraindications   Pneumococcal Vaccine   * Pneumococcal conjugate vaccine = PCV13 (Prevnar 13), PCV15 (Vaxneuvance), PCV20 (Prevnar 20)  * Pneumococcal polysaccharide vaccine =  PPSV23 (Pneumovax) Adults 19-63 yo with certain risk factors or if 65+ yo  If never received any pneumonia vaccine: recommend Prevnar 20 (PCV20)  Give PCV20 if previously received 1 dose of PCV13 or PPSV23   Hepatitis B Vaccine 3 dose series if at intermediate or high risk (ex: diabetes, end stage renal disease, liver disease)   Respiratory syncytial virus (RSV) Vaccine - COVERED BY MEDICARE PART D  * RSVPreF3 (Arexvy) CDC recommends that adults 60 years of age and older may receive a single dose of RSV vaccine using shared clinical decision-making (SCDM)   Tetanus (Td) Vaccine - COST NOT COVERED BY MEDICARE PART B Following completion of primary series, a booster dose should be given every 10 years to maintain immunity against tetanus. Td may also be given as tetanus wound prophylaxis.   Tdap Vaccine - COST NOT COVERED BY MEDICARE PART B Recommended at least once for all adults. For pregnant patients, recommended with each pregnancy.   Shingles Vaccine (Shingrix) - COST NOT COVERED BY MEDICARE PART B  2 shot series recommended in those 19 years and older who have or will have weakened immune systems or those 50 years and older     Health Maintenance Due:      Topic Date Due   • Colorectal Cancer Screening  08/15/2024 (Originally 5/12/2002)   • Hepatitis C Screening  Completed     Immunizations Due:      Topic Date Due   • Influenza Vaccine (1) 09/01/2023   • COVID-19 Vaccine (2 - 2023-24 season) 09/01/2023     Advance Directives   What are advance directives?  Advance directives are legal documents that state your wishes and plans for medical care. These plans are made ahead of time in case you lose your ability to make decisions for yourself. Advance directives can apply to any medical decision, such as the treatments you want, and if you want to donate organs.   What are the types of advance directives?  There are many types of advance directives, and each state has rules about how to use them. You may choose a  combination of any of the following:  Living will:  This is a written record of the treatment you want. You can also choose which treatments you do not want, which to limit, and which to stop at a certain time. This includes surgery, medicine, IV fluid, and tube feedings.   Durable power of  for healthcare (DPAHC):  This is a written record that states who you want to make healthcare choices for you when you are unable to make them for yourself. This person, called a proxy, is usually a family member or a friend. You may choose more than 1 proxy.  Do not resuscitate (DNR) order:  A DNR order is used in case your heart stops beating or you stop breathing. It is a request not to have certain forms of treatment, such as CPR. A DNR order may be included in other types of advance directives.  Medical directive:  This covers the care that you want if you are in a coma, near death, or unable to make decisions for yourself. You can list the treatments you want for each condition. Treatment may include pain medicine, surgery, blood transfusions, dialysis, IV or tube feedings, and a ventilator (breathing machine).  Values history:  This document has questions about your views, beliefs, and how you feel and think about life. This information can help others choose the care that you would choose.  Why are advance directives important?  An advance directive helps you control your care. Although spoken wishes may be used, it is better to have your wishes written down. Spoken wishes can be misunderstood, or not followed. Treatments may be given even if you do not want them. An advance directive may make it easier for your family to make difficult choices about your care.   Weight Management   Why it is important to manage your weight:  Being overweight increases your risk of health conditions such as heart disease, high blood pressure, type 2 diabetes, and certain types of cancer. It can also increase your risk for  osteoarthritis, sleep apnea, and other respiratory problems. Aim for a slow, steady weight loss. Even a small amount of weight loss can lower your risk of health problems.  How to lose weight safely:  A safe and healthy way to lose weight is to eat fewer calories and get regular exercise. You can lose up about 1 pound a week by decreasing the number of calories you eat by 500 calories each day.   Healthy meal plan for weight management:  A healthy meal plan includes a variety of foods, contains fewer calories, and helps you stay healthy. A healthy meal plan includes the following:  Eat whole-grain foods more often.  A healthy meal plan should contain fiber. Fiber is the part of grains, fruits, and vegetables that is not broken down by your body. Whole-grain foods are healthy and provide extra fiber in your diet. Some examples of whole-grain foods are whole-wheat breads and pastas, oatmeal, brown rice, and bulgur.  Eat a variety of vegetables every day.  Include dark, leafy greens such as spinach, kale, adrianne greens, and mustard greens. Eat yellow and orange vegetables such as carrots, sweet potatoes, and winter squash.   Eat a variety of fruits every day.  Choose fresh or canned fruit (canned in its own juice or light syrup) instead of juice. Fruit juice has very little or no fiber.  Eat low-fat dairy foods.  Drink fat-free (skim) milk or 1% milk. Eat fat-free yogurt and low-fat cottage cheese. Try low-fat cheeses such as mozzarella and other reduced-fat cheeses.  Choose meat and other protein foods that are low in fat.  Choose beans or other legumes such as split peas or lentils. Choose fish, skinless poultry (chicken or turkey), or lean cuts of red meat (beef or pork). Before you cook meat or poultry, cut off any visible fat.   Use less fat and oil.  Try baking foods instead of frying them. Add less fat, such as margarine, sour cream, regular salad dressing and mayonnaise to foods. Eat fewer high-fat foods. Some  examples of high-fat foods include french fries, doughnuts, ice cream, and cakes.  Eat fewer sweets.  Limit foods and drinks that are high in sugar. This includes candy, cookies, regular soda, and sweetened drinks.  Exercise:  Exercise at least 30 minutes per day on most days of the week. Some examples of exercise include walking, biking, dancing, and swimming. You can also fit in more physical activity by taking the stairs instead of the elevator or parking farther away from stores. Ask your healthcare provider about the best exercise plan for you.   Narcotic (Opioid) Safety    Use narcotics safely:  Take prescribed narcotics exactly as directed  Do not give narcotics to others or take narcotics that belong to someone else  Do not mix narcotics without medicines or alcohol  Do not drive or operate heavy machinery after you take the narcotic  Monitor for side effects and notify your healthcare provider if you experienced side effects such as nausea, sleepiness, itching, or trouble thinking clearly.    Manage constipation:    Constipation is the most common side effect of narcotic medicine. Constipation is when you have hard, dry bowel movements, or you go longer than usual between bowel movements. Tell your healthcare provider about all changes in your bowel movements while you are taking narcotics. He or she may recommend laxative medicine to help you have a bowel movement. He or she may also change the kind of narcotic you are taking, or change when you take it. The following are more ways you can prevent or relieve constipation:    Drink liquids as directed.  You may need to drink extra liquids to help soften and move your bowels. Ask how much liquid to drink each day and which liquids are best for you.  Eat high-fiber foods.  This may help decrease constipation by adding bulk to your bowel movements. High-fiber foods include fruits, vegetables, whole-grain breads and cereals, and beans. Your healthcare provider  or dietitian can help you create a high-fiber meal plan. Your provider may also recommend a fiber supplement if you cannot get enough fiber from food.  Exercise regularly.  Regular physical activity can help stimulate your intestines. Walking is a good exercise to prevent or relieve constipation. Ask which exercises are best for you.  Schedule a time each day to have a bowel movement.  This may help train your body to have regular bowel movements. Bend forward while you are on the toilet to help move the bowel movement out. Sit on the toilet for at least 10 minutes, even if you do not have a bowel movement.    Store narcotics safely:   Store narcotics where others cannot easily get them.  Keep them in a locked cabinet or secure area. Do not  keep them in a purse or other bag you carry with you. A person may be looking for something else and find the narcotics.  Make sure narcotics are stored out of the reach of children.  A child can easily overdose on narcotics. Narcotics may look like candy to a small child.    The best way to dispose of narcotics:      The laws vary by country and area. In the [place], the best way is to return the narcotics through a take-back program. This program is offered by the [place] Drug Enforcement Agency (ARAM). The following are options for using the program:  Take the narcotics to a ARAM collection site.  The site is often a law enforcement center. Call your local law enforcement center for scheduled take-back days in your area. You will be given information on where to go if the collection site is in a different location.  Take the narcotics to an approved pharmacy or hospital.  A pharmacy or hospital may be set up as a collection site. You will need to ask if it is a ARAM collection site if you were not directed there. A pharmacy or doctor's office may not be able to take back narcotics unless it is a ARAM site.  Use a mail-back system.  This means you are given containers to put the  narcotics into. You will then mail them in the containers.  Use a take-back drop box.  This is a place to leave the narcotics at any time. People and animals will not be able to get into the box. Your local law enforcement agency can tell you where to find a drop box in your area.    Other ways to manage pain:   Ask your healthcare provider about non-narcotic medicines to control pain.  Nonprescription medicines include NSAIDs (such as ibuprofen) and acetaminophen. Prescription medicines include muscle relaxers, antidepressants, and steroids.  Pain may be managed without any medicines.  Some ways to relieve pain include massage, aromatherapy, or meditation. Physical or occupational therapy may also help.    For more information:   Drug Enforcement Administration  [address] [place] [address]  Phone: 5- 809 - 863-9184  Web Address: https://www.deadiversion.[facility].gov/drug_disposal/    US Food and Drug Administration  [address] [place] [address]  Phone: 2- 694 - 738-9102  Web Address: http://www.fda.gov     © Copyright [employer] 2018 Information is for End User's use only and may not be sold, redistributed or otherwise used for commercial purposes. All illustrations and images included in CareNotes® are the copyrighted property of [employer].D.A[employer]M., Inc. or [employer]

## 2023-12-20 ENCOUNTER — OFFICE VISIT (OUTPATIENT)
Dept: INFECTIOUS DISEASES | Facility: CLINIC | Age: 68
End: 2023-12-20
Payer: MEDICARE

## 2023-12-20 VITALS
WEIGHT: 210 LBS | HEART RATE: 95 BPM | BODY MASS INDEX: 33.75 KG/M2 | OXYGEN SATURATION: 97 % | DIASTOLIC BLOOD PRESSURE: 70 MMHG | SYSTOLIC BLOOD PRESSURE: 142 MMHG | TEMPERATURE: 96.8 F | HEIGHT: 66 IN | RESPIRATION RATE: 18 BRPM

## 2023-12-20 DIAGNOSIS — T84.7XXD HARDWARE COMPLICATING WOUND INFECTION, SUBSEQUENT ENCOUNTER: ICD-10-CM

## 2023-12-20 DIAGNOSIS — I48.0 PAROXYSMAL ATRIAL FIBRILLATION (HCC): Primary | ICD-10-CM

## 2023-12-20 DIAGNOSIS — M86.9 OSTEITIS OF SYMPHYSIS PUBIS (HCC): Primary | ICD-10-CM

## 2023-12-20 DIAGNOSIS — E11.69 DIABETES MELLITUS TYPE 2 IN OBESE: Chronic | ICD-10-CM

## 2023-12-20 DIAGNOSIS — E66.9 DIABETES MELLITUS TYPE 2 IN OBESE: Chronic | ICD-10-CM

## 2023-12-20 DIAGNOSIS — Z98.1 H/O SPINAL FUSION: ICD-10-CM

## 2023-12-20 PROCEDURE — 99213 OFFICE O/P EST LOW 20 MIN: CPT | Performed by: PHYSICIAN ASSISTANT

## 2023-12-20 RX ORDER — DOXYCYCLINE HYCLATE 100 MG/1
100 CAPSULE ORAL EVERY 12 HOURS SCHEDULED
Qty: 60 CAPSULE | Refills: 0 | Status: SHIPPED | OUTPATIENT
Start: 2024-01-03 | End: 2024-02-02

## 2023-12-20 NOTE — PATIENT INSTRUCTIONS
- Abe will remain on board through 1/2/2024  - start doxycycline 1/3/24. You will take one pill every twelve hours by mouth.   - Please have blood work in 4 weeks, a week prior to your upcoming follow up with us.  - Follow up with us after the blood work is drawn, in 4 weeks.

## 2023-12-20 NOTE — ASSESSMENT & PLAN NOTE
Previous spinal fusion of L1-L5.  Would question if this site has been seeded given CT imaging with lucency at the L5 pedicle screw.  Noted on most recent CT imaging in September.  Would question if patient may have seeded this area.  Patient completed 2 infusions of dalvance as above, now will be transitioned to suppressive doxycycline.

## 2023-12-20 NOTE — ASSESSMENT & PLAN NOTE
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  Patient initially presented with development of a draining sinus track.  ORIF was performed many years ago.  He went to the OR on 11/3 for hardware removal along with debridement.  Cultures isolated MRSA.  Susceptibilities reviewed.  Patient currently on Coumadin and so unable to take Bactrim.  Has been taking clindamycin but organism resistant.  Fortunately he is not showing any signs of relapse currently.  Suspect that this may have been a chronic progressive process as he reports previous draining tract prior to spinal surgery and radiology mentions abnormalities on 2022 images.  However based on imaging would question if patient may have seeded his spinal hardware.  We discussed avoidance of PICC line, plans for Dalvance and transition to suppression.     Patient received his 2 infusion of dalvance without issue.  He continues to do well.  Last set of labs stable.  Due to possible seeding of spinal hardware will transition to po doxycycline indefinitely.  Patient has yet to follow up with his Neurosurgeon but states he plans to do so early next year.  He cancelled his Ortho follow up as he was told if he was doing good he could do so.     Plan  -  transition to doxycycline 100 mg po bid on 1/3/2024 and continue indefinitely  - reviewed with patient to avoid prolonged sun expose while on doxycycline, stay upright for at least 30 minutes after taking the doxycycline, and wait 1-2 hours before and after doxcycline to take vitamins/supplements/dairy  - patient to follow up with Neurosurgery.   - CBCD, CMP 4-6 weeks  - RTO 4-6 weeks

## 2023-12-22 PROBLEM — R93.89 ABNORMAL CT SCAN: Status: RESOLVED | Noted: 2023-10-20 | Resolved: 2023-12-22

## 2023-12-22 NOTE — ASSESSMENT & PLAN NOTE
Patient remains under care of cardiology.  Continue warfarin and metoprolol.  Patient is hopeful to switch to home MD INR checks next year or possibly change Rx regimen to Eliquis/Xarelto.

## 2023-12-22 NOTE — ASSESSMENT & PLAN NOTE
Lab Results   Component Value Date    HGBA1C 6.1 (H) 12/06/2023   Excellent glycemic control.  Continue current med regimen of glipizide and metformin

## 2023-12-22 NOTE — ASSESSMENT & PLAN NOTE
MRSA .Patient is scheduled for follow-up with infectious disease.  Denies symptoms of pain or fever.  He completed antibiotic therapy with dalbavancin IV x 2 on November 22 and November 29.

## 2024-01-04 ENCOUNTER — APPOINTMENT (OUTPATIENT)
Dept: LAB | Facility: CLINIC | Age: 69
End: 2024-01-04
Payer: MEDICARE

## 2024-01-04 DIAGNOSIS — T84.7XXD HARDWARE COMPLICATING WOUND INFECTION, SUBSEQUENT ENCOUNTER: ICD-10-CM

## 2024-01-04 DIAGNOSIS — M86.9 OSTEITIS OF SYMPHYSIS PUBIS (HCC): ICD-10-CM

## 2024-01-05 ENCOUNTER — ANTICOAG VISIT (OUTPATIENT)
Dept: CARDIOLOGY CLINIC | Facility: CLINIC | Age: 69
End: 2024-01-05

## 2024-01-05 ENCOUNTER — TELEPHONE (OUTPATIENT)
Dept: PAIN MEDICINE | Facility: CLINIC | Age: 69
End: 2024-01-05

## 2024-01-05 ENCOUNTER — TELEPHONE (OUTPATIENT)
Dept: PAIN MEDICINE | Facility: MEDICAL CENTER | Age: 69
End: 2024-01-05

## 2024-01-05 DIAGNOSIS — G89.4 CHRONIC PAIN SYNDROME: ICD-10-CM

## 2024-01-05 DIAGNOSIS — I48.0 PAROXYSMAL ATRIAL FIBRILLATION (HCC): Primary | ICD-10-CM

## 2024-01-05 RX ORDER — HYDROCODONE BITARTRATE AND ACETAMINOPHEN 7.5; 325 MG/1; MG/1
1 TABLET ORAL EVERY 6 HOURS PRN
Qty: 120 TABLET | Refills: 0 | Status: SHIPPED | OUTPATIENT
Start: 2024-01-05

## 2024-01-05 RX ORDER — BACLOFEN 10 MG/1
10 TABLET ORAL 2 TIMES DAILY
Qty: 60 TABLET | Refills: 0 | Status: SHIPPED | OUTPATIENT
Start: 2024-01-05

## 2024-01-05 NOTE — TELEPHONE ENCOUNTER
[facility] patient scheduled incorrectly at the [place] office - 1/5/24    Pt requesting KESHAWN from [place] to [place]. Pt states that he would like to continue to see Inocencia. He doesn't mind driving to [facility] from now on

## 2024-01-05 NOTE — TELEPHONE ENCOUNTER
----- Message from Inocencia Lopez PA-C sent at 1/5/2024 12:05 PM EST -----  Can clinical help with this?  This [place] patient arrived at the [place] location.  He is requesting med refills.  Is he able to be seen in the near future by Hardeep?  I do not want to fill meds if he won't be my patient anymore;  looks like he is considering a KESHAWN but I won't see him here unless approved by JOSE ALFREDO and SL.     Thanks,  Inocencia

## 2024-01-05 NOTE — TELEPHONE ENCOUNTER
S/W pt.  Pt requesting refill of norco and he has about 10 pills left.  Pt stated his insurance won't pay for the robaxin.  He wants to change back to the baclofen.  Pt stated he was scheduled for appt today in [place] with MG.    Hardeep's next appt is 1/25    Advised JOSE ALFREDO of the messages.  JE is requesting MG to fill this as MG was the last prescriber.  JE stated he approved the transfer.      Please advise.

## 2024-01-11 NOTE — TELEPHONE ENCOUNTER
It's ok with me;  he can be scheduled for a 15 min OV with me at the end of the month when he is due for refills.

## 2024-01-19 ENCOUNTER — APPOINTMENT (OUTPATIENT)
Dept: LAB | Facility: CLINIC | Age: 69
End: 2024-01-19
Payer: MEDICARE

## 2024-01-19 ENCOUNTER — ANTICOAG VISIT (OUTPATIENT)
Dept: CARDIOLOGY CLINIC | Facility: CLINIC | Age: 69
End: 2024-01-19

## 2024-01-19 DIAGNOSIS — I48.0 PAROXYSMAL ATRIAL FIBRILLATION (HCC): Primary | ICD-10-CM

## 2024-01-19 LAB
ALBUMIN SERPL BCP-MCNC: 4 G/DL (ref 3.5–5)
ALP SERPL-CCNC: 78 U/L (ref 34–104)
ALT SERPL W P-5'-P-CCNC: 9 U/L (ref 7–52)
ANION GAP SERPL CALCULATED.3IONS-SCNC: 9 MMOL/L
AST SERPL W P-5'-P-CCNC: 12 U/L (ref 13–39)
BASOPHILS # BLD AUTO: 0.02 THOUSANDS/ÂΜL (ref 0–0.1)
BASOPHILS NFR BLD AUTO: 0 % (ref 0–1)
BILIRUB SERPL-MCNC: 0.98 MG/DL (ref 0.2–1)
BUN SERPL-MCNC: 29 MG/DL (ref 5–25)
CALCIUM SERPL-MCNC: 9.6 MG/DL (ref 8.4–10.2)
CHLORIDE SERPL-SCNC: 106 MMOL/L (ref 96–108)
CO2 SERPL-SCNC: 23 MMOL/L (ref 21–32)
CREAT SERPL-MCNC: 1.19 MG/DL (ref 0.6–1.3)
EOSINOPHIL # BLD AUTO: 0.13 THOUSAND/ÂΜL (ref 0–0.61)
EOSINOPHIL NFR BLD AUTO: 2 % (ref 0–6)
ERYTHROCYTE [DISTWIDTH] IN BLOOD BY AUTOMATED COUNT: 15.9 % (ref 11.6–15.1)
GFR SERPL CREATININE-BSD FRML MDRD: 63 ML/MIN/1.73SQ M
GLUCOSE P FAST SERPL-MCNC: 89 MG/DL (ref 65–99)
HCT VFR BLD AUTO: 43.1 % (ref 36.5–49.3)
HGB BLD-MCNC: 13.4 G/DL (ref 12–17)
IMM GRANULOCYTES # BLD AUTO: 0.02 THOUSAND/UL (ref 0–0.2)
IMM GRANULOCYTES NFR BLD AUTO: 0 % (ref 0–2)
LYMPHOCYTES # BLD AUTO: 1.45 THOUSANDS/ÂΜL (ref 0.6–4.47)
LYMPHOCYTES NFR BLD AUTO: 21 % (ref 14–44)
MCH RBC QN AUTO: 26.7 PG (ref 26.8–34.3)
MCHC RBC AUTO-ENTMCNC: 31.1 G/DL (ref 31.4–37.4)
MCV RBC AUTO: 86 FL (ref 82–98)
MONOCYTES # BLD AUTO: 0.59 THOUSAND/ÂΜL (ref 0.17–1.22)
MONOCYTES NFR BLD AUTO: 9 % (ref 4–12)
NEUTROPHILS # BLD AUTO: 4.64 THOUSANDS/ÂΜL (ref 1.85–7.62)
NEUTS SEG NFR BLD AUTO: 68 % (ref 43–75)
NRBC BLD AUTO-RTO: 0 /100 WBCS
PLATELET # BLD AUTO: 226 THOUSANDS/UL (ref 149–390)
PMV BLD AUTO: 10.6 FL (ref 8.9–12.7)
POTASSIUM SERPL-SCNC: 4.9 MMOL/L (ref 3.5–5.3)
PROT SERPL-MCNC: 6.8 G/DL (ref 6.4–8.4)
RBC # BLD AUTO: 5.01 MILLION/UL (ref 3.88–5.62)
SODIUM SERPL-SCNC: 138 MMOL/L (ref 135–147)
WBC # BLD AUTO: 6.85 THOUSAND/UL (ref 4.31–10.16)

## 2024-01-19 PROCEDURE — 85025 COMPLETE CBC W/AUTO DIFF WBC: CPT

## 2024-01-19 PROCEDURE — 80053 COMPREHEN METABOLIC PANEL: CPT

## 2024-01-24 ENCOUNTER — OFFICE VISIT (OUTPATIENT)
Dept: INFECTIOUS DISEASES | Facility: CLINIC | Age: 69
End: 2024-01-24
Payer: MEDICARE

## 2024-01-24 VITALS
TEMPERATURE: 97.4 F | DIASTOLIC BLOOD PRESSURE: 78 MMHG | SYSTOLIC BLOOD PRESSURE: 140 MMHG | WEIGHT: 210 LBS | BODY MASS INDEX: 33.75 KG/M2 | HEART RATE: 72 BPM | HEIGHT: 66 IN | RESPIRATION RATE: 18 BRPM | OXYGEN SATURATION: 97 %

## 2024-01-24 DIAGNOSIS — T84.7XXD HARDWARE COMPLICATING WOUND INFECTION, SUBSEQUENT ENCOUNTER: ICD-10-CM

## 2024-01-24 DIAGNOSIS — Z98.1 H/O SPINAL FUSION: Primary | ICD-10-CM

## 2024-01-24 DIAGNOSIS — E66.9 DIABETES MELLITUS TYPE 2 IN OBESE: Chronic | ICD-10-CM

## 2024-01-24 DIAGNOSIS — E66.9 OBESITY (BMI 30.0-34.9): ICD-10-CM

## 2024-01-24 DIAGNOSIS — M86.9 OSTEITIS OF SYMPHYSIS PUBIS (HCC): ICD-10-CM

## 2024-01-24 DIAGNOSIS — E11.69 DIABETES MELLITUS TYPE 2 IN OBESE: Chronic | ICD-10-CM

## 2024-01-24 PROBLEM — E66.811 OBESITY (BMI 30.0-34.9): Status: ACTIVE | Noted: 2024-01-24

## 2024-01-24 PROCEDURE — 99213 OFFICE O/P EST LOW 20 MIN: CPT | Performed by: PHYSICIAN ASSISTANT

## 2024-01-24 RX ORDER — DOXYCYCLINE HYCLATE 100 MG/1
100 CAPSULE ORAL EVERY 12 HOURS SCHEDULED
Qty: 180 CAPSULE | Refills: 0 | Status: SHIPPED | OUTPATIENT
Start: 2024-01-24 | End: 2024-04-23

## 2024-01-24 NOTE — ASSESSMENT & PLAN NOTE
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  Patient initially presented with development of a draining sinus track.  ORIF was performed many years ago.  He went to the OR on 11/3 for hardware removal along with debridement.  Cultures isolated MRSA.  Susceptibilities reviewed.  Patient currently on Coumadin and so unable to take Bactrim.  Has been taking clindamycin but organism resistant.  Fortunately he is not showing any signs of relapse currently.  Suspect that this may have been a chronic progressive process as he reports previous draining tract prior to spinal surgery and radiology mentions abnormalities on 2022 images.  However based on imaging would question if patient may have seeded his spinal hardware.  We discussed avoidance of PICC line, plans for Dalvance and transition to suppression.      Patient received his 2 infusion of dalvance without issue and has been transitione to po suppression with doxcycline due to possible seeding of spinal hardware.   Patient tolerating the doxycycline.  His main issue is indigestion.  Suggest he take the antibiotic a little earlier at night possibly with some crackers.  Again reviewed he should remain upright for at least 30-45 minutes after taking the doxycycline.      Plan  -  continue doxycycline 100 mg po bid indefinitely  - reviewed with patient to avoid prolonged sun expose while on doxycycline, stay upright for at least 30 minutes after taking the doxycycline, and wait 1-2 hours before and after doxcycline to take vitamins/supplements/dairy  - patient to follow up with Neurosurgery.   - CBCD, CMP prior to next appointment  - RTO 12 weeks

## 2024-01-24 NOTE — PROGRESS NOTES
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  Patient initially presented with development of a draining sinus track.  ORIF was performed many years ago.  He went to the OR on 11/3 for hardware removal along with debridement.  Cultures isolated MRSA.  Susceptibilities reviewed.  Patient currently on Coumadin and so unable to take Bactrim.  Has been taking clindamycin but organism resistant.  Fortunately he is not showing any signs of relapse currently.  Suspect that this may have been a chronic progressive process as he reports previous draining tract prior to spinal surgery and radiology mentions abnormalities on 2022 images.  However based on imaging would question if patient may have seeded his spinal hardware.  We discussed avoidance of PICC line, plans for Dalvance and transition to suppression.      Patient received his 2 infusion of dalvance without issue and has been transitione to po suppression with doxcycline due to possible seeding of spinal hardware.   Patient tolerating the doxycycline.  His main issue is indigestion.  Suggest he take the antibiotic a little earlier at night possibly with some crackers.  Again reviewed he should remain upright for at least 30-45 minutes after taking the doxycycline.      Plan  -  continue doxycycline 100 mg po bid indefinitely  - reviewed with patient to avoid prolonged sun expose while on doxycycline, stay upright for at least 30 minutes after taking the doxycycline, and wait 1-2 hours before and after doxcycline to take vitamins/supplements/dairy  - patient to follow up with Neurosurgery.   - CBCD, CMP prior to next appointment  - RTO 12 weeks    H/O spinal fusion  Previous spinal fusion of L1-L5.  Would question if this site has been seeded given CT imaging with lucency at the L5 pedicle screw.  Noted on most recent CT imaging in September.  Would question if patient may have seeded this area.   Patient completed 2 infusions of dalvance as above, now will be transitioned to suppressive doxycycline.     Diabetes mellitus type 2 in obese (HCC)    Lab Results   Component Value Date    HGBA1C 6.1 (H) 12/06/2023       Obesity (BMI 30.0-34.9)  BMI 33.89       Diagnoses and all orders for this visit:    H/O spinal fusion    Osteitis of symphysis pubis (HCC)  -     doxycycline hyclate (VIBRAMYCIN) 100 mg capsule; Take 1 capsule (100 mg total) by mouth every 12 (twelve) hours  -     CBC and differential; Future  -     Comprehensive metabolic panel; Future    Hardware complicating wound infection, subsequent encounter  -     doxycycline hyclate (VIBRAMYCIN) 100 mg capsule; Take 1 capsule (100 mg total) by mouth every 12 (twelve) hours  -     CBC and differential; Future  -     Comprehensive metabolic panel; Future    Diabetes mellitus type 2 in obese     Obesity (BMI 30.0-34.9)          Subjective:      Patient ID: Adolfo Munroe is a 68 y.o. male.    HPI  67 y/o male presents for office follow up today regarding  pubic symphysis osteitis and hardware infection.  He received 2 dalvance infusions on 11/22 and 11/29 and was then transitioned to po suppression.  He is now on po doxycycline.  His main c/o is that it causes some indigestion erik at night time.  Seems as though he may by laying down shortly after taking it.  Otherwise, he has no new complaints.  He has not made an appointment with Neurosurgery.   The following portions of the patient's history were reviewed and updated as appropriate: allergies, current medications, past family history, past medical history, past social history, past surgical history, and problem list.    Review of Systems   Constitutional:  Negative for chills and fever.   Respiratory:  Negative for cough and shortness of breath.    Gastrointestinal:  Negative for abdominal pain, diarrhea, nausea and vomiting.   Skin:  Negative for rash.   Psychiatric/Behavioral:  Negative for behavioral  "problems and confusion.          Objective:      /78   Pulse 72   Temp (!) 97.4 °F (36.3 °C)   Resp 18   Ht 5' 6\" (1.676 m)   Wt 95.3 kg (210 lb)   SpO2 97%   BMI 33.89 kg/m²          Physical Exam  Vitals reviewed.   Constitutional:       General: He is not in acute distress.     Appearance: Normal appearance. He is not ill-appearing, toxic-appearing or diaphoretic.   HENT:      Head: Normocephalic and atraumatic.   Eyes:      General: No scleral icterus.        Right eye: No discharge.         Left eye: No discharge.      Conjunctiva/sclera: Conjunctivae normal.   Cardiovascular:      Rate and Rhythm: Normal rate.   Pulmonary:      Effort: Pulmonary effort is normal. No respiratory distress.      Breath sounds: Normal breath sounds. No stridor. No wheezing, rhonchi or rales.   Chest:      Chest wall: No tenderness.   Abdominal:      General: Bowel sounds are normal. There is no distension.      Palpations: Abdomen is soft.      Tenderness: There is no abdominal tenderness.   Skin:     General: Skin is warm and dry.      Coloration: Skin is not jaundiced or pale.      Findings: No erythema or rash.   Neurological:      Mental Status: He is alert and oriented to person, place, and time.   Psychiatric:         Mood and Affect: Mood normal.         Behavior: Behavior normal.             Labs  1/19/2024  Wbc: 6.85  Hgb: 13.4  Plt: 226  Cr: 1.19  Ast: 12  ALT: 9  Alk phos: 78  "

## 2024-01-24 NOTE — PATIENT INSTRUCTIONS
Plan  -  continue doxycycline 100 mg po bid indefinitely  - refill script sent to pharmacy  -  avoid prolonged sun expose while on doxycycline, stay upright for at least 30 minutes after taking the doxycycline, and wait 1-2 hours before and after doxcycline to take vitamins/supplements/dairy  -  follow up with Neurosurgery.   - CBCD, CMP prior to next appointment  - RTO 12 weeks

## 2024-01-29 ENCOUNTER — OFFICE VISIT (OUTPATIENT)
Dept: PAIN MEDICINE | Facility: CLINIC | Age: 69
End: 2024-01-29
Payer: MEDICARE

## 2024-01-29 VITALS
HEIGHT: 66 IN | DIASTOLIC BLOOD PRESSURE: 80 MMHG | WEIGHT: 213 LBS | TEMPERATURE: 97.8 F | SYSTOLIC BLOOD PRESSURE: 136 MMHG | HEART RATE: 78 BPM | BODY MASS INDEX: 34.23 KG/M2

## 2024-01-29 DIAGNOSIS — M46.1 SACROILIITIS (HCC): ICD-10-CM

## 2024-01-29 DIAGNOSIS — F11.20 UNCOMPLICATED OPIOID DEPENDENCE (HCC): ICD-10-CM

## 2024-01-29 DIAGNOSIS — M54.50 CHRONIC BILATERAL LOW BACK PAIN WITHOUT SCIATICA: ICD-10-CM

## 2024-01-29 DIAGNOSIS — Z79.891 LONG-TERM CURRENT USE OF OPIATE ANALGESIC: ICD-10-CM

## 2024-01-29 DIAGNOSIS — M47.816 LUMBAR SPONDYLOSIS: Primary | ICD-10-CM

## 2024-01-29 DIAGNOSIS — G89.4 CHRONIC PAIN SYNDROME: ICD-10-CM

## 2024-01-29 DIAGNOSIS — M96.1 LUMBAR POSTLAMINECTOMY SYNDROME: ICD-10-CM

## 2024-01-29 DIAGNOSIS — G89.29 CHRONIC BILATERAL LOW BACK PAIN WITHOUT SCIATICA: ICD-10-CM

## 2024-01-29 DIAGNOSIS — Z98.1 H/O SPINAL FUSION: ICD-10-CM

## 2024-01-29 PROCEDURE — 99214 OFFICE O/P EST MOD 30 MIN: CPT | Performed by: PHYSICIAN ASSISTANT

## 2024-01-29 RX ORDER — HYDROCODONE BITARTRATE AND ACETAMINOPHEN 7.5; 325 MG/1; MG/1
1 TABLET ORAL EVERY 6 HOURS PRN
Qty: 120 TABLET | Refills: 0 | Status: CANCELLED | OUTPATIENT
Start: 2024-01-29

## 2024-01-29 NOTE — PROGRESS NOTES
Assessment:  1. Lumbar spondylosis    2. Chronic bilateral low back pain without sciatica    3. Sacroiliitis (HCC)    4. H/O spinal fusion    5. Lumbar postlaminectomy syndrome    6. Chronic pain syndrome    7. Long-term current use of opiate analgesic    8. Uncomplicated opioid dependence (HCC)        Plan:  While the patient was in the office today, I did have a thorough conversation regarding their chronic pain syndrome, medication management, and treatment plan options.    The patient remains clinically stable on the current medication regimen of Norco 7.5/325 every 6 hours as needed as well as baclofen 10 mg twice daily.  Patient states that he has only been taking the Norco on average 2 times daily and does not require any renewals of his medication at this point.  He also states the baclofen he takes sparingly and does not request refills on today's visit.    He is questioning a repeat therapeutic sacroiliac joint injection however I stated that I would need to discuss this with infectious disease and also with Dr. Thibodeaux regarding the safety of an interventional procedure given his events last year with osteitis of the symphysis pubis and hardware complicating wound infection with subsequent removal of that hardware and wound debridement.  He is now under the care of infectious disease and on doxycycline indefinitely.  At this point in time we will hold off on injection therapy.    [place] Prescription Drug Monitoring Program report was reviewed and was appropriate     A urine drug screen was collected at today's office visit as part of our medication management protocol. The point of care testing results were appropriate for what was being prescribed. The specimen will be sent for confirmatory testing. The drug screen is medically necessary because the patient is either dependent on opioid medication or is being considered for opioid medication therapy and the results could impact ongoing or future  treatment. The drug screen is to evaluate for the presences or absence of prescribed, non-prescribed, and/or illicit drugs/substances.    There are risks associated with opioid medications, including dependence, addiction and tolerance. The patient understands and agrees to use these medications only as prescribed. Potential side effects of the medications include, but are not limited to, constipation, drowsiness, addiction, impaired judgment and risk of fatal overdose if not taken as prescribed. The patient was warned against driving while taking sedation medications.  Sharing medications is a felony. At this point in time, the patient is showing no signs of addiction, abuse, diversion or suicidal ideation.    The patient will follow-up in 8 weeks for medication prescription refill and reevaluation. The patient was advised to contact the office should their symptoms worsen in the interim. The patient was agreeable and verbalized an understanding.        History of Present Illness:    The patient is a 68 y.o. male last seen on 11/10/2023 who presents for a follow up office visit in regards to chronic low back pain secondary to lumbar spondylosis, lumbar degenerative disc disease, history of lumbar laminectomy/fusion.  The patient currently reports chronic low back pain that he presently rates a 6 out of 10 and describes it as a constant sharp pain that is made worse with standing and walking.  Patient has had significant relief with sacroiliac joint injections for the pain he is currently experiencing.  Unfortunately since I last saw him, he had removal of hardware of the pubic symphysis and wound debridement following hardware complicating wound infection.  He is now under the care of infectious disease and on lifelong doxycycline.    Current pain medications includes: Norco and baclofen.  The patient reports that this regimen is providing 50% pain relief.  The patient is reporting no side effects from this pain  medication regimen.    Pain Contract Signed: 1/29/2024  Last Urine Drug Screen: 1/29/2024    I have personally reviewed and/or updated the patient's past medical history, past surgical history, family history, social history, current medications, allergies, and vital signs today.       Review of Systems:    Review of Systems   Respiratory:  Negative for shortness of breath.    Cardiovascular:  Negative for chest pain.   Gastrointestinal:  Negative for constipation, diarrhea, nausea and vomiting.   Musculoskeletal:  Negative for arthralgias, gait problem, joint swelling and myalgias.   Skin:  Negative for rash.   Neurological:  Negative for dizziness, seizures and weakness.   All other systems reviewed and are negative.        Past Medical History:   Diagnosis Date   • A-fib ([facility])    • Acute respiratory failure with hypoxia and hypercapnia ([facility]) 08/17/2020   • LAURENCE (acute kidney injury) ([facility]) 08/16/2020   • Arthritis     knees   • Community acquired pneumonia of left lower lobe of lung 08/12/2020   • Diabetes mellitus ([facility])    • Hypertension    • Measles, Icelandic (rubella)    • Mumps    • Obesity    • Pneumonia 08/2020   • Psoas abscess ([facility]) 01/03/2023   • Snoring     Loudly   • Substance abuse ([facility])        Past Surgical History:   Procedure Laterality Date   • CYSTOSCOPY      Pt denies   • JOINT REPLACEMENT  07/2018   • KNEE ARTHROSCOPY Left     knee   • ORIF PELVIC FRACTURE     • NH DEBRIDEMENT SUBCUTANEOUS TISSUE EA ADDL 20 SQ CM N/A 11/3/2023    Procedure: DEBRIDEMENT WOUND (WASH OUT);  Surgeon: Jose Gasca MD;  Location: [facility];  Service: Orthopedics   • NH REMOVAL IMPLANT DEEP N/A 11/3/2023    Procedure: REMOVAL HARDWARE pubic symphysis;  Surgeon: oJse Gasca MD;  Location: [facility];  Service: Orthopedics   • SPINAL FUSION  05/06/2022   • TOTAL KNEE ARTHROPLASTY Left 06/10/2019    Procedure: ARTHROPLASTY KNEE TOTAL;  Surgeon: Kenny Moy MD;  Location: [facility];  Service: Orthopedics        Family History   Problem Relation Age of Onset   • Ovarian cancer Mother    • Heart disease Mother    • Diabetes type II Mother    • Colon cancer Father    • Heart disease Father    • Nephrolithiasis Father    • Cancer Father        Social History     Occupational History   • Not on file   Tobacco Use   • Smoking status: Never   • Smokeless tobacco: Never   Vaping Use   • Vaping status: Never Used   Substance and Sexual Activity   • Alcohol use: Yes     Alcohol/week: 1.0 standard drink of alcohol     Types: 1 Standard drinks or equivalent per week   • Drug use: Never   • Sexual activity: Not Currently     Birth control/protection: None         Current Outpatient Medications:   •  amLODIPine (NORVASC) 5 mg tablet, Take 1 tablet (5 mg total) by mouth daily, Disp: 90 tablet, Rfl: 3  •  atorvastatin (LIPITOR) 20 mg tablet, Take 1 tablet (20 mg total) by mouth daily, Disp: 90 tablet, Rfl: 3  •  baclofen 10 mg tablet, Take 1 tablet (10 mg total) by mouth 2 (two) times a day, Disp: 60 tablet, Rfl: 0  •  doxycycline hyclate (VIBRAMYCIN) 100 mg capsule, Take 1 capsule (100 mg total) by mouth every 12 (twelve) hours, Disp: 180 capsule, Rfl: 0  •  glipiZIDE (GLUCOTROL XL) 5 mg 24 hr tablet, Take 1 tablet (5 mg total) by mouth daily, Disp: 90 tablet, Rfl: 3  •  HYDROcodone-acetaminophen (Norco) 7.5-325 mg per tablet, Take 1 tablet by mouth every 6 (six) hours as needed for pain For ongoing therapy Max Daily Amount: 4 tablets, Disp: 120 tablet, Rfl: 0  •  HYDROcodone-acetaminophen (NORCO) 7.5-325 mg per tablet, Take 1 tablet by mouth every 6 (six) hours as needed for pain For ongoing therapy Max Daily Amount: 4 tablets, Disp: 120 tablet, Rfl: 0  •  lisinopril (ZESTRIL) 10 mg tablet, Take 1 tablet (10 mg total) by mouth daily, Disp: 90 tablet, Rfl: 3  •  metFORMIN (GLUCOPHAGE-XR) 500 mg 24 hr tablet, Take 2 tablets (1,000 mg total) by mouth daily Taking 2 tablets daily, Disp: 180 tablet, Rfl: 3  •  metoprolol succinate  "(TOPROL-XL) 50 mg 24 hr tablet, Take 1.5 tablets (75 mg total) by mouth daily, Disp: 135 tablet, Rfl: 3  •  warfarin (COUMADIN) 5 mg tablet, Take 1 tablet (5 mg total) by mouth daily, Disp: 90 tablet, Rfl: 3  •  Blood Glucose Monitoring Suppl (OneTouch Verio Reflect) w/Device KIT, Check blood sugars once daily. Please substitute with appropriate alternative as covered by patient's insurance. Dx: E11.65, Disp: 1 kit, Rfl: 0  •  naloxone (NARCAN) 4 mg/0.1 mL nasal spray, Administer 1 spray into a nostril. If no response after 2-3 minutes, give another dose in the other nostril using a new spray., Disp: 1 each, Rfl: 1    No Known Allergies    Physical Exam:    /80 (BP Location: Left arm, Patient Position: Sitting, Cuff Size: Standard)   Pulse 78   Temp 97.8 °F (36.6 °C)   Ht 5' 6\" (1.676 m)   Wt 96.6 kg (213 lb)   BMI 34.38 kg/m²     Constitutional:normal, well developed, well nourished, alert, in no distress and non-toxic and no overt pain behavior. and overweight  Eyes:anicteric  HEENT:grossly intact  Neck:supple, symmetric, trachea midline and no masses   Pulmonary:even and unlabored  Cardiovascular:No edema or pitting edema present  Skin:Normal without rashes or lesions and well hydrated  Psychiatric:Mood and affect appropriate  Neurologic:Cranial Nerves II-XII grossly intact  Musculoskeletal: Gait is slow but stable without the use of assistive devices.  Lumbar scars from prior surgery.  Tender at bilateral sacroiliac joints.    Imaging  No orders to display         Orders Placed This Encounter   Procedures   • [facility] All Prescribed Meds and Special Instructions   • Amphetamines, Methamphetamines   • Butalbital   • Phenobarbital   • Secobarbital   • Alprazolam   • Clonazepam   • Diazepam   • Lorazepam   • Gabapentin   • Pregabalin   • Cocaine   • Heroin   • Buprenorphine   • Levorphanol   • Meperidine   • Naltrexone   • Fentanyl   • Methadone   • Oxycodone   • Tapentadol   • THC   • Tramadol   • " Codeine, Hydrocodone, Hydropmorphone, Morphine   • Bath Salts   • Ethyl Glucuronide/Ethyl Sulfate   • Kratom   • Spice   • Methylphenidate   • Phentermine   • Validity Oxidant   • Validity Creatinine   • Validity pH   • Validity Specific   • Xylazine Definitive Test

## 2024-01-31 LAB
4-HYDROXY XYLAZINE: NEGATIVE NG/ML
6MAM UR QL CFM: NEGATIVE NG/ML
7AMINOCLONAZEPAM UR QL CFM: NEGATIVE NG/ML
A-OH ALPRAZ UR QL CFM: NEGATIVE NG/ML
ACCEPTABLE CREAT UR QL: NORMAL MG/DL
ACCEPTIBLE SP GR UR QL: NORMAL
AMPHET UR QL CFM: NEGATIVE NG/ML
BUPRENORPHINE UR QL CFM: NEGATIVE NG/ML
BUTALBITAL UR QL CFM: NEGATIVE NG/ML
BZE UR QL CFM: NEGATIVE NG/ML
CODEINE UR QL CFM: NEGATIVE NG/ML
EDDP UR QL CFM: NEGATIVE NG/ML
ETHYL GLUCURONIDE UR QL CFM: NEGATIVE NG/ML
ETHYL SULFATE UR QL SCN: NEGATIVE NG/ML
EUTYLONE UR QL: NEGATIVE NG/ML
FENTANYL UR QL CFM: NEGATIVE NG/ML
GLIADIN IGG SER IA-ACNC: NEGATIVE NG/ML
HYDROCODONE UR QL CFM: NORMAL NG/ML
HYDROMORPHONE UR QL CFM: NORMAL NG/ML
LORAZEPAM UR QL CFM: NEGATIVE NG/ML
ME-PHENIDATE UR QL CFM: NEGATIVE NG/ML
MEPERIDINE UR QL CFM: NEGATIVE NG/ML
METHADONE UR QL CFM: NEGATIVE NG/ML
METHAMPHET UR QL CFM: NEGATIVE NG/ML
MORPHINE UR QL CFM: NEGATIVE NG/ML
NALTREXONE UR QL CFM: NEGATIVE NG/ML
NITRITE UR QL: NORMAL UG/ML
NORBUPRENORPHINE UR QL CFM: NEGATIVE NG/ML
NORDIAZEPAM UR QL CFM: NEGATIVE NG/ML
NORFENTANYL UR QL CFM: NEGATIVE NG/ML
NORHYDROCODONE UR QL CFM: NORMAL NG/ML
NORMEPERIDINE UR QL CFM: NEGATIVE NG/ML
NOROXYCODONE UR QL CFM: NEGATIVE NG/ML
OXAZEPAM UR QL CFM: NEGATIVE NG/ML
OXYCODONE UR QL CFM: NEGATIVE NG/ML
OXYMORPHONE UR QL CFM: NEGATIVE NG/ML
PARA-FLUOROFENTANYL QUANTIFICATION: NORMAL NG/ML
PHENOBARB UR QL CFM: NEGATIVE NG/ML
RESULT ALL_PRESCRIBED MEDS AND SPECIAL INSTRUCTIONS: NORMAL
SECOBARBITAL UR QL CFM: NEGATIVE NG/ML
SL AMB 4-ANPP QUANTIFICATION: NORMAL NG/ML
SL AMB 5F-ADB-M7 METABOLITE QUANTIFICATION: NEGATIVE NG/ML
SL AMB 7-OH-MITRAGYNINE (KRATOM ALKALOID) QUANTIFICATION: NEGATIVE NG/ML
SL AMB AB-FUBINACA-M3 METABOLITE QUANTIFICATION: NEGATIVE NG/ML
SL AMB ACETYL FENTANYL QUANTIFICATION: NORMAL NG/ML
SL AMB ACETYL NORFENTANYL QUANTIFICATION: NORMAL NG/ML
SL AMB ACRYL FENTANYL QUANTIFICATION: NORMAL NG/ML
SL AMB CARFENTANIL QUANTIFICATION: NORMAL NG/ML
SL AMB CTHC (MARIJUANA METABOLITE) QUANTIFICATION: NEGATIVE NG/ML
SL AMB DEXTRORPHAN (DEXTROMETHORPHAN METABOLITE) QUANT: NEGATIVE NG/ML
SL AMB GABAPENTIN QUANTIFICATION: NEGATIVE
SL AMB JWH018 METABOLITE QUANTIFICATION: NEGATIVE NG/ML
SL AMB JWH073 METABOLITE QUANTIFICATION: NEGATIVE NG/ML
SL AMB MDMB-FUBINACA-M1 METABOLITE QUANTIFICATION: NEGATIVE NG/ML
SL AMB METHYLONE QUANTIFICATION: NEGATIVE NG/ML
SL AMB N-DESMETHYL-TRAMADOL QUANTIFICATION: NEGATIVE NG/ML
SL AMB PHENTERMINE QUANTIFICATION: NEGATIVE NG/ML
SL AMB PREGABALIN QUANTIFICATION: NEGATIVE
SL AMB RCS4 METABOLITE QUANTIFICATION: NEGATIVE NG/ML
SL AMB RITALINIC ACID QUANTIFICATION: NEGATIVE NG/ML
SMOOTH MUSCLE AB TITR SER IF: NEGATIVE NG/ML
SPECIMEN DRAWN SERPL: NEGATIVE NG/ML
SPECIMEN PH ACCEPTABLE UR: NORMAL
TAPENTADOL UR QL CFM: NEGATIVE NG/ML
TEMAZEPAM UR QL CFM: NEGATIVE NG/ML
TRAMADOL UR QL CFM: NEGATIVE NG/ML
URATE/CREAT 24H UR: NEGATIVE NG/ML
XYLAZINE QUANTIFICATION: NEGATIVE NG/ML

## 2024-02-16 ENCOUNTER — APPOINTMENT (OUTPATIENT)
Dept: LAB | Facility: CLINIC | Age: 69
End: 2024-02-16
Payer: MEDICARE

## 2024-02-16 DIAGNOSIS — E11.69 DIABETES MELLITUS TYPE 2 IN OBESE: Chronic | ICD-10-CM

## 2024-02-16 DIAGNOSIS — E66.9 DIABETES MELLITUS TYPE 2 IN OBESE: Chronic | ICD-10-CM

## 2024-02-19 ENCOUNTER — ANTICOAG VISIT (OUTPATIENT)
Dept: CARDIOLOGY CLINIC | Facility: CLINIC | Age: 69
End: 2024-02-19

## 2024-02-19 DIAGNOSIS — I48.0 PAROXYSMAL ATRIAL FIBRILLATION (HCC): Primary | ICD-10-CM

## 2024-02-19 DIAGNOSIS — G89.4 CHRONIC PAIN SYNDROME: ICD-10-CM

## 2024-02-19 RX ORDER — BACLOFEN 10 MG/1
10 TABLET ORAL 2 TIMES DAILY
Qty: 60 TABLET | Refills: 1 | Status: SHIPPED | OUTPATIENT
Start: 2024-02-19

## 2024-02-19 RX ORDER — BACLOFEN 10 MG/1
10 TABLET ORAL 2 TIMES DAILY
Qty: 60 TABLET | Refills: 0 | Status: CANCELLED | OUTPATIENT
Start: 2024-02-19

## 2024-02-19 NOTE — TELEPHONE ENCOUNTER
Reason for call:   [x] Refill   [] Prior Auth  [] Other:     Office:   [] PCP/Provider -   [x] Specialty/Provider - S&P    Medication: BACLOFEN    Dose/Frequency: 10 MG    Quantity: 60    Pharmacy:   [facility] [id number] [place] [address]  Phone: [phone]  Fax: [phone]     Does the patient have enough for 3 days?   [x] Yes   [] No - Send as HP to POD

## 2024-02-19 NOTE — TELEPHONE ENCOUNTER
S/w pt, confirmed baclofen 10 mg 1 pill po bid with + relief, no se's. Pt confirmed 3/25/24 ov with MG. Advised pt, the writer will d/w BELLA. Anticipate rx will be sent to the pharmacy for pu later today. The writer will cb if there is any question or change in the plan as discussed. Pt verbalized understanding and appreciation.

## 2024-02-20 ENCOUNTER — TELEPHONE (OUTPATIENT)
Age: 69
End: 2024-02-20

## 2024-02-20 NOTE — TELEPHONE ENCOUNTER
Patient called requesting refill for Baclofen 10mg. Patient made aware medication was refilled on 02/19/24 for 60 with 1  refills to [facility] pharmacy. Patient instructed to contact the pharmacy to obtain refills of medication. Patient verbalized understanding.

## 2024-03-18 ENCOUNTER — APPOINTMENT (OUTPATIENT)
Dept: LAB | Facility: CLINIC | Age: 69
End: 2024-03-18
Payer: MEDICARE

## 2024-03-19 ENCOUNTER — ANTICOAG VISIT (OUTPATIENT)
Dept: CARDIOLOGY CLINIC | Facility: CLINIC | Age: 69
End: 2024-03-19

## 2024-03-19 DIAGNOSIS — I48.0 PAROXYSMAL ATRIAL FIBRILLATION (HCC): Primary | ICD-10-CM

## 2024-03-24 NOTE — ASSESSMENT & PLAN NOTE
Patient is not compliant with his home regimen  Currently does not take any blood pressure medications  Continue to monitor  Medication compliance discussed RN bladder scanned patient which showed 410 ML of urine in bladder. MD Gleason notified. Order for new noonan was placed.

## 2024-03-25 ENCOUNTER — OFFICE VISIT (OUTPATIENT)
Dept: PAIN MEDICINE | Facility: CLINIC | Age: 69
End: 2024-03-25
Payer: MEDICARE

## 2024-03-25 VITALS
HEART RATE: 68 BPM | DIASTOLIC BLOOD PRESSURE: 70 MMHG | WEIGHT: 210 LBS | HEIGHT: 66 IN | SYSTOLIC BLOOD PRESSURE: 118 MMHG | BODY MASS INDEX: 33.75 KG/M2 | TEMPERATURE: 97.1 F

## 2024-03-25 DIAGNOSIS — Z79.891 LONG-TERM CURRENT USE OF OPIATE ANALGESIC: ICD-10-CM

## 2024-03-25 DIAGNOSIS — Z98.1 H/O SPINAL FUSION: ICD-10-CM

## 2024-03-25 DIAGNOSIS — M54.50 CHRONIC BILATERAL LOW BACK PAIN WITHOUT SCIATICA: ICD-10-CM

## 2024-03-25 DIAGNOSIS — M96.1 LUMBAR POSTLAMINECTOMY SYNDROME: Primary | ICD-10-CM

## 2024-03-25 DIAGNOSIS — G89.29 CHRONIC BILATERAL LOW BACK PAIN WITHOUT SCIATICA: ICD-10-CM

## 2024-03-25 DIAGNOSIS — F11.20 UNCOMPLICATED OPIOID DEPENDENCE (HCC): ICD-10-CM

## 2024-03-25 DIAGNOSIS — G89.4 CHRONIC PAIN SYNDROME: ICD-10-CM

## 2024-03-25 DIAGNOSIS — M46.1 SACROILIITIS (HCC): ICD-10-CM

## 2024-03-25 PROCEDURE — 99214 OFFICE O/P EST MOD 30 MIN: CPT | Performed by: PHYSICIAN ASSISTANT

## 2024-03-25 PROCEDURE — G2211 COMPLEX E/M VISIT ADD ON: HCPCS | Performed by: PHYSICIAN ASSISTANT

## 2024-03-25 RX ORDER — HYDROCODONE BITARTRATE AND ACETAMINOPHEN 7.5; 325 MG/1; MG/1
1 TABLET ORAL EVERY 6 HOURS PRN
Qty: 120 TABLET | Refills: 0 | Status: CANCELLED | OUTPATIENT
Start: 2024-03-25

## 2024-03-25 RX ORDER — HYDROCODONE BITARTRATE AND ACETAMINOPHEN 10; 325 MG/1; MG/1
1 TABLET ORAL EVERY 6 HOURS PRN
Qty: 120 TABLET | Refills: 0 | Status: SHIPPED | OUTPATIENT
Start: 2024-03-25

## 2024-03-25 NOTE — PROGRESS NOTES
Assessment:  1. Lumbar postlaminectomy syndrome    2. H/O spinal fusion    3. Sacroiliitis (HCC)    4. Chronic bilateral low back pain without sciatica    5. Chronic pain syndrome    6. Long-term current use of opiate analgesic    7. Uncomplicated opioid dependence (HCC)        Plan:  While the patient was in the office today, I did have a thorough conversation regarding their chronic pain syndrome, medication management, and treatment plan options.    After discussing options, we will increase the Norco from 7.5-10/325 every 6 hours as needed.  On today's visit I have electronically sent this medication to his pharmacy with a fill date of today 3/25/2024 and 4/23/2024.    Unfortunately I do not feel that injection therapy is going to be an option anytime in the near future due to his hardware complicating wound infection.  He is now on doxycycline indefinitely.  Patient has a follow-up visit with infectious disease next month.    [place] Prescription Drug Monitoring Program report was reviewed and was appropriate     There are risks associated with opioid medications, including dependence, addiction and tolerance. The patient understands and agrees to use these medications only as prescribed. Potential side effects of the medications include, but are not limited to, constipation, drowsiness, addiction, impaired judgment and risk of fatal overdose if not taken as prescribed. The patient was warned against driving while taking sedation medications.  Sharing medications is a felony. At this point in time, the patient is showing no signs of addiction, abuse, diversion or suicidal ideation.    The patient will follow-up in 8 weeks for medication prescription refill and reevaluation. The patient was advised to contact the office should their symptoms worsen in the interim. The patient was agreeable and verbalized an understanding.        History of Present Illness:    The patient is a 68 y.o. male last seen on  1/29/2024 who presents for a follow up office visit in regards to chronic low back pain secondary to lumbar postlaminectomy pain syndrome, history of spinal fusion, lumbar spondylosis and sacroiliitis.  The patient currently reports persistent low back pain right-sided greater than left that he rates a 5 out of 10 and describes it as a constant sharp pain that is significantly worse with prolonged standing and walking.  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  Patient is now on doxycycline indefinitely.  He has an appointment with infectious disease for follow-up next month.  Patient has not followed up with neurosurgery as recommended.  He does seem frustrated regarding the situation.  Unfortunately his pain continues and he does not feel well-controlled on the current medication regimen of Norco 7.5/325 and baclofen.    Pain Contract Signed: 1/29/2024  Last Urine Drug Screen: 1/29/2024    I have personally reviewed and/or updated the patient's past medical history, past surgical history, family history, social history, current medications, allergies, and vital signs today.       Review of Systems:    Review of Systems   Respiratory:  Negative for shortness of breath.    Cardiovascular:  Negative for chest pain.   Gastrointestinal:  Positive for constipation. Negative for diarrhea, nausea and vomiting.   Musculoskeletal:  Negative for arthralgias, gait problem, joint swelling and myalgias.   Skin:  Negative for rash.   Neurological:  Negative for dizziness, seizures and weakness.   All other systems reviewed and are negative.        Past Medical History:   Diagnosis Date   • A-fib ([facility])    • Acute respiratory failure with hypoxia and hypercapnia ([facility]) 08/17/2020   • LAURENCE (acute kidney injury) ([facility])  08/16/2020   • Arthritis     knees   • Community acquired pneumonia of left lower lobe of lung 08/12/2020   • Diabetes mellitus (HCC)    • Hypertension    • Measles, Trin[place] (rubella)    • Mumps    • Obesity    • Pneumonia 08/2020   • Psoas abscess (HCC) 01/03/2023   • Snoring     Loudly   • Substance abuse (HCC)        Past Surgical History:   Procedure Laterality Date   • CYSTOSCOPY      Pt denies   • JOINT REPLACEMENT  07/2018   • KNEE ARTHROSCOPY Left     knee   • ORIF PELVIC FRACTURE     • OH DEBRIDEMENT SUBCUTANEOUS TISSUE EA ADDL 20 SQ CM N/A 11/3/2023    Procedure: DEBRIDEMENT WOUND (WASH OUT);  Surgeon: Jose Gasca MD;  [facility];  Service: Orthopedics   • OH REMOVAL IMPLANT DEEP N/A 11/3/2023    Procedure: REMOVAL HARDWARE pubic symphysis;  Surgeon: Jose Gasca MD;  [facility];  Service: Orthopedics   • SPINAL FUSION  05/06/2022   • TOTAL KNEE ARTHROPLASTY Left 06/10/2019    Procedure: ARTHROPLASTY KNEE TOTAL;  Surgeon: Kenny Moy MD;  [facility];  Service: Orthopedics       Family History   Problem Relation Age of Onset   • Ovarian cancer Mother    • Heart disease Mother    • Diabetes type II Mother    • Colon cancer Father    • Heart disease Father    • Nephrolithiasis Father    • Cancer Father        Social History     Occupational History   • Not on file   Tobacco Use   • Smoking status: Never   • Smokeless tobacco: Never   Vaping Use   • Vaping status: Never Used   Substance and Sexual Activity   • Alcohol use: Yes     Alcohol/week: 1.0 standard drink of alcohol     Types: 1 Standard drinks or equivalent per week   • Drug use: Never   • Sexual activity: Not Currently     Birth control/protection: None         Current Outpatient Medications:   •  amLODIPine (NORVASC) 5 mg tablet, Take 1 tablet (5 mg total) by mouth daily, Disp: 90 tablet, Rfl: 3  •  atorvastatin (LIPITOR) 20 mg tablet, Take 1 tablet (20 mg total) by mouth daily, Disp: 90 tablet, Rfl: 3  •   "baclofen 10 mg tablet, Take 1 tablet (10 mg total) by mouth 2 (two) times a day, Disp: 60 tablet, Rfl: 1  •  doxycycline hyclate (VIBRAMYCIN) 100 mg capsule, Take 1 capsule (100 mg total) by mouth every 12 (twelve) hours, Disp: 180 capsule, Rfl: 0  •  glipiZIDE (GLUCOTROL XL) 5 mg 24 hr tablet, Take 1 tablet (5 mg total) by mouth daily, Disp: 90 tablet, Rfl: 3  •  HYDROcodone-acetaminophen (NORCO)  mg per tablet, Take 1 tablet by mouth every 6 (six) hours as needed for moderate pain For ongoing therapy DO NOT FILL BEFORE: 04/23/24 Max Daily Amount: 4 tablets, Disp: 120 tablet, Rfl: 0  •  HYDROcodone-acetaminophen (NORCO)  mg per tablet, Take 1 tablet by mouth every 6 (six) hours as needed for moderate pain For ongoing therapy Max Daily Amount: 4 tablets, Disp: 120 tablet, Rfl: 0  •  lisinopril (ZESTRIL) 10 mg tablet, Take 1 tablet (10 mg total) by mouth daily, Disp: 90 tablet, Rfl: 3  •  metFORMIN (GLUCOPHAGE-XR) 500 mg 24 hr tablet, Take 2 tablets (1,000 mg total) by mouth daily Taking 2 tablets daily, Disp: 180 tablet, Rfl: 3  •  metoprolol succinate (TOPROL-XL) 50 mg 24 hr tablet, Take 1.5 tablets (75 mg total) by mouth daily, Disp: 135 tablet, Rfl: 3  •  warfarin (COUMADIN) 5 mg tablet, Take 1 tablet (5 mg total) by mouth daily, Disp: 90 tablet, Rfl: 3  •  Blood Glucose Monitoring Suppl (OneTouch Verio Reflect) w/Device KIT, Check blood sugars once daily. Please substitute with appropriate alternative as covered by patient's insurance. Dx: E11.65, Disp: 1 kit, Rfl: 0  •  naloxone (NARCAN) 4 mg/0.1 mL nasal spray, Administer 1 spray into a nostril. If no response after 2-3 minutes, give another dose in the other nostril using a new spray., Disp: 1 each, Rfl: 1    No Known Allergies    Physical Exam:    /70 (BP Location: Left arm, Patient Position: Sitting, Cuff Size: Standard)   Pulse 68   Temp (!) 97.1 °F (36.2 °C)   Ht 5' 6\" (1.676 m)   Wt 95.3 kg (210 lb)   BMI 33.89 kg/m² "     Constitutional:normal, well developed, well nourished, alert, in no distress and non-toxic and no overt pain behavior.  Eyes:anicteric  HEENT:grossly intact  Neck:supple, symmetric, trachea midline and no masses   Pulmonary:even and unlabored  Cardiovascular:No edema or pitting edema present  Skin:Normal without rashes or lesions and well hydrated  Psychiatric:Mood and affect appropriate  Neurologic:Cranial Nerves II-XII grossly intact  Musculoskeletal: Lumbar scars from surgeries, tender right sacroiliac joint, gait is antalgic but stable      Imaging  No orders to display         No orders of the defined types were placed in this encounter.

## 2024-03-26 ENCOUNTER — OFFICE VISIT (OUTPATIENT)
Dept: CARDIOLOGY CLINIC | Facility: CLINIC | Age: 69
End: 2024-03-26
Payer: MEDICARE

## 2024-03-26 VITALS
HEART RATE: 79 BPM | OXYGEN SATURATION: 99 % | DIASTOLIC BLOOD PRESSURE: 76 MMHG | SYSTOLIC BLOOD PRESSURE: 136 MMHG | HEIGHT: 66 IN | BODY MASS INDEX: 34.55 KG/M2 | WEIGHT: 215 LBS

## 2024-03-26 DIAGNOSIS — E78.5 DYSLIPIDEMIA: ICD-10-CM

## 2024-03-26 DIAGNOSIS — E66.01 CLASS 2 SEVERE OBESITY DUE TO EXCESS CALORIES WITH SERIOUS COMORBIDITY AND BODY MASS INDEX (BMI) OF 37.0 TO 37.9 IN ADULT (HCC): ICD-10-CM

## 2024-03-26 DIAGNOSIS — I48.21 PERMANENT ATRIAL FIBRILLATION (HCC): Primary | ICD-10-CM

## 2024-03-26 DIAGNOSIS — I10 ESSENTIAL HYPERTENSION: Chronic | ICD-10-CM

## 2024-03-26 PROCEDURE — 99214 OFFICE O/P EST MOD 30 MIN: CPT | Performed by: INTERNAL MEDICINE

## 2024-03-26 NOTE — PROGRESS NOTES
Cardiology Follow Up    Adolfo Munroe  1955  [id number]  [facility] [place]  [address]  [address]  [place] [address] [phone]    1. Permanent atrial fibrillation (HCC)        2. Essential hypertension        3. Dyslipidemia        4. Class 2 severe obesity due to excess calories with serious comorbidity and body mass index (BMI) of 37.0 to 37.9 in adult             Discussion/Summary:    Persistent/permanent atrial fibrillation. Rate control and anticoagulation strategy. I changed him to Toprol-XL at 75 mg daily. His heart rate is controlled. He remains on warfarin for anticoagulation. With his recent infectious complications, his INR was supratherapeutic the most recent one was just above 3. His dose was adjusted and his INR is monitored, next due to be drawn on April 1.    We once again discussed DOAC. This remains cost prohibitive.    He is going to continue to discuss with the infectious disease specialist regarding long-term antibiotics. He remains leery of this. He does understand the need to monitor his INR carefully. He mentioned he may look to a herbalist to find a concoction. Need to be careful about his INR with that, also.    Blood pressure is controlled with amlodipine, lisinopril, metoprolol. Continue current regimen.        Previous History:  68-year-old gentleman.    In the setting of severe pneumonia requiring intubation, he had rapid afib. Treated with metoprolol (8/2020)  He was started on anticoagulation with Eliquis. Insurance issues with Eliquis so now changed to warfarin.    Initially paroxysmal, but now permanent.    EF was preserved on echo.    Interval history:    No significant changes from cardiac standpoint since last visit. He changed to Toprol-XL instead of tartrate. His heart rate remains controlled but he does not necessarily feel any different. Blood pressure remains controlled,  also.    There were infectious complications that he had and he had a orthopedic hardware removed. He is on antibiotics currently. He sees infectious disease. He has been advised to continue long-term antibiotics but he is not sure about this. He has some concerns because of issues with doxycycline. He is also having a hard time regulating his INR.    DOACs continue to be cost prohibitive, he is watching out for this.    Blood pressure controlled with amlodipine, lisinopril in addition to his metoprolol.        Problem List       Essential hypertension    Diabetes mellitus type 2 in obese (HCC) (Chronic)      Lab Results   Component Value Date    HGBA1C 6.1 (H) 12/06/2023         Vitamin D deficiency    Morbid obesity (HCC)    Osteoarthritis of left knee    Bladder wall thickening    Status post left knee replacement    Community acquired pneumonia of left lower lobe of lung (Chronic)    Non-MI Troponin Elevation    Rhabdomyolysis    New onset atrial fibrillation (HCC) (Chronic)    Overview Signed 9/1/2020  7:09 AM by Shanna Apodaca MD     August 2020 during hospitalization for left lower lobe pneumonia  Eliquis and metoprolol  [facility]     ECHO 8/14/2020  Systolic function was normal. Ejection fraction was estimated to be 60 %.  There were no regional wall motion abnormalities.   LEFT ATRIUM:  The atrium was dilated.   MITRAL VALVE:  There was mild regurgitation.   AORTIC VALVE:  There was mild regurgitation.         LAURENCE (acute kidney injury) ([facility])    Acute respiratory failure with hypoxia and hypercapnia ([facility])          Past Medical History:   Diagnosis Date    A-fib ([facility])     Acute respiratory failure with hypoxia and hypercapnia (HCC) 08/17/2020    LAURENCE (acute kidney injury) ([facility]) 08/16/2020    Arthritis     knees    Community acquired pneumonia of left lower lobe of lung 08/12/2020    Diabetes mellitus (HCC)     Hypertension     Measles, [place] (rubella)     Mumps     Obesity     Pneumonia  08/2020    Psoas abscess (HCC) 01/03/2023    Snoring     Loudly    Substance abuse ([facility])      Social History     Tobacco Use    Smoking status: Never    Smokeless tobacco: Never   Vaping Use    Vaping status: Never Used   Substance Use Topics    Alcohol use: Yes     Alcohol/week: 1.0 standard drink of alcohol     Types: 1 Standard drinks or equivalent per week    Drug use: Never      Family History   Problem Relation Age of Onset    Ovarian cancer Mother     Heart disease Mother     Diabetes type II Mother     Colon cancer Father     Heart disease Father     Nephrolithiasis Father     Cancer Father      Past Surgical History:   Procedure Laterality Date    CYSTOSCOPY      Pt denies    JOINT REPLACEMENT  07/2018    KNEE ARTHROSCOPY Left     knee    ORIF PELVIC FRACTURE      DE DEBRIDEMENT SUBCUTANEOUS TISSUE EA ADDL 20 SQ CM N/A 11/3/2023    Procedure: DEBRIDEMENT WOUND (WASH OUT);  Surgeon: Jose Gasca MD;  [facility];  Service: Orthopedics    DE REMOVAL IMPLANT DEEP N/A 11/3/2023    Procedure: REMOVAL HARDWARE pubic symphysis;  Surgeon: Jose Gasca MD;  [facility];  Service: Orthopedics    SPINAL FUSION  05/06/2022    TOTAL KNEE ARTHROPLASTY Left 06/10/2019    Procedure: ARTHROPLASTY KNEE TOTAL;  Surgeon: Kenny Moy MD;  [facility];  Service: Orthopedics       Current Outpatient Medications:     amLODIPine (NORVASC) 5 mg tablet, Take 1 tablet (5 mg total) by mouth daily, Disp: 90 tablet, Rfl: 3    atorvastatin (LIPITOR) 20 mg tablet, Take 1 tablet (20 mg total) by mouth daily, Disp: 90 tablet, Rfl: 3    baclofen 10 mg tablet, Take 1 tablet (10 mg total) by mouth 2 (two) times a day, Disp: 60 tablet, Rfl: 1    Blood Glucose Monitoring Suppl (OneTouch Verio Reflect) w/Device KIT, Check blood sugars once daily. Please substitute with appropriate alternative as covered by patient's insurance. Dx: E11.65, Disp: 1 kit, Rfl: 0    doxycycline hyclate (VIBRAMYCIN) 100 mg capsule,  "Take 1 capsule (100 mg total) by mouth every 12 (twelve) hours, Disp: 180 capsule, Rfl: 0    glipiZIDE (GLUCOTROL XL) 5 mg 24 hr tablet, Take 1 tablet (5 mg total) by mouth daily, Disp: 90 tablet, Rfl: 3    HYDROcodone-acetaminophen (NORCO)  mg per tablet, Take 1 tablet by mouth every 6 (six) hours as needed for moderate pain For ongoing therapy DO NOT FILL BEFORE: 04/23/24 Max Daily Amount: 4 tablets, Disp: 120 tablet, Rfl: 0    HYDROcodone-acetaminophen (NORCO)  mg per tablet, Take 1 tablet by mouth every 6 (six) hours as needed for moderate pain For ongoing therapy Max Daily Amount: 4 tablets, Disp: 120 tablet, Rfl: 0    lisinopril (ZESTRIL) 10 mg tablet, Take 1 tablet (10 mg total) by mouth daily, Disp: 90 tablet, Rfl: 3    metFORMIN (GLUCOPHAGE-XR) 500 mg 24 hr tablet, Take 2 tablets (1,000 mg total) by mouth daily Taking 2 tablets daily, Disp: 180 tablet, Rfl: 3    metoprolol succinate (TOPROL-XL) 50 mg 24 hr tablet, Take 1.5 tablets (75 mg total) by mouth daily, Disp: 135 tablet, Rfl: 3    warfarin (COUMADIN) 5 mg tablet, Take 1 tablet (5 mg total) by mouth daily, Disp: 90 tablet, Rfl: 3    naloxone (NARCAN) 4 mg/0.1 mL nasal spray, Administer 1 spray into a nostril. If no response after 2-3 minutes, give another dose in the other nostril using a new spray. (Patient not taking: Reported on 3/26/2024), Disp: 1 each, Rfl: 1  No Known Allergies    Vitals:    03/26/24 1039   BP: 136/76   BP Location: Right arm   Patient Position: Sitting   Cuff Size: Large   Pulse: 79   SpO2: 99%   Weight: 97.5 kg (215 lb)   Height: 5' 6\" (1.676 m)     Vitals:    03/26/24 1039   Weight: 97.5 kg (215 lb)      Height: 5' 6\" (167.6 cm)   Body mass index is 34.7 kg/m².    Physical Exam:  GEN: Adolfo Munroe appears well, alert and oriented x 3, pleasant and cooperative   HEENT: pupils equal, round, and reactive to light; extraocular muscles intact  NECK: supple, no carotid bruits   HEART: irregularly irregular.   LUNGS: " clear to auscultation bilaterally; no wheezes, rales, or rhonchi   ABDOMEN: normal bowel sounds, soft, no tenderness, no distention  EXTREMITIES: mild edema  NEURO: no focal findings   SKIN: normal without suspicious lesions on exposed skin    ROS:  Positive for back pain, hip pain, fatigue  Except as noted in HPI, is otherwise reviewed in detail and a 12 point review of systems is negative.  ROS reviewed and is unchanged    Labs:  Lab Results   Component Value Date     03/17/2017    K 4.9 01/19/2024     01/19/2024    CREATININE 1.19 01/19/2024    BUN 29 (H) 01/19/2024    CO2 23 01/19/2024    ALT 9 01/19/2024    AST 12 (L) 01/19/2024    INR 3.09 (H) 03/18/2024    GLUF 89 01/19/2024    HGBA1C 6.1 (H) 12/06/2023    WBC 6.85 01/19/2024    HGB 13.4 01/19/2024    HCT 43.1 01/19/2024     01/19/2024       Lab Results   Component Value Date    CHOL 218 (H) 03/17/2017    CHOL 199 07/27/2016    CHOL 204 (H) 01/12/2016     Lab Results   Component Value Date    LDLCALC 95 12/06/2023    LDLCALC 40 08/10/2023    LDLCALC 30 04/10/2023     Lab Results   Component Value Date    HDL 36 (L) 12/06/2023    HDL 46 08/10/2023    HDL 40 04/10/2023     Lab Results   Component Value Date    TRIG 93 12/06/2023    TRIG 88 08/10/2023    TRIG 89 04/10/2023     Testing:  holter 1/24/24:    Findings  The patient was in atrial fibrillation for the duration of the study. The ventricular rate ranged from 32 bpm to 152 bpm, with an average rate of 65 bpm. The fastest rates occurred during the first few hours of the study, after which the patient was predominantly rate-controlled.  There were rare, isolated, monomorphic wide complex beats which are likely consistent with Abdiaziz beats (0.6% of total beats).  There were no other atrial arrhythmias.  The longest R-R interval was 2.2 seconds. No significant pauses.   No diary returned for clinical correlation.     Impression/Summary  Patient was exclusively in atrial fibrillation with  mostly controlled ventricular rates. High ventricular rates mostly occurred during the first 2-3 hours of monitoring up to 150 bpm.   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     Echo 1/24/23:Left Ventricle: Left ventricular cavity size is normal. Wall thickness is increased. There is mild concentric hypertrophy. Wall motion is normal. Left atrial filling pressure cannot be estimated.. atrial fibrillation    Left Atrium: The atrium is mildly dilated.    Aortic Valve: There is mild regurgitation.    Mitral Valve: There is mild regurgitation.      Echo 8/14/20:  LEFT VENTRICLE:  Systolic function was normal. Ejection fraction was estimated to be 60 %.  There were no regional wall motion abnormalities.     LEFT ATRIUM:  The atrium was dilated.     MITRAL VALVE:  There was mild regurgitation.     AORTIC VALVE:  There was mild regurgitation.

## 2024-04-08 ENCOUNTER — APPOINTMENT (OUTPATIENT)
Dept: LAB | Facility: CLINIC | Age: 69
End: 2024-04-08
Payer: MEDICARE

## 2024-04-09 ENCOUNTER — ANTICOAG VISIT (OUTPATIENT)
Dept: CARDIOLOGY CLINIC | Facility: CLINIC | Age: 69
End: 2024-04-09

## 2024-04-09 DIAGNOSIS — I48.21 PERMANENT ATRIAL FIBRILLATION (HCC): Primary | ICD-10-CM

## 2024-04-23 ENCOUNTER — ANTICOAG VISIT (OUTPATIENT)
Dept: CARDIOLOGY CLINIC | Facility: CLINIC | Age: 69
End: 2024-04-23

## 2024-04-23 ENCOUNTER — APPOINTMENT (OUTPATIENT)
Dept: LAB | Facility: CLINIC | Age: 69
End: 2024-04-23
Payer: MEDICARE

## 2024-04-23 DIAGNOSIS — T84.7XXD HARDWARE COMPLICATING WOUND INFECTION, SUBSEQUENT ENCOUNTER: ICD-10-CM

## 2024-04-23 DIAGNOSIS — I48.21 PERMANENT ATRIAL FIBRILLATION (HCC): Primary | ICD-10-CM

## 2024-04-23 DIAGNOSIS — M86.9 OSTEITIS OF SYMPHYSIS PUBIS (HCC): ICD-10-CM

## 2024-04-23 LAB
ALBUMIN SERPL BCP-MCNC: 3.6 G/DL (ref 3.5–5)
ALP SERPL-CCNC: 75 U/L (ref 34–104)
ALT SERPL W P-5'-P-CCNC: 7 U/L (ref 7–52)
ANION GAP SERPL CALCULATED.3IONS-SCNC: 8 MMOL/L (ref 4–13)
AST SERPL W P-5'-P-CCNC: 13 U/L (ref 13–39)
BASOPHILS # BLD AUTO: 0.01 THOUSANDS/ÂΜL (ref 0–0.1)
BASOPHILS NFR BLD AUTO: 0 % (ref 0–1)
BILIRUB SERPL-MCNC: 0.7 MG/DL (ref 0.2–1)
BUN SERPL-MCNC: 25 MG/DL (ref 5–25)
CALCIUM SERPL-MCNC: 8.6 MG/DL (ref 8.4–10.2)
CHLORIDE SERPL-SCNC: 109 MMOL/L (ref 96–108)
CO2 SERPL-SCNC: 23 MMOL/L (ref 21–32)
CREAT SERPL-MCNC: 1.25 MG/DL (ref 0.6–1.3)
EOSINOPHIL # BLD AUTO: 0.12 THOUSAND/ÂΜL (ref 0–0.61)
EOSINOPHIL NFR BLD AUTO: 2 % (ref 0–6)
ERYTHROCYTE [DISTWIDTH] IN BLOOD BY AUTOMATED COUNT: 16.8 % (ref 11.6–15.1)
GFR SERPL CREATININE-BSD FRML MDRD: 59 ML/MIN/1.73SQ M
GLUCOSE P FAST SERPL-MCNC: 92 MG/DL (ref 65–99)
HCT VFR BLD AUTO: 39.7 % (ref 36.5–49.3)
HGB BLD-MCNC: 12.3 G/DL (ref 12–17)
IMM GRANULOCYTES # BLD AUTO: 0.02 THOUSAND/UL (ref 0–0.2)
IMM GRANULOCYTES NFR BLD AUTO: 0 % (ref 0–2)
LYMPHOCYTES # BLD AUTO: 1.01 THOUSANDS/ÂΜL (ref 0.6–4.47)
LYMPHOCYTES NFR BLD AUTO: 15 % (ref 14–44)
MCH RBC QN AUTO: 25.3 PG (ref 26.8–34.3)
MCHC RBC AUTO-ENTMCNC: 31 G/DL (ref 31.4–37.4)
MCV RBC AUTO: 82 FL (ref 82–98)
MONOCYTES # BLD AUTO: 0.48 THOUSAND/ÂΜL (ref 0.17–1.22)
MONOCYTES NFR BLD AUTO: 7 % (ref 4–12)
NEUTROPHILS # BLD AUTO: 4.99 THOUSANDS/ÂΜL (ref 1.85–7.62)
NEUTS SEG NFR BLD AUTO: 76 % (ref 43–75)
NRBC BLD AUTO-RTO: 0 /100 WBCS
PLATELET # BLD AUTO: 209 THOUSANDS/UL (ref 149–390)
PMV BLD AUTO: 11 FL (ref 8.9–12.7)
POTASSIUM SERPL-SCNC: 4.8 MMOL/L (ref 3.5–5.3)
PROT SERPL-MCNC: 6.1 G/DL (ref 6.4–8.4)
RBC # BLD AUTO: 4.86 MILLION/UL (ref 3.88–5.62)
SODIUM SERPL-SCNC: 140 MMOL/L (ref 135–147)
WBC # BLD AUTO: 6.63 THOUSAND/UL (ref 4.31–10.16)

## 2024-04-23 PROCEDURE — 85025 COMPLETE CBC W/AUTO DIFF WBC: CPT

## 2024-04-23 PROCEDURE — 80053 COMPREHEN METABOLIC PANEL: CPT

## 2024-04-24 ENCOUNTER — OFFICE VISIT (OUTPATIENT)
Dept: INFECTIOUS DISEASES | Facility: CLINIC | Age: 69
End: 2024-04-24
Payer: MEDICARE

## 2024-04-24 VITALS
OXYGEN SATURATION: 98 % | SYSTOLIC BLOOD PRESSURE: 128 MMHG | TEMPERATURE: 97.1 F | DIASTOLIC BLOOD PRESSURE: 70 MMHG | BODY MASS INDEX: 34.1 KG/M2 | HEIGHT: 66 IN | HEART RATE: 98 BPM | WEIGHT: 212.2 LBS | RESPIRATION RATE: 16 BRPM

## 2024-04-24 DIAGNOSIS — M86.9 OSTEITIS OF SYMPHYSIS PUBIS (HCC): Primary | ICD-10-CM

## 2024-04-24 DIAGNOSIS — Z98.1 H/O SPINAL FUSION: ICD-10-CM

## 2024-04-24 DIAGNOSIS — E66.9 TYPE 2 DIABETES MELLITUS WITH OBESITY  (HCC): Chronic | ICD-10-CM

## 2024-04-24 DIAGNOSIS — E11.69 TYPE 2 DIABETES MELLITUS WITH OBESITY  (HCC): Chronic | ICD-10-CM

## 2024-04-24 DIAGNOSIS — T84.7XXD HARDWARE COMPLICATING WOUND INFECTION, SUBSEQUENT ENCOUNTER: ICD-10-CM

## 2024-04-24 DIAGNOSIS — E66.9 OBESITY (BMI 30.0-34.9): ICD-10-CM

## 2024-04-24 PROCEDURE — 99214 OFFICE O/P EST MOD 30 MIN: CPT | Performed by: PHYSICIAN ASSISTANT

## 2024-04-24 RX ORDER — DOXYCYCLINE HYCLATE 100 MG/1
100 CAPSULE ORAL EVERY 12 HOURS SCHEDULED
Qty: 180 CAPSULE | Refills: 1 | Status: SHIPPED | OUTPATIENT
Start: 2024-04-24 | End: 2024-07-23

## 2024-04-24 NOTE — PATIENT INSTRUCTIONS
-  continue doxycycline 100 mg po bid indefinitely  - avoid prolonged sun expose while on doxycycline, stay upright for at least 30 minutes after taking the doxycycline, and wait 1-2 hours before and after doxcycline to take vitamins/supplements/dairy  - patient to follow up with Neurosurgery.   - CBCD, CMP prior to next appointment  - discuss reflux with PCP if it continues  - RTO 6 months

## 2024-04-24 NOTE — ASSESSMENT & PLAN NOTE
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  Patient initially presented with development of a draining sinus track.  ORIF was performed many years ago.  He went to the OR on 11/3 for hardware removal along with debridement.  Cultures isolated MRSA.  Susceptibilities reviewed.  Patient currently on Coumadin and so unable to take Bactrim.  Has been taking clindamycin but organism resistant.  Fortunately he is not showing any signs of relapse currently.  Suspect that this may have been a chronic progressive process as he reports previous draining tract prior to spinal surgery and radiology mentions abnormalities on 2022 images.  However based on imaging would question if patient may have seeded his spinal hardware.  We discussed avoidance of PICC line, plans for Dalvance and transition to suppression.      Patient received his 2 infusion of dalvance without issue and has been transitioned to po suppression with doxcycline due to possible seeding of spinal hardware.     Patient continues to tolerate the doxycycline.  He has no new complaints.  Still with some intermittent reflux.  Labs stable.       Plan  -  continue doxycycline 100 mg po bid indefinitely  - refill order sent to pharmacy today  - reviewed with patient to avoid prolonged sun expose while on doxycycline, stay upright for at least 30 minutes after taking the doxycycline, and wait 1-2 hours before and after doxcycline to take vitamins/supplements/dairy  - patient to follow up with Neurosurgery.   - CBCD, CMP prior to next appointment  - discuss reflux with PCP if it continues  - RTO 6 months

## 2024-04-24 NOTE — PROGRESS NOTES
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  Patient initially presented with development of a draining sinus track.  ORIF was performed many years ago.  He went to the OR on 11/3 for hardware removal along with debridement.  Cultures isolated MRSA.  Susceptibilities reviewed.  Patient currently on Coumadin and so unable to take Bactrim.  Has been taking clindamycin but organism resistant.  Fortunately he is not showing any signs of relapse currently.  Suspect that this may have been a chronic progressive process as he reports previous draining tract prior to spinal surgery and radiology mentions abnormalities on 2022 images.  However based on imaging would question if patient may have seeded his spinal hardware.  We discussed avoidance of PICC line, plans for Dalvance and transition to suppression.      Patient received his 2 infusion of dalvance without issue and has been transitioned to po suppression with doxcycline due to possible seeding of spinal hardware.     Patient continues to tolerate the doxycycline.  He has no new complaints.  Still with some intermittent reflux.  Labs stable.       Plan  -  continue doxycycline 100 mg po bid indefinitely  - refill order sent to pharmacy today  - reviewed with patient to avoid prolonged sun expose while on doxycycline, stay upright for at least 30 minutes after taking the doxycycline, and wait 1-2 hours before and after doxcycline to take vitamins/supplements/dairy  - patient to follow up with Neurosurgery.   - CBCD, CMP prior to next appointment  - discuss reflux with PCP if it continues  - RTO 6 months    H/O spinal fusion  Previous spinal fusion of L1-L5.  Would question if this site has been seeded given CT imaging with lucency at the L5 pedicle screw.  Noted on most recent CT imaging in September.  Would question if patient may have seeded this area.  Patient completed 2 infusions of dalvance as  "above, now will be transitioned to suppressive doxycycline.      Type 2 diabetes mellitus with obesity  (HCC)    Lab Results   Component Value Date    HGBA1C 6.1 (H) 12/06/2023       Obesity (BMI 30.0-34.9)  BMI 34.25       Diagnoses and all orders for this visit:    Osteitis of symphysis pubis (HCC)  -     Comprehensive metabolic panel; Future  -     CBC and differential; Future  -     doxycycline hyclate (VIBRAMYCIN) 100 mg capsule; Take 1 capsule (100 mg total) by mouth every 12 (twelve) hours    Hardware complicating wound infection, subsequent encounter  -     CBC and differential; Future  -     doxycycline hyclate (VIBRAMYCIN) 100 mg capsule; Take 1 capsule (100 mg total) by mouth every 12 (twelve) hours    H/O spinal fusion    Type 2 diabetes mellitus with obesity  (HCC)    Obesity (BMI 30.0-34.9)          Subjective:      Patient ID: Adolfo Munroe is a 68 y.o. male.    HPI  69 y/o male presents for office follow up today regarding chronic doxycycline suppression.  He continues to tolerate the medication.  His only c/o is intermittent reflux.  He states he does not lie down after taking his doxycycline.  Otherwise he is doing well.  He denies any changes to his medications or medical history.   The following portions of the patient's history were reviewed and updated as appropriate: allergies, current medications, past family history, past medical history, past social history, past surgical history, and problem list.    Review of Systems   Constitutional:  Negative for chills and fever.   Respiratory:  Negative for cough and shortness of breath.    Gastrointestinal:  Negative for abdominal pain, diarrhea, nausea and vomiting.   Skin:  Negative for rash.   Psychiatric/Behavioral:  Negative for behavioral problems and confusion.          Objective:      /70 (BP Location: Left arm, Patient Position: Sitting, Cuff Size: Standard)   Pulse 98   Temp (!) 97.1 °F (36.2 °C) (Temporal)   Resp 16   Ht 5' 6\" " (1.676 m)   Wt 96.3 kg (212 lb 3.2 oz)   SpO2 98%   BMI 34.25 kg/m²          Physical Exam  Vitals reviewed.   Constitutional:       General: He is not in acute distress.     Appearance: Normal appearance. He is not ill-appearing, toxic-appearing or diaphoretic.   HENT:      Head: Normocephalic and atraumatic.   Eyes:      General: No scleral icterus.        Right eye: No discharge.         Left eye: No discharge.      Conjunctiva/sclera: Conjunctivae normal.   Cardiovascular:      Rate and Rhythm: Normal rate.   Pulmonary:      Effort: Pulmonary effort is normal. No respiratory distress.      Breath sounds: Normal breath sounds. No stridor. No wheezing, rhonchi or rales.   Chest:      Chest wall: No tenderness.   Abdominal:      General: Bowel sounds are normal. There is no distension.      Palpations: Abdomen is soft.      Tenderness: There is no abdominal tenderness.   Skin:     General: Skin is warm and dry.      Coloration: Skin is not jaundiced or pale.      Findings: No erythema or rash.   Neurological:      Mental Status: He is alert and oriented to person, place, and time.   Psychiatric:         Mood and Affect: Mood normal.         Behavior: Behavior normal.               Labs:   4/23/2024  Wbc: 6.63  Hgb: 12.3  Plt: 209  Cr: 1.25  LFTs: WNL

## 2024-05-14 ENCOUNTER — TELEPHONE (OUTPATIENT)
Age: 69
End: 2024-05-14

## 2024-05-14 NOTE — TELEPHONE ENCOUNTER
Caller: pt    Doctor: robb    Reason for call: pt would like an mri of his low back.    Call back#: [phone]

## 2024-05-15 DIAGNOSIS — M96.1 LUMBAR POSTLAMINECTOMY SYNDROME: ICD-10-CM

## 2024-05-15 DIAGNOSIS — M47.816 LUMBAR SPONDYLOSIS: Primary | ICD-10-CM

## 2024-05-15 DIAGNOSIS — Z98.1 HISTORY OF SPINAL FUSION: ICD-10-CM

## 2024-05-15 NOTE — TELEPHONE ENCOUNTER
MRI order entered.  He had a BUN/Creatinine done end of April;  not sure if that'll be ok with radiology as far as time frame, but maybe he can check when scheduling.

## 2024-05-15 NOTE — TELEPHONE ENCOUNTER
S/w pt, c/o increase in his usual pain. Stated that he was going to discuss neurosurgery cons with Inocencia at his ov on Monday, but wanted to get an mri started. Advised pt, the writer will make MG aware and cb if there is anything additional. Pt verbalized understanding and appreciation.

## 2024-05-15 NOTE — TELEPHONE ENCOUNTER
Brook pt, advised of above. Pt verbalized understanding and appreciation. Will proceed as directed.

## 2024-05-20 ENCOUNTER — OFFICE VISIT (OUTPATIENT)
Dept: PAIN MEDICINE | Facility: CLINIC | Age: 69
End: 2024-05-20
Payer: MEDICARE

## 2024-05-20 VITALS
DIASTOLIC BLOOD PRESSURE: 78 MMHG | HEIGHT: 66 IN | TEMPERATURE: 97.2 F | BODY MASS INDEX: 33.27 KG/M2 | SYSTOLIC BLOOD PRESSURE: 130 MMHG | WEIGHT: 207 LBS | HEART RATE: 80 BPM

## 2024-05-20 DIAGNOSIS — G89.4 CHRONIC PAIN SYNDROME: ICD-10-CM

## 2024-05-20 DIAGNOSIS — F11.20 UNCOMPLICATED OPIOID DEPENDENCE (HCC): ICD-10-CM

## 2024-05-20 DIAGNOSIS — M54.50 CHRONIC BILATERAL LOW BACK PAIN WITHOUT SCIATICA: ICD-10-CM

## 2024-05-20 DIAGNOSIS — G89.29 CHRONIC BILATERAL LOW BACK PAIN WITHOUT SCIATICA: ICD-10-CM

## 2024-05-20 DIAGNOSIS — Z79.891 LONG-TERM CURRENT USE OF OPIATE ANALGESIC: ICD-10-CM

## 2024-05-20 DIAGNOSIS — M46.1 SACROILIITIS (HCC): ICD-10-CM

## 2024-05-20 DIAGNOSIS — M96.1 LUMBAR POSTLAMINECTOMY SYNDROME: ICD-10-CM

## 2024-05-20 DIAGNOSIS — Z98.1 HISTORY OF LUMBAR FUSION: Primary | ICD-10-CM

## 2024-05-20 DIAGNOSIS — M47.816 LUMBAR SPONDYLOSIS: ICD-10-CM

## 2024-05-20 PROCEDURE — G2211 COMPLEX E/M VISIT ADD ON: HCPCS | Performed by: PHYSICIAN ASSISTANT

## 2024-05-20 PROCEDURE — 99214 OFFICE O/P EST MOD 30 MIN: CPT | Performed by: PHYSICIAN ASSISTANT

## 2024-05-20 RX ORDER — HYDROCODONE BITARTRATE AND ACETAMINOPHEN 10; 325 MG/1; MG/1
1 TABLET ORAL EVERY 6 HOURS PRN
Qty: 120 TABLET | Refills: 0 | Status: SHIPPED | OUTPATIENT
Start: 2024-05-20

## 2024-05-20 RX ORDER — BACLOFEN 10 MG/1
10 TABLET ORAL 2 TIMES DAILY
Qty: 60 TABLET | Refills: 3 | Status: SHIPPED | OUTPATIENT
Start: 2024-05-20

## 2024-05-20 NOTE — PROGRESS NOTES
Assessment:  1. History of lumbar fusion    2. Lumbar postlaminectomy syndrome    3. Lumbar spondylosis    4. Chronic bilateral low back pain without sciatica    5. Sacroiliitis (HCC)    6. Chronic pain syndrome    7. Uncomplicated opioid dependence (HCC)    8. Long-term current use of opiate analgesic        Plan:  While the patient was in the office today, I did have a thorough conversation regarding their chronic pain syndrome, medication management, and treatment plan options.    I feel it is medically reasonable and necessary to obtain an MRI with and without contrast given his increasing low back pain.  This is already scheduled for next week.  Once we obtain the results we will contact him to discuss potential treatment options.  He may require a neurosurgical consultation.    He remains clinically stable and moderately controlled on the current medication regimen of Norco 10/325 every 6 hours as needed and baclofen.  I have electronically sent refills to his pharmacy with the appropriate fill dates.    [place] Prescription Drug Monitoring Program report was reviewed and was appropriate     There are risks associated with opioid medications, including dependence, addiction and tolerance. The patient understands and agrees to use these medications only as prescribed. Potential side effects of the medications include, but are not limited to, constipation, drowsiness, addiction, impaired judgment and risk of fatal overdose if not taken as prescribed. The patient was warned against driving while taking sedation medications.  Sharing medications is a felony. At this point in time, the patient is showing no signs of addiction, abuse, diversion or suicidal ideation.    The patient will follow-up in 8 weeks for medication prescription refill and reevaluation. The patient was advised to contact the office should their symptoms worsen in the interim. The patient was agreeable and verbalized an understanding.         History of Present Illness:    The patient is a 69 y.o. male last seen on 3/25/2024 who presents for a follow up office visit in regards to chronic low back pain secondary to postlaminectomy pain syndrome/history of lumbar fusion, lumbar spondylosis and sacroiliitis.  The patient currently reports increasing low back pain that he presently rates an 8 out of 10 and describes it as a constant sharp pain.  Patient denies any lower extremity radicular pain or paresthesias.  He feels that his core and back have become more weak.  He states that he has been having more frequent and severe flareups and these flareups are lasting longer.  Patient is scheduled for an MRI of the lumbar spine next week.  He continues to remain on lifelong doxycycline as a result of his hardware complicating wound infection.    Current pain medications includes: Norco and baclofen ?.  The patient reports that this regimen is providing 25% pain relief.  The patient is reporting no side effects from this pain medication regimen.    Pain Contract Signed: 1/29/2024  Last Urine Drug Screen: 1/29/2024    I have personally reviewed and/or updated the patient's past medical history, past surgical history, family history, social history, current medications, allergies, and vital signs today.       Review of Systems:    Review of Systems   Respiratory:  Negative for shortness of breath.    Cardiovascular:  Negative for chest pain.   Gastrointestinal:  Negative for constipation, diarrhea, nausea and vomiting.   Musculoskeletal:  Negative for arthralgias, gait problem, joint swelling and myalgias.   Skin:  Negative for rash.   Neurological:  Positive for weakness. Negative for dizziness and seizures.   All other systems reviewed and are negative.        Past Medical History:   Diagnosis Date   • A-fib ([facility])    • Acute respiratory failure with hypoxia and hypercapnia ([facility]) 08/17/2020   • LAURENCE (acute kidney injury) ([facility]) 08/16/2020   • Arthritis      knees   • Community acquired pneumonia of left lower lobe of lung 08/12/2020   • Diabetes mellitus (HCC)    • Hypertension    • Measles, [place] (rubella)    • Mumps    • Obesity    • Pneumonia 08/2020   • Psoas abscess (HCC) 01/03/2023   • Snoring     Loudly   • Substance abuse (HCC)        Past Surgical History:   Procedure Laterality Date   • CYSTOSCOPY      Pt denies   • JOINT REPLACEMENT  07/2018   • KNEE ARTHROSCOPY Left     knee   • ORIF PELVIC FRACTURE     • VT DEBRIDEMENT SUBCUTANEOUS TISSUE EA ADDL 20 SQ CM N/A 11/3/2023    Procedure: DEBRIDEMENT WOUND (WASH OUT);  Surgeon: Jose Gasca MD;  Location: [facility];  Service: Orthopedics   • VT REMOVAL IMPLANT DEEP N/A 11/3/2023    Procedure: REMOVAL HARDWARE pubic symphysis;  Surgeon: Jose Gasca MD;  Location: [facility];  Service: Orthopedics   • SPINAL FUSION  05/06/2022   • TOTAL KNEE ARTHROPLASTY Left 06/10/2019    Procedure: ARTHROPLASTY KNEE TOTAL;  Surgeon: Kenny Moy MD;  Location: [facility];  Service: Orthopedics       Family History   Problem Relation Age of Onset   • Ovarian cancer Mother    • Heart disease Mother    • Diabetes type II Mother    • Colon cancer Father    • Heart disease Father    • Nephrolithiasis Father    • Cancer Father        Social History     Occupational History   • Not on file   Tobacco Use   • Smoking status: Never   • Smokeless tobacco: Never   Vaping Use   • Vaping status: Never Used   Substance and Sexual Activity   • Alcohol use: Yes     Alcohol/week: 1.0 standard drink of alcohol     Types: 1 Standard drinks or equivalent per week   • Drug use: Never   • Sexual activity: Not Currently     Birth control/protection: None         Current Outpatient Medications:   •  amLODIPine (NORVASC) 5 mg tablet, Take 1 tablet (5 mg total) by mouth daily, Disp: 90 tablet, Rfl: 3  •  atorvastatin (LIPITOR) 20 mg tablet, Take 1 tablet (20 mg total) by mouth daily, Disp: 90 tablet, Rfl: 3  •  baclofen 10 mg tablet, Take 1  "tablet (10 mg total) by mouth 2 (two) times a day, Disp: 60 tablet, Rfl: 3  •  Blood Glucose Monitoring Suppl (OneTouch Verio Reflect) w/Device KIT, Check blood sugars once daily. Please substitute with appropriate alternative as covered by patient's insurance. Dx: E11.65, Disp: 1 kit, Rfl: 0  •  doxycycline hyclate (VIBRAMYCIN) 100 mg capsule, Take 1 capsule (100 mg total) by mouth every 12 (twelve) hours, Disp: 180 capsule, Rfl: 1  •  glipiZIDE (GLUCOTROL XL) 5 mg 24 hr tablet, Take 1 tablet (5 mg total) by mouth daily, Disp: 90 tablet, Rfl: 3  •  HYDROcodone-acetaminophen (NORCO)  mg per tablet, Take 1 tablet by mouth every 6 (six) hours as needed for moderate pain For ongoing therapy Max Daily Amount: 4 tablets, Disp: 120 tablet, Rfl: 0  •  HYDROcodone-acetaminophen (NORCO)  mg per tablet, Take 1 tablet by mouth every 6 (six) hours as needed for moderate pain For ongoing therapy  DO NOT FILL BEFORE: 06/18/24 Max Daily Amount: 4 tablets, Disp: 120 tablet, Rfl: 0  •  lisinopril (ZESTRIL) 10 mg tablet, Take 1 tablet (10 mg total) by mouth daily, Disp: 90 tablet, Rfl: 3  •  metFORMIN (GLUCOPHAGE-XR) 500 mg 24 hr tablet, Take 2 tablets (1,000 mg total) by mouth daily Taking 2 tablets daily, Disp: 180 tablet, Rfl: 3  •  metoprolol succinate (TOPROL-XL) 50 mg 24 hr tablet, Take 1.5 tablets (75 mg total) by mouth daily, Disp: 135 tablet, Rfl: 3  •  warfarin (COUMADIN) 5 mg tablet, Take 1 tablet (5 mg total) by mouth daily, Disp: 90 tablet, Rfl: 3  •  naloxone (NARCAN) 4 mg/0.1 mL nasal spray, Administer 1 spray into a nostril. If no response after 2-3 minutes, give another dose in the other nostril using a new spray., Disp: 1 each, Rfl: 1    No Known Allergies    Physical Exam:    /78 (BP Location: Left arm, Patient Position: Sitting, Cuff Size: Standard)   Pulse 80   Temp (!) 97.2 °F (36.2 °C)   Ht 5' 6\" (1.676 m)   Wt 93.9 kg (207 lb)   BMI 33.41 kg/m²     Constitutional:normal, well developed, " well nourished, alert, in no distress and non-toxic and no overt pain behavior.  Eyes:anicteric  HEENT:grossly intact  Neck:supple, symmetric, trachea midline and no masses   Pulmonary:even and unlabored  Cardiovascular:No edema or pitting edema present  Skin:Normal without rashes or lesions and well hydrated  Psychiatric:Mood and affect appropriate  Neurologic:Cranial Nerves II-XII grossly intact  Musculoskeletal: Lumbar scar from prior fusion surgery, tender bilateral paraspinal muscles of the lumbar spine, limited range of motion with extension, gait is slow but stable.      Imaging  No orders to display         No orders of the defined types were placed in this encounter.

## 2024-05-26 ENCOUNTER — HOSPITAL ENCOUNTER (OUTPATIENT)
Dept: RADIOLOGY | Facility: HOSPITAL | Age: 69
Discharge: HOME | End: 2024-05-26
Payer: MEDICARE

## 2024-05-26 DIAGNOSIS — M47.816 LUMBAR SPONDYLOSIS: ICD-10-CM

## 2024-05-26 DIAGNOSIS — Z98.1 HISTORY OF SPINAL FUSION: ICD-10-CM

## 2024-05-26 DIAGNOSIS — M96.1 LUMBAR POSTLAMINECTOMY SYNDROME: ICD-10-CM

## 2024-05-26 PROCEDURE — 72158 MRI LUMBAR SPINE W/O & W/DYE: CPT

## 2024-05-26 PROCEDURE — A9585 GADOBUTROL INJECTION: HCPCS | Performed by: PHYSICIAN ASSISTANT

## 2024-05-26 RX ORDER — GADOBUTROL 604.72 MG/ML
9 INJECTION INTRAVENOUS
Status: COMPLETED | OUTPATIENT
Start: 2024-05-26 | End: 2024-05-26

## 2024-05-26 RX ADMIN — GADOBUTROL 9 ML: 604.72 INJECTION INTRAVENOUS at 21:11

## 2024-05-30 ENCOUNTER — TELEPHONE (OUTPATIENT)
Age: 69
End: 2024-05-30

## 2024-05-30 NOTE — TELEPHONE ENCOUNTER
Caller: jennifer Mata    Doctor: Carlos Eduardo    Reason for call: pt called for MRI results.  Pt made aware that MRI has not resulted as of yet    Call back#: [phone]

## 2024-05-31 DIAGNOSIS — T84.7XXD HARDWARE COMPLICATING WOUND INFECTION, SUBSEQUENT ENCOUNTER: ICD-10-CM

## 2024-05-31 DIAGNOSIS — M86.9 OSTEITIS OF SYMPHYSIS PUBIS (HCC): ICD-10-CM

## 2024-05-31 NOTE — TELEPHONE ENCOUNTER
S/w pt, advised of delays in radiology. The writer will fu with radiology after 1 week. Advised pt, hopefully, the information will be received and reviewed on Monday, the writer will cb with MG's comments when they become available. Pt verbalized understanding and appreciation.

## 2024-06-03 RX ORDER — DOXYCYCLINE HYCLATE 100 MG/1
100 CAPSULE ORAL EVERY 12 HOURS
Qty: 180 CAPSULE | Refills: 1 | Status: SHIPPED | OUTPATIENT
Start: 2024-06-03 | End: 2024-09-01

## 2024-06-03 NOTE — TELEPHONE ENCOUNTER
Please confirm with patient as I put in for 6 months of refills on his last visit which was 4/24/24. He should not need any refills at this time

## 2024-06-03 NOTE — TELEPHONE ENCOUNTER
Called and spoke with patient,   Informed patient of NICK Calle attached note. Patient states he called and there are no refills. Patient also states he did not  the last refills on time and pharmacy cancelled the prescription.

## 2024-06-04 ENCOUNTER — TELEPHONE (OUTPATIENT)
Dept: PAIN MEDICINE | Facility: MEDICAL CENTER | Age: 69
End: 2024-06-04

## 2024-06-04 DIAGNOSIS — Z98.1 HISTORY OF LUMBAR SPINAL FUSION: Primary | ICD-10-CM

## 2024-06-04 DIAGNOSIS — M48.061 LUMBAR FORAMINAL STENOSIS: ICD-10-CM

## 2024-06-04 NOTE — TELEPHONE ENCOUNTER
Inocencia Lopez PA-C  6/4/2024  8:45 AM EDT       Please let patient know his lumbar spine MRI reveals stable fusion with no hardware complications, degenerative changes at L4-5 with moderate-severe foraminal stenosis.  I would recommend he schedule an office visit with his neurosurgeon.

## 2024-06-04 NOTE — TELEPHONE ENCOUNTER
S/w pt. Pt is aware of results, states he already received a call. Pt advised referral was placed.     ,DirectAddress_Unknown ,DirectAddress_Unknown,DirectAddress_Unknown ,DirectAddress_Unknown,DirectAddress_Unknown,[email or web address],DirectAddress_Unknown,DirectAddress_Unknown

## 2024-06-04 NOTE — TELEPHONE ENCOUNTER
"Can clinical call radiology and see if they can clarify which side the \"moderate-severe foraminal stenosis\" is at the L4-5 level? It is not specified in the report. "

## 2024-06-06 ENCOUNTER — APPOINTMENT (OUTPATIENT)
Dept: LAB | Facility: CLINIC | Age: 69
End: 2024-06-06
Payer: MEDICARE

## 2024-06-06 ENCOUNTER — ANTICOAG VISIT (OUTPATIENT)
Dept: CARDIOLOGY CLINIC | Facility: CLINIC | Age: 69
End: 2024-06-06

## 2024-06-06 DIAGNOSIS — I48.21 PERMANENT ATRIAL FIBRILLATION (HCC): Primary | ICD-10-CM

## 2024-06-06 LAB
ALBUMIN SERPL BCP-MCNC: 4.1 G/DL (ref 3.5–5)
ALP SERPL-CCNC: 90 U/L (ref 34–104)
ALT SERPL W P-5'-P-CCNC: 7 U/L (ref 7–52)
ANION GAP SERPL CALCULATED.3IONS-SCNC: 12 MMOL/L (ref 4–13)
AST SERPL W P-5'-P-CCNC: 15 U/L (ref 13–39)
BASOPHILS # BLD AUTO: 0.02 THOUSANDS/ÂΜL (ref 0–0.1)
BASOPHILS NFR BLD AUTO: 0 % (ref 0–1)
BILIRUB SERPL-MCNC: 1.3 MG/DL (ref 0.2–1)
BUN SERPL-MCNC: 20 MG/DL (ref 5–25)
CALCIUM SERPL-MCNC: 9.4 MG/DL (ref 8.4–10.2)
CHLORIDE SERPL-SCNC: 103 MMOL/L (ref 96–108)
CO2 SERPL-SCNC: 24 MMOL/L (ref 21–32)
CREAT SERPL-MCNC: 1.08 MG/DL (ref 0.6–1.3)
CREAT UR-MCNC: 47.2 MG/DL
EOSINOPHIL # BLD AUTO: 0.12 THOUSAND/ÂΜL (ref 0–0.61)
EOSINOPHIL NFR BLD AUTO: 2 % (ref 0–6)
ERYTHROCYTE [DISTWIDTH] IN BLOOD BY AUTOMATED COUNT: 18.6 % (ref 11.6–15.1)
EST. AVERAGE GLUCOSE BLD GHB EST-MCNC: 123 MG/DL
GFR SERPL CREATININE-BSD FRML MDRD: 70 ML/MIN/1.73SQ M
GLUCOSE P FAST SERPL-MCNC: 95 MG/DL (ref 65–99)
HBA1C MFR BLD: 5.9 %
HCT VFR BLD AUTO: 43.5 % (ref 36.5–49.3)
HGB BLD-MCNC: 13.6 G/DL (ref 12–17)
IMM GRANULOCYTES # BLD AUTO: 0.02 THOUSAND/UL (ref 0–0.2)
IMM GRANULOCYTES NFR BLD AUTO: 0 % (ref 0–2)
LYMPHOCYTES # BLD AUTO: 1.26 THOUSANDS/ÂΜL (ref 0.6–4.47)
LYMPHOCYTES NFR BLD AUTO: 18 % (ref 14–44)
MCH RBC QN AUTO: 24.8 PG (ref 26.8–34.3)
MCHC RBC AUTO-ENTMCNC: 31.3 G/DL (ref 31.4–37.4)
MCV RBC AUTO: 79 FL (ref 82–98)
MICROALBUMIN UR-MCNC: 16.1 MG/L
MICROALBUMIN/CREAT 24H UR: 34 MG/G CREATININE (ref 0–30)
MONOCYTES # BLD AUTO: 0.53 THOUSAND/ÂΜL (ref 0.17–1.22)
MONOCYTES NFR BLD AUTO: 7 % (ref 4–12)
NEUTROPHILS # BLD AUTO: 5.18 THOUSANDS/ÂΜL (ref 1.85–7.62)
NEUTS SEG NFR BLD AUTO: 73 % (ref 43–75)
NRBC BLD AUTO-RTO: 0 /100 WBCS
PLATELET # BLD AUTO: 221 THOUSANDS/UL (ref 149–390)
PMV BLD AUTO: 10.6 FL (ref 8.9–12.7)
POTASSIUM SERPL-SCNC: 4.5 MMOL/L (ref 3.5–5.3)
PROT SERPL-MCNC: 6.8 G/DL (ref 6.4–8.4)
RBC # BLD AUTO: 5.48 MILLION/UL (ref 3.88–5.62)
SODIUM SERPL-SCNC: 139 MMOL/L (ref 135–147)
T4 FREE SERPL-MCNC: 0.93 NG/DL (ref 0.61–1.12)
TSH SERPL DL<=0.05 MIU/L-ACNC: 0.53 UIU/ML (ref 0.45–4.5)
WBC # BLD AUTO: 7.13 THOUSAND/UL (ref 4.31–10.16)

## 2024-06-14 ENCOUNTER — RA CDI HCC (OUTPATIENT)
Dept: OTHER | Facility: HOSPITAL | Age: 69
End: 2024-06-14

## 2024-06-20 ENCOUNTER — OFFICE VISIT (OUTPATIENT)
Dept: FAMILY MEDICINE CLINIC | Facility: CLINIC | Age: 69
End: 2024-06-20
Payer: MEDICARE

## 2024-06-20 VITALS
HEIGHT: 66 IN | BODY MASS INDEX: 33.85 KG/M2 | OXYGEN SATURATION: 98 % | WEIGHT: 210.6 LBS | SYSTOLIC BLOOD PRESSURE: 108 MMHG | DIASTOLIC BLOOD PRESSURE: 60 MMHG | RESPIRATION RATE: 16 BRPM | HEART RATE: 69 BPM | TEMPERATURE: 98 F

## 2024-06-20 DIAGNOSIS — M86.9 OSTEITIS OF SYMPHYSIS PUBIS (HCC): ICD-10-CM

## 2024-06-20 DIAGNOSIS — E78.5 DYSLIPIDEMIA: ICD-10-CM

## 2024-06-20 DIAGNOSIS — M48.07 STENOSIS OF LUMBOSACRAL SPINE: Chronic | ICD-10-CM

## 2024-06-20 DIAGNOSIS — E66.9 TYPE 2 DIABETES MELLITUS WITH OBESITY  (HCC): Primary | Chronic | ICD-10-CM

## 2024-06-20 DIAGNOSIS — E11.69 TYPE 2 DIABETES MELLITUS WITH OBESITY  (HCC): Primary | Chronic | ICD-10-CM

## 2024-06-20 DIAGNOSIS — I48.21 PERMANENT ATRIAL FIBRILLATION (HCC): ICD-10-CM

## 2024-06-20 DIAGNOSIS — Z12.5 PROSTATE CANCER SCREENING: ICD-10-CM

## 2024-06-20 DIAGNOSIS — I10 ESSENTIAL HYPERTENSION: Chronic | ICD-10-CM

## 2024-06-20 PROCEDURE — 99214 OFFICE O/P EST MOD 30 MIN: CPT | Performed by: FAMILY MEDICINE

## 2024-06-20 PROCEDURE — G2211 COMPLEX E/M VISIT ADD ON: HCPCS | Performed by: FAMILY MEDICINE

## 2024-06-20 NOTE — PATIENT INSTRUCTIONS
Please proceed wit eye exam  Your hemoglobin A1c is excellent at 5.9%, great job!  Queta Rosa MD  - Infectious disease doctor  
normal...

## 2024-06-20 NOTE — ASSESSMENT & PLAN NOTE
Lab Results   Component Value Date    HGBA1C 5.9 (H) 06/06/2024    Off metformin-few months   On Glipizide ER 5 mg daily  Diabetic foot exam was performed today  Patient will proceed with diabetic eye exam

## 2024-06-20 NOTE — PROGRESS NOTES
Ambulatory Visit  Name: Adolfo Munroe      : 1955      MRN: [id number]  Encounter Provider: Shanna Apodaca MD  Encounter Date: 2024   Encounter department: [facility]    Assessment & Plan   1. Type 2 diabetes mellitus with obesity  (HCC)  Assessment & Plan:    Lab Results   Component Value Date    HGBA1C 5.9 (H) 2024    Off metformin-few months   On Glipizide ER 5 mg daily  Diabetic foot exam was performed today  Patient will proceed with diabetic eye exam  Orders:  -     CBC and differential; Future  -     Hemoglobin A1C; Future  2. Osteitis of symphysis pubis (HCC)  Assessment & Plan:  The patient is under care of [facility] infectious disease.  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    Patient initially presented with development of a draining sinus track.     Patient received his 2 infusion of dalvance without issue and has been transitioned to po suppression with doxcycline due to possible seeding of spinal hardware.   3. Dyslipidemia  -     Comprehensive metabolic panel; Future  -     Lipid Panel with Direct LDL reflex; Future  -     TSH, 3rd generation; Future  4. Prostate cancer screening  -     PSA, Total Screen; Future  5. Permanent atrial fibrillation (HCC)  Assessment & Plan:  Anticoagulated on Coumadin.  Cardiology follows PT/INR  No complaints of palpitations.  Rate control: Metoprolol  6. Essential hypertension  Assessment & Plan:  Blood pressure is well-controlled.  Continue regimen of amlodipine, lisinopril and metoprolol  7. Stenosis of lumbosacral spine  Assessment & Plan:  Status post L1-L5 lumbar fusion at [facility], Dr. Jennings  Patient complains of ongoing lower back pain.  He is under care of [facility] spine and pain center  Pending consultation with [facility] neurosurgery in August.    Patient currently defers/declines colorectal screening that I have recommended.    Return in about 6 months (around  12/20/2024) for follow up, Medicare wellness.      History of Present Illness       Follow up     Worsening of low back pain lately    Patient is under care of [facility] spine and pain Ctr. He is scheduled for consultation with [facility] neurosurgery in  August.    Off metformin  x 2 months.  HbA1C 5.9%. On Glipizide 1 daily  Results of recent blood work reviewed.  All normal/stable.     Patient remains under care of [facility] infectious disease.    History of pubic symphysis osteitis and hardware infection with MRSA.    Infectious disease recommends lifelong suppressive therapy with doxycycline 100 mg twice a day.  Patient  has been compliant with Rx regimen but is bit concerned about long-term antibiotic therapy and would like to discuss options with ID going forward.  He is concerned about side effects of photosensitivity especially during summer months.    [facility] cardiology follows PT/INR.  Patient is on Coumadin, regular monitoring.      Review of Systems   Constitutional: Negative.  Negative for fever.   HENT: Negative.     Eyes: Negative.    Respiratory: Negative.     Cardiovascular: Negative.    Gastrointestinal: Negative.    Endocrine: Negative.    Genitourinary: Negative.    Musculoskeletal:  Positive for back pain.   Skin: Negative.    Allergic/Immunologic: Negative.    Neurological: Negative.    Hematological: Negative.      Past Medical History:   Diagnosis Date   • A-fib ([facility])    • Acute respiratory failure with hypoxia and hypercapnia ([facility]) 08/17/2020   • LAURENCE (acute kidney injury) ([facility]) 08/16/2020   • Arthritis     knees   • Community acquired pneumonia of left lower lobe of lung 08/12/2020   • Diabetes mellitus ([facility])    • Hardware complicating wound infection ([facility]) 11/15/2023   • Hypertension    • Measles, Sinhala (rubella)    • Mumps    • Obesity    • Pneumonia 08/2020   • Psoas abscess ([facility]) 01/03/2023   • Sacroiliitis ([facility])    • Snoring     Loudly   • Substance abuse ([facility])      Past Surgical  History:   Procedure Laterality Date   • CYSTOSCOPY      Pt denies   • JOINT REPLACEMENT  07/2018   • KNEE ARTHROSCOPY Left     knee   • ORIF PELVIC FRACTURE     • NJ DEBRIDEMENT SUBCUTANEOUS TISSUE EA ADDL 20 SQ CM N/A 11/3/2023    Procedure: DEBRIDEMENT WOUND (WASH OUT);  Surgeon: Jose Gasca MD;  [facility];  Service: Orthopedics   • NJ REMOVAL IMPLANT DEEP N/A 11/3/2023    Procedure: REMOVAL HARDWARE pubic symphysis;  Surgeon: Jose Gasca MD;  [facility];  Service: Orthopedics   • SPINAL FUSION  05/06/2022   • TOTAL KNEE ARTHROPLASTY Left 06/10/2019    Procedure: ARTHROPLASTY KNEE TOTAL;  Surgeon: Kenny Moy MD;  [facility];  Service: Orthopedics     Family History   Problem Relation Age of Onset   • Ovarian cancer Mother    • Heart disease Mother    • Diabetes type II Mother    • Colon cancer Father    • Heart disease Father    • Nephrolithiasis Father    • Cancer Father      Social History     Tobacco Use   • Smoking status: Never   • Smokeless tobacco: Never   Vaping Use   • Vaping status: Never Used   Substance and Sexual Activity   • Alcohol use: Yes     Alcohol/week: 1.0 standard drink of alcohol     Types: 1 Standard drinks or equivalent per week   • Drug use: Never   • Sexual activity: Not Currently     Birth control/protection: None     Current Outpatient Medications on File Prior to Visit   Medication Sig   • amLODIPine (NORVASC) 5 mg tablet Take 1 tablet (5 mg total) by mouth daily   • atorvastatin (LIPITOR) 20 mg tablet Take 1 tablet (20 mg total) by mouth daily   • baclofen 10 mg tablet Take 1 tablet (10 mg total) by mouth 2 (two) times a day   • Blood Glucose Monitoring Suppl (OneTouch Verio Reflect) w/Device KIT Check blood sugars once daily. Please substitute with appropriate alternative as covered by patient's insurance. Dx: E11.65   • doxycycline hyclate (VIBRAMYCIN) 100 mg capsule Take 1 capsule (100 mg total) by mouth every 12 (twelve) hours   •  "glipiZIDE (GLUCOTROL XL) 5 mg 24 hr tablet Take 1 tablet (5 mg total) by mouth daily   • HYDROcodone-acetaminophen (NORCO)  mg per tablet Take 1 tablet by mouth every 6 (six) hours as needed for moderate pain For ongoing therapy Max Daily Amount: 4 tablets   • HYDROcodone-acetaminophen (NORCO)  mg per tablet Take 1 tablet by mouth every 6 (six) hours as needed for moderate pain For ongoing therapy  DO NOT FILL BEFORE: 06/18/24 Max Daily Amount: 4 tablets   • lisinopril (ZESTRIL) 10 mg tablet Take 1 tablet (10 mg total) by mouth daily   • metoprolol succinate (TOPROL-XL) 50 mg 24 hr tablet Take 1.5 tablets (75 mg total) by mouth daily   • warfarin (COUMADIN) 5 mg tablet Take 1 tablet (5 mg total) by mouth daily   • naloxone (NARCAN) 4 mg/0.1 mL nasal spray Administer 1 spray into a nostril. If no response after 2-3 minutes, give another dose in the other nostril using a new spray.     No Known Allergies  Immunization History   Administered Date(s) Administered   • COVID-19 J&J (BabyGlowz) vaccine 0.5 mL 05/27/2021   • COVID-19 Pfizer Vac BIVALENT Conor-sucrose 12 Yr+ IM 12/15/2022   • INFLUENZA 08/03/2016   • Influenza Quadrivalent Preservative Free 3 years and older IM 08/03/2016   • Pneumococcal Conjugate Vaccine 20-valent (Pcv20), Polysace 10/11/2022   • Tdap 01/01/2001     Objective     /60 (BP Location: Left arm, Patient Position: Sitting, Cuff Size: Large)   Pulse 69   Temp 98 °F (36.7 °C) (Temporal)   Resp 16   Ht 5' 6\" (1.676 m)   Wt 95.5 kg (210 lb 9.6 oz)   SpO2 98%   BMI 33.99 kg/m²     Physical Exam  Vitals and nursing note reviewed.   Constitutional:       General: He is not in acute distress.     Appearance: Normal appearance. He is well-developed. He is not ill-appearing.   HENT:      Head: Normocephalic and atraumatic.   Eyes:      Conjunctiva/sclera: Conjunctivae normal.   Neck:      Vascular: No carotid bruit.   Cardiovascular:      Rate and Rhythm: Normal rate. Rhythm " irregular.      Pulses: no weak pulses.           Dorsalis pedis pulses are 2+ on the right side and 2+ on the left side.      Heart sounds: Normal heart sounds. No murmur heard.  Pulmonary:      Effort: Pulmonary effort is normal. No respiratory distress.      Breath sounds: Normal breath sounds. No wheezing or rales.   Abdominal:      General: Bowel sounds are normal. There is no distension or abdominal bruit.   Musculoskeletal:         General: Normal range of motion.      Cervical back: Neck supple.      Right lower leg: No edema.      Left lower leg: No edema.   Feet:      Right foot:      Skin integrity: No ulcer, skin breakdown, erythema, warmth, callus or dry skin.      Left foot:      Skin integrity: No ulcer, skin breakdown, erythema, warmth, callus or dry skin.   Neurological:      General: No focal deficit present.      Mental Status: He is alert and oriented to person, place, and time.      Cranial Nerves: No cranial nerve deficit.   Psychiatric:         Mood and Affect: Mood normal.         Behavior: Behavior normal.     Diabetic Foot Exam    Patient's shoes and socks removed.    Right Foot/Ankle   Right Foot Inspection  Skin Exam: skin normal and skin intact. No dry skin, no warmth, no callus, no erythema, no maceration, no abnormal color, no pre-ulcer, no ulcer and no callus.     Toe Exam: ROM and strength within normal limits.     Sensory   Vibration: intact  Proprioception: intact  Monofilament testing: intact    Vascular  The right DP pulse is 2+.     Left Foot/Ankle  Left Foot Inspection  Skin Exam: skin normal and skin intact. No dry skin, no warmth, no erythema, no maceration, normal color, no pre-ulcer, no ulcer and no callus.     Toe Exam: ROM and strength within normal limits.     Sensory   Vibration: intact  Proprioception: intact  Monofilament testing: intact    Vascular  The left DP pulse is 2+.     Assign Risk Category  No deformity present  No loss of protective sensation  No weak  pulses  Risk: 0    Administrative Statements

## 2024-06-22 PROBLEM — T84.7XXA HARDWARE COMPLICATING WOUND INFECTION (HCC): Status: RESOLVED | Noted: 2023-11-15 | Resolved: 2024-06-22

## 2024-06-23 NOTE — ASSESSMENT & PLAN NOTE
The patient is under care of [facility] infectious disease.  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    Patient initially presented with development of a draining sinus track.     Patient received his 2 infusion of dalvance without issue and has been transitioned to po suppression with doxcycline due to possible seeding of spinal hardware.

## 2024-06-23 NOTE — ASSESSMENT & PLAN NOTE
Anticoagulated on Coumadin.  Cardiology follows PT/INR  No complaints of palpitations.  Rate control: Metoprolol

## 2024-06-23 NOTE — ASSESSMENT & PLAN NOTE
Status post L1-L5 lumbar fusion at [facility], Dr. Jennings  Patient complains of ongoing lower back pain.  He is under care of [facility] spine and pain center  Pending consultation with [facility] neurosurgery in August.

## 2024-07-11 ENCOUNTER — OFFICE VISIT (OUTPATIENT)
Dept: PAIN MEDICINE | Facility: CLINIC | Age: 69
End: 2024-07-11
Payer: MEDICARE

## 2024-07-11 VITALS
BODY MASS INDEX: 33.23 KG/M2 | DIASTOLIC BLOOD PRESSURE: 68 MMHG | TEMPERATURE: 97.7 F | SYSTOLIC BLOOD PRESSURE: 112 MMHG | WEIGHT: 206.8 LBS | HEIGHT: 66 IN

## 2024-07-11 DIAGNOSIS — M54.50 CHRONIC BILATERAL LOW BACK PAIN WITHOUT SCIATICA: ICD-10-CM

## 2024-07-11 DIAGNOSIS — M48.061 SPINAL STENOSIS OF LUMBAR REGION WITHOUT NEUROGENIC CLAUDICATION: ICD-10-CM

## 2024-07-11 DIAGNOSIS — M96.1 LUMBAR POSTLAMINECTOMY SYNDROME: ICD-10-CM

## 2024-07-11 DIAGNOSIS — F11.20 UNCOMPLICATED OPIOID DEPENDENCE (HCC): ICD-10-CM

## 2024-07-11 DIAGNOSIS — Z98.1 HISTORY OF LUMBAR FUSION: ICD-10-CM

## 2024-07-11 DIAGNOSIS — Z79.891 LONG-TERM CURRENT USE OF OPIATE ANALGESIC: ICD-10-CM

## 2024-07-11 DIAGNOSIS — G89.29 CHRONIC BILATERAL LOW BACK PAIN WITHOUT SCIATICA: ICD-10-CM

## 2024-07-11 DIAGNOSIS — M47.816 LUMBAR SPONDYLOSIS: ICD-10-CM

## 2024-07-11 DIAGNOSIS — G89.4 CHRONIC PAIN SYNDROME: Primary | ICD-10-CM

## 2024-07-11 PROCEDURE — G2211 COMPLEX E/M VISIT ADD ON: HCPCS | Performed by: PHYSICIAN ASSISTANT

## 2024-07-11 PROCEDURE — 99214 OFFICE O/P EST MOD 30 MIN: CPT | Performed by: PHYSICIAN ASSISTANT

## 2024-07-11 RX ORDER — HYDROCODONE BITARTRATE AND ACETAMINOPHEN 10; 325 MG/1; MG/1
1 TABLET ORAL EVERY 6 HOURS PRN
Qty: 120 TABLET | Refills: 0 | Status: SHIPPED | OUTPATIENT
Start: 2024-07-11

## 2024-07-11 NOTE — PROGRESS NOTES
Assessment:  1. Chronic pain syndrome    2. Lumbar postlaminectomy syndrome    3. Spinal stenosis of lumbar region without neurogenic claudication    4. History of lumbar fusion    5. Lumbar spondylosis    6. Chronic bilateral low back pain without sciatica    7. Long-term current use of opiate analgesic    8. Uncomplicated opioid dependence (HCC)        Plan:  While the patient was in the office today, I did have a thorough conversation regarding their chronic pain syndrome, medication management, and treatment plan options.    The patient remains clinically stable moderately controlled on the current medication regimen of Norco 10/325 every 6 hours as needed.  Patient reports about 50% relief and without side effects.  I feel it is reasonable to continue.  On today's visit I have electronically sent his opioid prescription to his pharmacy with a do not fill date of 7/16/2024 and 8/14/2024.    Patient will follow-up with Dr. Woodward as scheduled for consultation.  He has moderate to severe spinal stenosis at the L4-5 region.  Patient may be a candidate for injection therapy with medical clearance from infectious disease.    [place] Prescription Drug Monitoring Program report was reviewed and was appropriate     A urine drug screen was collected at today's office visit as part of our medication management protocol. The point of care testing results were appropriate for what was being prescribed. The specimen will be sent for confirmatory testing. The drug screen is medically necessary because the patient is either dependent on opioid medication or is being considered for opioid medication therapy and the results could impact ongoing or future treatment. The drug screen is to evaluate for the presences or absence of prescribed, non-prescribed, and/or illicit drugs/substances.    There are risks associated with opioid medications, including dependence, addiction and tolerance. The patient understands and agrees  to use these medications only as prescribed. Potential side effects of the medications include, but are not limited to, constipation, drowsiness, addiction, impaired judgment and risk of fatal overdose if not taken as prescribed. The patient was warned against driving while taking sedation medications.  Sharing medications is a felony. At this point in time, the patient is showing no signs of addiction, abuse, diversion or suicidal ideation.    The patient will follow-up in 12 weeks for medication prescription refill and reevaluation. The patient was advised to contact the office should their symptoms worsen in the interim. The patient was agreeable and verbalized an understanding.        History of Present Illness:    The patient is a 69 y.o. male last seen on 5/20/24 who presents for a follow up office visit in regards to chronic pain secondary to lumbar postlaminectomy pain syndrome/history of lumbar fusion, lumbar spondylosis and lumbar spinal stenosis.  The patient currently reports low back pain that he presently rates a 6 out of 10 and describes it as a constant dull, aching and sharp pain.  Pain is significantly worse with standing and walking.  He underwent a lumbar spine MRI and is scheduled for neurosurgical consultation next month.  He is still on doxycycline as per infectious disease due to the history of pubic symphysis osteitis and hardware infection with MRSA.  He continues to follow-up with infectious disease and has a follow-up in October.    Current pain medications includes: Norco 10/325 every 6 hours as needed and baclofen as needed  .  The patient reports that this regimen is providing 50% pain relief.  The patient is reporting no side effects from this pain medication regimen.    Pain Contract Signed: 1/29/24  Last Urine Drug Screen: 7/11/24  Last dose of hydrocodone:    I have personally reviewed and/or updated the patient's past medical history, past surgical history, family history, social  history, current medications, allergies, and vital signs today.       Review of Systems:    Review of Systems      Past Medical History:   Diagnosis Date   • A-fib ([facility])    • Acute respiratory failure with hypoxia and hypercapnia ([facility]) 08/17/2020   • LAURENCE (acute kidney injury) (formerly [facility]) 08/16/2020   • Arthritis     knees   • Community acquired pneumonia of left lower lobe of lung 08/12/2020   • Diabetes mellitus ([facility])    • Hardware complicating wound infection ([facility]) 11/15/2023   • Hypertension    • Measles, Estonian (rubella)    • Mumps    • Obesity    • Pneumonia 08/2020   • Psoas abscess ([facility]) 01/03/2023   • Sacroiliitis ([facility])    • Snoring     Loudly   • Substance abuse ([facility])        Past Surgical History:   Procedure Laterality Date   • CYSTOSCOPY      Pt denies   • JOINT REPLACEMENT  07/2018   • KNEE ARTHROSCOPY Left     knee   • ORIF PELVIC FRACTURE     • NV DEBRIDEMENT SUBCUTANEOUS TISSUE EA ADDL 20 SQ CM N/A 11/3/2023    Procedure: DEBRIDEMENT WOUND (WASH OUT);  Surgeon: Jose Gasca MD;  [facility];  Service: Orthopedics   • NV REMOVAL IMPLANT DEEP N/A 11/3/2023    Procedure: REMOVAL HARDWARE pubic symphysis;  Surgeon: Jose Gasca MD;  [facility];  Service: Orthopedics   • SPINAL FUSION  05/06/2022   • TOTAL KNEE ARTHROPLASTY Left 06/10/2019    Procedure: ARTHROPLASTY KNEE TOTAL;  Surgeon: Kenny Moy MD;  [facility];  Service: Orthopedics       Family History   Problem Relation Age of Onset   • Ovarian cancer Mother    • Heart disease Mother    • Diabetes type II Mother    • Colon cancer Father    • Heart disease Father    • Nephrolithiasis Father    • Cancer Father        Social History     Occupational History   • Not on file   Tobacco Use   • Smoking status: Never   • Smokeless tobacco: Never   Vaping Use   • Vaping status: Never Used   Substance and Sexual Activity   • Alcohol use: Not Currently     Alcohol/week: 1.0 standard drink of alcohol     Types: 1 Standard drinks  or equivalent per week   • Drug use: Never   • Sexual activity: Not Currently     Birth control/protection: None         Current Outpatient Medications:   •  amLODIPine (NORVASC) 5 mg tablet, Take 1 tablet (5 mg total) by mouth daily, Disp: 90 tablet, Rfl: 3  •  atorvastatin (LIPITOR) 20 mg tablet, Take 1 tablet (20 mg total) by mouth daily, Disp: 90 tablet, Rfl: 3  •  baclofen 10 mg tablet, Take 1 tablet (10 mg total) by mouth 2 (two) times a day, Disp: 60 tablet, Rfl: 3  •  Blood Glucose Monitoring Suppl (OneTouch Verio Reflect) w/Device KIT, Check blood sugars once daily. Please substitute with appropriate alternative as covered by patient's insurance. Dx: E11.65, Disp: 1 kit, Rfl: 0  •  doxycycline hyclate (VIBRAMYCIN) 100 mg capsule, Take 1 capsule (100 mg total) by mouth every 12 (twelve) hours, Disp: 180 capsule, Rfl: 1  •  glipiZIDE (GLUCOTROL XL) 5 mg 24 hr tablet, Take 1 tablet (5 mg total) by mouth daily, Disp: 90 tablet, Rfl: 3  •  HYDROcodone-acetaminophen (NORCO)  mg per tablet, Take 1 tablet by mouth every 6 (six) hours as needed for moderate pain For ongoing therapy DO NOT FILL BEFORE: 07/16/24 Max Daily Amount: 4 tablets, Disp: 120 tablet, Rfl: 0  •  HYDROcodone-acetaminophen (NORCO)  mg per tablet, Take 1 tablet by mouth every 6 (six) hours as needed for moderate pain For ongoing therapy  DO NOT FILL BEFORE: 08/14/24 Max Daily Amount: 4 tablets, Disp: 120 tablet, Rfl: 0  •  lisinopril (ZESTRIL) 10 mg tablet, Take 1 tablet (10 mg total) by mouth daily, Disp: 90 tablet, Rfl: 3  •  metoprolol succinate (TOPROL-XL) 50 mg 24 hr tablet, Take 1.5 tablets (75 mg total) by mouth daily, Disp: 135 tablet, Rfl: 3  •  warfarin (COUMADIN) 5 mg tablet, Take 1 tablet (5 mg total) by mouth daily, Disp: 90 tablet, Rfl: 3  •  naloxone (NARCAN) 4 mg/0.1 mL nasal spray, Administer 1 spray into a nostril. If no response after 2-3 minutes, give another dose in the other nostril using a new spray. (Patient not  "taking: Reported on 7/11/2024), Disp: 1 each, Rfl: 1    No Known Allergies    Physical Exam:    /68 (BP Location: Left arm, Patient Position: Sitting, Cuff Size: Adult)   Temp 97.7 °F (36.5 °C) (Temporal)   Ht 5' 6\" (1.676 m) Comment: verbal  Wt 93.8 kg (206 lb 12.8 oz)   BMI 33.38 kg/m²     Constitutional:normal, well developed, well nourished, alert, in no distress and non-toxic and no overt pain behavior.  Eyes:anicteric  HEENT:grossly intact  Neck:supple, symmetric, trachea midline and no masses   Pulmonary:even and unlabored  Cardiovascular:No edema or pitting edema present  Skin:Normal without rashes or lesions and well hydrated  Psychiatric:Mood and affect appropriate  Neurologic:Cranial Nerves II-XII grossly intact  Musculoskeletal: Gait is stable without the use of assistive devices, lumbar scar from prior surgery, tender bilateral SI joints.      Imaging  No orders to display         Orders Placed This Encounter   Procedures   • [facility] All Prescribed Meds and Special Instructions   • Amphetamines, Methamphetamines   • Butalbital   • Phenobarbital   • Secobarbital   • Alprazolam   • Clonazepam   • Diazepam   • Lorazepam   • Gabapentin   • Pregabalin   • Cocaine   • Heroin   • Buprenorphine   • Levorphanol   • Meperidine   • Naltrexone   • Fentanyl   • Methadone   • Oxycodone   • Tapentadol   • THC   • Tramadol   • Codeine, Hydrocodone, Hydropmorphone, Morphine   • Bath Salts   • Ethyl Glucuronide/Ethyl Sulfate   • Kratom   • Spice   • Methylphenidate   • Phentermine   • Validity Oxidant   • Validity Creatinine   • Validity pH   • Validity Specific   • Xylazine Definitive Test         "

## 2024-07-11 NOTE — PROGRESS NOTES
Assessment:  No diagnosis found.    Plan:  ***  {PDMP Statement:45842}    {UDS Statement:21105}    {Opioid Statement:45098}    {Pain Management Procedure Risk Statement:29409}    {Oral Swab Statement:99000}      The patient will follow-up in {Follow Up:40300} for medication prescription refill and reevaluation. The patient was advised to contact the office should their symptoms worsen in the interim. The patient was agreeable and verbalized an understanding.        History of Present Illness:    The patient is a 69 y.o. male last seen on 05/20/24 who presents for a follow up office visit in regards to chronic pain secondary to ***.  The patient currently reports ***    Current pain medications includes:  .  The patient reports that this regimen is providing ***% pain relief.  The patient is reporting {Side Effects:82697} from this pain medication regimen.    Pain Contract Signed: ***  Last Urine Drug Screen: ***    I have personally reviewed and/or updated the patient's past medical history, past surgical history, family history, social history, current medications, allergies, and vital signs today.       Review of Systems:    Review of Systems      Past Medical History:   Diagnosis Date    A-fib ([facility])     Acute respiratory failure with hypoxia and hypercapnia ([facility]) 08/17/2020    LAURENCE (acute kidney injury) ([facility]) 08/16/2020    Arthritis     knees    Community acquired pneumonia of left lower lobe of lung 08/12/2020    Diabetes mellitus ([facility])     Hardware complicating wound infection ([facility]) 11/15/2023    Hypertension     Measles, [place] (rubella)     Mumps     Obesity     Pneumonia 08/2020    Psoas abscess ([facility]) 01/03/2023    Sacroiliitis ([facility])     Snoring     Loudly    Substance abuse ([facility])        Past Surgical History:   Procedure Laterality Date    CYSTOSCOPY      Pt denies    JOINT REPLACEMENT  07/2018    KNEE ARTHROSCOPY Left     knee    ORIF PELVIC FRACTURE      NC DEBRIDEMENT SUBCUTANEOUS TISSUE EA ADDL 20 SQ CM  N/A 11/3/2023    Procedure: DEBRIDEMENT WOUND (WASH OUT);  Surgeon: Jose Gasca MD;  [facility];  Service: Orthopedics    [place] REMOVAL IMPLANT DEEP N/A 11/3/2023    Procedure: REMOVAL HARDWARE pubic symphysis;  Surgeon: Jose Gasca MD;  [facility];  Service: Orthopedics    SPINAL FUSION  05/06/2022    TOTAL KNEE ARTHROPLASTY Left 06/10/2019    Procedure: ARTHROPLASTY KNEE TOTAL;  Surgeon: Kenny Moy MD;  [facility];  Service: Orthopedics       Family History   Problem Relation Age of Onset    Ovarian cancer Mother     Heart disease Mother     Diabetes type II Mother     Colon cancer Father     Heart disease Father     Nephrolithiasis Father     Cancer Father        Social History     Occupational History    Not on file   Tobacco Use    Smoking status: Never    Smokeless tobacco: Never   Vaping Use    Vaping status: Never Used   Substance and Sexual Activity    Alcohol use: Yes     Alcohol/week: 1.0 standard drink of alcohol     Types: 1 Standard drinks or equivalent per week    Drug use: Never    Sexual activity: Not Currently     Birth control/protection: None         Current Outpatient Medications:     amLODIPine (NORVASC) 5 mg tablet, Take 1 tablet (5 mg total) by mouth daily, Disp: 90 tablet, Rfl: 3    atorvastatin (LIPITOR) 20 mg tablet, Take 1 tablet (20 mg total) by mouth daily, Disp: 90 tablet, Rfl: 3    baclofen 10 mg tablet, Take 1 tablet (10 mg total) by mouth 2 (two) times a day, Disp: 60 tablet, Rfl: 3    Blood Glucose Monitoring Suppl (OneTouch Verio Reflect) w/Device KIT, Check blood sugars once daily. Please substitute with appropriate alternative as covered by patient's insurance. Dx: E11.65, Disp: 1 kit, Rfl: 0    doxycycline hyclate (VIBRAMYCIN) 100 mg capsule, Take 1 capsule (100 mg total) by mouth every 12 (twelve) hours, Disp: 180 capsule, Rfl: 1    glipiZIDE (GLUCOTROL XL) 5 mg 24 hr tablet, Take 1 tablet (5 mg total) by mouth daily, Disp: 90 tablet, Rfl:  "3    HYDROcodone-acetaminophen (NORCO)  mg per tablet, Take 1 tablet by mouth every 6 (six) hours as needed for moderate pain For ongoing therapy Max Daily Amount: 4 tablets, Disp: 120 tablet, Rfl: 0    HYDROcodone-acetaminophen (NORCO)  mg per tablet, Take 1 tablet by mouth every 6 (six) hours as needed for moderate pain For ongoing therapy  DO NOT FILL BEFORE: 24 Max Daily Amount: 4 tablets, Disp: 120 tablet, Rfl: 0    lisinopril (ZESTRIL) 10 mg tablet, Take 1 tablet (10 mg total) by mouth daily, Disp: 90 tablet, Rfl: 3    metoprolol succinate (TOPROL-XL) 50 mg 24 hr tablet, Take 1.5 tablets (75 mg total) by mouth daily, Disp: 135 tablet, Rfl: 3    naloxone (NARCAN) 4 mg/0.1 mL nasal spray, Administer 1 spray into a nostril. If no response after 2-3 minutes, give another dose in the other nostril using a new spray., Disp: 1 each, Rfl: 1    warfarin (COUMADIN) 5 mg tablet, Take 1 tablet (5 mg total) by mouth daily, Disp: 90 tablet, Rfl: 3    No Known Allergies    Physical Exam:    There were no vitals taken for this visit.    Constitutional:{General Appearance:00387::\"normal, well developed, well nourished, alert, in no distress and non-toxic and no overt pain behavior.\"}  Eyes:{Sclera:65600::\"anicteric\"}  HEENT:{Hearin::\"grossly intact\"}  Neck:{Neck:07334::\"supple, symmetric, trachea midline and no masses \"}  Pulmonary:{Respiratory effort:59109::\"even and unlabored\"}  Cardiovascular:{Examination of Extremities:04502::\"No edema or pitting edema present\"}  Skin:{Skin and Subcutaneous tissues:84184::\"Normal without rashes or lesions and well hydrated\"}  Psychiatric:{Mood and Affect:41878::\"Mood and affect appropriate\"}  Neurologic:{Cranial Nerves:93446::\"Cranial Nerves II-XII grossly intact\"}  Musculoskeletal:{[place]:65362::\"normal\"}      Imaging  No orders to display         No orders of the defined types were placed in this encounter.      "

## 2024-07-14 LAB
4OH-XYLAZINE UR QL CFM: NEGATIVE NG/ML
6MAM UR QL CFM: NEGATIVE NG/ML
7AMINOCLONAZEPAM UR QL CFM: NEGATIVE NG/ML
A-OH ALPRAZ UR QL CFM: NEGATIVE NG/ML
ACCEPTABLE CREAT UR QL: NORMAL MG/DL
ACCEPTIBLE SP GR UR QL: NORMAL
AMPHET UR QL CFM: NEGATIVE NG/ML
BUPRENORPHINE UR QL CFM: NEGATIVE NG/ML
BUTALBITAL UR QL CFM: NEGATIVE NG/ML
BZE UR QL CFM: NEGATIVE NG/ML
CODEINE UR QL CFM: NEGATIVE NG/ML
EDDP UR QL CFM: NEGATIVE NG/ML
ETHYL GLUCURONIDE UR QL CFM: NEGATIVE NG/ML
ETHYL SULFATE UR QL SCN: NEGATIVE NG/ML
EUTYLONE UR QL: NEGATIVE NG/ML
FENTANYL UR QL CFM: NEGATIVE NG/ML
GLIADIN IGG SER IA-ACNC: NEGATIVE NG/ML
HYDROCODONE UR QL CFM: NORMAL NG/ML
HYDROMORPHONE UR QL CFM: NORMAL NG/ML
LORAZEPAM UR QL CFM: NEGATIVE NG/ML
ME-PHENIDATE UR QL CFM: NEGATIVE NG/ML
MEPERIDINE UR QL CFM: NEGATIVE NG/ML
METHADONE UR QL CFM: NEGATIVE NG/ML
METHAMPHET UR QL CFM: NEGATIVE NG/ML
MORPHINE UR QL CFM: NEGATIVE NG/ML
NALTREXONE UR QL CFM: NEGATIVE NG/ML
NITRITE UR QL: NORMAL UG/ML
NORBUPRENORPHINE UR QL CFM: NEGATIVE NG/ML
NORDIAZEPAM UR QL CFM: NEGATIVE NG/ML
NORFENTANYL UR QL CFM: NEGATIVE NG/ML
NORHYDROCODONE UR QL CFM: NORMAL NG/ML
NORMEPERIDINE UR QL CFM: NEGATIVE NG/ML
NOROXYCODONE UR QL CFM: NEGATIVE NG/ML
OXAZEPAM UR QL CFM: NEGATIVE NG/ML
OXYCODONE UR QL CFM: NEGATIVE NG/ML
OXYMORPHONE UR QL CFM: NEGATIVE NG/ML
PARA-FLUOROFENTANYL QUANTIFICATION: NORMAL NG/ML
PHENOBARB UR QL CFM: NEGATIVE NG/ML
RESULT ALL_PRESCRIBED MEDS AND SPECIAL INSTRUCTIONS: NORMAL
SECOBARBITAL UR QL CFM: NEGATIVE NG/ML
SL AMB 4-ANPP QUANTIFICATION: NORMAL NG/ML
SL AMB 5F-ADB-M7 METABOLITE QUANTIFICATION: NEGATIVE NG/ML
SL AMB 7-OH-MITRAGYNINE (KRATOM ALKALOID) QUANTIFICATION: NEGATIVE NG/ML
SL AMB AB-FUBINACA-M3 METABOLITE QUANTIFICATION: NEGATIVE NG/ML
SL AMB ACETYL FENTANYL QUANTIFICATION: NORMAL NG/ML
SL AMB ACETYL NORFENTANYL QUANTIFICATION: NORMAL NG/ML
SL AMB ACRYL FENTANYL QUANTIFICATION: NORMAL NG/ML
SL AMB CARFENTANIL QUANTIFICATION: NORMAL NG/ML
SL AMB CTHC (MARIJUANA METABOLITE) QUANTIFICATION: NEGATIVE NG/ML
SL AMB DEXTRORPHAN (DEXTROMETHORPHAN METABOLITE) QUANT: NEGATIVE NG/ML
SL AMB GABAPENTIN QUANTIFICATION: NEGATIVE NG/ML
SL AMB JWH018 METABOLITE QUANTIFICATION: NEGATIVE NG/ML
SL AMB JWH073 METABOLITE QUANTIFICATION: NEGATIVE NG/ML
SL AMB MDMB-FUBINACA-M1 METABOLITE QUANTIFICATION: NEGATIVE NG/ML
SL AMB METHYLONE QUANTIFICATION: NEGATIVE NG/ML
SL AMB N-DESMETHYL-TRAMADOL QUANTIFICATION: NEGATIVE NG/ML
SL AMB PHENTERMINE QUANTIFICATION: NEGATIVE NG/ML
SL AMB PREGABALIN QUANTIFICATION: NEGATIVE NG/ML
SL AMB RCS4 METABOLITE QUANTIFICATION: NEGATIVE NG/ML
SL AMB RITALINIC ACID QUANTIFICATION: NEGATIVE NG/ML
SMOOTH MUSCLE AB TITR SER IF: NEGATIVE NG/ML
SPECIMEN DRAWN SERPL: NEGATIVE NG/ML
SPECIMEN PH ACCEPTABLE UR: NORMAL
TAPENTADOL UR QL CFM: NEGATIVE NG/ML
TEMAZEPAM UR QL CFM: NEGATIVE NG/ML
TRAMADOL UR QL CFM: NEGATIVE NG/ML
URATE/CREAT 24H UR: NEGATIVE NG/ML
XYLAZINE UR QL CFM: NEGATIVE NG/ML

## 2024-07-17 ENCOUNTER — APPOINTMENT (OUTPATIENT)
Dept: LAB | Facility: CLINIC | Age: 69
End: 2024-07-17
Payer: MEDICARE

## 2024-07-17 DIAGNOSIS — E78.5 DYSLIPIDEMIA: ICD-10-CM

## 2024-07-17 DIAGNOSIS — E11.69 TYPE 2 DIABETES MELLITUS WITH OBESITY  (HCC): Chronic | ICD-10-CM

## 2024-07-17 DIAGNOSIS — E66.9 TYPE 2 DIABETES MELLITUS WITH OBESITY  (HCC): Chronic | ICD-10-CM

## 2024-07-17 DIAGNOSIS — Z12.5 PROSTATE CANCER SCREENING: ICD-10-CM

## 2024-07-18 ENCOUNTER — ANTICOAG VISIT (OUTPATIENT)
Dept: CARDIOLOGY CLINIC | Facility: CLINIC | Age: 69
End: 2024-07-18

## 2024-07-18 DIAGNOSIS — I48.21 PERMANENT ATRIAL FIBRILLATION (HCC): Primary | ICD-10-CM

## 2024-08-06 ENCOUNTER — CONSULT (OUTPATIENT)
Dept: NEUROSURGERY | Facility: CLINIC | Age: 69
End: 2024-08-06
Payer: MEDICARE

## 2024-08-06 VITALS
DIASTOLIC BLOOD PRESSURE: 80 MMHG | HEIGHT: 66 IN | RESPIRATION RATE: 18 BRPM | OXYGEN SATURATION: 98 % | TEMPERATURE: 97.2 F | HEART RATE: 79 BPM | BODY MASS INDEX: 33.11 KG/M2 | WEIGHT: 206 LBS | SYSTOLIC BLOOD PRESSURE: 132 MMHG

## 2024-08-06 DIAGNOSIS — M48.061 LUMBAR FORAMINAL STENOSIS: ICD-10-CM

## 2024-08-06 DIAGNOSIS — Z98.1 HISTORY OF LUMBAR SPINAL FUSION: ICD-10-CM

## 2024-08-06 DIAGNOSIS — M54.9 MECHANICAL BACK PAIN: Primary | ICD-10-CM

## 2024-08-06 PROCEDURE — 99204 OFFICE O/P NEW MOD 45 MIN: CPT | Performed by: NEUROLOGICAL SURGERY
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
## 2024-08-14 ENCOUNTER — APPOINTMENT (OUTPATIENT)
Dept: LAB | Facility: CLINIC | Age: 69
End: 2024-08-14
Payer: MEDICARE

## 2024-08-15 ENCOUNTER — ANTICOAG VISIT (OUTPATIENT)
Dept: CARDIOLOGY CLINIC | Facility: CLINIC | Age: 69
End: 2024-08-15

## 2024-08-15 DIAGNOSIS — I48.21 PERMANENT ATRIAL FIBRILLATION (HCC): Primary | ICD-10-CM

## 2024-08-19 ENCOUNTER — HOSPITAL ENCOUNTER (OUTPATIENT)
Dept: CT IMAGING | Facility: HOSPITAL | Age: 69
Discharge: HOME | End: 2024-08-19
Attending: NEUROLOGICAL SURGERY
Payer: MEDICARE

## 2024-08-19 DIAGNOSIS — M48.061 LUMBAR FORAMINAL STENOSIS: ICD-10-CM

## 2024-08-19 DIAGNOSIS — Z98.1 HISTORY OF LUMBAR SPINAL FUSION: ICD-10-CM

## 2024-08-19 DIAGNOSIS — M54.9 MECHANICAL BACK PAIN: ICD-10-CM

## 2024-08-19 PROCEDURE — 72131 CT LUMBAR SPINE W/O DYE: CPT

## 2024-08-29 ENCOUNTER — OFFICE VISIT (OUTPATIENT)
Dept: NEUROSURGERY | Facility: CLINIC | Age: 69
End: 2024-08-29
Payer: MEDICARE

## 2024-08-29 VITALS
TEMPERATURE: 95.7 F | HEIGHT: 66 IN | OXYGEN SATURATION: 97 % | BODY MASS INDEX: 33.11 KG/M2 | WEIGHT: 206 LBS | HEART RATE: 54 BPM | RESPIRATION RATE: 18 BRPM | SYSTOLIC BLOOD PRESSURE: 122 MMHG | DIASTOLIC BLOOD PRESSURE: 82 MMHG

## 2024-08-29 DIAGNOSIS — M54.9 MECHANICAL BACK PAIN: ICD-10-CM

## 2024-08-29 DIAGNOSIS — M48.061 LUMBAR FORAMINAL STENOSIS: ICD-10-CM

## 2024-08-29 DIAGNOSIS — Z98.1 HISTORY OF LUMBAR SPINAL FUSION: ICD-10-CM

## 2024-08-29 DIAGNOSIS — Z98.1 ARTHRODESIS STATUS: Primary | ICD-10-CM

## 2024-08-29 PROCEDURE — 99213 OFFICE O/P EST LOW 20 MIN: CPT | Performed by: NEUROLOGICAL SURGERY
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
## 2024-09-06 ENCOUNTER — OFFICE VISIT (OUTPATIENT)
Dept: PAIN MEDICINE | Facility: CLINIC | Age: 69
End: 2024-09-06
Payer: MEDICARE

## 2024-09-06 ENCOUNTER — TELEPHONE (OUTPATIENT)
Dept: RADIOLOGY | Facility: CLINIC | Age: 69
End: 2024-09-06

## 2024-09-06 VITALS
HEART RATE: 55 BPM | HEIGHT: 66 IN | DIASTOLIC BLOOD PRESSURE: 80 MMHG | SYSTOLIC BLOOD PRESSURE: 120 MMHG | BODY MASS INDEX: 34.07 KG/M2 | WEIGHT: 212 LBS | TEMPERATURE: 97.4 F

## 2024-09-06 DIAGNOSIS — M46.1 SACROILIITIS (HCC): ICD-10-CM

## 2024-09-06 DIAGNOSIS — M48.061 LUMBAR FORAMINAL STENOSIS: ICD-10-CM

## 2024-09-06 DIAGNOSIS — Z79.891 LONG-TERM CURRENT USE OF OPIATE ANALGESIC: ICD-10-CM

## 2024-09-06 DIAGNOSIS — F11.20 UNCOMPLICATED OPIOID DEPENDENCE (HCC): ICD-10-CM

## 2024-09-06 DIAGNOSIS — M96.1 LUMBAR POSTLAMINECTOMY SYNDROME: ICD-10-CM

## 2024-09-06 DIAGNOSIS — G89.4 CHRONIC PAIN SYNDROME: ICD-10-CM

## 2024-09-06 DIAGNOSIS — Z98.1 HISTORY OF LUMBAR FUSION: ICD-10-CM

## 2024-09-06 DIAGNOSIS — M54.16 LUMBAR RADICULOPATHY: Primary | ICD-10-CM

## 2024-09-06 PROCEDURE — 99214 OFFICE O/P EST MOD 30 MIN: CPT | Performed by: PHYSICIAN ASSISTANT

## 2024-09-06 PROCEDURE — G2211 COMPLEX E/M VISIT ADD ON: HCPCS | Performed by: PHYSICIAN ASSISTANT

## 2024-09-06 RX ORDER — HYDROCODONE BITARTRATE AND ACETAMINOPHEN 10; 325 MG/1; MG/1
1 TABLET ORAL EVERY 6 HOURS PRN
Qty: 120 TABLET | Refills: 0 | Status: SHIPPED | OUTPATIENT
Start: 2024-09-06

## 2024-09-06 NOTE — TELEPHONE ENCOUNTER
Patient's warfarin is managed by [facility] and this message should be forwarded to them.  I refilled Rx for the patient but they are the ones who ordered Coumadin and monitor PT/INR.  Thank you

## 2024-09-06 NOTE — PROGRESS NOTES
Assessment:  1. Lumbar radiculopathy    2. Lumbar foraminal stenosis    3. Lumbar postlaminectomy syndrome    4. History of lumbar fusion    5. Sacroiliitis (HCC)    6. Chronic pain syndrome    7. Uncomplicated opioid dependence (HCC)    8. Long-term current use of opiate analgesic        Plan:  While the patient was in the office today, I did have a thorough conversation regarding their chronic pain syndrome, medication management, and treatment plan options.    After discussing options, patient will be scheduled for a right L4 and L5 transforaminal epidural steroid injection to address the radicular component of his pain pattern.  Patient was made aware that we will require medical clearance regarding safely and temporarily holding his Coumadin prior to the procedure.  Patient is on doxycycline for long-term preventative reasons.  I will reach out to infectious disease to ensure that it is safe for us to proceed with an injection with his history of infection.    Neurosurgical intervention has not been recommended at this time by Dr. Woodward.     Continue medication management with Norco 10/325 4 times daily as needed and baclofen 10 mg twice daily as needed.  On today's visit I have electronically sent refills for the next 2 months to his pharmacy with the appropriate fill dates.    [place] Prescription Drug Monitoring Program report was reviewed and was appropriate     There are risks associated with opioid medications, including dependence, addiction and tolerance. The patient understands and agrees to use these medications only as prescribed. Potential side effects of the medications include, but are not limited to, constipation, drowsiness, addiction, impaired judgment and risk of fatal overdose if not taken as prescribed. The patient was warned against driving while taking sedation medications.  Sharing medications is a felony. At this point in time, the patient is showing no signs of addiction, abuse,  diversion or suicidal ideation.    The patient will follow-up in 8 weeks for medication prescription refill and reevaluation. The patient was advised to contact the office should their symptoms worsen in the interim. The patient was agreeable and verbalized an understanding.        History of Present Illness:    The patient is a 69 y.o. male last seen on 7/11/2024 who presents for a follow up office visit in regards to chronic low back pain secondary to lumbar postlaminectomy pain syndrome, history of lumbar fusion, lumbar foraminal stenosis and sacroiliitis.  The patient currently reports right-sided low back pain with radiation into the right lower extremity.  Patient was seen by Dr. Woodward from neurosurgery who did not recommend any additional surgical intervention at this time.  A new CT scan of the lumbar spine reveals posterior fusion from L1-L5 with fracture of the right L5 trans pedicle screw, facet arthropathy as well as moderate to severe bilateral foraminal stenosis at the L4-5 level.    Current pain medications includes:  .  The patient reports that this regimen is providing 50% pain relief.  The patient is reporting no side effects from this pain medication regimen.    Pain Contract Signed: 1/29/2024  Last Urine Drug Screen: 7/11/2024    I have personally reviewed and/or updated the patient's past medical history, past surgical history, family history, social history, current medications, allergies, and vital signs today.       Review of Systems:    Review of Systems   Respiratory:  Negative for shortness of breath.    Cardiovascular:  Negative for chest pain.   Gastrointestinal:  Positive for constipation. Negative for diarrhea, nausea and vomiting.   Musculoskeletal:  Positive for gait problem. Negative for arthralgias, joint swelling and myalgias.   Skin:  Negative for rash.   Neurological:  Negative for dizziness, seizures and weakness.   All other systems reviewed and are negative.        Past  Medical History:   Diagnosis Date   • A-fib ([facility])    • Acute respiratory failure with hypoxia and hypercapnia ([facility]) 08/17/2020   • LAURENCE (acute kidney injury) ([facility]) 08/16/2020   • Arthritis     knees   • Community acquired pneumonia of left lower lobe of lung 08/12/2020   • Diabetes mellitus ([facility])    • Hardware complicating wound infection ([facility]) 11/15/2023   • Hypertension    • Measles, Sinhala (rubella)    • Mumps    • Nosebleed    • Obesity    • Pneumonia 08/2020   • Psoas abscess ([facility]) 01/03/2023   • Sacroiliitis ([facility])    • Snoring     Loudly   • Substance abuse ([facility])        Past Surgical History:   Procedure Laterality Date   • CYSTOSCOPY      Pt denies   • JOINT REPLACEMENT  07/2018   • KNEE ARTHROSCOPY Left     knee   • ORIF PELVIC FRACTURE     • AK DEBRIDEMENT SUBCUTANEOUS TISSUE EA ADDL 20 SQ CM N/A 11/03/2023    Procedure: DEBRIDEMENT WOUND (WASH OUT);  Surgeon: Jose Gasca MD;  [facility];  Service: Orthopedics   • AK REMOVAL IMPLANT DEEP N/A 11/03/2023    Procedure: REMOVAL HARDWARE pubic symphysis;  Surgeon: Jose Gasca MD;  [facility];  Service: Orthopedics   • SPINAL FUSION  05/06/2022   • TONSILLECTOMY     • TOTAL KNEE ARTHROPLASTY Left 06/10/2019    Procedure: ARTHROPLASTY KNEE TOTAL;  Surgeon: Kenny Moy MD;  [facility];  Service: Orthopedics       Family History   Problem Relation Age of Onset   • Ovarian cancer Mother    • Heart disease Mother    • Diabetes type II Mother    • Colon cancer Father    • Heart disease Father    • Nephrolithiasis Father    • Cancer Father        Social History     Occupational History   • Not on file   Tobacco Use   • Smoking status: Never   • Smokeless tobacco: Never   Vaping Use   • Vaping status: Never Used   Substance and Sexual Activity   • Alcohol use: Not Currently     Alcohol/week: 1.0 standard drink of alcohol     Types: 1 Standard drinks or equivalent per week   • Drug use: Never   • Sexual activity: Not Currently      Partners: Male     Birth control/protection: Abstinence         Current Outpatient Medications:   •  amLODIPine (NORVASC) 5 mg tablet, Take 1 tablet (5 mg total) by mouth daily, Disp: 90 tablet, Rfl: 3  •  atorvastatin (LIPITOR) 20 mg tablet, Take 1 tablet (20 mg total) by mouth daily, Disp: 90 tablet, Rfl: 3  •  baclofen 10 mg tablet, Take 1 tablet (10 mg total) by mouth 2 (two) times a day, Disp: 60 tablet, Rfl: 3  •  glipiZIDE (GLUCOTROL XL) 5 mg 24 hr tablet, Take 1 tablet (5 mg total) by mouth daily, Disp: 90 tablet, Rfl: 3  •  HYDROcodone-acetaminophen (NORCO)  mg per tablet, Take 1 tablet by mouth every 6 (six) hours as needed for moderate pain For ongoing therapy Max Daily Amount: 4 tablets, Disp: 120 tablet, Rfl: 0  •  HYDROcodone-acetaminophen (NORCO)  mg per tablet, Take 1 tablet by mouth every 6 (six) hours as needed for moderate pain For ongoing therapy  DO NOT FILL BEFORE: 10/04/24 Max Daily Amount: 4 tablets, Disp: 120 tablet, Rfl: 0  •  lisinopril (ZESTRIL) 10 mg tablet, Take 1 tablet (10 mg total) by mouth daily, Disp: 90 tablet, Rfl: 3  •  metoprolol succinate (TOPROL-XL) 50 mg 24 hr tablet, Take 1.5 tablets (75 mg total) by mouth daily, Disp: 135 tablet, Rfl: 3  •  warfarin (COUMADIN) 5 mg tablet, Take 1 tablet (5 mg total) by mouth daily, Disp: 90 tablet, Rfl: 3  •  Blood Glucose Monitoring Suppl (OneTouch Verio Reflect) w/Device KIT, Check blood sugars once daily. Please substitute with appropriate alternative as covered by patient's insurance. Dx: E11.65 (Patient not taking: Reported on 8/6/2024), Disp: 1 kit, Rfl: 0  •  naloxone (NARCAN) 4 mg/0.1 mL nasal spray, Administer 1 spray into a nostril. If no response after 2-3 minutes, give another dose in the other nostril using a new spray. (Patient not taking: Reported on 7/11/2024), Disp: 1 each, Rfl: 1    No Known Allergies    Physical Exam:    /80 (BP Location: Left arm, Patient Position: Sitting, Cuff Size: Standard)    "Pulse 55   Temp (!) 97.4 °F (36.3 °C)   Ht 5' 6\" (1.676 m)   Wt 96.2 kg (212 lb)   BMI 34.22 kg/m²     Constitutional:normal, well developed, well nourished, alert, in no distress and non-toxic and no overt pain behavior.  Eyes:anicteric  HEENT:grossly intact  Neck:supple, symmetric, trachea midline and no masses   Pulmonary:even and unlabored  Cardiovascular:No edema or pitting edema present  Skin:Normal without rashes or lesions and well hydrated  Psychiatric:Mood and affect appropriate  Neurologic:Cranial Nerves II-XII grossly intact  Musculoskeletal: Lumbar scar from prior surgery      Imaging  CT LUMBAR SPINE WITHOUT CONTRAST     INDICATION:   Z98.1: Arthrodesis status  M48.061: Spinal stenosis, lumbar region without neurogenic claudication  M54.9: Dorsalgia, unspecified. Back pain status post multilevel fusion     COMPARISON: MRI from 5/26/2024. CT abdomen pelvis from 9/29/2023     TECHNIQUE:  Contiguous axial images through the lumbar spine were obtained. Sagittal and coronal reconstructions were performed.     IV Contrast: None     Radiation dose length product (DLP) for this visit:  641.29 mGy-cm .  This examination, like all CT scans performed in the [facility], was performed utilizing techniques to minimize radiation dose exposure, including the use of iterative   reconstruction and automated exposure control.     IMAGE QUALITY:  Diagnostic.     FINDINGS:     ALIGNMENT: Transitional lumbosacral anatomy with partially sacralized L5 with left-sided pseudoarthrosis/assimilation joint. Lordotic reversal which may reflect positioning or spasm.     VERTEBRAE: Posterior decompression and fusion construct spans L1-L5. Decompressive laminectomies from L1-L3. Vertebral body heights are maintained. No destructive osseous lesions. No acute fracture. Lucency surrounds the left L5, left L4 transpedicle   screws, as well as the right L3, L4, and L5 transpedicle screws. Fracture of the right L5 " transpedicle screw (series 604 image 77). Interbody cages are present at L1-2, L2-3, and L3-4 with bridging bone. Interbody cage present at L4-5, although there are   adjacent endplate erosions and no significant bridging bone. There is multilevel degenerative endplate osteophytosis, worst at L4-5 and T12-L1.     DEGENERATIVE CHANGES:     Lower Thoracic spine:  Moderate lower thoracic degenerative disc disease with annular bulging.     Lumbar Spine:  L1-2: Surgical level. No significant canal or foraminal stenosis.     L2-3: Surgical level. No canal narrowing. Mild bilateral foraminal narrowing secondary to facet arthropathy and marginal osteophytes.     L3-4: Surgical level. No significant canal narrowing. Marginal osteophytes and facet arthropathy contribute to right subarticular narrowing, mild right foraminal narrowing. And mild left foraminal narrowing.     L4-5: Surgical level. Marginal osteophytes and facet arthropathy contribute to moderate to severe bilateral foraminal stenosis. Mild residual canal narrowing.     L5-S1: Surgical level. Marginal osteophytes and facet arthropathy contribute to mild bilateral foraminal narrowing, worse on the left. No canal narrowing.     PARASPINAL SOFT TISSUES: Presacral soft tissue thickening seen best on series 305 image 107 probably represents chronic postoperative change. Similar findings were present on CT abdomen pelvis from 9/29/2023. Partially imaged bladder wall thickening.     OTHER: Left SI joint screw fixation. Lucency surrounds the screw (example series 305 image 99).     IMPRESSION:     1.  Posterior fusion construct spans L1-L5 with lucency surrounding the right transpedicle screws at L3, L4, and L5, and left transpedicle screws at L4 and L5, concerning for loosening.  2.  Fracture of the right L5 transpedicle screw.  3.  Fixation screw traversing the left SI joint with surrounding lucency also concerning for loosening.  4.  L4-5 interbody cage without  significant bridging bone and adjacent endplate erosions which could also reflect loosening/subsidence.  5.  Degenerative changes as described.  6.  Partially imaged bladder wall thickening. The appearance is grossly stable compared to CT from 9/29/2023 although is not fully evaluated on this exam.     FL spine and pain procedure    (Results Pending)         Orders Placed This Encounter   Procedures   • FL spine and pain procedure

## 2024-09-06 NOTE — TELEPHONE ENCOUNTER
Dr. Thibodeaux has recommended an Epidural   Steroid Injection (BRODERICK) to help with this   patient's chronic pain. However, the patient Is  currently taking Coumadin which you are   managing. The American Society of   Regional Anesthesia Guideline on   neuraxial procedures and anti-coagulation   indicates that Coumadin should be held for   5 days prior to the procedure.      Your permission to hold coumadin is necessary   in order to proceed. We will instruct the   patient to restart this medication immediately   after the procedure, unless otherwise specified   by you.       ??  *please keep this task in clinical pool until   we get response back. (if forwarding this t  ask to another office or physician, please   keep clinical pool on the recipient list for   tracking purposes)

## 2024-10-09 ENCOUNTER — APPOINTMENT (OUTPATIENT)
Dept: LAB | Facility: CLINIC | Age: 69
End: 2024-10-09
Payer: MEDICARE

## 2024-10-09 DIAGNOSIS — T84.7XXD HARDWARE COMPLICATING WOUND INFECTION, SUBSEQUENT ENCOUNTER: ICD-10-CM

## 2024-10-09 DIAGNOSIS — M86.9 OSTEITIS OF SYMPHYSIS PUBIS (HCC): ICD-10-CM

## 2024-10-10 ENCOUNTER — ANTICOAG VISIT (OUTPATIENT)
Dept: CARDIOLOGY CLINIC | Facility: CLINIC | Age: 69
End: 2024-10-10

## 2024-10-10 DIAGNOSIS — I48.21 PERMANENT ATRIAL FIBRILLATION (HCC): Primary | ICD-10-CM

## 2024-10-21 ENCOUNTER — APPOINTMENT (OUTPATIENT)
Dept: LAB | Facility: CLINIC | Age: 69
End: 2024-10-21
Payer: MEDICARE

## 2024-10-21 LAB
ALBUMIN SERPL BCG-MCNC: 4.1 G/DL (ref 3.5–5)
ALP SERPL-CCNC: 88 U/L (ref 34–104)
ALT SERPL W P-5'-P-CCNC: 11 U/L (ref 7–52)
ANION GAP SERPL CALCULATED.3IONS-SCNC: 9 MMOL/L (ref 4–13)
AST SERPL W P-5'-P-CCNC: 17 U/L (ref 13–39)
BASOPHILS # BLD AUTO: 0.01 THOUSANDS/ΜL (ref 0–0.1)
BASOPHILS NFR BLD AUTO: 0 % (ref 0–1)
BILIRUB SERPL-MCNC: 0.67 MG/DL (ref 0.2–1)
BUN SERPL-MCNC: 25 MG/DL (ref 5–25)
CALCIUM SERPL-MCNC: 9.1 MG/DL (ref 8.4–10.2)
CHLORIDE SERPL-SCNC: 107 MMOL/L (ref 96–108)
CHOLEST SERPL-MCNC: 127 MG/DL
CO2 SERPL-SCNC: 26 MMOL/L (ref 21–32)
CREAT SERPL-MCNC: 1.23 MG/DL (ref 0.6–1.3)
EOSINOPHIL # BLD AUTO: 0.1 THOUSAND/ΜL (ref 0–0.61)
EOSINOPHIL NFR BLD AUTO: 2 % (ref 0–6)
ERYTHROCYTE [DISTWIDTH] IN BLOOD BY AUTOMATED COUNT: 17.7 % (ref 11.6–15.1)
EST. AVERAGE GLUCOSE BLD GHB EST-MCNC: 120 MG/DL
GFR SERPL CREATININE-BSD FRML MDRD: 60 ML/MIN/1.73SQ M
GLUCOSE P FAST SERPL-MCNC: 74 MG/DL (ref 65–99)
HBA1C MFR BLD: 5.8 %
HCT VFR BLD AUTO: 45.1 % (ref 36.5–49.3)
HDLC SERPL-MCNC: 56 MG/DL
HGB BLD-MCNC: 14.4 G/DL (ref 12–17)
IMM GRANULOCYTES # BLD AUTO: 0.01 THOUSAND/UL (ref 0–0.2)
IMM GRANULOCYTES NFR BLD AUTO: 0 % (ref 0–2)
LDLC SERPL CALC-MCNC: 58 MG/DL (ref 0–100)
LYMPHOCYTES # BLD AUTO: 1.12 THOUSANDS/ΜL (ref 0.6–4.47)
LYMPHOCYTES NFR BLD AUTO: 19 % (ref 14–44)
MCH RBC QN AUTO: 26.3 PG (ref 26.8–34.3)
MCHC RBC AUTO-ENTMCNC: 31.9 G/DL (ref 31.4–37.4)
MCV RBC AUTO: 82 FL (ref 82–98)
MONOCYTES # BLD AUTO: 0.52 THOUSAND/ΜL (ref 0.17–1.22)
MONOCYTES NFR BLD AUTO: 9 % (ref 4–12)
NEUTROPHILS # BLD AUTO: 4.06 THOUSANDS/ΜL (ref 1.85–7.62)
NEUTS SEG NFR BLD AUTO: 70 % (ref 43–75)
NRBC BLD AUTO-RTO: 0 /100 WBCS
PLATELET # BLD AUTO: 208 THOUSANDS/UL (ref 149–390)
PMV BLD AUTO: 10.9 FL (ref 8.9–12.7)
POTASSIUM SERPL-SCNC: 4.8 MMOL/L (ref 3.5–5.3)
PROT SERPL-MCNC: 6.9 G/DL (ref 6.4–8.4)
PSA SERPL-MCNC: 0.33 NG/ML (ref 0–4)
RBC # BLD AUTO: 5.47 MILLION/UL (ref 3.88–5.62)
SODIUM SERPL-SCNC: 142 MMOL/L (ref 135–147)
TRIGL SERPL-MCNC: 66 MG/DL
TSH SERPL DL<=0.05 MIU/L-ACNC: 0.34 UIU/ML (ref 0.45–4.5)
WBC # BLD AUTO: 5.82 THOUSAND/UL (ref 4.31–10.16)

## 2024-10-21 PROCEDURE — 80053 COMPREHEN METABOLIC PANEL: CPT

## 2024-10-21 PROCEDURE — 85025 COMPLETE CBC W/AUTO DIFF WBC: CPT

## 2024-10-23 ENCOUNTER — TELEPHONE (OUTPATIENT)
Dept: FAMILY MEDICINE CLINIC | Facility: CLINIC | Age: 69
End: 2024-10-23

## 2024-10-23 DIAGNOSIS — E05.90 SUBCLINICAL HYPERTHYROIDISM: Primary | ICD-10-CM

## 2024-10-24 NOTE — TELEPHONE ENCOUNTER
Please contact the patient.    All recent blood work was normal aside from slightly overactive thyroid function.    I recommend to schedule thyroid ultrasound for further evaluation.    Patient should repeat thyroid function testing prior to his office visit in December, nonfasting.  Orders placed.    Thank you

## 2024-10-24 NOTE — TELEPHONE ENCOUNTER
Spoke with pt, he did not have any way to write info. Requested MY[facility] message sent. Message sent as requested along with phone number for BELLA SALDIVAR.

## 2024-10-25 ENCOUNTER — HOSPITAL ENCOUNTER (OUTPATIENT)
Dept: ULTRASOUND IMAGING | Facility: HOSPITAL | Age: 69
Discharge: HOME | End: 2024-10-25
Payer: MEDICARE

## 2024-10-25 DIAGNOSIS — E05.90 SUBCLINICAL HYPERTHYROIDISM: ICD-10-CM

## 2024-10-25 PROCEDURE — 76536 US EXAM OF HEAD AND NECK: CPT

## 2024-10-28 ENCOUNTER — TELEPHONE (OUTPATIENT)
Dept: INFECTIOUS DISEASES | Facility: CLINIC | Age: 69
End: 2024-10-28

## 2024-10-28 NOTE — TELEPHONE ENCOUNTER
Contacted pt unfortunately d/t schedule change, will need to r/s patient's appointment today. R/s to 11/12. Patient has enough medication to get him to that appt.

## 2024-11-01 ENCOUNTER — OFFICE VISIT (OUTPATIENT)
Dept: PAIN MEDICINE | Facility: CLINIC | Age: 69
End: 2024-11-01
Payer: MEDICARE

## 2024-11-01 VITALS
DIASTOLIC BLOOD PRESSURE: 74 MMHG | TEMPERATURE: 98 F | SYSTOLIC BLOOD PRESSURE: 118 MMHG | WEIGHT: 211.5 LBS | BODY MASS INDEX: 33.99 KG/M2 | HEIGHT: 66 IN | HEART RATE: 62 BPM

## 2024-11-01 DIAGNOSIS — M47.816 LUMBAR SPONDYLOSIS: ICD-10-CM

## 2024-11-01 DIAGNOSIS — G89.4 CHRONIC PAIN SYNDROME: ICD-10-CM

## 2024-11-01 DIAGNOSIS — Z79.891 LONG-TERM CURRENT USE OF OPIATE ANALGESIC: ICD-10-CM

## 2024-11-01 DIAGNOSIS — M96.1 LUMBAR POSTLAMINECTOMY SYNDROME: Primary | ICD-10-CM

## 2024-11-01 DIAGNOSIS — Z79.899 ENCOUNTER FOR LONG-TERM (CURRENT) DRUG USE: ICD-10-CM

## 2024-11-01 DIAGNOSIS — Z98.1 HISTORY OF LUMBAR FUSION: ICD-10-CM

## 2024-11-01 PROCEDURE — 99214 OFFICE O/P EST MOD 30 MIN: CPT | Performed by: PHYSICIAN ASSISTANT

## 2024-11-01 PROCEDURE — G2211 COMPLEX E/M VISIT ADD ON: HCPCS | Performed by: PHYSICIAN ASSISTANT

## 2024-11-01 RX ORDER — HYDROCODONE BITARTRATE AND ACETAMINOPHEN 10; 325 MG/1; MG/1
1 TABLET ORAL EVERY 6 HOURS PRN
Qty: 120 TABLET | Refills: 0 | Status: SHIPPED | OUTPATIENT
Start: 2024-11-01

## 2024-11-01 RX ORDER — DOXYCYCLINE 100 MG/1
1 CAPSULE ORAL 2 TIMES DAILY
COMMUNITY
Start: 2024-10-07 | End: 2024-11-05 | Stop reason: SDUPTHER

## 2024-11-01 NOTE — PROGRESS NOTES
Assessment:  1. Lumbar postlaminectomy syndrome    2. History of lumbar fusion    3. Lumbar spondylosis    4. Chronic pain syndrome    5. Encounter for long-term (current) drug use    6. Long-term current use of opiate analgesic        Plan:  While the patient was in the office today, I did have a thorough conversation regarding their chronic pain syndrome, medication management, and treatment plan options.    The patient remains clinically stable and well-controlled on the current medication regimen of Norco 10/325 every 6 hours as needed.  Patient states that he is going to try decreasing the medication on his own and supplementing with acetaminophen.  I let him know I think this is a good idea and let him know not to exceed more than 3000 mg of acetaminophen in a 24-hour period.  On today's visit I have electronically sent the refills to his pharmacy with the appropriate fill dates.    [place] Prescription Drug Monitoring Program report was reviewed and was appropriate     A urine drug screen was collected at today's office visit as part of our medication management protocol. The point of care testing results were appropriate for what was being prescribed. The specimen will be sent for confirmatory testing. The drug screen is medically necessary because the patient is either dependent on opioid medication or is being considered for opioid medication therapy and the results could impact ongoing or future treatment. The drug screen is to evaluate for the presences or absence of prescribed, non-prescribed, and/or illicit drugs/substances.    There are risks associated with opioid medications, including dependence, addiction and tolerance. The patient understands and agrees to use these medications only as prescribed. Potential side effects of the medications include, but are not limited to, constipation, drowsiness, addiction, impaired judgment and risk of fatal overdose if not taken as prescribed. The patient  was warned against driving while taking sedation medications.  Sharing medications is a felony. At this point in time, the patient is showing no signs of addiction, abuse, diversion or suicidal ideation.    Unfortunately the patient has not cleared from an infectious disease standpoint for injection therapy.    The patient will follow-up in 8 weeks for medication prescription refill and reevaluation. The patient was advised to contact the office should their symptoms worsen in the interim. The patient was agreeable and verbalized an understanding.        History of Present Illness:    The patient is a 69 y.o. male last seen on 09/06/2024 who presents for a follow up office visit in regards to chronic low back pain secondary to lumbar postlaminectomy pain syndrome and past history of lumbar fusion, lumbar spondylosis, sacroiliitis with a history of pubic symphysis osteitis and hardware infection with MRSA.  The patient currently reports chronic low back pain that he rates a 3 out of 10 on today's visit and describes it as a constant dull, aching, sharp pain.  He has increased pain with prolonged standing and walking and relief with rest and medication.  Patient has been maintained on Norco 10/325 with about 50% reduction in pain and no side effects however he does express interest in possibly trying to decrease it.  He states he is not taking for a day on a regular basis.  He would like to supplement with acetaminophen and is asking about the daily milligram limits.  He rarely takes the baclofen.  Patient unfortunately is not cleared at this point to proceed with injection therapy.    Pain Contract Signed: 01/29/2024  Last Urine Drug Screen: 11/01/2024  Last dose of Norco: This a.m.  Last Narcan prescribed: 04/13/2023    I have personally reviewed and/or updated the patient's past medical history, past surgical history, family history, social history, current medications, allergies, and vital signs today.        Review of Systems:    Review of Systems   Musculoskeletal:  Positive for gait problem.         Past Medical History:   Diagnosis Date    A-fib ([facility])     Acute respiratory failure with hypoxia and hypercapnia ([facility]) 08/17/2020    LAURENCE (acute kidney injury) ([facility]) 08/16/2020    Arthritis     knees    Community acquired pneumonia of left lower lobe of lung 08/12/2020    Diabetes mellitus ([facility])     Hardware complicating wound infection ([facility]) 11/15/2023    Hypertension     Measles, Yoruba (rubella)     Mumps     Nosebleed     Obesity     Pneumonia 08/2020    Psoas abscess ([facility]) 01/03/2023    Sacroiliitis ([facility])     Snoring     Loudly    Substance abuse ([facility])        Past Surgical History:   Procedure Laterality Date    CYSTOSCOPY      Pt denies    JOINT REPLACEMENT  07/2018    KNEE ARTHROSCOPY Left     knee    ORIF PELVIC FRACTURE      MO DEBRIDEMENT SUBCUTANEOUS TISSUE EA ADDL 20 SQ CM N/A 11/03/2023    Procedure: DEBRIDEMENT WOUND (WASH OUT);  Surgeon: Jose Gasca MD;  [facility];  Service: Orthopedics    MO REMOVAL IMPLANT DEEP N/A 11/03/2023    Procedure: REMOVAL HARDWARE pubic symphysis;  Surgeon: Jose Gasca MD;  [facility];  Service: Orthopedics    SPINAL FUSION  05/06/2022    TONSILLECTOMY      TOTAL KNEE ARTHROPLASTY Left 06/10/2019    Procedure: ARTHROPLASTY KNEE TOTAL;  Surgeon: Kenny Moy MD;  [facility];  Service: Orthopedics       Family History   Problem Relation Age of Onset    Ovarian cancer Mother     Heart disease Mother     Diabetes type II Mother     Colon cancer Father     Heart disease Father     Nephrolithiasis Father     Cancer Father        Social History     Occupational History    Not on file   Tobacco Use    Smoking status: Never    Smokeless tobacco: Never   Vaping Use    Vaping status: Never Used   Substance and Sexual Activity    Alcohol use: Not Currently     Alcohol/week: 1.0 standard drink of alcohol     Types: 1 Standard drinks or equivalent  per week    Drug use: Never    Sexual activity: Not Currently     Partners: Male     Birth control/protection: Abstinence         Current Outpatient Medications:     amLODIPine (NORVASC) 5 mg tablet, Take 1 tablet (5 mg total) by mouth daily, Disp: 90 tablet, Rfl: 3    atorvastatin (LIPITOR) 20 mg tablet, Take 1 tablet (20 mg total) by mouth daily, Disp: 90 tablet, Rfl: 3    baclofen 10 mg tablet, Take 1 tablet (10 mg total) by mouth 2 (two) times a day, Disp: 60 tablet, Rfl: 3    doxycycline hyclate (VIBRAMYCIN) 100 mg capsule, Take 1 capsule by mouth 2 (two) times a day, Disp: , Rfl:     glipiZIDE (GLUCOTROL XL) 5 mg 24 hr tablet, Take 1 tablet (5 mg total) by mouth daily, Disp: 90 tablet, Rfl: 3    HYDROcodone-acetaminophen (NORCO)  mg per tablet, Take 1 tablet by mouth every 6 (six) hours as needed for moderate pain For ongoing therapy DO NOT FILL BEFORE: 11/02/24 Max Daily Amount: 4 tablets, Disp: 120 tablet, Rfl: 0    HYDROcodone-acetaminophen (NORCO)  mg per tablet, Take 1 tablet by mouth every 6 (six) hours as needed for moderate pain For ongoing therapy  DO NOT FILL BEFORE: 11/30/24 Max Daily Amount: 4 tablets, Disp: 120 tablet, Rfl: 0    lisinopril (ZESTRIL) 10 mg tablet, Take 1 tablet (10 mg total) by mouth daily, Disp: 90 tablet, Rfl: 3    metoprolol succinate (TOPROL-XL) 50 mg 24 hr tablet, Take 1.5 tablets (75 mg total) by mouth daily, Disp: 135 tablet, Rfl: 3    warfarin (COUMADIN) 5 mg tablet, Take 1 tablet (5 mg total) by mouth daily, Disp: 90 tablet, Rfl: 3    Blood Glucose Monitoring Suppl (OneTouch Verio Reflect) w/Device KIT, Check blood sugars once daily. Please substitute with appropriate alternative as covered by patient's insurance. Dx: E11.65 (Patient not taking: Reported on 8/6/2024), Disp: 1 kit, Rfl: 0    naloxone (NARCAN) 4 mg/0.1 mL nasal spray, Administer 1 spray into a nostril. If no response after 2-3 minutes, give another dose in the other nostril using a new spray.  "(Patient not taking: Reported on 7/11/2024), Disp: 1 each, Rfl: 1    No Known Allergies    Physical Exam:    /74 (BP Location: Left arm, Patient Position: Sitting, Cuff Size: Standard)   Pulse 62   Temp 98 °F (36.7 °C)   Ht 5' 6\" (1.676 m) Comment: Verbal  Wt 95.9 kg (211 lb 8 oz)   BMI 34.14 kg/m²     Constitutional:normal, well developed, well nourished, alert, in no distress and non-toxic and no overt pain behavior. and overweight  Eyes:anicteric  HEENT:grossly intact  Neck:supple, symmetric, trachea midline and no masses   Pulmonary:even and unlabored  Cardiovascular:No edema or pitting edema present  Skin:Normal without rashes or lesions and well hydrated  Psychiatric:Mood and affect appropriate  Neurologic:Cranial Nerves II-XII grossly intact  Musculoskeletal: Lumbar scar from prior surgery healed well, limited lumbar range of motion, gait is stable without the use of assistive devices    Imaging  No orders to display         Orders Placed This Encounter   Procedures    [facility] All Prescribed Meds and Special Instructions    Amphetamines, Methamphetamines    Butalbital    Phenobarbital    Secobarbital    Alprazolam    Clonazepam    Diazepam    Lorazepam    Gabapentin    Pregabalin    Cocaine    Heroin    Buprenorphine    Levorphanol    Meperidine    Naltrexone    Fentanyl    Methadone    Oxycodone    Tapentadol    THC    Tramadol    Codeine, Hydrocodone, Hydropmorphone, Morphine    Bath Salts    Ethyl Glucuronide/Ethyl Sulfate    Kratom    Spice    Methylphenidate    Phentermine    Validity Oxidant    Validity Creatinine    Validity pH    Validity Specific    Xylazine Definitive Test       "

## 2024-11-03 LAB
4OH-XYLAZINE UR QL CFM: NEGATIVE NG/ML
6MAM UR QL CFM: NEGATIVE NG/ML
7AMINOCLONAZEPAM UR QL CFM: NEGATIVE NG/ML
A-OH ALPRAZ UR QL CFM: NEGATIVE NG/ML
ACCEPTABLE CREAT UR QL: NORMAL MG/DL
ACCEPTIBLE SP GR UR QL: NORMAL
AMPHET UR QL CFM: NEGATIVE NG/ML
BUPRENORPHINE UR QL CFM: NEGATIVE NG/ML
BUTALBITAL UR QL CFM: NEGATIVE NG/ML
BZE UR QL CFM: NEGATIVE NG/ML
CODEINE UR QL CFM: NEGATIVE NG/ML
EDDP UR QL CFM: NEGATIVE NG/ML
ETHYL GLUCURONIDE UR QL CFM: NEGATIVE NG/ML
ETHYL SULFATE UR QL SCN: NEGATIVE NG/ML
EUTYLONE UR QL: NEGATIVE NG/ML
FENTANYL UR QL CFM: NEGATIVE NG/ML
GLIADIN IGG SER IA-ACNC: NEGATIVE NG/ML
HYDROCODONE UR QL CFM: NORMAL NG/ML
HYDROMORPHONE UR QL CFM: NORMAL NG/ML
LORAZEPAM UR QL CFM: NEGATIVE NG/ML
ME-PHENIDATE UR QL CFM: NEGATIVE NG/ML
MEPERIDINE UR QL CFM: NEGATIVE NG/ML
METHADONE UR QL CFM: NEGATIVE NG/ML
METHAMPHET UR QL CFM: NEGATIVE NG/ML
MORPHINE UR QL CFM: NEGATIVE NG/ML
NALTREXONE UR QL CFM: NEGATIVE NG/ML
NITRITE UR QL: NORMAL UG/ML
NORBUPRENORPHINE UR QL CFM: NEGATIVE NG/ML
NORDIAZEPAM UR QL CFM: NEGATIVE NG/ML
NORFENTANYL UR QL CFM: NEGATIVE NG/ML
NORHYDROCODONE UR QL CFM: NORMAL NG/ML
NORMEPERIDINE UR QL CFM: NEGATIVE NG/ML
NOROXYCODONE UR QL CFM: NEGATIVE NG/ML
OXAZEPAM UR QL CFM: NEGATIVE NG/ML
OXYCODONE UR QL CFM: NEGATIVE NG/ML
OXYMORPHONE UR QL CFM: NEGATIVE NG/ML
PARA-FLUOROFENTANYL QUANTIFICATION: NORMAL NG/ML
PHENOBARB UR QL CFM: NEGATIVE NG/ML
RESULT ALL_PRESCRIBED MEDS AND SPECIAL INSTRUCTIONS: NORMAL
SECOBARBITAL UR QL CFM: NEGATIVE NG/ML
SL AMB 5F-ADB-M7 METABOLITE QUANTIFICATION: NEGATIVE NG/ML
SL AMB 7-OH-MITRAGYNINE (KRATOM ALKALOID) QUANTIFICATION: NEGATIVE NG/ML
SL AMB AB-FUBINACA-M3 METABOLITE QUANTIFICATION: NEGATIVE NG/ML
SL AMB ACETYL FENTANYL QUANTIFICATION: NORMAL NG/ML
SL AMB ACETYL NORFENTANYL QUANTIFICATION: NORMAL NG/ML
SL AMB ACRYL FENTANYL QUANTIFICATION: NORMAL NG/ML
SL AMB CARFENTANIL QUANTIFICATION: NORMAL NG/ML
SL AMB CTHC (MARIJUANA METABOLITE) QUANTIFICATION: NEGATIVE NG/ML
SL AMB DEXTRORPHAN (DEXTROMETHORPHAN METABOLITE) QUANT: NEGATIVE NG/ML
SL AMB GABAPENTIN QUANTIFICATION: NEGATIVE NG/ML
SL AMB JWH018 METABOLITE QUANTIFICATION: NEGATIVE NG/ML
SL AMB JWH073 METABOLITE QUANTIFICATION: NEGATIVE NG/ML
SL AMB MDMB-FUBINACA-M1 METABOLITE QUANTIFICATION: NEGATIVE NG/ML
SL AMB METHYLONE QUANTIFICATION: NEGATIVE NG/ML
SL AMB N-DESMETHYL-TRAMADOL QUANTIFICATION: NEGATIVE NG/ML
SL AMB PHENTERMINE QUANTIFICATION: NEGATIVE NG/ML
SL AMB PREGABALIN QUANTIFICATION: NEGATIVE NG/ML
SL AMB RCS4 METABOLITE QUANTIFICATION: NEGATIVE NG/ML
SL AMB RITALINIC ACID QUANTIFICATION: NEGATIVE NG/ML
SMOOTH MUSCLE AB TITR SER IF: NEGATIVE NG/ML
SPECIMEN DRAWN SERPL: NEGATIVE NG/ML
SPECIMEN PH ACCEPTABLE UR: NORMAL
TAPENTADOL UR QL CFM: NEGATIVE NG/ML
TEMAZEPAM UR QL CFM: NEGATIVE NG/ML
TRAMADOL UR QL CFM: NEGATIVE NG/ML
URATE/CREAT 24H UR: NEGATIVE NG/ML
XYLAZINE UR QL CFM: NEGATIVE NG/ML

## 2024-11-04 ENCOUNTER — TELEPHONE (OUTPATIENT)
Dept: INFECTIOUS DISEASES | Facility: CLINIC | Age: 69
End: 2024-11-04

## 2024-11-04 NOTE — TELEPHONE ENCOUNTER
Called and spoke with patient today.   Informed patient I was calling to offer a sooner follow up appointment on 11/5/24 due to cancellations. Patient accepts and is scheduled 11/5/24 at 11:45AM with NICK Calle at the [facility].

## 2024-11-05 ENCOUNTER — OFFICE VISIT (OUTPATIENT)
Dept: INFECTIOUS DISEASES | Facility: CLINIC | Age: 69
End: 2024-11-05
Payer: MEDICARE

## 2024-11-05 VITALS
RESPIRATION RATE: 18 BRPM | WEIGHT: 216 LBS | TEMPERATURE: 96.7 F | DIASTOLIC BLOOD PRESSURE: 70 MMHG | SYSTOLIC BLOOD PRESSURE: 100 MMHG | HEIGHT: 66 IN | BODY MASS INDEX: 34.72 KG/M2 | HEART RATE: 83 BPM | OXYGEN SATURATION: 98 %

## 2024-11-05 DIAGNOSIS — M86.9 OSTEITIS OF SYMPHYSIS PUBIS (HCC): Primary | ICD-10-CM

## 2024-11-05 DIAGNOSIS — K43.9 HERNIA OF ABDOMINAL WALL: ICD-10-CM

## 2024-11-05 DIAGNOSIS — E11.69 TYPE 2 DIABETES MELLITUS WITH OBESITY  (HCC): Chronic | ICD-10-CM

## 2024-11-05 DIAGNOSIS — Z98.1 H/O SPINAL FUSION: ICD-10-CM

## 2024-11-05 DIAGNOSIS — E66.9 TYPE 2 DIABETES MELLITUS WITH OBESITY  (HCC): Chronic | ICD-10-CM

## 2024-11-05 DIAGNOSIS — E66.811 OBESITY (BMI 30.0-34.9): ICD-10-CM

## 2024-11-05 PROCEDURE — 99213 OFFICE O/P EST LOW 20 MIN: CPT | Performed by: PHYSICIAN ASSISTANT

## 2024-11-05 RX ORDER — DOXYCYCLINE 100 MG/1
100 CAPSULE ORAL 2 TIMES DAILY
Qty: 180 CAPSULE | Refills: 1 | Status: SHIPPED | OUTPATIENT
Start: 2024-11-05 | End: 2025-02-03

## 2024-11-05 NOTE — ASSESSMENT & PLAN NOTE
? Hernia present.  Area on exam is nontender and not present when patient lies down.  Suggest he have this evaluated by his PCP.  Discussed with him if it is painful or not reducible or he feels unwell he should be evaluated immediately.

## 2024-11-05 NOTE — PATIENT INSTRUCTIONS
- continue doxycycline as ordered  - refill order sent to pharmacy  - labs prior to next visit.  - follow up with PCP for ? Hernia  - RTO 6 months  - call with any concerns

## 2024-11-05 NOTE — ASSESSMENT & PLAN NOTE
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  Patient initially presented with development of a draining sinus track.  ORIF was performed many years ago.  He went to the OR on 11/3 for hardware removal along with debridement.  Cultures isolated MRSA.  Susceptibilities reviewed.  Patient currently on Coumadin and so unable to take Bactrim.  Has been taking clindamycin but organism resistant.  Fortunately he is not showing any signs of relapse currently.  Suspect that this may have been a chronic progressive process as he reports previous draining tract prior to spinal surgery and radiology mentions abnormalities on 2022 images.  However based on imaging would question if patient may have seeded his spinal hardware.  We discussed avoidance of PICC line, plans for Dalvance and transition to suppression.      Patient received his 2 infusion of dalvance without issue and has been transitioned to po suppression with doxcycline due to possible seeding of spinal hardware.     Patient continues to tolerate the doxycycline.  He has no new complaints.    Labs remain stable.       Plan  -  continue doxycycline 100 mg po bid indefinitely  - refill order sent to pharmacy today  - reviewed with patient to avoid prolonged sun expose while on doxycycline, stay upright for at least 30 minutes after taking the doxycycline, and wait 1-2 hours before and after doxcycline to take vitamins/supplements/dairy  - CBCD, CMP prior to next appointment  - RTO 6 months    Orders:    doxycycline hyclate (VIBRAMYCIN) 100 mg capsule; Take 1 capsule (100 mg total) by mouth 2 (two) times a day    CBC and differential; Future    Comprehensive metabolic panel; Future

## 2024-11-05 NOTE — PROGRESS NOTES
Ambulatory Visit  Name: Adolfo Munroe      : 1955      MRN: [id number]  Encounter Provider: Luc Kirby PA-C  Encounter Date: 2024   Encounter department: 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  Patient initially presented with development of a draining sinus track.  ORIF was performed many years ago.  He went to the OR on 11/3 for hardware removal along with debridement.  Cultures isolated MRSA.  Susceptibilities reviewed.  Patient currently on Coumadin and so unable to take Bactrim.  Has been taking clindamycin but organism resistant.  Fortunately he is not showing any signs of relapse currently.  Suspect that this may have been a chronic progressive process as he reports previous draining tract prior to spinal surgery and radiology mentions abnormalities on  images.  However based on imaging would question if patient may have seeded his spinal hardware.  We discussed avoidance of PICC line, plans for Dalvance and transition to suppression.      Patient received his 2 infusion of dalvance without issue and has been transitioned to po suppression with doxcycline due to possible seeding of spinal hardware.     Patient continues to tolerate the doxycycline.  He has no new complaints.    Labs remain stable.       Plan  -  continue doxycycline 100 mg po bid indefinitely  - refill order sent to pharmacy today  - reviewed with patient to avoid prolonged sun expose while on doxycycline, stay upright for at least 30 minutes after taking the doxycycline, and wait 1-2 hours before and after doxcycline to take vitamins/supplements/dairy  - CBCD, CMP prior to next appointment  - RTO 6 months    Orders:    doxycycline hyclate (VIBRAMYCIN) 100 mg capsule; Take 1 capsule (100 mg total) by mouth 2 (two) times a day    CBC and differential; Future    Comprehensive metabolic panel;  Future    H/O spinal fusion  Previous spinal fusion of L1-L5.  Would question if this site has been seeded given CT imaging with lucency at the L5 pedicle screw.  Noted on most recent CT imaging in September.  Would question if patient may have seeded this area.  Patient completed 2 infusions of dalvance as above, now will be transitioned to suppressive doxycycline.           Type 2 diabetes mellitus with obesity  (HCC)    Lab Results   Component Value Date    HGBA1C 5.8 (H) 10/21/2024            Obesity (BMI 30.0-34.9)  BMI 34.86         Hernia of abdominal wall  ? Hernia present.  Area on exam is nontender and not present when patient lies down.  Suggest he have this evaluated by his PCP.  Discussed with him if it is painful or not reducible or he feels unwell he should be evaluated immediately.            History of Present Illness     Adolfo Munroe is a 69 y.o. male who presents for office follow up today regarding chronic doxycycline suppression.  He continues to take the doxycycline as prescribed although not always at the same time due to it causing some nausea if he does not eat.  He states no major medication changes or changes to his medical history.  He does report he has a new bulging area in his abdomen.  It does not hurt.        Review of Systems   Constitutional:  Negative for chills and fever.   Respiratory:  Negative for cough and shortness of breath.    Gastrointestinal:  Negative for abdominal pain, diarrhea, nausea and vomiting.   Skin:  Negative for rash.   Psychiatric/Behavioral:  Negative for behavioral problems and confusion.            Objective     There were no vitals taken for this visit.    Physical Exam  Vitals reviewed.   Constitutional:       General: He is not in acute distress.     Appearance: Normal appearance. He is not ill-appearing, toxic-appearing or diaphoretic.   HENT:      Head: Normocephalic and atraumatic.   Eyes:      General: No scleral icterus.        Right eye: No  discharge.         Left eye: No discharge.      Conjunctiva/sclera: Conjunctivae normal.   Cardiovascular:      Rate and Rhythm: Normal rate.   Pulmonary:      Effort: Pulmonary effort is normal. No respiratory distress.      Breath sounds: Normal breath sounds. No stridor. No wheezing, rhonchi or rales.   Chest:      Chest wall: No tenderness.   Abdominal:      General: Bowel sounds are normal. There is no distension.      Palpations: Abdomen is soft.      Tenderness: There is no abdominal tenderness.      Comments: Patient with protruding area near old incision site while standing up.  Had him lie flat on the exam table and this area is no longer palpable.    Skin:     General: Skin is warm and dry.      Coloration: Skin is not jaundiced or pale.      Findings: No erythema or rash.   Neurological:      Mental Status: He is alert and oriented to person, place, and time.   Psychiatric:         Mood and Affect: Mood normal.         Behavior: Behavior normal.           Labs:   10/21/24  Wbc: 5.82  Hgb: 14.4  Plt: 208  Cr: 1.23  LFTs: WNL

## 2024-11-22 DIAGNOSIS — I48.0 PAROXYSMAL ATRIAL FIBRILLATION (HCC): ICD-10-CM

## 2024-11-25 RX ORDER — WARFARIN SODIUM 5 MG/1
5 TABLET ORAL DAILY
Qty: 90 TABLET | Refills: 3 | Status: SHIPPED | OUTPATIENT
Start: 2024-11-25

## 2024-11-25 RX ORDER — METOPROLOL SUCCINATE 50 MG/1
75 TABLET, EXTENDED RELEASE ORAL DAILY
Qty: 135 TABLET | Refills: 1 | Status: SHIPPED | OUTPATIENT
Start: 2024-11-25

## 2024-12-02 DIAGNOSIS — I48.21 PERMANENT ATRIAL FIBRILLATION (HCC): Primary | ICD-10-CM

## 2024-12-09 ENCOUNTER — APPOINTMENT (OUTPATIENT)
Dept: LAB | Facility: CLINIC | Age: 69
End: 2024-12-09
Payer: MEDICARE

## 2024-12-09 DIAGNOSIS — E05.90 SUBCLINICAL HYPERTHYROIDISM: ICD-10-CM

## 2024-12-09 DIAGNOSIS — M86.9 OSTEITIS OF SYMPHYSIS PUBIS (HCC): ICD-10-CM

## 2024-12-09 DIAGNOSIS — I48.21 PERMANENT ATRIAL FIBRILLATION (HCC): ICD-10-CM

## 2024-12-09 LAB
T4 FREE SERPL-MCNC: 1.29 NG/DL (ref 0.61–1.12)
TSH SERPL DL<=0.05 MIU/L-ACNC: 0.4 UIU/ML (ref 0.45–4.5)

## 2024-12-09 PROCEDURE — 84443 ASSAY THYROID STIM HORMONE: CPT

## 2024-12-09 PROCEDURE — 36415 COLL VENOUS BLD VENIPUNCTURE: CPT

## 2024-12-09 PROCEDURE — 84439 ASSAY OF FREE THYROXINE: CPT

## 2024-12-10 ENCOUNTER — ANTICOAG VISIT (OUTPATIENT)
Dept: CARDIOLOGY CLINIC | Facility: CLINIC | Age: 69
End: 2024-12-10

## 2024-12-10 DIAGNOSIS — I48.21 PERMANENT ATRIAL FIBRILLATION (HCC): Primary | ICD-10-CM

## 2024-12-16 ENCOUNTER — RA CDI HCC (OUTPATIENT)
Dept: OTHER | Facility: HOSPITAL | Age: 69
End: 2024-12-16

## 2024-12-18 ENCOUNTER — OFFICE VISIT (OUTPATIENT)
Dept: PAIN MEDICINE | Facility: CLINIC | Age: 69
End: 2024-12-18
Payer: MEDICARE

## 2024-12-18 VITALS
HEIGHT: 66 IN | HEART RATE: 80 BPM | DIASTOLIC BLOOD PRESSURE: 82 MMHG | SYSTOLIC BLOOD PRESSURE: 128 MMHG | BODY MASS INDEX: 35.03 KG/M2 | TEMPERATURE: 98 F | WEIGHT: 218 LBS

## 2024-12-18 DIAGNOSIS — M96.1 LUMBAR POSTLAMINECTOMY SYNDROME: Primary | ICD-10-CM

## 2024-12-18 DIAGNOSIS — G89.4 CHRONIC PAIN SYNDROME: ICD-10-CM

## 2024-12-18 DIAGNOSIS — M47.816 LUMBAR SPONDYLOSIS: ICD-10-CM

## 2024-12-18 DIAGNOSIS — F11.20 UNCOMPLICATED OPIOID DEPENDENCE (HCC): ICD-10-CM

## 2024-12-18 DIAGNOSIS — M46.1 SACROILIITIS (HCC): ICD-10-CM

## 2024-12-18 DIAGNOSIS — Z79.891 LONG-TERM CURRENT USE OF OPIATE ANALGESIC: ICD-10-CM

## 2024-12-18 DIAGNOSIS — Z98.1 HISTORY OF LUMBAR FUSION: ICD-10-CM

## 2024-12-18 PROCEDURE — 99214 OFFICE O/P EST MOD 30 MIN: CPT | Performed by: PHYSICIAN ASSISTANT

## 2024-12-18 RX ORDER — HYDROCODONE BITARTRATE AND ACETAMINOPHEN 10; 325 MG/1; MG/1
1 TABLET ORAL EVERY 6 HOURS PRN
Qty: 120 TABLET | Refills: 0 | Status: SHIPPED | OUTPATIENT
Start: 2024-12-18

## 2024-12-18 RX ORDER — BACLOFEN 10 MG/1
10 TABLET ORAL 2 TIMES DAILY
Qty: 60 TABLET | Refills: 3 | Status: SHIPPED | OUTPATIENT
Start: 2024-12-18

## 2024-12-18 NOTE — PROGRESS NOTES
Assessment:  1. Lumbar postlaminectomy syndrome    2. History of lumbar fusion    3. Sacroiliitis (HCC)    4. Lumbar spondylosis    5. Chronic pain syndrome    6. Long-term current use of opiate analgesic    7. Uncomplicated opioid dependence (HCC)        Plan:  While the patient was in the office today, I did have a thorough conversation regarding their chronic pain syndrome, medication management, and treatment plan options.    The patient remains clinically stable and well-controlled on the current medication regimen of Norco 10/325 every 6 hours as needed and as needed baclofen 10 mg twice daily.  He reports constipation over the medication as states is something he is able to manage with stool softeners.  I feel it is reasonable to continue as injections are not an option with his history of infection involving hardware and is on lifelong doxycycline.  On today's visit I have electronically sent refills to his pharmacy with the appropriate fill dates of 12/31/2024, 1/28/2025 and 2/26/2025.    [place] Prescription Drug Monitoring Program report was reviewed and was appropriate     There are risks associated with opioid medications, including dependence, addiction and tolerance. The patient understands and agrees to use these medications only as prescribed. Potential side effects of the medications include, but are not limited to, constipation, drowsiness, addiction, impaired judgment and risk of fatal overdose if not taken as prescribed. The patient was warned against driving while taking sedation medications.  Sharing medications is a felony. At this point in time, the patient is showing no signs of addiction, abuse, diversion or suicidal ideation.    The patient will follow-up in 12 weeks for medication prescription refill and reevaluation. The patient was advised to contact the office should their symptoms worsen in the interim. The patient was agreeable and verbalized an understanding.        History of  Present Illness:    The patient is a 69 y.o. male last seen on 11/1/2024 who presents for a follow up office visit in regards to chronic low back pain secondary to lumbar postlaminectomy pain syndrome/history of lumbar fusion, sacroiliitis.  The patient currently reports low back pain that he rates a 4 out of 10 and describes as a constant sharp pain with intermittent radiation into the right anterior thigh.  He has had significant relief with SI joint injections in the past but unfortunately is not cleared to proceed due to the history of osteitis of the symphysis pubis, now on likely lifelong doxycycline and followed by infectious disease.  Overall however he states that he is able to manage his current pain with a combination of Norco 10/325 every 6 hours as needed and baclofen 10 mg twice daily as needed.    Pain Contract Signed: 1/29/2024  Last Urine Drug Screen: 11/1/2024    I have personally reviewed and/or updated the patient's past medical history, past surgical history, family history, social history, current medications, allergies, and vital signs today.       Review of Systems:    Review of Systems   Respiratory:  Negative for shortness of breath.    Cardiovascular:  Negative for chest pain.   Gastrointestinal:  Positive for constipation. Negative for diarrhea, nausea and vomiting.   Musculoskeletal:  Negative for arthralgias, gait problem, joint swelling and myalgias.   Skin:  Negative for rash.   Neurological:  Positive for weakness. Negative for dizziness and seizures.   All other systems reviewed and are negative.        Past Medical History:   Diagnosis Date   • A-fib ([facility])    • Acute respiratory failure with hypoxia and hypercapnia ([facility]) 08/17/2020   • LAURENCE (acute kidney injury) ([facility]) 08/16/2020   • Arthritis     knees   • Community acquired pneumonia of left lower lobe of lung 08/12/2020   • Diabetes mellitus ([facility])    • Hardware complicating wound infection ([facility]) 11/15/2023   • Hypertension    •  Measles, Belgian (rubella)    • Mumps    • Nosebleed    • Obesity    • Pneumonia 08/2020   • Psoas abscess (HCC) 01/03/2023   • Sacroiliitis (HCC)    • Snoring     Loudly   • Substance abuse (HCC)        Past Surgical History:   Procedure Laterality Date   • CYSTOSCOPY      Pt denies   • JOINT REPLACEMENT  07/2018   • KNEE ARTHROSCOPY Left     knee   • ORIF PELVIC FRACTURE     • IN DEBRIDEMENT SUBCUTANEOUS TISSUE EA ADDL 20 SQ CM N/A 11/03/2023    Procedure: DEBRIDEMENT WOUND (WASH OUT);  Surgeon: Jose Gasca MD;  Location: [facility];  Service: Orthopedics   • IN REMOVAL IMPLANT DEEP N/A 11/03/2023    Procedure: REMOVAL HARDWARE pubic symphysis;  Surgeon: Jose Gasca MD;  Location: [facility];  Service: Orthopedics   • SPINAL FUSION  05/06/2022   • TONSILLECTOMY     • TOTAL KNEE ARTHROPLASTY Left 06/10/2019    Procedure: ARTHROPLASTY KNEE TOTAL;  Surgeon: Kenny Moy MD;  Location: [facility];  Service: Orthopedics       Family History   Problem Relation Age of Onset   • Ovarian cancer Mother    • Heart disease Mother    • Diabetes type II Mother    • Colon cancer Father    • Heart disease Father    • Nephrolithiasis Father    • Cancer Father        Social History     Occupational History   • Not on file   Tobacco Use   • Smoking status: Never   • Smokeless tobacco: Never   Vaping Use   • Vaping status: Never Used   Substance and Sexual Activity   • Alcohol use: Not Currently     Alcohol/week: 1.0 standard drink of alcohol     Types: 1 Standard drinks or equivalent per week   • Drug use: Never   • Sexual activity: Not Currently     Partners: Male     Birth control/protection: Abstinence         Current Outpatient Medications:   •  amLODIPine (NORVASC) 5 mg tablet, Take 1 tablet (5 mg total) by mouth daily, Disp: 90 tablet, Rfl: 3  •  atorvastatin (LIPITOR) 20 mg tablet, Take 1 tablet (20 mg total) by mouth daily, Disp: 90 tablet, Rfl: 3  •  baclofen 10 mg tablet, Take 1 tablet (10 mg total) by mouth 2  (two) times a day, Disp: 60 tablet, Rfl: 3  •  doxycycline hyclate (VIBRAMYCIN) 100 mg capsule, Take 1 capsule (100 mg total) by mouth 2 (two) times a day, Disp: 180 capsule, Rfl: 1  •  glipiZIDE (GLUCOTROL XL) 5 mg 24 hr tablet, Take 1 tablet (5 mg total) by mouth daily, Disp: 90 tablet, Rfl: 3  •  HYDROcodone-acetaminophen (NORCO)  mg per tablet, Take 1 tablet by mouth every 6 (six) hours as needed for moderate pain For ongoing therapy DO NOT FILL BEFORE: 12/31/24 Max Daily Amount: 4 tablets, Disp: 120 tablet, Rfl: 0  •  HYDROcodone-acetaminophen (NORCO)  mg per tablet, Take 1 tablet by mouth every 6 (six) hours as needed for moderate pain For ongoing therapy  DO NOT FILL BEFORE: 01/28/25 Max Daily Amount: 4 tablets, Disp: 120 tablet, Rfl: 0  •  HYDROcodone-acetaminophen (NORCO)  mg per tablet, Take 1 tablet by mouth every 6 (six) hours as needed for moderate pain For ongoing therapy DO NOT FILL BEFORE: 02/26/25 Max Daily Amount: 4 tablets, Disp: 120 tablet, Rfl: 0  •  Jantoven 5 MG tablet, TAKE ONE TABLET BY MOUTH EVERY DAY, Disp: 90 tablet, Rfl: 3  •  lisinopril (ZESTRIL) 10 mg tablet, Take 1 tablet (10 mg total) by mouth daily, Disp: 90 tablet, Rfl: 3  •  metoprolol succinate (TOPROL-XL) 50 mg 24 hr tablet, TAKE ONE AND ONE-HALF TABLETS BY MOUTH EVERY DAY, Disp: 135 tablet, Rfl: 1  •  Blood Glucose Monitoring Suppl (OneTouch Verio Reflect) w/Device KIT, Check blood sugars once daily. Please substitute with appropriate alternative as covered by patient's insurance. Dx: E11.65 (Patient not taking: Reported on 8/6/2024), Disp: 1 kit, Rfl: 0  •  naloxone (NARCAN) 4 mg/0.1 mL nasal spray, Administer 1 spray into a nostril. If no response after 2-3 minutes, give another dose in the other nostril using a new spray. (Patient not taking: Reported on 7/11/2024), Disp: 1 each, Rfl: 1    No Known Allergies    Physical Exam:    /82 (BP Location: Left arm, Patient Position: Sitting, Cuff Size: Large)   " Pulse 80   Temp 98 °F (36.7 °C)   Ht 5' 6\" (1.676 m)   Wt 98.9 kg (218 lb)   BMI 35.19 kg/m²     Constitutional:normal, well developed, well nourished, alert, in no distress and non-toxic and no overt pain behavior. and overweight  Eyes:anicteric  HEENT:grossly intact  Neck:supple, symmetric, trachea midline and no masses   Pulmonary:even and unlabored  Cardiovascular:No edema or pitting edema present  Skin:Normal without rashes or lesions and well hydrated  Psychiatric:Mood and affect appropriate  Neurologic:Cranial Nerves II-XII grossly intact  Musculoskeletal: Gait is slow but stable without the use of assistive devices, limited range of motion lumbar spine, lumbar scar from prior surgeries.      Imaging  No orders to display         No orders of the defined types were placed in this encounter.      "

## 2024-12-20 ENCOUNTER — OFFICE VISIT (OUTPATIENT)
Dept: FAMILY MEDICINE CLINIC | Facility: CLINIC | Age: 69
End: 2024-12-20
Payer: MEDICARE

## 2024-12-20 VITALS
OXYGEN SATURATION: 97 % | RESPIRATION RATE: 18 BRPM | SYSTOLIC BLOOD PRESSURE: 130 MMHG | HEIGHT: 66 IN | BODY MASS INDEX: 35.07 KG/M2 | TEMPERATURE: 97.6 F | HEART RATE: 72 BPM | WEIGHT: 218.2 LBS | DIASTOLIC BLOOD PRESSURE: 80 MMHG

## 2024-12-20 DIAGNOSIS — E66.9 TYPE 2 DIABETES MELLITUS WITH OBESITY  (HCC): Chronic | ICD-10-CM

## 2024-12-20 DIAGNOSIS — E78.5 DYSLIPIDEMIA: ICD-10-CM

## 2024-12-20 DIAGNOSIS — M48.07 STENOSIS OF LUMBOSACRAL SPINE: Chronic | ICD-10-CM

## 2024-12-20 DIAGNOSIS — E05.90 SUBCLINICAL HYPERTHYROIDISM: Primary | ICD-10-CM

## 2024-12-20 DIAGNOSIS — K43.2 INCISIONAL HERNIA, WITHOUT OBSTRUCTION OR GANGRENE: ICD-10-CM

## 2024-12-20 DIAGNOSIS — I10 ESSENTIAL HYPERTENSION: Chronic | ICD-10-CM

## 2024-12-20 DIAGNOSIS — E11.69 TYPE 2 DIABETES MELLITUS WITH OBESITY  (HCC): Chronic | ICD-10-CM

## 2024-12-20 DIAGNOSIS — M86.9 OSTEITIS OF SYMPHYSIS PUBIS (HCC): ICD-10-CM

## 2024-12-20 DIAGNOSIS — Z00.00 MEDICARE ANNUAL WELLNESS VISIT, SUBSEQUENT: ICD-10-CM

## 2024-12-20 DIAGNOSIS — I48.21 PERMANENT ATRIAL FIBRILLATION (HCC): ICD-10-CM

## 2024-12-20 PROCEDURE — 99214 OFFICE O/P EST MOD 30 MIN: CPT | Performed by: FAMILY MEDICINE

## 2024-12-20 PROCEDURE — G0439 PPPS, SUBSEQ VISIT: HCPCS | Performed by: FAMILY MEDICINE

## 2024-12-20 NOTE — PATIENT INSTRUCTIONS
Medicare Preventive Visit Patient Instructions  Thank you for completing your Welcome to Medicare Visit or Medicare Annual Wellness Visit today. Your next wellness visit will be due in one year (12/21/2025).  The screening/preventive services that you may require over the next 5-10 years are detailed below. Some tests may not apply to you based off risk factors and/or age. Screening tests ordered at today's visit but not completed yet may show as past due. Also, please note that scanned in results may not display below.  Preventive Screenings:  Service Recommendations Previous Testing/Comments   Colorectal Cancer Screening  Colonoscopy    Fecal Occult Blood Test (FOBT)/Fecal Immunochemical Test (FIT)  Fecal DNA/Cologuard Test  Flexible Sigmoidoscopy Age: 45-75 years old   Colonoscopy: every 10 years (May be performed more frequently if at higher risk)  OR  FOBT/FIT: every 1 year  OR  Cologuard: every 3 years  OR  Sigmoidoscopy: every 5 years  Screening may be recommended earlier than age 45 if at higher risk for colorectal cancer. Also, an individualized decision between you and your healthcare provider will decide whether screening between the ages of 76-85 would be appropriate. Colonoscopy: Not on file  FOBT/FIT: Not on file  Cologuard: Not on file  Sigmoidoscopy: Not on file          Prostate Cancer Screening Individualized decision between patient and health care provider in men between ages of 55-69   Medicare will cover every 12 months beginning on the day after your 50th birthday PSA: 0.330 ng/mL     Screening Current     Hepatitis C Screening Once for adults born between 1945 and 1965  More frequently in patients at high risk for Hepatitis C Hep C Antibody: 03/29/2019    Screening Current   Diabetes Screening 1-2 times per year if you're at risk for diabetes or have pre-diabetes Fasting glucose: 74 mg/dL (10/21/2024)  A1C: 5.8 % (10/21/2024)  Screening Not Indicated  History Diabetes   Cholesterol Screening  Once every 5 years if you don't have a lipid disorder. May order more often based on risk factors. Lipid panel: 10/21/2024  Screening Current      Other Preventive Screenings Covered by Medicare:  Abdominal Aortic Aneurysm (AAA) Screening: covered once if your at risk. You're considered to be at risk if you have a family history of AAA or a male between the age of 65-75 who smoking at least 100 cigarettes in your lifetime.  Lung Cancer Screening: covers low dose CT scan once per year if you meet all of the following conditions: (1) Age 55-77; (2) No signs or symptoms of lung cancer; (3) Current smoker or have quit smoking within the last 15 years; (4) You have a tobacco smoking history of at least 20 pack years (packs per day x number of years you smoked); (5) You get a written order from a healthcare provider.  Glaucoma Screening: covered annually if you're considered high risk: (1) You have diabetes OR (2) Family history of glaucoma OR (3)  aged 50 and older OR (4)  American aged 65 and older  Osteoporosis Screening: covered every 2 years if you meet one of the following conditions: (1) Have a vertebral abnormality; (2) On glucocorticoid therapy for more than 3 months; (3) Have primary hyperparathyroidism; (4) On osteoporosis medications and need to assess response to drug therapy.  HIV Screening: covered annually if you're between the age of 15-65. Also covered annually if you are younger than 15 and older than 65 with risk factors for HIV infection. For pregnant patients, it is covered up to 3 times per pregnancy.    Immunizations:  Immunization Recommendations   Influenza Vaccine Annual influenza vaccination during flu season is recommended for all persons aged >= 6 months who do not have contraindications   Pneumococcal Vaccine   * Pneumococcal conjugate vaccine = PCV13 (Prevnar 13), PCV15 (Vaxneuvance), PCV20 (Prevnar 20)  * Pneumococcal polysaccharide vaccine = PPSV23 (Pneumovax)  Adults 19-63 yo with certain risk factors or if 65+ yo  If never received any pneumonia vaccine: recommend Prevnar 20 (PCV20)  Give PCV20 if previously received 1 dose of PCV13 or PPSV23   Hepatitis B Vaccine 3 dose series if at intermediate or high risk (ex: diabetes, end stage renal disease, liver disease)   Respiratory syncytial virus (RSV) Vaccine - COVERED BY MEDICARE PART D  * RSVPreF3 (Arexvy) CDC recommends that adults 60 years of age and older may receive a single dose of RSV vaccine using shared clinical decision-making (SCDM)   Tetanus (Td) Vaccine - COST NOT COVERED BY MEDICARE PART B Following completion of primary series, a booster dose should be given every 10 years to maintain immunity against tetanus. Td may also be given as tetanus wound prophylaxis.   Tdap Vaccine - COST NOT COVERED BY MEDICARE PART B Recommended at least once for all adults. For pregnant patients, recommended with each pregnancy.   Shingles Vaccine (Shingrix) - COST NOT COVERED BY MEDICARE PART B  2 shot series recommended in those 19 years and older who have or will have weakened immune systems or those 50 years and older     Health Maintenance Due:      Topic Date Due   • Colorectal Cancer Screening  Never done   • Hepatitis C Screening  Completed     Immunizations Due:      Topic Date Due   • Hepatitis A Vaccine (1 of 2 - Risk 2-dose series) Never done   • Hepatitis B Vaccine (1 of 3 - Risk 3-dose series) Never done   • Influenza Vaccine (1) 09/01/2024   • COVID-19 Vaccine (3 - 2024-25 season) 09/01/2024     Advance Directives   What are advance directives?  Advance directives are legal documents that state your wishes and plans for medical care. These plans are made ahead of time in case you lose your ability to make decisions for yourself. Advance directives can apply to any medical decision, such as the treatments you want, and if you want to donate organs.   What are the types of advance directives?  There are many types  of advance directives, and each state has rules about how to use them. You may choose a combination of any of the following:  Living will:  This is a written record of the treatment you want. You can also choose which treatments you do not want, which to limit, and which to stop at a certain time. This includes surgery, medicine, IV fluid, and tube feedings.   Durable power of  for healthcare (DPAHC):  This is a written record that states who you want to make healthcare choices for you when you are unable to make them for yourself. This person, called a proxy, is usually a family member or a friend. You may choose more than 1 proxy.  Do not resuscitate (DNR) order:  A DNR order is used in case your heart stops beating or you stop breathing. It is a request not to have certain forms of treatment, such as CPR. A DNR order may be included in other types of advance directives.  Medical directive:  This covers the care that you want if you are in a coma, near death, or unable to make decisions for yourself. You can list the treatments you want for each condition. Treatment may include pain medicine, surgery, blood transfusions, dialysis, IV or tube feedings, and a ventilator (breathing machine).  Values history:  This document has questions about your views, beliefs, and how you feel and think about life. This information can help others choose the care that you would choose.  Why are advance directives important?  An advance directive helps you control your care. Although spoken wishes may be used, it is better to have your wishes written down. Spoken wishes can be misunderstood, or not followed. Treatments may be given even if you do not want them. An advance directive may make it easier for your family to make difficult choices about your care.   Weight Management   Why it is important to manage your weight:  Being overweight increases your risk of health conditions such as heart disease, high blood pressure,  type 2 diabetes, and certain types of cancer. It can also increase your risk for osteoarthritis, sleep apnea, and other respiratory problems. Aim for a slow, steady weight loss. Even a small amount of weight loss can lower your risk of health problems.  How to lose weight safely:  A safe and healthy way to lose weight is to eat fewer calories and get regular exercise. You can lose up about 1 pound a week by decreasing the number of calories you eat by 500 calories each day.   Healthy meal plan for weight management:  A healthy meal plan includes a variety of foods, contains fewer calories, and helps you stay healthy. A healthy meal plan includes the following:  Eat whole-grain foods more often.  A healthy meal plan should contain fiber. Fiber is the part of grains, fruits, and vegetables that is not broken down by your body. Whole-grain foods are healthy and provide extra fiber in your diet. Some examples of whole-grain foods are whole-wheat breads and pastas, oatmeal, brown rice, and bulgur.  Eat a variety of vegetables every day.  Include dark, leafy greens such as spinach, kale, adrianne greens, and mustard greens. Eat yellow and orange vegetables such as carrots, sweet potatoes, and winter squash.   Eat a variety of fruits every day.  Choose fresh or canned fruit (canned in its own juice or light syrup) instead of juice. Fruit juice has very little or no fiber.  Eat low-fat dairy foods.  Drink fat-free (skim) milk or 1% milk. Eat fat-free yogurt and low-fat cottage cheese. Try low-fat cheeses such as mozzarella and other reduced-fat cheeses.  Choose meat and other protein foods that are low in fat.  Choose beans or other legumes such as split peas or lentils. Choose fish, skinless poultry (chicken or turkey), or lean cuts of red meat (beef or pork). Before you cook meat or poultry, cut off any visible fat.   Use less fat and oil.  Try baking foods instead of frying them. Add less fat, such as margarine, sour  cream, regular salad dressing and mayonnaise to foods. Eat fewer high-fat foods. Some examples of high-fat foods include french fries, doughnuts, ice cream, and cakes.  Eat fewer sweets.  Limit foods and drinks that are high in sugar. This includes candy, cookies, regular soda, and sweetened drinks.  Exercise:  Exercise at least 30 minutes per day on most days of the week. Some examples of exercise include walking, biking, dancing, and swimming. You can also fit in more physical activity by taking the stairs instead of the elevator or parking farther away from stores. Ask your healthcare provider about the best exercise plan for you.   Narcotic (Opioid) Safety    Use narcotics safely:  Take prescribed narcotics exactly as directed  Do not give narcotics to others or take narcotics that belong to someone else  Do not mix narcotics without medicines or alcohol  Do not drive or operate heavy machinery after you take the narcotic  Monitor for side effects and notify your healthcare provider if you experienced side effects such as nausea, sleepiness, itching, or trouble thinking clearly.    Manage constipation:    Constipation is the most common side effect of narcotic medicine. Constipation is when you have hard, dry bowel movements, or you go longer than usual between bowel movements. Tell your healthcare provider about all changes in your bowel movements while you are taking narcotics. He or she may recommend laxative medicine to help you have a bowel movement. He or she may also change the kind of narcotic you are taking, or change when you take it. The following are more ways you can prevent or relieve constipation:    Drink liquids as directed.  You may need to drink extra liquids to help soften and move your bowels. Ask how much liquid to drink each day and which liquids are best for you.  Eat high-fiber foods.  This may help decrease constipation by adding bulk to your bowel movements. High-fiber foods include  fruits, vegetables, whole-grain breads and cereals, and beans. Your healthcare provider or dietitian can help you create a high-fiber meal plan. Your provider may also recommend a fiber supplement if you cannot get enough fiber from food.  Exercise regularly.  Regular physical activity can help stimulate your intestines. Walking is a good exercise to prevent or relieve constipation. Ask which exercises are best for you.  Schedule a time each day to have a bowel movement.  This may help train your body to have regular bowel movements. Bend forward while you are on the toilet to help move the bowel movement out. Sit on the toilet for at least 10 minutes, even if you do not have a bowel movement.    Store narcotics safely:   Store narcotics where others cannot easily get them.  Keep them in a locked cabinet or secure area. Do not  keep them in a purse or other bag you carry with you. A person may be looking for something else and find the narcotics.  Make sure narcotics are stored out of the reach of children.  A child can easily overdose on narcotics. Narcotics may look like candy to a small child.    The best way to dispose of narcotics:      The laws vary by country and area. In the [place], the best way is to return the narcotics through a take-back program. This program is offered by the [place] Drug Enforcement Agency (ARAM). The following are options for using the program:  Take the narcotics to a ARAM collection site.  The site is often a law enforcement center. Call your local law enforcement center for scheduled take-back days in your area. You will be given information on where to go if the collection site is in a different location.  Take the narcotics to an approved pharmacy or hospital.  A pharmacy or hospital may be set up as a collection site. You will need to ask if it is a ARAM collection site if you were not directed there. A pharmacy or doctor's office may not be able to take back narcotics unless it  is a ARAM site.  Use a mail-back system.  This means you are given containers to put the narcotics into. You will then mail them in the containers.  Use a take-back drop box.  This is a place to leave the narcotics at any time. People and animals will not be able to get into the box. Your local law enforcement agency can tell you where to find a drop box in your area.    Other ways to manage pain:   Ask your healthcare provider about non-narcotic medicines to control pain.  Nonprescription medicines include NSAIDs (such as ibuprofen) and acetaminophen. Prescription medicines include muscle relaxers, antidepressants, and steroids.  Pain may be managed without any medicines.  Some ways to relieve pain include massage, aromatherapy, or meditation. Physical or occupational therapy may also help.    For more information:   Drug Enforcement Administration  [address] [place] [address]  Phone: 1- 829 - 207-4215  Web Address: https://www.deadiversion.[facility].gov/drug_disposal/    [place] Food and Drug Administration  [address] [place] [address]  Phone: 2- 428 - 380-7057  Web Address: http://www.fda.gov     © Copyright [employer] 2018 Information is for End User's use only and may not be sold, redistributed or otherwise used for commercial purposes. All illustrations and images included in CareNotes® are the copyrighted property of [employer].A[employer] Inc. or [employer]

## 2024-12-20 NOTE — ASSESSMENT & PLAN NOTE
Mildly suppressed TSH with elevated free T4.  Recent thyroid ultrasound is unremarkable  Referral to endocrinology  Orders:  •  Ambulatory referral to Endocrinology; Future  •  TSH, 3rd generation; Future

## 2024-12-20 NOTE — ASSESSMENT & PLAN NOTE
Continue atorvastatin.  Orders:  •  Comprehensive metabolic panel; Future  •  Lipid Panel with Direct LDL reflex; Future

## 2024-12-20 NOTE — PROGRESS NOTES
"Name: Adolfo Munroe      : 1955      MRN: [id number]  Encounter Provider: Shanna Apodaca MD  Encounter Date: 2024   Encounter department: [facility]    Assessment & Plan  Medicare annual wellness visit, subsequent         Incisional hernia, without obstruction or gangrene  Mid lower abdominal incisional hernia.  Painless. Referral to general surgery.  Orders:  •  Ambulatory referral to General Surgery; Future    Type 2 diabetes mellitus with obesity  (HCC)    Lab Results   Component Value Date    HGBA1C 5.8 (H) 10/21/2024   Good glycemic control.  Continue glipizide.  No hypoglycemia.  Orders:  •  Ambulatory referral to Endocrinology; Future  •  CBC and differential; Future  •  Comprehensive metabolic panel; Future  •  Albumin / creatinine urine ratio; Future  •  Hemoglobin A1C; Future    Subclinical hyperthyroidism  Mildly suppressed TSH with elevated free T4.  Recent thyroid ultrasound is unremarkable  Referral to endocrinology  Orders:  •  Ambulatory referral to Endocrinology; Future  •  TSH, 3rd generation; Future    Dyslipidemia  Continue atorvastatin.  Orders:  •  Comprehensive metabolic panel; Future  •  Lipid Panel with Direct LDL reflex; Future    Permanent atrial fibrillation (HCC)  Continue metoprolol and Coumadin  Patient is under care of [facility] cardiology       Stenosis of lumbosacral spine  Recent evaluation by [facility] neurosurgery, follow-up PRN  Failed lumbar fusion performed at [facility] in   Dr. Woodward recommends \" maximizing, conservative care and specifically and going an epidural steroid injection and or medial branch blocks at the L4 five level. If this is unsuccessful, then the lower aspect of the fusion will need to be redone.\"  He remains under care of pain management, treated with Vicodin and baclofen  Patient is not a candidate for NSAIDs,anticoagulated on Coumadin for treatment of A-fib  I will discuss possibility of injections or MOHAMUD " with pain management and ID.         Osteitis of symphysis pubis (HCC)  The patient is under care of [facility] infectious disease.  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    Patient initially presented with development of a draining sinus track.     Patient received his 2 infusion of dalvance without issue and has been transitioned to indefinite po suppression with doxcycline 100 mg twice daily due to possible seeding of spinal hardware.    Patient was not cleared to restart low back injections due to above.         Essential hypertension  Blood pressure is well-controlled.  Continue lisinopril and metoprolol          Preventive health issues were discussed with patient, and age appropriate screening tests were ordered as noted in patient's After Visit Summary. Personalized health advice and appropriate referrals for health education or preventive services given if needed, as noted in patient's After Visit Summary.    Return in about 6 months (around 6/20/2025).    History of Present Illness     Presents for follow-up and Medicare wellness.  Results of recent blood work reviewed.  The patient is concerned about interim development of lower abdominal hernia which is painless. Bulging occurs only when he is standing, disappears when he is supine. Bowel movements are regular.    Patient has gained 8 pounds since June 2024.      He remains under care of of pain management and infectious disease   Chronic low back pain.Patient is not considered to be a suitable candidate for epidural injections due to history of osteomyelitis.  Patient is on Coumadin due to A-fib.  He has been using hydrocodone for pain.  Patient evaluated by [facility] neurosurgery due to failed lumbar fusion.  Dr. Woodward recommended possible epidural or medial branch block injections at L4 level.  Infectious disease is concerned about MRSA seeding of spinal hardware.  Patient remains on lifelong  suppression with doxycycline.    No complaints of chest pain, palpitations, shortness of breath or dizziness.  Patient remains on daily Rx for hypertension, hyperlipidemia, A-fib and type 2 diabetes.He is under care of [facility] cardiology.       Patient Care Team:  Shanna Apodaca MD as PCP - General  Shanna Apodaca MD    Review of Systems  Medical History Reviewed by provider this encounter:  Tobacco  Allergies  Meds  Problems  Med Hx  Surg Hx  Fam Hx       Annual Wellness Visit Questionnaire   Adolfo is here for his Subsequent Wellness visit.     Health Risk Assessment:   Patient rates overall health as good. Patient feels that their physical health rating is slightly better. Patient is satisfied with their life. Eyesight was rated as same. Hearing was rated as same. Patient feels that their emotional and mental health rating is same. Patients states they are never, rarely angry. Patient states they are never, rarely unusually tired/fatigued. Pain experienced in the last 7 days has been some. Patient's pain rating has been 4/10. Patient states that he has experienced no weight loss or gain in last 6 months.     Depression Screening:   PHQ-2 Score: 0      Fall Risk Screening:   In the past year, patient has experienced: no history of falling in past year      Home Safety:  Patient does not have trouble with stairs inside or outside of their home. Patient has working smoke alarms and has working carbon monoxide detector. Home safety hazards include: none.     Nutrition:   Current diet is Regular.     Medications:   Patient is not currently taking any over-the-counter supplements. Patient is able to manage medications.     Activities of Daily Living (ADLs)/Instrumental Activities of Daily Living (IADLs):   Walk and transfer into and out of bed and chair?: Yes  Dress and groom yourself?: Yes    Bathe or shower yourself?: Yes    Feed yourself? Yes  Do your laundry/housekeeping?: Yes  Manage your  money, pay your bills and track your expenses?: Yes  Make your own meals?: Yes    Do your own shopping?: Yes    Previous Hospitalizations:   Any hospitalizations or ED visits within the last 12 months?: No      Advance Care Planning:   Living will: No    Durable POA for healthcare: No    Advanced directive: No      PREVENTIVE SCREENINGS      Cardiovascular Screening:    General: Screening Current      Diabetes Screening:     General: Screening Not Indicated and History Diabetes      Prostate Cancer Screening:    General: Screening Current      Abdominal Aortic Aneurysm (AAA) Screening:    Risk factors include: age between 65-74 yo        Lung Cancer Screening:     General: Screening Not Indicated      Hepatitis C Screening:    General: Screening Current    Screening, Brief Intervention, and Referral to Treatment (SBIRT)    Screening  Typical number of drinks in a day: 0  Typical number of drinks in a week: 0  Interpretation: Low risk drinking behavior.    AUDIT-C Screenin) How often did you have a drink containing alcohol in the past year? never  2) How many drinks did you have on a typical day when you were drinking in the past year? 0  3) How often did you have 6 or more drinks on one occasion in the past year? never    AUDIT-C Score: 0  Interpretation: Score 0-3 (male): Negative screen for alcohol misuse    Single Item Drug Screening:  How often have you used an illegal drug (including marijuana) or a prescription medication for non-medical reasons in the past year? never    Single Item Drug Screen Score: 0  Interpretation: Negative screen for possible drug use disorder    Review of Current Opioid Use    Opioid Risk Tool (ORT) Interpretation: Complete Opioid Risk Tool (ORT)    Social Drivers of Health     Financial Resource Strain: Patient Declined (2023)    Overall Financial Resource Strain (CARDIA)    • Difficulty of Paying Living Expenses: Patient declined   Food Insecurity: No Food Insecurity  "(12/20/2024)    Hunger Vital Sign    • Worried About Running Out of Food in the Last Year: Never true    • Ran Out of Food in the Last Year: Never true   Transportation Needs: No Transportation Needs (12/20/2024)    PRAPARE - Transportation    • Lack of Transportation (Medical): No    • Lack of Transportation (Non-Medical): No   Housing Stability: Low Risk  (12/20/2024)    Housing Stability Vital Sign    • Unable to Pay for Housing in the Last Year: No    • Number of Times Moved in the Last Year: 1    • Homeless in the Last Year: No   Utilities: Not At Risk (12/20/2024)    [facility] Utilities    • Threatened with loss of utilities: No     No results found.    Objective   /80 (BP Location: Left arm, Patient Position: Sitting, Cuff Size: Large)   Pulse 72   Temp 97.6 °F (36.4 °C) (Temporal)   Resp 18   Ht 5' 6\" (1.676 m)   Wt 99 kg (218 lb 3.2 oz)   SpO2 97%   BMI 35.22 kg/m²     Physical Exam  Vitals and nursing note reviewed.   Constitutional:       General: He is not in acute distress.     Appearance: Normal appearance. He is not ill-appearing.   HENT:      Head: Normocephalic and atraumatic.   Eyes:      General: No scleral icterus.     Conjunctiva/sclera: Conjunctivae normal.   Neck:      Vascular: No carotid bruit.   Cardiovascular:      Rate and Rhythm: Normal rate. Rhythm irregular.      Heart sounds: Normal heart sounds. No murmur heard.  Pulmonary:      Effort: Pulmonary effort is normal. No respiratory distress.      Breath sounds: Normal breath sounds.   Abdominal:      General: Abdomen is flat. Bowel sounds are normal. There is no distension.      Palpations: Abdomen is soft.      Hernia: A hernia is present.       Musculoskeletal:         General: Normal range of motion.      Cervical back: Neck supple. No rigidity.      Right lower leg: No edema.      Left lower leg: No edema.   Skin:     General: Skin is warm.      Findings: No rash.   Neurological:      General: No focal deficit present.      " Mental Status: He is alert and oriented to person, place, and time.   Psychiatric:         Mood and Affect: Mood normal.         Behavior: Behavior normal.         Thought Content: Thought content normal.

## 2024-12-20 NOTE — ASSESSMENT & PLAN NOTE
Lab Results   Component Value Date    HGBA1C 5.8 (H) 10/21/2024   Good glycemic control.  Continue glipizide.  No hypoglycemia.  Orders:  •  Ambulatory referral to Endocrinology; Future  •  CBC and differential; Future  •  Comprehensive metabolic panel; Future  •  Albumin / creatinine urine ratio; Future  •  Hemoglobin A1C; Future

## 2024-12-25 NOTE — ASSESSMENT & PLAN NOTE
"Recent evaluation by [facility] neurosurgery, follow-up PRN  Failed lumbar fusion performed at [facility] in 2022  Dr. Woodward recommends \" maximizing, conservative care and specifically and going an epidural steroid injection and or medial branch blocks at the L4 five level. If this is unsuccessful, then the lower aspect of the fusion will need to be redone.\"  He remains under care of pain management, treated with Vicodin and baclofen  Patient is not a candidate for NSAIDs,anticoagulated on Coumadin for treatment of A-fib  I will discuss possibility of injections or MOHAMUD with pain management and ID.       "

## 2024-12-25 NOTE — ASSESSMENT & PLAN NOTE
The patient is under care of [facility] infectious disease.  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    Patient initially presented with development of a draining sinus track.     Patient received his 2 infusion of dalvance without issue and has been transitioned to indefinite po suppression with doxcycline 100 mg twice daily due to possible seeding of spinal hardware.    Patient was not cleared to restart low back injections due to above.

## 2025-01-10 ENCOUNTER — OFFICE VISIT (OUTPATIENT)
Dept: CARDIOLOGY CLINIC | Facility: CLINIC | Age: 70
End: 2025-01-10
Payer: MEDICARE

## 2025-01-10 ENCOUNTER — APPOINTMENT (OUTPATIENT)
Dept: LAB | Facility: CLINIC | Age: 70
End: 2025-01-10
Payer: MEDICARE

## 2025-01-10 VITALS
WEIGHT: 225 LBS | SYSTOLIC BLOOD PRESSURE: 112 MMHG | BODY MASS INDEX: 36.16 KG/M2 | DIASTOLIC BLOOD PRESSURE: 82 MMHG | HEART RATE: 55 BPM | OXYGEN SATURATION: 98 % | HEIGHT: 66 IN

## 2025-01-10 DIAGNOSIS — I48.21 PERMANENT ATRIAL FIBRILLATION (HCC): ICD-10-CM

## 2025-01-10 DIAGNOSIS — E66.812 CLASS 2 SEVERE OBESITY DUE TO EXCESS CALORIES WITH SERIOUS COMORBIDITY AND BODY MASS INDEX (BMI) OF 37.0 TO 37.9 IN ADULT (HCC): ICD-10-CM

## 2025-01-10 DIAGNOSIS — E78.5 DYSLIPIDEMIA: ICD-10-CM

## 2025-01-10 DIAGNOSIS — E11.69 TYPE 2 DIABETES MELLITUS WITH OBESITY  (HCC): Chronic | ICD-10-CM

## 2025-01-10 DIAGNOSIS — E66.9 TYPE 2 DIABETES MELLITUS WITH OBESITY  (HCC): Chronic | ICD-10-CM

## 2025-01-10 DIAGNOSIS — E05.90 SUBCLINICAL HYPERTHYROIDISM: ICD-10-CM

## 2025-01-10 DIAGNOSIS — I10 ESSENTIAL HYPERTENSION: Primary | Chronic | ICD-10-CM

## 2025-01-10 DIAGNOSIS — E66.01 CLASS 2 SEVERE OBESITY DUE TO EXCESS CALORIES WITH SERIOUS COMORBIDITY AND BODY MASS INDEX (BMI) OF 37.0 TO 37.9 IN ADULT (HCC): ICD-10-CM

## 2025-01-10 LAB
INR PPP: 2.82 (ref 0.85–1.19)
PROTHROMBIN TIME: 29.4 SECONDS (ref 12.3–15)

## 2025-01-10 PROCEDURE — 99214 OFFICE O/P EST MOD 30 MIN: CPT

## 2025-01-10 PROCEDURE — 85610 PROTHROMBIN TIME: CPT

## 2025-01-10 PROCEDURE — 93000 ELECTROCARDIOGRAM COMPLETE: CPT

## 2025-01-10 PROCEDURE — 36415 COLL VENOUS BLD VENIPUNCTURE: CPT

## 2025-01-10 NOTE — ASSESSMENT & PLAN NOTE
10/21/24 ldl 58  on lipitor 20 MG daily   TRANSFER - OUT REPORT:    Verbal report given to Nhi Goodson on Blease Bloch  being transferred to 555 for routine post - op       Report consisted of patients Situation, Background, Assessment and   Recommendations(SBAR). Time Pre op antibiotic given:0800  Anesthesia Stop time: 8543  Gipson Present on Transfer to floor:yes  Order for Gipson on Chart:yes  Discharge Prescriptions with Chart:no    Information from the following report(s) SBAR, OR Summary, Intake/Output and MAR was reviewed with the receiving nurse. Opportunity for questions and clarification was provided. Is the patient on 02? YES       L/Min 1       Other     Is the patient on a monitor? NO    Is the nurse transporting with the patient? NO    Surgical Waiting Area notified of patient's transfer from PACU? YES      The following personal items collected during your admission accompanied patient upon transfer:   Dental Appliance: Dental Appliances: Uppers, Lowers  Vision:    Hearing Aid:    Jewelry:    Clothing: Clothing: Other (comment)(2 bags of clothes)  Other Valuables:  Other Valuables: Bonnivinod Cotton sent to safe:

## 2025-01-10 NOTE — PROGRESS NOTES
[facility]  General Cardiology Note  Adolfo Munroe  1955  [id number]      Referring Provider - No ref. provider found  Chief Complaint   Patient presents with    Follow-up     9 month follow up; No cardiac complaints        Assessment & Plan  Essential hypertension  /82   Controlled  Continue current regimen  Permanent atrial fibrillation (HCC)  EKG today shows afib with slow ventricular response  He is asymptomatic  Type 2 diabetes mellitus with obesity  ([facility])  Care per PCP  Lab Results   Component Value Date    HGBA1C 5.8 (H) 10/21/2024     Class 2 severe obesity due to excess calories with serious comorbidity and body mass index (BMI) of 37.0 to 37.9 in adult (HCC)  BMI 36  Regular physical activity and healthy diet  Dyslipidemia  10/21/24 ldl 58  on lipitor 20 MG daily     Procedure/testing if ordered:  Risks Vs benefits discussed   Medication side effects reviewed with patient in detail and all their questions answered to their satisfaction.    Will RTO in 9 months or sooner if necessary  Patient / Caretaker was advised and educated to call our office  immediately if  patient has any new symptoms of chest pain/shortness of breath, near-syncope, syncope, light headedness sustained palpitations  or any other cardiovascular symptoms before their scheduled follow-up appointment.  ED precautions reviewed    Interval History: 69 y.o.  male  with PMH as below is here for routine visit  Patient of Dr. Rushing  In the setting of severe pneumonia requiring intubation, he had rapid afib. Treated with metoprolol (8/2020)  He was started on anticoagulation with Eliquis. Insurance issues with Eliquis, was changed to warfarin.  Initially paroxysmal, but now permanent.  EF was preserved on echo.    Today,  he reports no acute complaints   Some bruising on the warfarin. Had nosebleed before and had it cautarized, now he uses a mositurizer at home and no recurrence since. He follows with an  ENT.      Patient Active Problem List    Diagnosis Date Noted    Hernia of abdominal wall 11/05/2024    Subclinical hyperthyroidism 10/23/2024    Obesity (BMI 30.0-34.9) 01/24/2024    H/O spinal fusion 12/20/2023    Osteitis of symphysis pubis ([facility]) 10/06/2023    Stenosis of lumbosacral spine 03/09/2022    Dyslipidemia 10/08/2020    Skin mole 10/08/2020    Permanent atrial fibrillation ([facility]) 08/16/2020    Status post left knee replacement 08/06/2019    Class 2 severe obesity due to excess calories with serious comorbidity and body mass index (BMI) of 37.0 to 37.9 in adult ([facility]) 03/23/2019    Osteoarthritis of left knee 03/23/2019    Type 2 diabetes mellitus with obesity  ([facility]) 10/02/2013    Vitamin D deficiency 10/01/2013    Essential hypertension 09/17/2013     Past Medical History:   Diagnosis Date    A-fib ([facility])     Acute respiratory failure with hypoxia and hypercapnia ([facility]) 08/17/2020    LAURENCE (acute kidney injury) ([facility]) 08/16/2020    Arthritis     knees    Community acquired pneumonia of left lower lobe of lung 08/12/2020    Diabetes mellitus ([facility])     Hardware complicating wound infection ([facility]) 11/15/2023    Hypertension     Measles, Somali (rubella)     Mumps     Nosebleed     Obesity     Pneumonia 08/2020    Psoas abscess ([facility]) 01/03/2023    Sacroiliitis ([facility])     Snoring     Loudly    Substance abuse ([facility])      Social History     Tobacco Use    Smoking status: Never    Smokeless tobacco: Never   Vaping Use    Vaping status: Never Used   Substance Use Topics    Alcohol use: Not Currently     Alcohol/week: 1.0 standard drink of alcohol     Types: 1 Standard drinks or equivalent per week    Drug use: Never      Family History   Problem Relation Age of Onset    Ovarian cancer Mother     Heart disease Mother     Diabetes type II Mother     Colon cancer Father     Heart disease Father     Nephrolithiasis Father     Cancer Father      Past Surgical History:   Procedure Laterality Date    CYSTOSCOPY      Pt denies     JOINT REPLACEMENT  07/2018    KNEE ARTHROSCOPY Left     knee    ORIF PELVIC FRACTURE      WV DEBRIDEMENT SUBCUTANEOUS TISSUE EA ADDL 20 SQ CM N/A 11/03/2023    Procedure: DEBRIDEMENT WOUND (WASH OUT);  Surgeon: Jose Gasca MD;  Location: [facility];  Service: Orthopedics    WV REMOVAL IMPLANT DEEP N/A 11/03/2023    Procedure: REMOVAL HARDWARE pubic symphysis;  Surgeon: Jose Gasca MD;  Location: [facility];  Service: Orthopedics    SPINAL FUSION  05/06/2022    TONSILLECTOMY      TOTAL KNEE ARTHROPLASTY Left 06/10/2019    Procedure: ARTHROPLASTY KNEE TOTAL;  Surgeon: Kenny Moy MD;  Location: [facility];  Service: Orthopedics       Current Outpatient Medications:     amLODIPine (NORVASC) 5 mg tablet, Take 1 tablet (5 mg total) by mouth daily, Disp: 90 tablet, Rfl: 3    atorvastatin (LIPITOR) 20 mg tablet, Take 1 tablet (20 mg total) by mouth daily, Disp: 90 tablet, Rfl: 3    baclofen 10 mg tablet, Take 1 tablet (10 mg total) by mouth 2 (two) times a day, Disp: 60 tablet, Rfl: 3    doxycycline hyclate (VIBRAMYCIN) 100 mg capsule, Take 1 capsule (100 mg total) by mouth 2 (two) times a day, Disp: 180 capsule, Rfl: 1    glipiZIDE (GLUCOTROL XL) 5 mg 24 hr tablet, Take 1 tablet (5 mg total) by mouth daily, Disp: 90 tablet, Rfl: 3    HYDROcodone-acetaminophen (NORCO)  mg per tablet, Take 1 tablet by mouth every 6 (six) hours as needed for moderate pain For ongoing therapy DO NOT FILL BEFORE: 12/31/24 Max Daily Amount: 4 tablets, Disp: 120 tablet, Rfl: 0    HYDROcodone-acetaminophen (NORCO)  mg per tablet, Take 1 tablet by mouth every 6 (six) hours as needed for moderate pain For ongoing therapy  DO NOT FILL BEFORE: 01/28/25 Max Daily Amount: 4 tablets, Disp: 120 tablet, Rfl: 0    HYDROcodone-acetaminophen (NORCO)  mg per tablet, Take 1 tablet by mouth every 6 (six) hours as needed for moderate pain For ongoing therapy DO NOT FILL BEFORE: 02/26/25 Max Daily Amount: 4 tablets, Disp: 120  "tablet, Rfl: 0    Jantoven 5 MG tablet, TAKE ONE TABLET BY MOUTH EVERY DAY, Disp: 90 tablet, Rfl: 3    lisinopril (ZESTRIL) 10 mg tablet, Take 1 tablet (10 mg total) by mouth daily, Disp: 90 tablet, Rfl: 3    metoprolol succinate (TOPROL-XL) 50 mg 24 hr tablet, TAKE ONE AND ONE-HALF TABLETS BY MOUTH EVERY DAY, Disp: 135 tablet, Rfl: 1    Blood Glucose Monitoring Suppl (OneTouch Verio Reflect) w/Device KIT, Check blood sugars once daily. Please substitute with appropriate alternative as covered by patient's insurance. Dx: E11.65 (Patient not taking: Reported on 8/6/2024), Disp: 1 kit, Rfl: 0    naloxone (NARCAN) 4 mg/0.1 mL nasal spray, Administer 1 spray into a nostril. If no response after 2-3 minutes, give another dose in the other nostril using a new spray. (Patient not taking: Reported on 7/11/2024), Disp: 1 each, Rfl: 1    No Known Allergies    Vitals:    01/10/25 1134 01/10/25 1151   BP: 112/82    BP Location: Left arm    Patient Position: Sitting    Cuff Size: Standard    Pulse: (!) 47 55   SpO2: 98%    Weight: 102 kg (225 lb)    Height: 5' 6\" (1.676 m)         Vitals:    01/10/25 1134   Weight: 102 kg (225 lb)      Height: 5' 6\" (167.6 cm)   Body mass index is 36.32 kg/m².    Labs:   Lab Results   Component Value Date/Time    CHOLESTEROL 127 10/21/2024 08:51 AM    TRIG 66 10/21/2024 08:51 AM    TRIG 118 03/17/2017 12:21 PM    HDL 56 10/21/2024 08:51 AM    HDL 42 03/17/2017 12:21 PM    LDLCALC 58 10/21/2024 08:51 AM      Lab Results   Component Value Date    SODIUM 142 10/21/2024    K 4.8 10/21/2024     10/21/2024    CREATININE 1.23 10/21/2024    EGFR 60 10/21/2024    BUN 25 10/21/2024    CO2 26 10/21/2024    ALT 11 10/21/2024    AST 17 10/21/2024    INR 2.74 (H) 12/09/2024    GLUF 74 10/21/2024    HGBA1C 5.8 (H) 10/21/2024    WBC 5.82 10/21/2024    HGB 14.4 10/21/2024    HCT 45.1 10/21/2024     10/21/2024         Imaging: No results found.    ECG:  Atrial fibrillation with slow ventricular " response.  Left axis deviation.  Right bundle branch block.  Minimal voltage criteria for LVH, may be normal variant.  47 bpm   Reviewed by SALONI Orlando      Review of Systems   Constitutional: Negative.   HENT: Negative.     Eyes: Negative.    Cardiovascular: Negative.    Respiratory: Negative.     Neurological:  Negative for dizziness and light-headedness.   All other systems reviewed and are negative.    Except as noted in HPI, is otherwise reviewed in detail and a 12 point review of systems is negative.    Physical Exam  Vitals reviewed.   Constitutional:       General: He is not in acute distress.     Appearance: Normal appearance. He is obese. He is not diaphoretic.   HENT:      Head: Normocephalic and atraumatic.   Eyes:      General: No scleral icterus.  Cardiovascular:      Rate and Rhythm: Bradycardia present. Rhythm irregularly irregular.      Pulses: Normal pulses.           Radial pulses are 2+ on the right side and 2+ on the left side.      Heart sounds: Normal heart sounds, S1 normal and S2 normal.   Pulmonary:      Effort: Pulmonary effort is normal. No tachypnea or respiratory distress.      Breath sounds: Normal breath sounds. No wheezing or rales.   Abdominal:      General: Bowel sounds are normal. There is no distension.      Palpations: Abdomen is soft.   Musculoskeletal:      Right lower leg: No edema.      Left lower leg: No edema.   Skin:     General: Skin is warm and dry.      Capillary Refill: Capillary refill takes less than 2 seconds.   Neurological:      Mental Status: He is alert and oriented to person, place, and time.      Gait: Gait normal.   Psychiatric:         Mood and Affect: Mood normal.         Behavior: Behavior normal.          **Please Note: This note may have been constructed using a voice recognition system**     Thank you for the opportunity to participate in the care of this patient.   SALONI Orlando

## 2025-01-13 ENCOUNTER — ANTICOAG VISIT (OUTPATIENT)
Dept: CARDIOLOGY CLINIC | Facility: CLINIC | Age: 70
End: 2025-01-13

## 2025-01-13 DIAGNOSIS — I48.21 PERMANENT ATRIAL FIBRILLATION (HCC): Primary | ICD-10-CM

## 2025-01-15 ENCOUNTER — CONSULT (OUTPATIENT)
Dept: SURGERY | Facility: CLINIC | Age: 70
End: 2025-01-15
Payer: MEDICARE

## 2025-01-15 VITALS
BODY MASS INDEX: 35.36 KG/M2 | HEART RATE: 81 BPM | SYSTOLIC BLOOD PRESSURE: 143 MMHG | DIASTOLIC BLOOD PRESSURE: 89 MMHG | HEIGHT: 66 IN | OXYGEN SATURATION: 99 % | WEIGHT: 220 LBS

## 2025-01-15 DIAGNOSIS — K43.2 INCISIONAL HERNIA, WITHOUT OBSTRUCTION OR GANGRENE: Primary | ICD-10-CM

## 2025-01-15 PROCEDURE — 99204 OFFICE O/P NEW MOD 45 MIN: CPT | Performed by: SURGERY
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
## 2025-01-21 ENCOUNTER — HOSPITAL ENCOUNTER (OUTPATIENT)
Dept: CT IMAGING | Facility: HOSPITAL | Age: 70
Discharge: HOME | End: 2025-01-21
Attending: SURGERY
Payer: MEDICARE

## 2025-01-21 DIAGNOSIS — K43.2 INCISIONAL HERNIA, WITHOUT OBSTRUCTION OR GANGRENE: ICD-10-CM

## 2025-01-21 PROCEDURE — 74176 CT ABD & PELVIS W/O CONTRAST: CPT

## 2025-01-22 DIAGNOSIS — I10 ESSENTIAL HYPERTENSION: Chronic | ICD-10-CM

## 2025-01-22 DIAGNOSIS — E78.5 DYSLIPIDEMIA: ICD-10-CM

## 2025-01-22 DIAGNOSIS — E11.69 TYPE 2 DIABETES MELLITUS WITH OBESITY  (HCC): ICD-10-CM

## 2025-01-22 DIAGNOSIS — E11.21 DIABETIC NEPHROPATHY ASSOCIATED WITH TYPE 2 DIABETES MELLITUS (HCC): ICD-10-CM

## 2025-01-22 DIAGNOSIS — E66.9 TYPE 2 DIABETES MELLITUS WITH OBESITY  (HCC): ICD-10-CM

## 2025-01-22 RX ORDER — AMLODIPINE BESYLATE 5 MG/1
5 TABLET ORAL DAILY
Qty: 90 TABLET | Refills: 1 | Status: SHIPPED | OUTPATIENT
Start: 2025-01-22

## 2025-01-22 RX ORDER — GLIPIZIDE 5 MG/1
5 TABLET, FILM COATED, EXTENDED RELEASE ORAL DAILY
Qty: 90 TABLET | Refills: 1 | Status: SHIPPED | OUTPATIENT
Start: 2025-01-22

## 2025-01-22 RX ORDER — LISINOPRIL 10 MG/1
10 TABLET ORAL DAILY
Qty: 90 TABLET | Refills: 1 | Status: SHIPPED | OUTPATIENT
Start: 2025-01-22

## 2025-01-22 RX ORDER — ATORVASTATIN CALCIUM 20 MG/1
20 TABLET, FILM COATED ORAL DAILY
Qty: 90 TABLET | Refills: 1 | Status: SHIPPED | OUTPATIENT
Start: 2025-01-22

## 2025-02-05 ENCOUNTER — OFFICE VISIT (OUTPATIENT)
Dept: SURGERY | Facility: CLINIC | Age: 70
End: 2025-02-05
Payer: MEDICARE

## 2025-02-05 VITALS
WEIGHT: 220 LBS | BODY MASS INDEX: 35.36 KG/M2 | OXYGEN SATURATION: 99 % | HEIGHT: 66 IN | SYSTOLIC BLOOD PRESSURE: 143 MMHG | DIASTOLIC BLOOD PRESSURE: 94 MMHG | HEART RATE: 77 BPM

## 2025-02-05 DIAGNOSIS — K43.2 INCISIONAL HERNIA, WITHOUT OBSTRUCTION OR GANGRENE: Primary | ICD-10-CM

## 2025-02-05 PROCEDURE — 99214 OFFICE O/P EST MOD 30 MIN: CPT | Performed by: SURGERY

## 2025-02-05 NOTE — ASSESSMENT & PLAN NOTE
Lengthy discussion regarding incisional hernia repair.  Pointed out that this hernia directly involves his pubic tubercle.  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  Would have to use phasic's mesh given an infected field.  In addition have to get in the preperitoneal space which could potentially injure the bladder.  Giving difficulty in anchoring the inferior aspect of the mesh the hernia could also return.    In the end he states his hernia is asymptomatic.  We discussed observing and he would like to do this given some of the above noted risks.  If this seems to be bothering him more or certainly seems to be getting bigger I encouraged him to return where we can entertain surgical intervention

## 2025-02-05 NOTE — LETTER
2025     Shanna Apodaca MD  [address] [place] [id number]    Patient: Adolfo Munroe   YOB: 1955   Date of Visit: 2025       Dear Dr. Apodaca:    Thank you for referring Adolfo Munroe to me for evaluation. Below are my notes for this consultation.    If you have questions, please do not hesitate to call me. I look forward to following your patient along with you.         Sincerely,        Adolfo Oseguera DO        CC: No Recipients    Adolfo Oseguera DO  2025 11:15 AM  Sign when Signing Visit  Name: Adolfo Munroe      : 1955      MRN: [id number]  Encounter Provider: Adolfo Oseguera DO  Encounter Date: 2025   Encounter department: [facility]  :  Assessment & Plan  Incisional hernia, without obstruction or gangrene  Lengthy discussion regarding incisional hernia repair.  Pointed out that this hernia directly involves his pubic tubercle.  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  Would have to use phasic's mesh given an infected field.  In addition have to get in the preperitoneal space which could potentially injure the bladder.  Giving difficulty in anchoring the inferior aspect of the mesh the hernia could also return.    In the end he states his hernia is asymptomatic.  We discussed observing and he would like to do this given some of the above noted risks.  If this seems to be bothering him more or certainly seems to be getting bigger I encouraged him to return where we can entertain surgical intervention           History of Present Illness  HPI  Adolfo Munroe is a 69 y.o. male who presents for follow up to discuss CT results.  CT abdomen pelvis 2025: ABDOMINAL WALL/INGUINAL REGIONS: Postsurgical changes in the lower abdominal wall. There is a new lower abdominal wall hernia containing nonobstructed loops of the small bowel. The hernia defect measures 7.4 x 5.1 cm  "(series 2, image 129 and series 602,   image 114).    Patient returns after having above-noted CAT scan.  Images were reviewed.  Certainly has a defect of the anterior abdominal wall at the level of his pubic symphysis.  He had infected hardware removed in the past.  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  After much discussion he states that hernia does not bother him and just that he noticed a lump there.  He is able to perform his typical activities.     History obtained from: patient    Review of Systems   All other systems reviewed and are negative.    Medical History Reviewed by provider this encounter:     .     Objective  /94   Pulse 77   Ht 5' 6\" (1.676 m)   Wt 99.8 kg (220 lb)   SpO2 99%   BMI 35.51 kg/m²      Physical Exam  Constitutional:       General: He is not in acute distress.     Appearance: He is obese.   Abdominal:      General: Abdomen is flat. Bowel sounds are normal.      Palpations: Abdomen is soft.      Comments: Lower midline incision to just below the pubic symphysis.  Evidence of an incisional hernia just above the pubic symphysis on standing.  This easily reduces upon a supine position.           "

## 2025-02-05 NOTE — PROGRESS NOTES
Name: Adolfo Munroe      : 1955      MRN: [id number]  Encounter Provider: Adolfo Oseguera DO  Encounter Date: 2025   Encounter department: [facility]  :  Assessment & Plan  Incisional hernia, without obstruction or gangrene  Lengthy discussion regarding incisional hernia repair.  Pointed out that this hernia directly involves his pubic tubercle.  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  Would have to use phasic's mesh given an infected field.  In addition have to get in the preperitoneal space which could potentially injure the bladder.  Giving difficulty in anchoring the inferior aspect of the mesh the hernia could also return.    In the end he states his hernia is asymptomatic.  We discussed observing and he would like to do this given some of the above noted risks.  If this seems to be bothering him more or certainly seems to be getting bigger I encouraged him to return where we can entertain surgical intervention           History of Present Illness   HPI  Adolfo Munroe is a 69 y.o. male who presents for follow up to discuss CT results.  CT abdomen pelvis 2025: ABDOMINAL WALL/INGUINAL REGIONS: Postsurgical changes in the lower abdominal wall. There is a new lower abdominal wall hernia containing nonobstructed loops of the small bowel. The hernia defect measures 7.4 x 5.1 cm (series 2, image 129 and series 602,   image 114).    Patient returns after having above-noted CAT scan.  Images were reviewed.  Certainly has a defect of the anterior abdominal wall at the level of his pubic symphysis.  He had infected hardware removed in the past.  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  After much discussion he states that hernia does not bother him and just that he noticed a lump there.  He is able to perform his typical activities.     History obtained from: patient    Review  "of Systems   All other systems reviewed and are negative.    Medical History Reviewed by provider this encounter:     .     Objective   /94   Pulse 77   Ht 5' 6\" (1.676 m)   Wt 99.8 kg (220 lb)   SpO2 99%   BMI 35.51 kg/m²      Physical Exam  Constitutional:       General: He is not in acute distress.     Appearance: He is obese.   Abdominal:      General: Abdomen is flat. Bowel sounds are normal.      Palpations: Abdomen is soft.      Comments: Lower midline incision to just below the pubic symphysis.  Evidence of an incisional hernia just above the pubic symphysis on standing.  This easily reduces upon a supine position.           "

## 2025-02-10 DIAGNOSIS — M86.9 OSTEITIS OF SYMPHYSIS PUBIS (HCC): ICD-10-CM

## 2025-02-10 DIAGNOSIS — T84.7XXD HARDWARE COMPLICATING WOUND INFECTION, SUBSEQUENT ENCOUNTER: ICD-10-CM

## 2025-02-11 RX ORDER — DOXYCYCLINE 100 MG/1
100 CAPSULE ORAL EVERY 12 HOURS
Qty: 180 CAPSULE | Refills: 1 | OUTPATIENT
Start: 2025-02-11

## 2025-02-11 NOTE — TELEPHONE ENCOUNTER
Reached out to [facility]. Patient should have a refill on file. Spoke with Clare who confirms this fill is on file. She is going to get this ready for the patient.

## 2025-02-11 NOTE — TELEPHONE ENCOUNTER
Can you call patient and make sure he needs a refill.  It looks to me like he should have a refill on file

## 2025-02-17 ENCOUNTER — APPOINTMENT (OUTPATIENT)
Dept: LAB | Facility: CLINIC | Age: 70
End: 2025-02-17
Payer: MEDICARE

## 2025-02-17 ENCOUNTER — CONSULT (OUTPATIENT)
Dept: ENDOCRINOLOGY | Facility: CLINIC | Age: 70
End: 2025-02-17
Payer: MEDICARE

## 2025-02-17 VITALS
WEIGHT: 227.6 LBS | BODY MASS INDEX: 36.58 KG/M2 | OXYGEN SATURATION: 97 % | HEIGHT: 66 IN | DIASTOLIC BLOOD PRESSURE: 80 MMHG | HEART RATE: 89 BPM | SYSTOLIC BLOOD PRESSURE: 140 MMHG

## 2025-02-17 DIAGNOSIS — E05.90 SUBCLINICAL HYPERTHYROIDISM: ICD-10-CM

## 2025-02-17 DIAGNOSIS — E11.69 TYPE 2 DIABETES MELLITUS WITH OBESITY  (HCC): Chronic | ICD-10-CM

## 2025-02-17 DIAGNOSIS — I48.21 PERMANENT ATRIAL FIBRILLATION (HCC): ICD-10-CM

## 2025-02-17 DIAGNOSIS — E66.9 TYPE 2 DIABETES MELLITUS WITH OBESITY  (HCC): Chronic | ICD-10-CM

## 2025-02-17 LAB
INR PPP: 2.67 (ref 0.85–1.19)
PROTHROMBIN TIME: 28.2 SECONDS (ref 12.3–15)
T3 SERPL-MCNC: 1.1 NG/ML (ref 0.9–1.8)
T4 FREE SERPL-MCNC: 0.86 NG/DL (ref 0.61–1.12)
TSH SERPL DL<=0.05 MIU/L-ACNC: 0.48 UIU/ML (ref 0.45–4.5)

## 2025-02-17 PROCEDURE — 84443 ASSAY THYROID STIM HORMONE: CPT | Performed by: INTERNAL MEDICINE

## 2025-02-17 PROCEDURE — 99204 OFFICE O/P NEW MOD 45 MIN: CPT | Performed by: INTERNAL MEDICINE

## 2025-02-17 PROCEDURE — 85610 PROTHROMBIN TIME: CPT

## 2025-02-17 PROCEDURE — 84445 ASSAY OF TSI GLOBULIN: CPT | Performed by: INTERNAL MEDICINE

## 2025-02-17 PROCEDURE — 36415 COLL VENOUS BLD VENIPUNCTURE: CPT

## 2025-02-17 PROCEDURE — 84439 ASSAY OF FREE THYROXINE: CPT | Performed by: INTERNAL MEDICINE

## 2025-02-17 PROCEDURE — 84480 ASSAY TRIIODOTHYRONINE (T3): CPT | Performed by: INTERNAL MEDICINE

## 2025-02-17 NOTE — ASSESSMENT & PLAN NOTE
He appears euthyroid but does have the persistently low TSH and now a mildly elevated Free t4. Will repeat TSH and Free T4 and check T3 and TSII. Given his history of afib, if there is biochemical hyperthyroidism, then low dose methimazole is warranted. We will contact him when labs returm

## 2025-02-17 NOTE — PROGRESS NOTES
Chief Complaint   Patient presents with   • Hyperthyroidism      Referring Provider  Shanna Apodaca Md  [address] [place] [id number]     History of Present Illness:   Adolfo Munroe is a 69 y.o. male with abnormal TSHa nd possible subclinical hyperthyroidism seen in consultation at the request of Dr. Shanna Apodaca.  He has both T2DM which is well controlled and a low TSH which has been persistent.   I will focus on the low TSH for today.    There is a history of afib but this is rate controlled.   Denies  palpitations, tremor, changes in hair/skin/nails, diarrhea, anxiety sleep disturbance, anxiety/nervous, or tremor. However there is tremor on exam.     Denies changes in neck, changes in voice, dysphagia. Denies radiation exposure to head/neck/chest apart from imaging.   Currently on chronic doxycycline therapy for chronic infection. Sees ID for this. NO recent contrast       Fhx:   thyroid disorder- none     Patient Active Problem List   Diagnosis   • Essential hypertension   • Type 2 diabetes mellitus with obesity  ([facility])   • Vitamin D deficiency   • Class 2 severe obesity due to excess calories with serious comorbidity and body mass index (BMI) of 37.0 to 37.9 in adult ([facility])   • Osteoarthritis of left knee   • Status post left knee replacement   • Permanent atrial fibrillation ([facility])   • Dyslipidemia   • Skin mole   • Stenosis of lumbosacral spine   • Osteitis of symphysis pubis ([facility])   • H/O spinal fusion   • Obesity (BMI 30.0-34.9)   • Subclinical hyperthyroidism   • Hernia of abdominal wall   • Incisional hernia, without obstruction or gangrene      Past Medical History:   Diagnosis Date   • A-fib ([facility])    • Acute respiratory failure with hypoxia and hypercapnia ([facility]) 08/17/2020   • LAURENCE (acute kidney injury) ([facility]) 08/16/2020   • Arthritis     knees   • Community acquired pneumonia of left lower lobe of lung 08/12/2020   • Diabetes mellitus ([facility])    • Hardware complicating wound infection  ([facility]) 11/15/2023   • Hypertension    • Measles, Sinhala (rubella)    • Mumps    • Nosebleed    • Obesity    • Pneumonia 08/2020   • Psoas abscess ([facility]) 01/03/2023   • Sacroiliitis ([facility])    • Snoring     Loudly   • Substance abuse ([facility])       Past Surgical History:   Procedure Laterality Date   • CYSTOSCOPY      Pt denies   • JOINT REPLACEMENT  07/2018   • KNEE ARTHROSCOPY Left     knee   • ORIF PELVIC FRACTURE     • KY DEBRIDEMENT SUBCUTANEOUS TISSUE EA ADDL 20 SQ CM N/A 11/03/2023    Procedure: DEBRIDEMENT WOUND (WASH OUT);  Surgeon: Jose Gasca MD;  Location: [facility];  Service: Orthopedics   • KY REMOVAL IMPLANT DEEP N/A 11/03/2023    Procedure: REMOVAL HARDWARE pubic symphysis;  Surgeon: Jose Gasca MD;  Location: [facility];  Service: Orthopedics   • SPINAL FUSION  05/06/2022   • TONSILLECTOMY     • TOTAL KNEE ARTHROPLASTY Left 06/10/2019    Procedure: ARTHROPLASTY KNEE TOTAL;  Surgeon: Kenny Moy MD;  Location: [facility];  Service: Orthopedics      Family History   Problem Relation Age of Onset   • Ovarian cancer Mother    • Heart disease Mother    • Diabetes type II Mother    • Colon cancer Father    • Heart disease Father    • Nephrolithiasis Father    • Cancer Father      Social History     Tobacco Use   • Smoking status: Never   • Smokeless tobacco: Never   Substance Use Topics   • Alcohol use: Not Currently     Alcohol/week: 1.0 standard drink of alcohol     Types: 1 Standard drinks or equivalent per week     No Known Allergies      Current Outpatient Medications:   •  amLODIPine (NORVASC) 5 mg tablet, TAKE ONE TABLET BY MOUTH EVERY DAY, Disp: 90 tablet, Rfl: 1  •  atorvastatin (LIPITOR) 20 mg tablet, TAKE ONE TABLET BY MOUTH EVERY DAY, Disp: 90 tablet, Rfl: 1  •  baclofen 10 mg tablet, Take 1 tablet (10 mg total) by mouth 2 (two) times a day, Disp: 60 tablet, Rfl: 3  •  glipiZIDE (GLUCOTROL XL) 5 mg 24 hr tablet, TAKE ONE TABLET BY MOUTH EVERY DAY, Disp: 90 tablet, Rfl: 1  •   HYDROcodone-acetaminophen (NORCO)  mg per tablet, Take 1 tablet by mouth every 6 (six) hours as needed for moderate pain For ongoing therapy DO NOT FILL BEFORE: 12/31/24 Max Daily Amount: 4 tablets, Disp: 120 tablet, Rfl: 0  •  HYDROcodone-acetaminophen (NORCO)  mg per tablet, Take 1 tablet by mouth every 6 (six) hours as needed for moderate pain For ongoing therapy  DO NOT FILL BEFORE: 01/28/25 Max Daily Amount: 4 tablets, Disp: 120 tablet, Rfl: 0  •  HYDROcodone-acetaminophen (NORCO)  mg per tablet, Take 1 tablet by mouth every 6 (six) hours as needed for moderate pain For ongoing therapy DO NOT FILL BEFORE: 02/26/25 Max Daily Amount: 4 tablets, Disp: 120 tablet, Rfl: 0  •  Jantoven 5 MG tablet, TAKE ONE TABLET BY MOUTH EVERY DAY, Disp: 90 tablet, Rfl: 3  •  lisinopril (ZESTRIL) 10 mg tablet, TAKE ONE TABLET BY MOUTH EVERY DAY, Disp: 90 tablet, Rfl: 1  •  metoprolol succinate (TOPROL-XL) 50 mg 24 hr tablet, TAKE ONE AND ONE-HALF TABLETS BY MOUTH EVERY DAY, Disp: 135 tablet, Rfl: 1  •  Blood Glucose Monitoring Suppl (OneTouch Verio Reflect) w/Device KIT, Check blood sugars once daily. Please substitute with appropriate alternative as covered by patient's insurance. Dx: E11.65 (Patient not taking: Reported on 8/6/2024), Disp: 1 kit, Rfl: 0  •  naloxone (NARCAN) 4 mg/0.1 mL nasal spray, Administer 1 spray into a nostril. If no response after 2-3 minutes, give another dose in the other nostril using a new spray. (Patient not taking: Reported on 7/11/2024), Disp: 1 each, Rfl: 1  Review of Systems   Constitutional:  Negative for unexpected weight change.   HENT:  Negative for trouble swallowing and voice change.    Eyes:  Negative for visual disturbance.   Cardiovascular:  Negative for palpitations.   Gastrointestinal:  Positive for constipation. Negative for diarrhea.   Musculoskeletal:  Negative for gait problem.   Skin:         Denies changes in his skin and has some fingernail splitting.   "  Neurological:  Negative for tremors.   Psychiatric/Behavioral:  The patient is not nervous/anxious.        Physical Exam:  Body mass index is 36.74 kg/m².  /80   Pulse 89   Ht 5' 6\" (1.676 m)   Wt 103 kg (227 lb 9.6 oz)   SpO2 97%   BMI 36.74 kg/m²    Wt Readings from Last 3 Encounters:   02/17/25 103 kg (227 lb 9.6 oz)   02/05/25 99.8 kg (220 lb)   01/15/25 99.8 kg (220 lb)       GEN: NAD  E/n/m nl facies, hearing intact bilat, tongue midline, lips nl  Eyes: no stare or proptosis, nl lids, EOMI  Neck: trachea midline, thyroid NT to palpation, nl in size, no nodules or neck masses noted, no cervical LAD  CV; heart irreg but rate controlled, s1s2 nl, no m/r/g appreciated  Resp: CTAB, good effort  Ab+BS  Neuro: + tremor in digits of both hands. ,  MS: no c/c in digits, moves all 4 ext, nl muscle bulk, gait nl  Skin: warm and dry, no palmar erythema  Psych: nl mood and affect, no gross lapses in memory    DATA:  Labs:   Lab Results   Component Value Date    ULJ4JZRPPDHP 0.397 (L) 12/09/2024         Lab Results   Component Value Date    FREET4 1.29 (H) 12/09/2024         Radiology  10/2024   INDICATION: E05.90: Thyrotoxicosis, unspecified without thyrotoxic crisis or storm.     COMPARISON: None     TECHNIQUE: Ultrasound of the thyroid was performed with a high frequency linear transducer in transverse and sagittal planes including volumetric imaging sweeps as well as traditional still imaging technique.     FINDINGS:  Thyroid texture: Thyroid parenchyma is diffusely heterogeneous in echotexture.     Right lobe: 5.7 x 2.3 x 2.4 cm. Volume 15.1 mL  Left lobe: 4.8 x 2.3 x 1.9 cm. Volume 10.5 mL  Isthmus: 0.2 cm.     5 mm posterior right mid lobe nodule. No dominant nodule identified.     IMPRESSION:     Mildly heterogeneous gland with subcentimeter nodule which does not necessitate further ultrasound follow-up based on current ACR criteria.    Impression/Plan:  Vineet was seen today for " hyperthyroidism.    Diagnoses and all orders for this visit:    Type 2 diabetes mellitus with obesity  (HCC)  -     Ambulatory referral to Endocrinology    Subclinical hyperthyroidism  -     Ambulatory referral to Endocrinology  -     Cancel: TSH, 3rd generation  -     Cancel: T4, free  -     Cancel: T3; Future  -     Cancel: Thyroid stimulating immunoglobulin; Future  -     TSH, 3rd generation  -     T4, free  -     T3  -     Thyroid stimulating immunoglobulin      Problem List Items Addressed This Visit     Type 2 diabetes mellitus with obesity  (HCC) (Chronic)    Attached to the consult, but as this is well controlled diabetes was not addressed         Subclinical hyperthyroidism    He appears euthyroid but does have the persistently low TSH and now a mildly elevated Free t4. Will repeat TSH and Free T4 and check T3 and TSII. Given his history of afib, if there is biochemical hyperthyroidism, then low dose methimazole is warranted. We will contact him when labs returm         Relevant Orders    TSH, 3rd generation    T4, free    T3    Thyroid stimulating immunoglobulin                Discussed with the patient and all questioned fully answered. He will call me if any problems arise.        Windy Mix MD

## 2025-02-18 ENCOUNTER — ANTICOAG VISIT (OUTPATIENT)
Dept: CARDIOLOGY CLINIC | Facility: CLINIC | Age: 70
End: 2025-02-18

## 2025-02-18 DIAGNOSIS — I48.21 PERMANENT ATRIAL FIBRILLATION (HCC): Primary | ICD-10-CM

## 2025-02-19 ENCOUNTER — RESULTS FOLLOW-UP (OUTPATIENT)
Dept: ENDOCRINOLOGY | Facility: CLINIC | Age: 70
End: 2025-02-19

## 2025-02-19 LAB — TSI SER-ACNC: <0.1 IU/L (ref 0–0.55)

## 2025-03-11 ENCOUNTER — OFFICE VISIT (OUTPATIENT)
Dept: PAIN MEDICINE | Facility: CLINIC | Age: 70
End: 2025-03-11
Payer: MEDICARE

## 2025-03-11 VITALS — WEIGHT: 229 LBS | HEIGHT: 66 IN | HEART RATE: 79 BPM | BODY MASS INDEX: 36.8 KG/M2 | TEMPERATURE: 97.8 F

## 2025-03-11 DIAGNOSIS — F11.20 UNCOMPLICATED OPIOID DEPENDENCE (HCC): ICD-10-CM

## 2025-03-11 DIAGNOSIS — G89.4 CHRONIC PAIN SYNDROME: ICD-10-CM

## 2025-03-11 DIAGNOSIS — Z79.899 ENCOUNTER FOR LONG-TERM (CURRENT) DRUG USE: ICD-10-CM

## 2025-03-11 DIAGNOSIS — Z98.1 HISTORY OF LUMBAR FUSION: ICD-10-CM

## 2025-03-11 DIAGNOSIS — M46.1 SACROILIITIS (HCC): ICD-10-CM

## 2025-03-11 DIAGNOSIS — M96.1 LUMBAR POSTLAMINECTOMY SYNDROME: Primary | ICD-10-CM

## 2025-03-11 PROCEDURE — G2211 COMPLEX E/M VISIT ADD ON: HCPCS | Performed by: PHYSICIAN ASSISTANT

## 2025-03-11 PROCEDURE — 99214 OFFICE O/P EST MOD 30 MIN: CPT | Performed by: PHYSICIAN ASSISTANT

## 2025-03-11 RX ORDER — HYDROCODONE BITARTRATE AND ACETAMINOPHEN 10; 325 MG/1; MG/1
1 TABLET ORAL EVERY 6 HOURS PRN
Qty: 120 TABLET | Refills: 0 | Status: SHIPPED | OUTPATIENT
Start: 2025-03-11

## 2025-03-11 NOTE — PROGRESS NOTES
Assessment:  1. Lumbar postlaminectomy syndrome    2. History of lumbar fusion    3. Sacroiliitis (HCC)    4. Chronic pain syndrome    5. Encounter for long-term (current) drug use    6. Uncomplicated opioid dependence (HCC)        Plan:  While the patient was in the office today, I did have a thorough conversation regarding their chronic pain syndrome, medication management, and treatment plan options.    The patient remains clinically stable and moderately controlled on the current medication regimen of Norco 10/325 every 6 hours as needed.  Patient is taking the medication as prescribed and without side effects.  I feel it is reasonable to continue and have electronically sent the next 2 months of the opioid prescriptions to his pharmacy with the appropriate fill dates.  Patient takes the baclofen sparingly and does not require refills on today's visit.    [place] Prescription Drug Monitoring Program report was reviewed and was appropriate     A urine drug screen was collected at today's office visit as part of our medication management protocol. The point of care testing results were appropriate for what was being prescribed. The specimen will be sent for confirmatory testing. The drug screen is medically necessary because the patient is either dependent on opioid medication or is being considered for opioid medication therapy and the results could impact ongoing or future treatment. The drug screen is to evaluate for the presences or absence of prescribed, non-prescribed, and/or illicit drugs/substances.    There are risks associated with opioid medications, including dependence, addiction and tolerance. The patient understands and agrees to use these medications only as prescribed. Potential side effects of the medications include, but are not limited to, constipation, drowsiness, addiction, impaired judgment and risk of fatal overdose if not taken as prescribed. The patient was warned against driving  while taking sedation medications.  Sharing medications is a felony. At this point in time, the patient is showing no signs of addiction, abuse, diversion or suicidal ideation.    The patient will follow-up in 8 weeks for medication prescription refill and reevaluation. The patient was advised to contact the office should their symptoms worsen in the interim. The patient was agreeable and verbalized an understanding.        History of Present Illness:    The patient is a 69 y.o. male last seen on 12/18/2024 who presents for a follow up office visit in regards to chronic low back pain secondary to lumbar postlaminectomy pain syndrome/history of lumbar fusion, lumbar spondylosis and sacroiliitis. The patient currently reports low back pain that he rates a 5 out of 10 and described as a constant dull, aching, sharp pain.  Patient denies any lower extremity radicular pain, paresthesias or weakness.  His pain does not interfere with his daily living activities.  He has been continuing with home exercises.  He reports no change since his last visit with us.  He had significant relief in the past with SI joint injections however interventional treatment is no longer recommended in the form of injection therapy due to his issue in 2023 with pelvic hardware wound infection.  He is on chronic antibiotics, lifelong.  Infectious disease provider does not recommend injection therapy as there is potential question if this may have seeded from his spinal hardware.    Current pain medications includes: Norco and baclofen as needed .  The patient reports that this regimen is providing 50% pain relief.  The patient is reporting no side effects from this pain medication regimen.    Pain Contract Signed: 03/11/2025  Last Urine Drug Screen: 03/11/2025  Last dose of Norco: 03/11/2025 @ 5am   Last narcan prescribed: 04/13/2023  BECK'S :03/11/2025  SOAPP:03/11/2025    I have personally reviewed and/or updated the patient's past medical  history, past surgical history, family history, social history, current medications, allergies, and vital signs today.       Review of Systems:    Review of Systems   Gastrointestinal:  Positive for constipation.   Musculoskeletal:  Positive for back pain.         Past Medical History:   Diagnosis Date   • A-fib (Formerly [facility])    • Acute respiratory failure with hypoxia and hypercapnia (Formerly [facility]) 08/17/2020   • LAURENCE (acute kidney injury) (Formerly [facility]) 08/16/2020   • Arthritis     knees   • Community acquired pneumonia of left lower lobe of lung 08/12/2020   • Diabetes mellitus (Formerly [facility])    • Hardware complicating wound infection ([facility]) 11/15/2023   • Hypertension    • Measles, Sinhala (rubella)    • Mumps    • Nosebleed    • Obesity    • Pneumonia 08/2020   • Psoas abscess ([facility]) 01/03/2023   • Sacroiliitis ([facility])    • Snoring     Loudly   • Substance abuse (Formerly [facility])        Past Surgical History:   Procedure Laterality Date   • CYSTOSCOPY      Pt denies   • JOINT REPLACEMENT  07/2018   • KNEE ARTHROSCOPY Left     knee   • ORIF PELVIC FRACTURE     • MI DEBRIDEMENT SUBCUTANEOUS TISSUE EA ADDL 20 SQ CM N/A 11/03/2023    Procedure: DEBRIDEMENT WOUND (WASH OUT);  Surgeon: Jose Gasca MD;  Location: [facility];  Service: Orthopedics   • MI REMOVAL IMPLANT DEEP N/A 11/03/2023    Procedure: REMOVAL HARDWARE pubic symphysis;  Surgeon: Jose Gasca MD;  Location: [facility];  Service: Orthopedics   • SPINAL FUSION  05/06/2022   • TONSILLECTOMY     • TOTAL KNEE ARTHROPLASTY Left 06/10/2019    Procedure: ARTHROPLASTY KNEE TOTAL;  Surgeon: Kenny Moy MD;  Location: [facility];  Service: Orthopedics       Family History   Problem Relation Age of Onset   • Ovarian cancer Mother    • Heart disease Mother    • Diabetes type II Mother    • Colon cancer Father    • Heart disease Father    • Nephrolithiasis Father    • Cancer Father        Social History     Occupational History   • Not on file   Tobacco Use   • Smoking status: Never   • Smokeless tobacco:  Never   Vaping Use   • Vaping status: Never Used   Substance and Sexual Activity   • Alcohol use: Not Currently     Alcohol/week: 1.0 standard drink of alcohol     Types: 1 Standard drinks or equivalent per week   • Drug use: Never   • Sexual activity: Not Currently     Partners: Male     Birth control/protection: None         Current Outpatient Medications:   •  amLODIPine (NORVASC) 5 mg tablet, TAKE ONE TABLET BY MOUTH EVERY DAY, Disp: 90 tablet, Rfl: 1  •  atorvastatin (LIPITOR) 20 mg tablet, TAKE ONE TABLET BY MOUTH EVERY DAY, Disp: 90 tablet, Rfl: 1  •  baclofen 10 mg tablet, Take 1 tablet (10 mg total) by mouth 2 (two) times a day, Disp: 60 tablet, Rfl: 3  •  glipiZIDE (GLUCOTROL XL) 5 mg 24 hr tablet, TAKE ONE TABLET BY MOUTH EVERY DAY, Disp: 90 tablet, Rfl: 1  •  HYDROcodone-acetaminophen (NORCO)  mg per tablet, Take 1 tablet by mouth every 6 (six) hours as needed for moderate pain For ongoing therapy DO NOT FILL BEFORE: 02/26/25 Max Daily Amount: 4 tablets, Disp: 120 tablet, Rfl: 0  •  HYDROcodone-acetaminophen (NORCO)  mg per tablet, Take 1 tablet by mouth every 6 (six) hours as needed for moderate pain For ongoing therapy DO NOT FILL BEFORE: 03/27/25 Max Daily Amount: 4 tablets, Disp: 120 tablet, Rfl: 0  •  HYDROcodone-acetaminophen (NORCO)  mg per tablet, Take 1 tablet by mouth every 6 (six) hours as needed for moderate pain For ongoing therapy  DO NOT FILL BEFORE: 04/25/25 Max Daily Amount: 4 tablets, Disp: 120 tablet, Rfl: 0  •  Jantoven 5 MG tablet, TAKE ONE TABLET BY MOUTH EVERY DAY, Disp: 90 tablet, Rfl: 3  •  lisinopril (ZESTRIL) 10 mg tablet, TAKE ONE TABLET BY MOUTH EVERY DAY, Disp: 90 tablet, Rfl: 1  •  metoprolol succinate (TOPROL-XL) 50 mg 24 hr tablet, TAKE ONE AND ONE-HALF TABLETS BY MOUTH EVERY DAY, Disp: 135 tablet, Rfl: 1  •  Blood Glucose Monitoring Suppl (OneTouch Verio Reflect) w/Device KIT, Check blood sugars once daily. Please substitute with appropriate alternative  "as covered by patient's insurance. Dx: E11.65 (Patient not taking: Reported on 8/6/2024), Disp: 1 kit, Rfl: 0  •  naloxone (NARCAN) 4 mg/0.1 mL nasal spray, Administer 1 spray into a nostril. If no response after 2-3 minutes, give another dose in the other nostril using a new spray. (Patient not taking: Reported on 7/11/2024), Disp: 1 each, Rfl: 1    No Known Allergies    Physical Exam:    Pulse 79   Temp 97.8 °F (36.6 °C)   Ht 5' 6\" (1.676 m) Comment: Verbal  Wt 104 kg (229 lb)   BMI 36.96 kg/m²     Constitutional:normal, well developed, well nourished, alert, in no distress and non-toxic and no overt pain behavior.  Eyes:anicteric  HEENT:grossly intact  Neck:supple, symmetric, trachea midline and no masses   Pulmonary:even and unlabored  Cardiovascular:No edema or pitting edema present  Skin:Normal without rashes or lesions and well hydrated  Psychiatric:Mood and affect appropriate  Neurologic:Cranial Nerves II-XII grossly intact  Musculoskeletal: Lumbar scar from prior surgery, gait is slow but stable.      Imaging  No orders to display         Orders Placed This Encounter   Procedures   • [facility] All Prescribed Meds and Special Instructions   • Amphetamines, Methamphetamines   • Butalbital   • Phenobarbital   • Secobarbital   • Alprazolam   • Clonazepam   • Diazepam   • Lorazepam   • Gabapentin   • Pregabalin   • Cocaine   • Heroin   • Buprenorphine   • Levorphanol   • Meperidine   • Naltrexone   • Fentanyl   • Methadone   • Oxycodone   • Tapentadol   • THC   • Tramadol   • Codeine, Hydrocodone, Hydropmorphone, Morphine   • Bath Salts   • Ethyl Glucuronide/Ethyl Sulfate   • Kratom   • Spice   • Methylphenidate   • Phentermine   • Validity Oxidant   • Validity Creatinine   • Validity pH   • Validity Specific   • Xylazine Definitive Test       "

## 2025-03-27 ENCOUNTER — APPOINTMENT (OUTPATIENT)
Dept: LAB | Facility: CLINIC | Age: 70
End: 2025-03-27
Payer: MEDICARE

## 2025-03-27 DIAGNOSIS — I48.21 PERMANENT ATRIAL FIBRILLATION (HCC): ICD-10-CM

## 2025-03-27 LAB
INR PPP: 1.89 (ref 0.85–1.19)
PROTHROMBIN TIME: 21.8 SECONDS (ref 12.3–15)

## 2025-03-27 PROCEDURE — 36415 COLL VENOUS BLD VENIPUNCTURE: CPT

## 2025-03-27 PROCEDURE — 85610 PROTHROMBIN TIME: CPT

## 2025-03-28 ENCOUNTER — ANTICOAG VISIT (OUTPATIENT)
Dept: CARDIOLOGY CLINIC | Facility: CLINIC | Age: 70
End: 2025-03-28

## 2025-03-28 DIAGNOSIS — I48.21 PERMANENT ATRIAL FIBRILLATION (HCC): Primary | ICD-10-CM

## 2025-04-25 ENCOUNTER — TELEPHONE (OUTPATIENT)
Dept: INFECTIOUS DISEASES | Facility: CLINIC | Age: 70
End: 2025-04-25

## 2025-04-25 NOTE — TELEPHONE ENCOUNTER
I called Vineet to see if he would want to move his appt from 5/5 to 4/29 or 4/30. No answer. I left a message to call the office back.

## 2025-05-09 ENCOUNTER — APPOINTMENT (OUTPATIENT)
Dept: LAB | Facility: CLINIC | Age: 70
End: 2025-05-09
Payer: MEDICARE

## 2025-05-09 DIAGNOSIS — I48.21 PERMANENT ATRIAL FIBRILLATION (HCC): ICD-10-CM

## 2025-05-09 LAB
INR PPP: 3.24 (ref 0.85–1.19)
PROTHROMBIN TIME: 32.7 SECONDS (ref 12.3–15)

## 2025-05-09 PROCEDURE — 36415 COLL VENOUS BLD VENIPUNCTURE: CPT

## 2025-05-09 PROCEDURE — 85610 PROTHROMBIN TIME: CPT

## 2025-05-12 ENCOUNTER — ANTICOAG VISIT (OUTPATIENT)
Dept: CARDIOLOGY CLINIC | Facility: CLINIC | Age: 70
End: 2025-05-12

## 2025-05-12 DIAGNOSIS — I48.21 PERMANENT ATRIAL FIBRILLATION (HCC): Primary | ICD-10-CM

## 2025-05-16 ENCOUNTER — TELEPHONE (OUTPATIENT)
Dept: INFECTIOUS DISEASES | Facility: CLINIC | Age: 70
End: 2025-05-16

## 2025-05-21 ENCOUNTER — OFFICE VISIT (OUTPATIENT)
Dept: INFECTIOUS DISEASES | Facility: CLINIC | Age: 70
End: 2025-05-21
Payer: MEDICARE

## 2025-05-21 VITALS
TEMPERATURE: 97.7 F | OXYGEN SATURATION: 98 % | BODY MASS INDEX: 38.9 KG/M2 | WEIGHT: 241 LBS | DIASTOLIC BLOOD PRESSURE: 58 MMHG | SYSTOLIC BLOOD PRESSURE: 102 MMHG | HEART RATE: 66 BPM

## 2025-05-21 DIAGNOSIS — E66.811 OBESITY (BMI 30.0-34.9): ICD-10-CM

## 2025-05-21 DIAGNOSIS — E66.9 TYPE 2 DIABETES MELLITUS WITH OBESITY  (HCC): Chronic | ICD-10-CM

## 2025-05-21 DIAGNOSIS — Z98.1 H/O SPINAL FUSION: ICD-10-CM

## 2025-05-21 DIAGNOSIS — M86.9 OSTEITIS OF SYMPHYSIS PUBIS (HCC): Primary | ICD-10-CM

## 2025-05-21 DIAGNOSIS — E11.69 TYPE 2 DIABETES MELLITUS WITH OBESITY  (HCC): Chronic | ICD-10-CM

## 2025-05-21 PROCEDURE — 99214 OFFICE O/P EST MOD 30 MIN: CPT | Performed by: PHYSICIAN ASSISTANT

## 2025-05-21 RX ORDER — DOXYCYCLINE 100 MG/1
1 CAPSULE ORAL 2 TIMES DAILY
COMMUNITY
Start: 2025-02-11

## 2025-05-21 NOTE — PATIENT INSTRUCTIONS
- continue doxycycline 100 mg po every 12 hours indefinitely  - labs in the next week or two, then every 6 months  - call when refills are needed  - RTO one year or sooner if needed

## 2025-05-21 NOTE — ASSESSMENT & PLAN NOTE
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  Patient initially presented with development of a draining sinus track.  ORIF was performed many years ago.  He went to the OR on 11/3 for hardware removal along with debridement.  Cultures isolated MRSA.  Susceptibilities reviewed.  Patient currently on Coumadin and so unable to take Bactrim.  Has been taking clindamycin but organism resistant.  Fortunately he is not showing any signs of relapse currently.  Suspect that this may have been a chronic progressive process as he reports previous draining tract prior to spinal surgery and radiology mentions abnormalities on 2022 images.  However based on imaging would question if patient may have seeded his spinal hardware.  We discussed avoidance of PICC line, plans for Dalvance and transition to suppression.      Patient received his 2 infusion of dalvance without issue and has been transitioned to po suppression with doxcycline due to possible seeding of spinal hardware.     Patient continues to tolerate the doxycycline.  He has no new complaints today.    No new labs.  Patient states he is due for labs ordered by his PCP in the next few weeks and will go at that time.      Plan  -  continue doxycycline 100 mg po bid indefinitely  - patient states he will call the office when he needs refills  - CBCD, CMP now, then every 6 months  - RTO 1 year    Orders:    CBC and differential    Comprehensive metabolic panel    CBC and differential; Standing    Comprehensive metabolic panel; Standing

## 2025-05-21 NOTE — PROGRESS NOTES
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  Patient initially presented with development of a draining sinus track.  ORIF was performed many years ago.  He went to the OR on 11/3 for hardware removal along with debridement.  Cultures isolated MRSA.  Susceptibilities reviewed.  Patient currently on Coumadin and so unable to take Bactrim.  Has been taking clindamycin but organism resistant.  Fortunately he is not showing any signs of relapse currently.  Suspect that this may have been a chronic progressive process as he reports previous draining tract prior to spinal surgery and radiology mentions abnormalities on  images.  However based on imaging would question if patient may have seeded his spinal hardware.  We discussed avoidance of PICC line, plans for Dalvance and transition to suppression.      Patient received his 2 infusion of dalvance without issue and has been transitioned to po suppression with doxcycline due to possible seeding of spinal hardware.     Patient continues to tolerate the doxycycline.  He has no new complaints today.    No new labs.  Patient states he is due for labs ordered by his PCP in the next few weeks and will go at that time.      Plan  -  continue doxycycline 100 mg po bid indefinitely  - patient states he will call the office when he needs refills  - CBCD, CMP now, then every 6 months  - RTO 1 year    Orders:    CBC and differential    Comprehensive metabolic panel    CBC and differential; Standing    Comprehensive metabolic panel; Standing      H/O spinal fusion  Previous spinal fusion of L1-L5.  Would question if this site has been seeded given CT imaging with lucency at the L5 pedicle screw.  Noted on most  recent CT imaging in September.  Would question if patient may have seeded this area.  Patient completed 2 infusions of dalvance as above, now will be transitioned to suppressive doxycycline.             Type 2 diabetes mellitus with obesity  (HCC)    Lab Results   Component Value Date    HGBA1C 5.8 (H) 10/21/2024            Obesity (BMI 30.0-34.9)  BMI 38.9           History of Present Illness   HPI  Adolfo Munroe is a 70 y.o. male who presents for office follow up today regarding antibiotic suppression.  Patient remains on po doxycycline.  He continues to tolerate it well.  He has no new complaints today.  Still has chronic back pain.  He denies any major changes since last seen.  He denies any changes to his medications since last seen.        Review of Systems   Constitutional:  Negative for chills and fever.   Respiratory:  Negative for cough and shortness of breath.    Gastrointestinal:  Negative for abdominal pain, diarrhea, nausea and vomiting.   Skin:  Negative for rash.   Psychiatric/Behavioral:  Negative for behavioral problems and confusion.           Objective   /58 (BP Location: Left arm, Patient Position: Sitting, Cuff Size: Standard)   Pulse 66   Temp 97.7 °F (36.5 °C) (Temporal)   Wt 109 kg (241 lb)   SpO2 98%   BMI 38.90 kg/m²      Physical Exam  Vitals reviewed.   Constitutional:       General: He is not in acute distress.     Appearance: Normal appearance. He is not ill-appearing, toxic-appearing or diaphoretic.   HENT:      Head: Normocephalic and atraumatic.     Eyes:      General: No scleral icterus.        Right eye: No discharge.         Left eye: No discharge.      Conjunctiva/sclera: Conjunctivae normal.       Cardiovascular:      Rate and Rhythm: Normal rate.   Pulmonary:      Effort: Pulmonary effort is normal. No respiratory distress.      Breath sounds: Normal breath sounds. No stridor. No wheezing, rhonchi or rales.   Chest:      Chest wall: No tenderness.   Abdominal:       General: There is no distension.      Palpations: Abdomen is soft.      Tenderness: There is no abdominal tenderness.     Skin:     General: Skin is warm and dry.      Coloration: Skin is not jaundiced or pale.      Findings: No erythema or rash.     Neurological:      Mental Status: He is alert and oriented to person, place, and time.     Psychiatric:         Mood and Affect: Mood normal.         Behavior: Behavior normal.             Labs:   No new labs    Administrative Statements   I have spent a total time of 30 minutes in caring for this patient on the day of the visit/encounter including Diagnostic results, Prognosis, Risks and benefits of tx options, Instructions for management, Patient and family education, Importance of tx compliance, Risk factor reductions, Impressions, Documenting in the medical record, and Reviewing/placing orders in the medical record (including tests, medications, and/or procedures).

## 2025-05-22 ENCOUNTER — OFFICE VISIT (OUTPATIENT)
Dept: PAIN MEDICINE | Facility: CLINIC | Age: 70
End: 2025-05-22
Payer: MEDICARE

## 2025-05-22 VITALS
BODY MASS INDEX: 38.57 KG/M2 | WEIGHT: 240 LBS | HEIGHT: 66 IN | HEART RATE: 67 BPM | OXYGEN SATURATION: 97 % | TEMPERATURE: 97.4 F

## 2025-05-22 DIAGNOSIS — Z79.891 LONG-TERM CURRENT USE OF OPIATE ANALGESIC: ICD-10-CM

## 2025-05-22 DIAGNOSIS — F11.20 UNCOMPLICATED OPIOID DEPENDENCE (HCC): ICD-10-CM

## 2025-05-22 DIAGNOSIS — G89.4 CHRONIC PAIN SYNDROME: ICD-10-CM

## 2025-05-22 DIAGNOSIS — Z98.1 HISTORY OF LUMBAR FUSION: ICD-10-CM

## 2025-05-22 DIAGNOSIS — M96.1 LUMBAR POSTLAMINECTOMY SYNDROME: Primary | ICD-10-CM

## 2025-05-22 PROCEDURE — G2211 COMPLEX E/M VISIT ADD ON: HCPCS | Performed by: PHYSICIAN ASSISTANT

## 2025-05-22 PROCEDURE — 99214 OFFICE O/P EST MOD 30 MIN: CPT | Performed by: PHYSICIAN ASSISTANT

## 2025-05-22 RX ORDER — HYDROCODONE BITARTRATE AND ACETAMINOPHEN 10; 325 MG/1; MG/1
1 TABLET ORAL EVERY 6 HOURS PRN
Qty: 120 TABLET | Refills: 0 | Status: SHIPPED | OUTPATIENT
Start: 2025-05-22

## 2025-05-22 NOTE — PROGRESS NOTES
Name: Adolfo Munroe      : 1955      MRN: [id number]  Encounter Provider: Inocencia Lopez PA-C  Encounter Date: 2025   Encounter department: [facility]  :  Assessment & Plan  Lumbar postlaminectomy syndrome    While the patient was in the office today, I did have a thorough conversation regarding their chronic pain syndrome, medication management, and treatment plan options.    The patient remains clinically stable and moderately controlled on the current pain medication regimen of Norco 10/325 every 6 hours as needed and baclofen 10 mg nightly as needed.  Patient is taking the medication as prescribed, appropriately and denies side effects.  I feel it is reasonable to continue and have electronically sent 3 months of the pain medication to his pharmacy with the appropriate fill dates.    Orders:  •  HYDROcodone-acetaminophen (NORCO)  mg per tablet; Take 1 tablet by mouth every 6 (six) hours as needed for moderate pain For ongoing therapy DO NOT FILL BEFORE: 25 Max Daily Amount: 4 tablets  •  HYDROcodone-acetaminophen (NORCO)  mg per tablet; Take 1 tablet by mouth every 6 (six) hours as needed for moderate pain For ongoing therapy DO NOT FILL BEFORE: 25 Max Daily Amount: 4 tablets  •  HYDROcodone-acetaminophen (NORCO)  mg per tablet; Take 1 tablet by mouth every 6 (six) hours as needed for moderate pain For ongoing therapy Max Daily Amount: 4 tablets    History of lumbar fusion    Orders:  •  HYDROcodone-acetaminophen (NORCO)  mg per tablet; Take 1 tablet by mouth every 6 (six) hours as needed for moderate pain For ongoing therapy DO NOT FILL BEFORE: 25 Max Daily Amount: 4 tablets    Chronic pain syndrome    Orders:  •  HYDROcodone-acetaminophen (NORCO)  mg per tablet; Take 1 tablet by mouth every 6 (six) hours as needed for moderate pain For ongoing therapy DO NOT FILL BEFORE: 25 Max Daily Amount: 4 tablets  •   HYDROcodone-acetaminophen (NORCO)  mg per tablet; Take 1 tablet by mouth every 6 (six) hours as needed for moderate pain For ongoing therapy Max Daily Amount: 4 tablets    Uncomplicated opioid dependence (HCC)         Long-term current use of opiate analgesic             There are risks associated with opioid medications, including dependence, addiction and tolerance. The patient understands and agrees to use these medications only as prescribed. Potential side effects of the medications include, but are not limited to, constipation, drowsiness, addiction, impaired judgment and risk of fatal overdose if not taken as prescribed. The patient was warned against driving while taking sedation medications.  Sharing medications is a felony. At this point in time, the patient is showing no signs of addiction, abuse, diversion or suicidal ideation.  [place] Pres[place] Drug Monitoring Program report was reviewed and was appropriate      Follow-up is planned in 12 weeks or sooner as warranted. The patient was advised to contact the office should their symptoms worsen in the interim.     My impressions and treatment recommendations were discussed in detail with the patient who verbalized understanding and had no further questions.  Discharge instructions were provided. I personally saw and examined the patient and I agree with the above discussed plan of care.    History of Present Illness     Adolfo Munroe is a 70 y.o. male who presents for a follow up office visit in regards to Back Pain. The patient’s current symptoms include low back pain, primarily left-sided with referred pain into the hip and buttock.  He rates his current pain a 5 out of 10 and describes it as a constant dull, aching, sharp pain that does not radiate.  His pain does not interfere with his daily living activities.  He does continue to participate in home exercises as tolerated.  Patient has a history of laminectomy and spinal fusion.   "Unfortunately a few years ago, he developed pubic symphysis osteitis and hardware infection with MRSA.  He is on lifelong doxycycline and is followed by infectious disease.  Patient used to undergo intermittent SI joint injections however given his significant risk for infection, our office feels there is too much risk performing interventional spine procedures.  He is clinically stable and moderately controlled the current medication regimen of hydrocodone as needed and baclofen as needed.    Opioid contract date: 3/11/2025    Last UDS Date: 3/11/2025                         Review of Systems   Respiratory:  Negative for shortness of breath.    Cardiovascular:  Negative for chest pain.   Gastrointestinal:  Negative for constipation, diarrhea, nausea and vomiting.   Musculoskeletal:  Negative for arthralgias, gait problem, joint swelling and myalgias.   Skin:  Negative for rash.   Neurological:  Positive for weakness. Negative for dizziness and seizures.   Psychiatric/Behavioral:  Negative for dysphoric mood.    All other systems reviewed and are negative.      Medical History Reviewed by provider this encounter:  Tobacco  Allergies  Meds  Problems  Med Hx  Surg Hx  Fam Hx     .     Objective   Pulse 67   Temp (!) 97.4 °F (36.3 °C)   Ht 5' 6\" (1.676 m)   Wt 109 kg (240 lb)   SpO2 97%   BMI 38.74 kg/m²      Pain Score:   3  Physical Exam  Constitutional:normal, well developed, well nourished, alert, in no distress and non-toxic and no overt pain behavior. and overweight  Eyes:anicteric  HEENT:grossly intact  Neck:supple, symmetric, trachea midline and no masses   Pulmonary:even and unlabored  Cardiovascular:No edema or pitting edema present  Skin:Normal without rashes or lesions and well hydrated  Psychiatric:Mood and affect appropriate  Neurologic:Cranial Nerves II-XII grossly intact  Musculoskeletal: Lumbar scar from prior surgeries, limited lumbar range of motion.      "

## 2025-05-25 DIAGNOSIS — I48.0 PAROXYSMAL ATRIAL FIBRILLATION (HCC): ICD-10-CM

## 2025-05-25 RX ORDER — METOPROLOL SUCCINATE 50 MG/1
75 TABLET, EXTENDED RELEASE ORAL DAILY
Qty: 135 TABLET | Refills: 1 | Status: SHIPPED | OUTPATIENT
Start: 2025-05-25

## 2025-06-10 ENCOUNTER — APPOINTMENT (OUTPATIENT)
Dept: LAB | Facility: CLINIC | Age: 70
End: 2025-06-10
Payer: MEDICARE

## 2025-06-10 DIAGNOSIS — M86.9 OSTEITIS OF SYMPHYSIS PUBIS (HCC): ICD-10-CM

## 2025-06-10 DIAGNOSIS — I48.21 PERMANENT ATRIAL FIBRILLATION (HCC): ICD-10-CM

## 2025-06-10 LAB
ALBUMIN SERPL BCG-MCNC: 4.1 G/DL (ref 3.5–5)
ALP SERPL-CCNC: 92 U/L (ref 34–104)
ALT SERPL W P-5'-P-CCNC: 14 U/L (ref 7–52)
ANION GAP SERPL CALCULATED.3IONS-SCNC: 8 MMOL/L (ref 4–13)
AST SERPL W P-5'-P-CCNC: 18 U/L (ref 13–39)
BASOPHILS # BLD AUTO: 0.02 THOUSANDS/ÂΜL (ref 0–0.1)
BASOPHILS NFR BLD AUTO: 0 % (ref 0–1)
BILIRUB SERPL-MCNC: 1.16 MG/DL (ref 0.2–1)
BUN SERPL-MCNC: 25 MG/DL (ref 5–25)
CALCIUM SERPL-MCNC: 9.1 MG/DL (ref 8.4–10.2)
CHLORIDE SERPL-SCNC: 104 MMOL/L (ref 96–108)
CHOLEST SERPL-MCNC: 121 MG/DL (ref ?–200)
CO2 SERPL-SCNC: 26 MMOL/L (ref 21–32)
CREAT SERPL-MCNC: 1.22 MG/DL (ref 0.6–1.3)
CREAT UR-MCNC: 75 MG/DL
EOSINOPHIL # BLD AUTO: 0.13 THOUSAND/ÂΜL (ref 0–0.61)
EOSINOPHIL NFR BLD AUTO: 2 % (ref 0–6)
ERYTHROCYTE [DISTWIDTH] IN BLOOD BY AUTOMATED COUNT: 16.2 % (ref 11.6–15.1)
EST. AVERAGE GLUCOSE BLD GHB EST-MCNC: 128 MG/DL
GFR SERPL CREATININE-BSD FRML MDRD: 60 ML/MIN/1.73SQ M
GLUCOSE P FAST SERPL-MCNC: 95 MG/DL (ref 65–99)
HBA1C MFR BLD: 6.1 %
HCT VFR BLD AUTO: 45.4 % (ref 36.5–49.3)
HDLC SERPL-MCNC: 55 MG/DL
HGB BLD-MCNC: 14.9 G/DL (ref 12–17)
IMM GRANULOCYTES # BLD AUTO: 0.02 THOUSAND/UL (ref 0–0.2)
IMM GRANULOCYTES NFR BLD AUTO: 0 % (ref 0–2)
INR PPP: 2.31 (ref 0.85–1.19)
LDLC SERPL CALC-MCNC: 52 MG/DL (ref 0–100)
LYMPHOCYTES # BLD AUTO: 1.23 THOUSANDS/ÂΜL (ref 0.6–4.47)
LYMPHOCYTES NFR BLD AUTO: 19 % (ref 14–44)
MCH RBC QN AUTO: 28 PG (ref 26.8–34.3)
MCHC RBC AUTO-ENTMCNC: 32.8 G/DL (ref 31.4–37.4)
MCV RBC AUTO: 85 FL (ref 82–98)
MICROALBUMIN UR-MCNC: 18.3 MG/L
MICROALBUMIN/CREAT 24H UR: 24 MG/G CREATININE (ref 0–30)
MONOCYTES # BLD AUTO: 0.52 THOUSAND/ÂΜL (ref 0.17–1.22)
MONOCYTES NFR BLD AUTO: 8 % (ref 4–12)
NEUTROPHILS # BLD AUTO: 4.69 THOUSANDS/ÂΜL (ref 1.85–7.62)
NEUTS SEG NFR BLD AUTO: 71 % (ref 43–75)
NRBC BLD AUTO-RTO: 0 /100 WBCS
PLATELET # BLD AUTO: 191 THOUSANDS/UL (ref 149–390)
PMV BLD AUTO: 11.4 FL (ref 8.9–12.7)
POTASSIUM SERPL-SCNC: 4.8 MMOL/L (ref 3.5–5.3)
PROT SERPL-MCNC: 6.8 G/DL (ref 6.4–8.4)
PROTHROMBIN TIME: 25.3 SECONDS (ref 12.3–15)
RBC # BLD AUTO: 5.33 MILLION/UL (ref 3.88–5.62)
SODIUM SERPL-SCNC: 138 MMOL/L (ref 135–147)
TRIGL SERPL-MCNC: 69 MG/DL (ref ?–150)
TSH SERPL DL<=0.05 MIU/L-ACNC: 0.46 UIU/ML (ref 0.45–4.5)
WBC # BLD AUTO: 6.61 THOUSAND/UL (ref 4.31–10.16)

## 2025-06-10 PROCEDURE — 85610 PROTHROMBIN TIME: CPT

## 2025-06-10 PROCEDURE — 36415 COLL VENOUS BLD VENIPUNCTURE: CPT

## 2025-06-11 ENCOUNTER — ANTICOAG VISIT (OUTPATIENT)
Dept: CARDIOLOGY CLINIC | Facility: CLINIC | Age: 70
End: 2025-06-11

## 2025-06-11 ENCOUNTER — RESULTS FOLLOW-UP (OUTPATIENT)
Dept: INFECTIOUS DISEASES | Facility: CLINIC | Age: 70
End: 2025-06-11

## 2025-06-11 DIAGNOSIS — I48.21 PERMANENT ATRIAL FIBRILLATION (HCC): Primary | ICD-10-CM

## 2025-06-14 ENCOUNTER — RA CDI HCC (OUTPATIENT)
Dept: OTHER | Facility: HOSPITAL | Age: 70
End: 2025-06-14

## 2025-06-20 ENCOUNTER — OFFICE VISIT (OUTPATIENT)
Dept: FAMILY MEDICINE CLINIC | Facility: CLINIC | Age: 70
End: 2025-06-20
Payer: MEDICARE

## 2025-06-20 VITALS
TEMPERATURE: 97.6 F | SYSTOLIC BLOOD PRESSURE: 120 MMHG | WEIGHT: 244.8 LBS | RESPIRATION RATE: 16 BRPM | HEART RATE: 76 BPM | HEIGHT: 66 IN | BODY MASS INDEX: 39.34 KG/M2 | OXYGEN SATURATION: 97 % | DIASTOLIC BLOOD PRESSURE: 82 MMHG

## 2025-06-20 DIAGNOSIS — I10 ESSENTIAL HYPERTENSION: Chronic | ICD-10-CM

## 2025-06-20 DIAGNOSIS — E78.5 DYSLIPIDEMIA: ICD-10-CM

## 2025-06-20 DIAGNOSIS — E66.9 TYPE 2 DIABETES MELLITUS WITH OBESITY  (HCC): Chronic | ICD-10-CM

## 2025-06-20 DIAGNOSIS — M86.9 OSTEITIS OF SYMPHYSIS PUBIS (HCC): ICD-10-CM

## 2025-06-20 DIAGNOSIS — E11.69 TYPE 2 DIABETES MELLITUS WITH OBESITY  (HCC): Chronic | ICD-10-CM

## 2025-06-20 DIAGNOSIS — M48.07 STENOSIS OF LUMBOSACRAL SPINE: Primary | Chronic | ICD-10-CM

## 2025-06-20 DIAGNOSIS — I48.21 PERMANENT ATRIAL FIBRILLATION (HCC): ICD-10-CM

## 2025-06-20 PROCEDURE — 99214 OFFICE O/P EST MOD 30 MIN: CPT | Performed by: FAMILY MEDICINE

## 2025-06-20 PROCEDURE — G2211 COMPLEX E/M VISIT ADD ON: HCPCS | Performed by: FAMILY MEDICINE

## 2025-06-20 RX ORDER — TIRZEPATIDE 2.5 MG/.5ML
2.5 INJECTION, SOLUTION SUBCUTANEOUS WEEKLY
Qty: 2 ML | Refills: 0 | Status: SHIPPED | OUTPATIENT
Start: 2025-06-20

## 2025-06-20 NOTE — ASSESSMENT & PLAN NOTE
Vicodin PRN  Tylenol PRN  PM follows  Not a candidate for injections due to history of osteomyelitis

## 2025-06-20 NOTE — PROGRESS NOTES
Name: Adolfo Munroe      : 1955      MRN: [id number]  Encounter Provider: Shanna Apodaca MD  Encounter Date: 2025   Encounter department: [facility]    Assessment & Plan  Stenosis of lumbosacral spine  Vicodin PRN  Tylenol PRN  PM follows  Not a candidate for injections due to history of osteomyelitis       Type 2 diabetes mellitus with obesity  (HCC)    Lab Results   Component Value Date    HGBA1C 6.1 (H) 06/10/2025   Metformin caused GI side effect  Currently on glipizide ER 5 mg daily  Due for diabetic eye exam, diabetic foot exam was performed today  Good glycemic control.  BMI 39.5, weight gain.  Patient is an excellent candidate for GLP therapy.  Rx for Mounjaro was sent to the pharmacy.  Patient is concerned about possible high cost of Rx, will keep me posted  Orders:  •  Tirzepatide (Mounjaro) 2.5 MG/0.5ML SOAJ; Inject 2.5 mg under the skin once a week  •  CBC; Future  •  Hemoglobin A1C; Future    Dyslipidemia  Lab Results   Component Value Date    LDLCALC 52 06/10/2025     Good control.  Continue atorvastatin 20 mg daily  Orders:  •  Comprehensive metabolic panel; Future  •  Lipid Panel with Direct LDL reflex; Future  •  TSH, 3rd generation; Future    Permanent atrial fibrillation (HCC)  Continue warfarin and metoprolol.  Patient follows with [facility] cardiology       Essential hypertension  Blood pressure is well-controlled.  Continue metoprolol, amlodipine and lisinopril        Osteitis of symphysis pubis (HCC)  Chronic suppressed doxycycline therapy.  Follows with ID.          Patient Instructions   Trial prescription for Zepbound 2.5 mg weekly was sent to the pharmacy.  Please let me know if you are starting this medication or not, if you start injections-I would be in favor of stopping glipizide  Please continue same daily medications  Eye exam  Blood work 6 months    Return in about 6 months (around 2025) for Medicare wellness, follow up.    History of  "Present Illness     Follow-up chronic medical conditions.  Patient has been feeling stable.  Chronic low back pain.  Not a candidate for injections.  Remains on lifelong suppressive antibiotic therapy due to history of osteomyelitis.     Patient [facility]    Reports weight gain.  Previous weight 218 pounds in December 2025, he weighs 244 pounds today    Exercise capacity is limited due to chronic arthritic pain.    Results of recent blood work reviewed.  No complaints of chest pain, palpitations, shortness of breath or dizziness.  Patient remains on daily Rx for A-fib, hypertension, hyperlipidemia and type 2 diabetes          Review of Systems   Constitutional: Negative.    HENT: Negative.     Eyes: Negative.    Respiratory: Negative.     Cardiovascular: Negative.    Gastrointestinal: Negative.    Endocrine: Negative.    Musculoskeletal:  Positive for arthralgias and back pain.   Skin: Negative.    Allergic/Immunologic: Negative.    Neurological: Negative.    Hematological: Negative.    Psychiatric/Behavioral: Negative.       Past Medical History[1]  Past Surgical History[2]  Family History[3]  Social History[4]  Medications[5]  No Known Allergies  Immunization History   Administered Date(s) Administered   • COVID-19 J&J ([employer]) vaccine 0.5 mL 05/27/2021   • COVID-19 Pfizer Vac BIVALENT Conor-sucrose 12 Yr+ IM 12/15/2022   • INFLUENZA 08/03/2016   • Influenza Quadrivalent Preservative Free 3 years and older IM 08/03/2016   • Pneumococcal Conjugate Vaccine 20-valent (Pcv20), Polysace 10/11/2022   • Tdap 01/01/2001     Objective   /82 (BP Location: Left arm, Patient Position: Sitting, Cuff Size: Large)   Pulse 76   Temp 97.6 °F (36.4 °C) (Temporal)   Resp 16   Ht 5' 6\" (1.676 m)   Wt 111 kg (244 lb 12.8 oz)   SpO2 97%   BMI 39.51 kg/m²     Physical Exam  Vitals and nursing note reviewed.   Constitutional:       General: He is not in acute " distress.     Appearance: Normal appearance. He is not ill-appearing.   HENT:      Head: Normocephalic and atraumatic.     Eyes:      General: No scleral icterus.     Conjunctiva/sclera: Conjunctivae normal.     Neck:      Vascular: No carotid bruit.     Cardiovascular:      Rate and Rhythm: Normal rate. Rhythm irregular.      Pulses: no weak pulses.           Dorsalis pedis pulses are 2+ on the right side and 2+ on the left side.      Heart sounds: Normal heart sounds. No murmur heard.  Pulmonary:      Effort: Pulmonary effort is normal. No respiratory distress.      Breath sounds: Normal breath sounds.   Abdominal:      General: Bowel sounds are normal. There is no distension.      Palpations: Abdomen is soft.     Musculoskeletal:         General: Normal range of motion.      Cervical back: Neck supple. No rigidity.      Right lower leg: No edema.      Left lower leg: No edema.   Feet:      Right foot:      Skin integrity: No ulcer, skin breakdown, erythema, warmth, callus or dry skin.      Left foot:      Skin integrity: No ulcer, skin breakdown, erythema, warmth, callus or dry skin.     Skin:     General: Skin is warm.      Findings: No rash.     Neurological:      General: No focal deficit present.      Mental Status: He is alert and oriented to person, place, and time.     Psychiatric:         Mood and Affect: Mood normal.         Behavior: Behavior normal.         Thought Content: Thought content normal.     Diabetic Foot Exam    Patient's shoes and socks removed.    Right Foot/Ankle   Right Foot Inspection  Skin Exam: skin normal and skin intact. No dry skin, no warmth, no callus, no erythema, no maceration, no abnormal color, no pre-ulcer, no ulcer and no callus.     Toe Exam: ROM and strength within normal limits.     Sensory   Vibration: intact  Proprioception: intact  Monofilament testing: intact    Vascular  The right DP pulse is 2+.     Left Foot/Ankle  Left Foot Inspection  Skin Exam: skin normal and  skin intact. No dry skin, no warmth, no erythema, no maceration, normal color, no pre-ulcer, no ulcer and no callus.     Toe Exam: ROM and strength within normal limits.     Sensory   Vibration: intact  Proprioception: intact  Monofilament testing: intact    Vascular  The left DP pulse is 2+.     Assign Risk Category  No deformity present  No loss of protective sensation  No weak pulses  Risk: 0             [1]  Past Medical History:  Diagnosis Date   • A-fib ([facility])    • Acute respiratory failure with hypoxia and hypercapnia ([facility]) 08/17/2020   • LAURENCE (acute kidney injury) ([facility]) 08/16/2020   • Arthritis     knees   • Community acquired pneumonia of left lower lobe of lung 08/12/2020   • Diabetes mellitus ([facility])    • Hardware complicating wound infection ([facility]) 11/15/2023   • Hypertension    • Measles, [place] (rubella)    • Mumps    • Nosebleed    • Obesity    • Pneumonia 08/2020   • Psoas abscess ([facility]) 01/03/2023   • Sacroiliitis ([facility])    • Snoring     Loudly   • Substance abuse ([facility])    [2]  Past Surgical History:  Procedure Laterality Date   • CYSTOSCOPY      Pt denies   • JOINT REPLACEMENT  07/2018   • KNEE ARTHROSCOPY Left     knee   • ORIF PELVIC FRACTURE     • NV DEBRIDEMENT SUBCUTANEOUS TISSUE EA ADDL 20 SQ CM N/A 11/03/2023    Procedure: DEBRIDEMENT WOUND (WASH OUT);  Surgeon: Jose Gasca MD;  Location: [facility];  Service: Orthopedics   • NV REMOVAL IMPLANT DEEP N/A 11/03/2023    Procedure: REMOVAL HARDWARE pubic symphysis;  Surgeon: Jose Gasca MD;  Location: [facility];  Service: Orthopedics   • SPINAL FUSION  05/06/2022   • TONSILLECTOMY     • TOTAL KNEE ARTHROPLASTY Left 06/10/2019    Procedure: ARTHROPLASTY KNEE TOTAL;  Surgeon: Kenny Moy MD;  Location: [facility];  Service: Orthopedics   [3]  Family History  Problem Relation Name Age of Onset   • Ovarian cancer Mother kristel gonzales    • Heart disease Mother kristel gonzales    • Diabetes type II Mother kristel gonzales    • Colon cancer Father Adolfo  charly gonzales    • Heart disease Father Adolfo gonzales    • Nephrolithiasis Father Adolfo gonzales    • Cancer Father Adolfo gonzales    [4]  Social History  Tobacco Use   • Smoking status: Never   • Smokeless tobacco: Never   Vaping Use   • Vaping status: Never Used   Substance and Sexual Activity   • Alcohol use: Not Currently     Alcohol/week: 1.0 standard drink of alcohol     Types: 1 Standard drinks or equivalent per week   • Drug use: Never   • Sexual activity: Not Currently     Partners: Male     Birth control/protection: None   [5]  Current Outpatient Medications on File Prior to Visit   Medication Sig   • amLODIPine (NORVASC) 5 mg tablet TAKE ONE TABLET BY MOUTH EVERY DAY   • atorvastatin (LIPITOR) 20 mg tablet TAKE ONE TABLET BY MOUTH EVERY DAY   • baclofen 10 mg tablet Take 1 tablet (10 mg total) by mouth 2 (two) times a day   • doxycycline hyclate (VIBRAMYCIN) 100 mg capsule Take 1 capsule by mouth in the morning and 1 capsule before bedtime.   • glipiZIDE (GLUCOTROL XL) 5 mg 24 hr tablet TAKE ONE TABLET BY MOUTH EVERY DAY   • HYDROcodone-acetaminophen (NORCO)  mg per tablet Take 1 tablet by mouth every 6 (six) hours as needed for moderate pain For ongoing therapy DO NOT FILL BEFORE: 07/18/25 Max Daily Amount: 4 tablets   • HYDROcodone-acetaminophen (NORCO)  mg per tablet Take 1 tablet by mouth every 6 (six) hours as needed for moderate pain For ongoing therapy DO NOT FILL BEFORE: 06/20/25 Max Daily Amount: 4 tablets   • HYDROcodone-acetaminophen (NORCO)  mg per tablet Take 1 tablet by mouth every 6 (six) hours as needed for moderate pain For ongoing therapy Max Daily Amount: 4 tablets   • Jantoven 5 MG tablet TAKE ONE TABLET BY MOUTH EVERY DAY   • lisinopril (ZESTRIL) 10 mg tablet TAKE ONE TABLET BY MOUTH EVERY DAY   • metoprolol succinate (TOPROL-XL) 50 mg 24 hr tablet TAKE ONE AND ONE-HALF TABLETS BY MOUTH EVERY DAY   • naloxone (NARCAN) 4 mg/0.1 mL nasal spray  Administer 1 spray into a nostril. If no response after 2-3 minutes, give another dose in the other nostril using a new spray. (Patient not taking: Reported on 7/11/2024)   • [DISCONTINUED] Blood Glucose Monitoring Suppl (OneTouch Verio Reflect) w/Device KIT Check blood sugars once daily. Please substitute with appropriate alternative as covered by patient's insurance. Dx: E11.65 (Patient not taking: Reported on 8/6/2024)

## 2025-06-20 NOTE — ASSESSMENT & PLAN NOTE
Lab Results   Component Value Date    HGBA1C 6.1 (H) 06/10/2025   Metformin caused GI side effect  Currently on glipizide ER 5 mg daily  Due for diabetic eye exam, diabetic foot exam was performed today  Good glycemic control.  BMI 39.5, weight gain.  Patient is an excellent candidate for GLP therapy.  Rx for Mounjaro was sent to the pharmacy.  Patient is concerned about possible high cost of Rx, will keep me posted  Orders:  •  Tirzepatide (Mounjaro) 2.5 MG/0.5ML SOAJ; Inject 2.5 mg under the skin once a week  •  CBC; Future  •  Hemoglobin A1C; Future

## 2025-06-20 NOTE — PATIENT INSTRUCTIONS
Trial prescription for Zepbound 2.5 mg weekly was sent to the pharmacy.  Please let me know if you are starting this medication or not, if you start injections-I would be in favor of stopping glipizide  Please continue same daily medications  Eye exam  Blood work 6 months

## 2025-06-20 NOTE — ASSESSMENT & PLAN NOTE
Lab Results   Component Value Date    LDLCALC 52 06/10/2025     Good control.  Continue atorvastatin 20 mg daily  Orders:  •  Comprehensive metabolic panel; Future  •  Lipid Panel with Direct LDL reflex; Future  •  TSH, 3rd generation; Future

## 2025-07-31 DIAGNOSIS — E11.69 TYPE 2 DIABETES MELLITUS WITH OBESITY  (HCC): ICD-10-CM

## 2025-07-31 DIAGNOSIS — I10 ESSENTIAL HYPERTENSION: Chronic | ICD-10-CM

## 2025-07-31 DIAGNOSIS — E11.21 DIABETIC NEPHROPATHY ASSOCIATED WITH TYPE 2 DIABETES MELLITUS (HCC): ICD-10-CM

## 2025-07-31 DIAGNOSIS — E78.5 DYSLIPIDEMIA: ICD-10-CM

## 2025-07-31 DIAGNOSIS — E66.9 TYPE 2 DIABETES MELLITUS WITH OBESITY  (HCC): ICD-10-CM

## 2025-08-01 RX ORDER — GLIPIZIDE 5 MG/1
5 TABLET, FILM COATED, EXTENDED RELEASE ORAL DAILY
Qty: 90 TABLET | Refills: 1 | Status: SHIPPED | OUTPATIENT
Start: 2025-08-01

## 2025-08-01 RX ORDER — LISINOPRIL 10 MG/1
10 TABLET ORAL DAILY
Qty: 90 TABLET | Refills: 1 | Status: SHIPPED | OUTPATIENT
Start: 2025-08-01

## 2025-08-01 RX ORDER — ATORVASTATIN CALCIUM 20 MG/1
20 TABLET, FILM COATED ORAL DAILY
Qty: 90 TABLET | Refills: 1 | Status: SHIPPED | OUTPATIENT
Start: 2025-08-01

## 2025-08-01 RX ORDER — AMLODIPINE BESYLATE 5 MG/1
5 TABLET ORAL DAILY
Qty: 90 TABLET | Refills: 1 | Status: SHIPPED | OUTPATIENT
Start: 2025-08-01

## 2025-08-14 ENCOUNTER — OFFICE VISIT (OUTPATIENT)
Dept: PAIN MEDICINE | Facility: CLINIC | Age: 70
End: 2025-08-14
Payer: MEDICARE

## (undated) DEVICE — SAW BLADE OSCILLATING BRAZOL 167

## (undated) DEVICE — 3M™ IOBAN™ 2 ANTIMICROBIAL INCISE DRAPE 6648EZ: Brand: IOBAN™ 2

## (undated) DEVICE — GLOVE SRG BIOGEL 7.5

## (undated) DEVICE — PADDING CAST 4 IN  COTTON STRL

## (undated) DEVICE — GLOVE INDICATOR PI UNDERGLOVE SZ 9 BLUE

## (undated) DEVICE — BETHLEHEM UNIV MAJ EXT ,KIT: Brand: CARDINAL HEALTH

## (undated) DEVICE — DRESSING MEPILEX AG BORDER POST-OP 4 X 8 IN

## (undated) DEVICE — CHLORAPREP HI-LITE 26ML ORANGE

## (undated) DEVICE — BETHLEHEM UNIV MAJOR ORTHO,KIT: Brand: CARDINAL HEALTH

## (undated) DEVICE — SCD SEQUENTIAL COMPRESSION COMFORT SLEEVE MEDIUM KNEE LENGTH: Brand: KENDALL SCD

## (undated) DEVICE — SYRINGE 10ML LL

## (undated) DEVICE — SKIN MARKER DUAL TIP WITH RULER CAP, FLEXIBLE RULER AND LABELS: Brand: DEVON

## (undated) DEVICE — PROXIMATE SKIN STAPLERS (35 WIDE) CONTAINS 35 STAINLESS STEEL STAPLES (FIXED HEAD): Brand: PROXIMATE

## (undated) DEVICE — CUFF TOURNIQUET 30 X 4 IN QUICK CONNECT DISP 1BLA

## (undated) DEVICE — TWIST DRILL 3.2MM DIA 127.0MM LONG

## (undated) DEVICE — OCCLUSIVE GAUZE STRIP,3% BISMUTH TRIBROMOPHENATE IN PETROLATUM BLEND: Brand: XEROFORM

## (undated) DEVICE — GLOVE SRG BIOGEL 9

## (undated) DEVICE — SPONGE SCRUB 4 PCT CHLORHEXIDINE

## (undated) DEVICE — Device

## (undated) DEVICE — COBAN 6 IN STERILE

## (undated) DEVICE — THE SIMPULSE SOLO SYSTEM WITH ULTREX RETRACTABLE SPLASH SHIELD TIP: Brand: SIMPULSE SOLO

## (undated) DEVICE — ACE WRAP 6 IN UNSTERILE

## (undated) DEVICE — NEEDLE 18G X 1

## (undated) DEVICE — CEMENT MIXING CARTRIDGE PRISM II

## (undated) DEVICE — 3M™ STERI-DRAPE™ U-DRAPE 1015: Brand: STERI-DRAPE™

## (undated) DEVICE — U-DRAPE: Brand: CONVERTORS

## (undated) DEVICE — SUT MONOCRYL 2-0 CT-1 36 IN Y945H

## (undated) DEVICE — BETHLEHEM UNIV TOTAL KNEE, KIT: Brand: CARDINAL HEALTH

## (undated) DEVICE — IMPERVIOUS STOCKINETTE: Brand: DEROYAL

## (undated) DEVICE — PADDING CAST 6IN COTTON STRL

## (undated) DEVICE — SYRINGE 50ML LL

## (undated) DEVICE — PLUMEPEN PRO 10FT

## (undated) DEVICE — DRAPE C-ARM X-RAY

## (undated) DEVICE — WEBRIL 6 IN UNSTERILE

## (undated) DEVICE — SUT VICRYL 2-0 CTB-1 36 IN JB945

## (undated) DEVICE — SAW BLADE RECIPROCATING 179

## (undated) DEVICE — GLOVE SRG BIOGEL 8

## (undated) DEVICE — NEEDLE 18 G X 1 1/2

## (undated) DEVICE — HEAVY DUTY TABLE COVER: Brand: CONVERTORS

## (undated) DEVICE — COOL TEMP PAD

## (undated) DEVICE — HOOD: Brand: FLYTE

## (undated) DEVICE — SUT VICRYL 1 CTX 36 IN J977H

## (undated) DEVICE — GLOVE INDICATOR PI UNDERGLOVE SZ 8.5 BLUE

## (undated) DEVICE — 10FR FRAZIER SUCTION HANDLE: Brand: CARDINAL HEALTH

## (undated) DEVICE — TRAY FOLEY 16FR URIMETER SURESTEP

## (undated) DEVICE — TOWEL SET X-RAY

## (undated) DEVICE — INTENDED FOR TISSUE SEPARATION, AND OTHER PROCEDURES THAT REQUIRE A SHARP SURGICAL BLADE TO PUNCTURE OR CUT.: Brand: BARD-PARKER SAFETY BLADES SIZE 10, STERILE

## (undated) DEVICE — SUT VICRYL 1 CT-1 CR/8 27 IN JJ40G

## (undated) DEVICE — PROXIMATE PLUS MD MULTI-DIRECTIONAL RELEASE SKIN STAPLERS CONTAINS 35 STAINLESS STEEL STAPLES APPROXIMATE CLOSED DIMENSIONS: 6.9MM X 3.9MM WIDE: Brand: PROXIMATE

## (undated) DEVICE — HANDPIECE SET WITH RETRACTABLE COAXIAL FAN SPRAY TIP AND SUCTION TUBE: Brand: INTERPULSE

## (undated) DEVICE — SPONGE LAP 18 X 18 IN STRL RFD

## (undated) DEVICE — SILVER-COATED ANTIMICROBIAL BARRIER DRESSING: Brand: ACTICOAT   4" X 8"

## (undated) DEVICE — SURGICAL GOWN, XL SMARTSLEEVE: Brand: CONVERTORS

## (undated) DEVICE — CAPIT KNEE ATTUNE FB CEMENT - DEPUY